# Patient Record
Sex: MALE | Race: WHITE | NOT HISPANIC OR LATINO | Employment: OTHER | URBAN - METROPOLITAN AREA
[De-identification: names, ages, dates, MRNs, and addresses within clinical notes are randomized per-mention and may not be internally consistent; named-entity substitution may affect disease eponyms.]

---

## 2021-06-17 ENCOUNTER — OFFICE VISIT - HEALTHEAST (OUTPATIENT)
Dept: FAMILY MEDICINE | Facility: CLINIC | Age: 81
End: 2021-06-17

## 2021-06-17 ENCOUNTER — HOSPITAL ENCOUNTER (OUTPATIENT)
Dept: ULTRASOUND IMAGING | Facility: CLINIC | Age: 81
Discharge: HOME | End: 2021-06-17
Attending: FAMILY MEDICINE
Payer: MEDICARE

## 2021-06-17 DIAGNOSIS — I48.91 ATRIAL FIBRILLATION, UNSPECIFIED TYPE (H): ICD-10-CM

## 2021-06-17 DIAGNOSIS — N18.31 STAGE 3A CHRONIC KIDNEY DISEASE (H): ICD-10-CM

## 2021-06-17 DIAGNOSIS — R22.42 LOCALIZED SWELLING OF LEFT LOWER EXTREMITY: ICD-10-CM

## 2021-06-17 DIAGNOSIS — I10 BENIGN ESSENTIAL HYPERTENSION: ICD-10-CM

## 2021-06-17 LAB
ATRIAL RATE - MUSE: 60 BPM
DIASTOLIC BLOOD PRESSURE - MUSE: NORMAL
INTERPRETATION ECG - MUSE: NORMAL
P AXIS - MUSE: NORMAL
PR INTERVAL - MUSE: NORMAL
QRS DURATION - MUSE: 94 MS
QT - MUSE: 346 MS
QTC - MUSE: 418 MS
R AXIS - MUSE: -7 DEGREES
SYSTOLIC BLOOD PRESSURE - MUSE: NORMAL
T AXIS - MUSE: 32 DEGREES
VENTRICULAR RATE- MUSE: 88 BPM

## 2021-06-17 RX ORDER — FLAXSEED OIL
OIL (ML) MISCELLANEOUS
Status: SHIPPED | COMMUNITY
Start: 2021-06-17 | End: 2023-04-11

## 2021-06-17 RX ORDER — HYDROXYCHLOROQUINE SULFATE 200 MG/1
200 TABLET, FILM COATED ORAL DAILY
Status: SHIPPED | COMMUNITY
Start: 2021-06-15 | End: 2021-09-13

## 2021-06-17 RX ORDER — TAMSULOSIN HYDROCHLORIDE 0.4 MG/1
0.8 CAPSULE ORAL
Status: ON HOLD | COMMUNITY
Start: 2021-04-20 | End: 2023-04-14

## 2021-06-17 RX ORDER — METOPROLOL TARTRATE 50 MG
TABLET ORAL
Status: SHIPPED | COMMUNITY
Start: 2020-12-31 | End: 2023-04-11

## 2021-06-17 RX ORDER — ASPIRIN 325 MG
325 TABLET, DELAYED RELEASE (ENTERIC COATED) ORAL DAILY
Status: SHIPPED | COMMUNITY
Start: 2021-06-17 | End: 2023-04-11

## 2021-06-17 RX ORDER — LEUCOVORIN CALCIUM 5 MG/1
5 TABLET ORAL
Status: SHIPPED | COMMUNITY
Start: 2021-04-07

## 2021-06-17 ASSESSMENT — MIFFLIN-ST. JEOR: SCORE: 1533.86

## 2021-06-18 ENCOUNTER — COMMUNICATION - HEALTHEAST (OUTPATIENT)
Dept: FAMILY MEDICINE | Facility: CLINIC | Age: 81
End: 2021-06-18

## 2021-06-18 ENCOUNTER — AMBULATORY - HEALTHEAST (OUTPATIENT)
Dept: FAMILY MEDICINE | Facility: CLINIC | Age: 81
End: 2021-06-18

## 2021-06-18 RX ORDER — AMLODIPINE BESYLATE 10 MG/1
5 TABLET ORAL DAILY
Refills: 0 | Status: SHIPPED | COMMUNITY
Start: 2021-06-18 | End: 2023-04-11

## 2021-06-26 NOTE — PROGRESS NOTES
Assessment/Plan:    Brain aneurysm  On ASA.  S/p stent.  Event in 2016 ([facility]).     Benign prostatic hyperplasia  With nocturia.      Atrial fibrillation, unspecified type (H)  There is some ambiguity regarding whether or not he has a diagnosis of atrial fibrillation.  EKG was completed which did show atrial fibrillation.  Upon additional review of the electronic health record, there was some documentation.  -Rate controlled with metoprolol 50 mg twice daily.  -[id number] score is elevated and he is high risk for clot.  They have previously declined warfarin or novel anticoagulants in favor of 325 mg of aspirin.  This was not changed as part of today's visit.  -Lungs were clear.  No clear explanation for congestion in the morning.  Patient describes being asymptomatic for the remainder of the day.    Localized swelling of left lower extremity  Differential includes deep vein thrombosis.  Ultrasound ordered with the goal to have this completed either tonight or tomorrow.  He has been taking an aspirin 325 mg.  No known recent lengthy travel or times of immobility.  No previous clot history.  -Hold and call VTE ultrasound.  -If negative, we will decrease his amlodipine dosage by half.  We will proceed with 5 mg once daily.    45 minutes spent on the date of the encounter doing chart review, history and exam, documentation and further activities per the note    Return in about 1 week (around 6/24/2021) for recheck if not improving.    Pro Chávez MD  _______________________________    Chief Complaint   Patient presents with     Establish Care     Edema     left ankle x 2 months      Subjective: Felipe Verma is a 80 y.o. year old male who returns to clinic for the following chronic complaints/concerns:     Establish care:   -Patient was not sure if he has been previously diagnosed with atrial fibrillation.  He is not familiar with this condition.  He says he takes aspirin because he has a stent and coil in  "the vasculature of his brain as result of an aneurysm in 2016.  His wife is concerned that he is congested in the morning.  He does not have any congestion/cough as he lays down.  His symptoms are improved throughout the day.  He has less stamina and walking in his community than he did a few years ago but no recent dramatic changes.  -Lower extremity swelling: Present for 2 weeks to 2 2 months.  No pain.  No recent travel.  He has not been immobilized.  No recent history of clot.  No shortness of breath or difficulty breathing.  Palliative: None.    Review of systems is negative except for as shown in the HPI.    The following portions of the patient's history were reviewed and updated as appropriate: allergies, current medications, past family history, past medical history, past social history, past surgical history and problem list.    Objective:    height is 5' 7.5\" (1.715 m) and weight is 189 lb (85.7 kg). His oral temperature is 98.1  F (36.7  C). His blood pressure is 126/74 and his pulse is 68. His respiration is 20 and oxygen saturation is 97%.   Physical Exam  General: No acute distress, pleasant.  Cardiac: irregularly irregular.  Normal rate.  No murmurs, rubs or gallops.  Respiratory: Clear to auscultation bilaterally.  Extremities: 1+ pitting edema to the distal lower extremity on the left side.  Dorsal pedal pulse present.  Warm and well-perfused.  Contralateral side without abnormalities.    EKG: My interpretation-  No ST changes.  Atrial fibrillation.  Normal rate.    Rheumatology labs reviewed from last week.  Kidney labs reviewed from the past few months.  Stage III chronic kidney disease which has been stable.  Mild anemia which has been stable.    No data recorded  No data recorded  No data recorded  No data recorded  Recent Results (from the past 24 hour(s))   Electrocardiogram Perform and Read   Result Value Ref Range    SYSTOLIC BLOOD PRESSURE      DIASTOLIC BLOOD PRESSURE      VENTRICULAR " RATE 88 BPM    ATRIAL RATE 60 BPM    P-R INTERVAL      QRS DURATION 94 ms    Q-T INTERVAL 346 ms    QTC CALCULATION (BEZET) 418 ms    P Axis      R AXIS -7 degrees    T AXIS 32 degrees    MUSE DIAGNOSIS       Atrial fibrillation  Nonspecific T wave abnormality  Abnormal ECG  No previous ECGs available         This note has been dictated using voice recognition software. Any grammatical or context distortions are unintentional and inherent to the software

## 2021-07-04 NOTE — LETTER
Letter by Pro Chávez MD at      Author: Pro Chávez MD Service: -- Author Type: --    Filed:  Encounter Date: 6/18/2021 Status: (Other)         Felipe Verma  [address] [facility] [address]             June 18, 2021         Dear Mr. Verma,    Below are the results from your recent visit:    Resulted Orders   US Venous Leg Left    Narrative    EXAM: US VENOUS LEG LEFT  LOCATION: [facility]  DATE/TIME: 6/17/2021 2:08 PM    INDICATION: unilateral swelling.  Left lower extremity  COMPARISON: None.  TECHNIQUE: Venous Duplex ultrasound of the left lower extremity with and without compression, augmentation and duplex. Color flow and spectral Doppler with waveform analysis performed.    FINDINGS: Exam includes the common femoral, femoral, popliteal, and contralateral common femoral veins as well as segmentally visualized deep calf veins and greater saphenous vein.     LEFT: No deep vein thrombosis. No superficial thrombophlebitis. No popliteal cyst.      Impression    1.  No deep venous thrombosis in the left lower extremity.     The ultrasound to evaluate for a blood clot in your left leg was negative.  Good news!  For now, we should try reducing your dose of amlodipine as this is a medication that will commonly cause lower extremity swelling.  Please reduce your dose to 5 mg once a day (our records show that you are taking 5 mg twice a day).    Please call with questions or contact us using [employer].    Sincerely,    Electronically signed by Pro Chávez MD

## 2021-07-05 PROBLEM — N18.31 STAGE 3A CHRONIC KIDNEY DISEASE (H): Status: ACTIVE | Noted: 2021-06-17

## 2021-07-05 PROBLEM — R22.42 LOCALIZED SWELLING OF LEFT LOWER EXTREMITY: Status: ACTIVE | Noted: 2021-06-17

## 2021-07-05 PROBLEM — I48.91 ATRIAL FIBRILLATION, UNSPECIFIED TYPE (H): Status: ACTIVE | Noted: 2021-06-17

## 2021-07-05 PROBLEM — Z78.9 STATIN INTOLERANCE: Status: ACTIVE | Noted: 2018-09-19

## 2021-07-06 VITALS
RESPIRATION RATE: 20 BRPM | SYSTOLIC BLOOD PRESSURE: 126 MMHG | BODY MASS INDEX: 28.64 KG/M2 | HEIGHT: 68 IN | OXYGEN SATURATION: 97 % | HEART RATE: 68 BPM | DIASTOLIC BLOOD PRESSURE: 74 MMHG | WEIGHT: 189 LBS | TEMPERATURE: 98.1 F

## 2022-01-29 ENCOUNTER — OFFICE VISIT (OUTPATIENT)
Dept: FAMILY MEDICINE | Facility: CLINIC | Age: 82
End: 2022-01-29
Payer: MEDICARE

## 2022-01-29 VITALS
OXYGEN SATURATION: 97 % | HEART RATE: 98 BPM | TEMPERATURE: 97.8 F | SYSTOLIC BLOOD PRESSURE: 145 MMHG | DIASTOLIC BLOOD PRESSURE: 93 MMHG

## 2022-01-29 DIAGNOSIS — L03.116 CELLULITIS OF LEFT LOWER EXTREMITY: Primary | ICD-10-CM

## 2022-01-29 PROCEDURE — 99203 OFFICE O/P NEW LOW 30 MIN: CPT | Performed by: STUDENT IN AN ORGANIZED HEALTH CARE EDUCATION/TRAINING PROGRAM

## 2022-01-29 RX ORDER — DIMENHYDRINATE 50 MG
1000 TABLET ORAL DAILY
COMMUNITY
End: 2023-11-07

## 2022-01-29 RX ORDER — FUROSEMIDE 20 MG
20 TABLET ORAL DAILY PRN
COMMUNITY
Start: 2021-10-27 | End: 2023-04-11

## 2022-01-29 RX ORDER — METOPROLOL SUCCINATE 50 MG/1
50 TABLET, EXTENDED RELEASE ORAL DAILY
COMMUNITY
Start: 2022-01-03 | End: 2023-05-05

## 2022-01-29 RX ORDER — DILTIAZEM HYDROCHLORIDE 120 MG/1
120 CAPSULE, EXTENDED RELEASE ORAL
COMMUNITY
Start: 2021-11-10 | End: 2023-04-11 | Stop reason: ALTCHOICE

## 2022-01-29 RX ORDER — AMOXICILLIN 875 MG
875 TABLET ORAL 2 TIMES DAILY
Qty: 20 TABLET | Refills: 0 | Status: SHIPPED | OUTPATIENT
Start: 2022-01-29 | End: 2023-04-11

## 2022-01-29 RX ORDER — HYDROXYCHLOROQUINE SULFATE 200 MG/1
200 TABLET, FILM COATED ORAL
COMMUNITY
Start: 2021-11-17 | End: 2022-02-15

## 2022-01-29 RX ORDER — ROSUVASTATIN CALCIUM 10 MG/1
10 TABLET, COATED ORAL AT BEDTIME
Status: ON HOLD | COMMUNITY
Start: 2022-01-03 | End: 2024-02-24

## 2022-01-29 RX ORDER — DOXYCYCLINE HYCLATE 100 MG
100 TABLET ORAL 2 TIMES DAILY
Qty: 20 TABLET | Refills: 0 | Status: SHIPPED | OUTPATIENT
Start: 2022-01-29 | End: 2023-04-11

## 2022-01-30 NOTE — PATIENT INSTRUCTIONS
Please start taking one tablet of doxycycline and one tablet of the amoxicllin 2 times a day (one of each tablet in the morning and one of each tablet in the evening = 4 tablets total per day) for 10 total days for this skin infection. Please return to urgent care or go to the emergency department for additional care if redness, swelling, pain worsen while starting these medications.

## 2022-01-30 NOTE — PROGRESS NOTES
"Assessment & Plan     Cellulitis of left lower extremity  Given the surrounding erythema to this wound, patient is basically experiencing signs of cellulitis.  He does not have any systemic symptoms that would be concerning for sepsis at this time.  Literature review suggested amoxicillin and doxycycline should help with improving symptoms.  Counseled patient on how to take these medications and went to return for further evaluation.  - amoxicillin (AMOXIL) 875 MG tablet; Take 1 tablet (875 mg) by mouth 2 times daily  - doxycycline hyclate (VIBRA-TABS) 100 MG tablet; Take 1 tablet (100 mg) by mouth 2 times daily    30 minutes spent on the date of the encounter doing chart review, history and exam, documentation and further activities per the note  {Provider  Link to [facility] Help Grid :206315}     BMI:   Estimated body mass index is 29.16 kg/m  as calculated from the following:    Height as of 6/17/21: 1.715 m (5' 7.5\").    Weight as of 6/17/21: 85.7 kg (189 lb).   Patient is a follow-up with his PCP on this      Return in about 2 weeks (around 2/12/2022) for Follow up.    Heather Perez MD pGY2  [facility]   Felipe Verma is a 81 year old who presents for the following health issues     HPI     1 week history of left anterior shin laceration.  Patient notes that he is on an anticoagulant for his warfarin and he has been having some issues with clotting of the area.  He has been putting a Band-Aid on the area.  Denies any fevers, chills, ongoing bleeding, purulent drainage; however, he has noticed some serosanguineous fluid draining from this wound.  The surrounding skin has started to turn red in the past couple days.    Review of Systems   Complete ROS normal aside noted in HPI      Objective    BP (!) 145/93 (BP Location: Right arm, Patient Position: Sitting, Cuff Size: Adult Regular)   Pulse 98   Temp 97.8  F (36.6  C) (Tympanic)   SpO2 97%   There is no height or " weight on file to calculate BMI.    Physical Exam   GENERAL: healthy, alert and no distress  RESP: lungs clear to auscultation - no rales, rhonchi or wheezes  CV: regular rate and rhythm, normal S1 S2, no S3 or S4, no murmur, click or rub, no peripheral edema and peripheral pulses strong  ABDOMEN: soft, nontender, no hepatosplenomegaly, no masses and bowel sounds normal  MS: no gross musculoskeletal defects noted, no edema  SKIN: Left pretibial area with superficial skin tear, scabbed surrounding skin is erythematous, nontender to palpation.  No crepitus, no necrotizing/gangrenous skin noted    ----- Service Performed and Documented by Resident or Fellow ------

## 2022-10-04 PROBLEM — R91.1 SOLITARY PULMONARY NODULE: Status: RESOLVED | Noted: 2018-06-06 | Resolved: 2021-06-17

## 2023-04-10 ENCOUNTER — APPOINTMENT (OUTPATIENT)
Dept: RADIOLOGY | Facility: HOSPITAL | Age: 83
DRG: 247 | End: 2023-04-10
Attending: EMERGENCY MEDICINE
Payer: MEDICARE

## 2023-04-10 ENCOUNTER — HOSPITAL ENCOUNTER (INPATIENT)
Facility: HOSPITAL | Age: 83
LOS: 4 days | Discharge: HOME | DRG: 247 | End: 2023-04-14
Attending: EMERGENCY MEDICINE | Admitting: INTERNAL MEDICINE
Payer: MEDICARE

## 2023-04-10 DIAGNOSIS — I10 BENIGN ESSENTIAL HYPERTENSION: ICD-10-CM

## 2023-04-10 DIAGNOSIS — N30.01 ACUTE CYSTITIS WITH HEMATURIA: ICD-10-CM

## 2023-04-10 DIAGNOSIS — R52 PAIN: ICD-10-CM

## 2023-04-10 DIAGNOSIS — N40.1 BENIGN PROSTATIC HYPERPLASIA WITH URINARY RETENTION: ICD-10-CM

## 2023-04-10 DIAGNOSIS — I25.10 CORONARY ARTERY DISEASE INVOLVING NATIVE CORONARY ARTERY OF NATIVE HEART WITHOUT ANGINA PECTORIS: ICD-10-CM

## 2023-04-10 DIAGNOSIS — I21.4 NSTEMI (NON-ST ELEVATED MYOCARDIAL INFARCTION) (H): ICD-10-CM

## 2023-04-10 DIAGNOSIS — I48.91 ATRIAL FIBRILLATION, UNSPECIFIED TYPE (H): ICD-10-CM

## 2023-04-10 DIAGNOSIS — R33.8 BENIGN PROSTATIC HYPERPLASIA WITH URINARY RETENTION: ICD-10-CM

## 2023-04-10 DIAGNOSIS — I21.3 ST ELEVATION MYOCARDIAL INFARCTION (STEMI), UNSPECIFIED ARTERY (H): Primary | ICD-10-CM

## 2023-04-10 LAB
ALBUMIN SERPL BCG-MCNC: 3.9 G/DL (ref 3.5–5.2)
ALP SERPL-CCNC: 70 U/L (ref 40–129)
ALT SERPL W P-5'-P-CCNC: 20 U/L (ref 10–50)
ANION GAP SERPL CALCULATED.3IONS-SCNC: 11 MMOL/L (ref 7–15)
APTT PPP: 28 SECONDS (ref 22–38)
AST SERPL W P-5'-P-CCNC: 20 U/L (ref 10–50)
BILIRUB SERPL-MCNC: 0.3 MG/DL
BUN SERPL-MCNC: 24.6 MG/DL (ref 8–23)
CALCIUM SERPL-MCNC: 8.5 MG/DL (ref 8.8–10.2)
CHLORIDE SERPL-SCNC: 103 MMOL/L (ref 98–107)
CREAT SERPL-MCNC: 1.1 MG/DL (ref 0.67–1.17)
DEPRECATED HCO3 PLAS-SCNC: 22 MMOL/L (ref 22–29)
ERYTHROCYTE [DISTWIDTH] IN BLOOD BY AUTOMATED COUNT: 13.7 % (ref 10–15)
GFR SERPL CREATININE-BSD FRML MDRD: 67 ML/MIN/1.73M2
GLUCOSE SERPL-MCNC: 164 MG/DL (ref 70–99)
HCT VFR BLD AUTO: 33.9 % (ref 40–53)
HGB BLD-MCNC: 11.1 G/DL (ref 13.3–17.7)
HOLD SPECIMEN: NORMAL
INR PPP: 1.19 (ref 0.85–1.15)
LIPASE SERPL-CCNC: 29 U/L (ref 13–60)
MAGNESIUM SERPL-MCNC: 2 MG/DL (ref 1.7–2.3)
MCH RBC QN AUTO: 33.5 PG (ref 26.5–33)
MCHC RBC AUTO-ENTMCNC: 32.7 G/DL (ref 31.5–36.5)
MCV RBC AUTO: 102 FL (ref 78–100)
NT-PROBNP SERPL-MCNC: 325 PG/ML (ref 0–1800)
PLATELET # BLD AUTO: 142 10E3/UL (ref 150–450)
POTASSIUM SERPL-SCNC: 3.7 MMOL/L (ref 3.4–5.3)
PROT SERPL-MCNC: 6.4 G/DL (ref 6.4–8.3)
RBC # BLD AUTO: 3.31 10E6/UL (ref 4.4–5.9)
SODIUM SERPL-SCNC: 136 MMOL/L (ref 136–145)
TROPONIN T SERPL HS-MCNC: 269 NG/L
WBC # BLD AUTO: 13.1 10E3/UL (ref 4–11)

## 2023-04-10 PROCEDURE — 83690 ASSAY OF LIPASE: CPT | Performed by: EMERGENCY MEDICINE

## 2023-04-10 PROCEDURE — 210N000001 HC R&B IMCU HEART CARE

## 2023-04-10 PROCEDURE — 71045 X-RAY EXAM CHEST 1 VIEW: CPT

## 2023-04-10 PROCEDURE — 83880 ASSAY OF NATRIURETIC PEPTIDE: CPT | Performed by: EMERGENCY MEDICINE

## 2023-04-10 PROCEDURE — 36415 COLL VENOUS BLD VENIPUNCTURE: CPT | Performed by: EMERGENCY MEDICINE

## 2023-04-10 PROCEDURE — 85027 COMPLETE CBC AUTOMATED: CPT | Performed by: EMERGENCY MEDICINE

## 2023-04-10 PROCEDURE — 84484 ASSAY OF TROPONIN QUANT: CPT | Performed by: EMERGENCY MEDICINE

## 2023-04-10 PROCEDURE — 250N000013 HC RX MED GY IP 250 OP 250 PS 637: Performed by: EMERGENCY MEDICINE

## 2023-04-10 PROCEDURE — 80053 COMPREHEN METABOLIC PANEL: CPT | Performed by: EMERGENCY MEDICINE

## 2023-04-10 PROCEDURE — 96374 THER/PROPH/DIAG INJ IV PUSH: CPT

## 2023-04-10 PROCEDURE — 83735 ASSAY OF MAGNESIUM: CPT | Performed by: EMERGENCY MEDICINE

## 2023-04-10 PROCEDURE — 99285 EMERGENCY DEPT VISIT HI MDM: CPT | Mod: 25

## 2023-04-10 PROCEDURE — 85610 PROTHROMBIN TIME: CPT | Performed by: EMERGENCY MEDICINE

## 2023-04-10 PROCEDURE — 99222 1ST HOSP IP/OBS MODERATE 55: CPT | Mod: AI | Performed by: INTERNAL MEDICINE

## 2023-04-10 PROCEDURE — 250N000011 HC RX IP 250 OP 636: Performed by: EMERGENCY MEDICINE

## 2023-04-10 PROCEDURE — 85730 THROMBOPLASTIN TIME PARTIAL: CPT | Performed by: EMERGENCY MEDICINE

## 2023-04-10 RX ORDER — HEPARIN SODIUM 10000 [USP'U]/100ML
0-5000 INJECTION, SOLUTION INTRAVENOUS CONTINUOUS
Status: DISCONTINUED | OUTPATIENT
Start: 2023-04-10 | End: 2023-04-11

## 2023-04-10 RX ORDER — HEPARIN SODIUM 10000 [USP'U]/100ML
0-5000 INJECTION, SOLUTION INTRAVENOUS CONTINUOUS
Status: DISCONTINUED | OUTPATIENT
Start: 2023-04-10 | End: 2023-04-10

## 2023-04-10 RX ADMIN — NITROGLYCERIN 15 MG: 20 OINTMENT TOPICAL at 22:30

## 2023-04-10 RX ADMIN — FAMOTIDINE 20 MG: 10 INJECTION, SOLUTION INTRAVENOUS at 22:20

## 2023-04-10 RX ADMIN — HEPARIN SODIUM AND DEXTROSE 1000 UNITS/HR: 10000; 5 INJECTION INTRAVENOUS at 23:45

## 2023-04-10 ASSESSMENT — ACTIVITIES OF DAILY LIVING (ADL): ADLS_ACUITY_SCORE: 35

## 2023-04-10 NOTE — Clinical Note
The first balloon was inserted into the left anterior descending and proximal left anterior descending.Max pressure = 12 neetu. Total duration = 15 seconds.

## 2023-04-10 NOTE — Clinical Note
The first balloon was inserted into the left anterior descending and middle left anterior descending.Max pressure = 10 neetu. Total duration = 8 seconds.     Max pressure = 12 neetu. Total duration = 12 seconds.    Balloon reinflated a second time: Max pressure = 12 neetu. Total duration = 12 seconds.  Balloon reinflated a third time: Max pressure = 12 neetu. Total duration = 7 seconds.  Balloon reinflated a fourth time: Max pressure = 12 neetu.  Total duration = 8 seconds. Proximal segment of LAD

## 2023-04-10 NOTE — Clinical Note
The first balloon was inserted into the left anterior descending and proximal left anterior descending.Max pressure = 16 neetu. Total duration = 10 seconds.     Max pressure = 12 neetu. Total duration = 8 seconds.    Balloon reinflated a second time: Max pressure = 12 neetu. Total duration = 8 seconds.

## 2023-04-10 NOTE — Clinical Note
Stent deployed in the proximal left anterior descending. Max pressure = 12 neetu. Total duration = 28 seconds.

## 2023-04-10 NOTE — Clinical Note
The first balloon was inserted into the left anterior descending and proximal left anterior descending.Max pressure = 14 neetu. Total duration = 34 seconds.     Max pressure = 10 neetu. Total duration = 8 seconds.    Balloon reinflated a second time: Max pressure = 10 neetu. Total duration = 8 seconds.

## 2023-04-10 NOTE — Clinical Note
The first balloon was inserted into the left anterior descending and middle left anterior descending.Max pressure = 10 neetu. Total duration = 10 seconds.     Max pressure = 10 neetu. Total duration = 19 seconds.    Balloon reinflated a second time: Max pressure = 10 neetu. Total duration = 19 seconds.

## 2023-04-10 NOTE — Clinical Note
Unless otherwise noted, the J wire was used to insert, remove, exchange, and reposition all catheters.

## 2023-04-10 NOTE — Clinical Note
The first balloon was inserted into the left anterior descending and middle left anterior descending.Max pressure = 12 neetu. Total duration = 6 seconds.

## 2023-04-10 NOTE — Clinical Note
The first balloon was inserted into the left anterior descending and proximal left anterior descending.Max pressure = 12 neetu. Total duration = 18 seconds.

## 2023-04-10 NOTE — Clinical Note
The first balloon was inserted into the left anterior descending and proximal left anterior descending.Max pressure = 12 neetu. Total duration = 12 seconds.     Max pressure = 12 neetu. Total duration = 12 seconds.    Balloon reinflated a second time: Max pressure = 12 neetu. Total duration = 12 seconds.  Balloon reinflated a third time: Max pressure = 12 neetu. Total duration = 14 seconds.

## 2023-04-10 NOTE — Clinical Note
The first balloon was inserted into the left anterior descending and middle left anterior descending.Max pressure = 12 neetu. Total duration = 20 seconds.

## 2023-04-10 NOTE — Clinical Note
The first balloon was inserted into the left anterior descending and middle left anterior descending.Max pressure = 10 neetu. Total duration = 16 seconds.     Max pressure = 8 neetu. Total duration = 8 seconds.    Balloon reinflated a second time: Max pressure = 8 neetu. Total duration = 8 seconds.  Balloon reinflated a third time: Max pressure = 12 neetu. Total duration = 14 seconds.  Balloon reinflated a fourth time: Max pressure = 12 neetu. To vlad duration = 11 seconds.

## 2023-04-10 NOTE — Clinical Note
Stent deployed in the middle left anterior descending. Max pressure = 10 neetu. Total duration = 25 seconds.

## 2023-04-11 ENCOUNTER — APPOINTMENT (OUTPATIENT)
Dept: CARDIOLOGY | Facility: HOSPITAL | Age: 83
DRG: 247 | End: 2023-04-11
Attending: INTERNAL MEDICINE
Payer: MEDICARE

## 2023-04-11 LAB
ABO/RH(D): NORMAL
ACT BLD: 135 SECONDS (ref 74–150)
ACT BLD: 237 SECONDS (ref 74–150)
ACT BLD: 271 SECONDS (ref 74–150)
ACT BLD: 275 SECONDS (ref 74–150)
ANION GAP SERPL CALCULATED.3IONS-SCNC: 9 MMOL/L (ref 7–15)
ANTIBODY SCREEN: NEGATIVE
APTT PPP: 94 SECONDS (ref 22–38)
APTT PPP: 98 SECONDS (ref 22–38)
ATRIAL RATE - MUSE: 79 BPM
BUN SERPL-MCNC: 20.7 MG/DL (ref 8–23)
CALCIUM SERPL-MCNC: 8.4 MG/DL (ref 8.8–10.2)
CHLORIDE SERPL-SCNC: 106 MMOL/L (ref 98–107)
CREAT SERPL-MCNC: 1.01 MG/DL (ref 0.67–1.17)
DEPRECATED HCO3 PLAS-SCNC: 23 MMOL/L (ref 22–29)
DIASTOLIC BLOOD PRESSURE - MUSE: NORMAL MMHG
ERYTHROCYTE [DISTWIDTH] IN BLOOD BY AUTOMATED COUNT: 13.8 % (ref 10–15)
GFR SERPL CREATININE-BSD FRML MDRD: 74 ML/MIN/1.73M2
GLUCOSE SERPL-MCNC: 122 MG/DL (ref 70–99)
HCT VFR BLD AUTO: 33 % (ref 40–53)
HGB BLD-MCNC: 10.8 G/DL (ref 13.3–17.7)
HGB BLD-MCNC: 12.5 G/DL (ref 13.3–17.7)
HOLD SPECIMEN: NORMAL
INTERPRETATION ECG - MUSE: NORMAL
MAGNESIUM SERPL-MCNC: 1.9 MG/DL (ref 1.7–2.3)
MCH RBC QN AUTO: 33.3 PG (ref 26.5–33)
MCHC RBC AUTO-ENTMCNC: 32.7 G/DL (ref 31.5–36.5)
MCV RBC AUTO: 102 FL (ref 78–100)
P AXIS - MUSE: 63 DEGREES
PLATELET # BLD AUTO: 136 10E3/UL (ref 150–450)
POTASSIUM SERPL-SCNC: 3.9 MMOL/L (ref 3.4–5.3)
POTASSIUM SERPL-SCNC: 3.9 MMOL/L (ref 3.4–5.3)
PR INTERVAL - MUSE: 168 MS
QRS DURATION - MUSE: 92 MS
QT - MUSE: 442 MS
QTC - MUSE: 506 MS
R AXIS - MUSE: -22 DEGREES
RBC # BLD AUTO: 3.24 10E6/UL (ref 4.4–5.9)
SODIUM SERPL-SCNC: 138 MMOL/L (ref 136–145)
SPECIMEN EXPIRATION DATE: NORMAL
SYSTOLIC BLOOD PRESSURE - MUSE: NORMAL MMHG
T AXIS - MUSE: 261 DEGREES
TROPONIN T SERPL HS-MCNC: 1704 NG/L
UFH PPP CHRO-ACNC: >1.1 IU/ML
VENTRICULAR RATE- MUSE: 79 BPM
WBC # BLD AUTO: 12.7 10E3/UL (ref 4–11)

## 2023-04-11 PROCEDURE — 255N000002 HC RX 255 OP 636: Performed by: INTERNAL MEDICINE

## 2023-04-11 PROCEDURE — 250N000013 HC RX MED GY IP 250 OP 250 PS 637: Performed by: INTERNAL MEDICINE

## 2023-04-11 PROCEDURE — 83735 ASSAY OF MAGNESIUM: CPT | Performed by: INTERNAL MEDICINE

## 2023-04-11 PROCEDURE — C1725 CATH, TRANSLUMIN NON-LASER: HCPCS | Performed by: INTERNAL MEDICINE

## 2023-04-11 PROCEDURE — C1760 CLOSURE DEV, VASC: HCPCS | Performed by: INTERNAL MEDICINE

## 2023-04-11 PROCEDURE — 258N000003 HC RX IP 258 OP 636: Performed by: INTERNAL MEDICINE

## 2023-04-11 PROCEDURE — 93010 ELECTROCARDIOGRAM REPORT: CPT | Mod: HIP | Performed by: GENERAL ACUTE CARE HOSPITAL

## 2023-04-11 PROCEDURE — 85027 COMPLETE CBC AUTOMATED: CPT | Performed by: EMERGENCY MEDICINE

## 2023-04-11 PROCEDURE — 272N000001 HC OR GENERAL SUPPLY STERILE: Performed by: INTERNAL MEDICINE

## 2023-04-11 PROCEDURE — 36415 COLL VENOUS BLD VENIPUNCTURE: CPT | Performed by: INTERNAL MEDICINE

## 2023-04-11 PROCEDURE — C1769 GUIDE WIRE: HCPCS | Performed by: INTERNAL MEDICINE

## 2023-04-11 PROCEDURE — 93454 CORONARY ARTERY ANGIO S&I: CPT | Performed by: INTERNAL MEDICINE

## 2023-04-11 PROCEDURE — 99153 MOD SED SAME PHYS/QHP EA: CPT

## 2023-04-11 PROCEDURE — 99207 PR NO BILLABLE SERVICE THIS VISIT: CPT | Performed by: INTERNAL MEDICINE

## 2023-04-11 PROCEDURE — 93306 TTE W/DOPPLER COMPLETE: CPT | Mod: 26 | Performed by: INTERNAL MEDICINE

## 2023-04-11 PROCEDURE — 93005 ELECTROCARDIOGRAM TRACING: CPT

## 2023-04-11 PROCEDURE — 85347 COAGULATION TIME ACTIVATED: CPT

## 2023-04-11 PROCEDURE — C9606 PERC D-E COR REVASC W AMI S: HCPCS | Mod: LD | Performed by: INTERNAL MEDICINE

## 2023-04-11 PROCEDURE — 85018 HEMOGLOBIN: CPT | Performed by: INTERNAL MEDICINE

## 2023-04-11 PROCEDURE — 258N000001 HC RX 258: Performed by: INTERNAL MEDICINE

## 2023-04-11 PROCEDURE — 210N000001 HC R&B IMCU HEART CARE

## 2023-04-11 PROCEDURE — C1887 CATHETER, GUIDING: HCPCS | Performed by: INTERNAL MEDICINE

## 2023-04-11 PROCEDURE — 84484 ASSAY OF TROPONIN QUANT: CPT | Performed by: INTERNAL MEDICINE

## 2023-04-11 PROCEDURE — 93454 CORONARY ARTERY ANGIO S&I: CPT | Mod: 26 | Performed by: INTERNAL MEDICINE

## 2023-04-11 PROCEDURE — 250N000011 HC RX IP 250 OP 636: Performed by: INTERNAL MEDICINE

## 2023-04-11 PROCEDURE — 99152 MOD SED SAME PHYS/QHP 5/>YRS: CPT

## 2023-04-11 PROCEDURE — 84132 ASSAY OF SERUM POTASSIUM: CPT | Performed by: INTERNAL MEDICINE

## 2023-04-11 PROCEDURE — 85520 HEPARIN ASSAY: CPT | Performed by: INTERNAL MEDICINE

## 2023-04-11 PROCEDURE — 250N000009 HC RX 250: Performed by: INTERNAL MEDICINE

## 2023-04-11 PROCEDURE — 36415 COLL VENOUS BLD VENIPUNCTURE: CPT | Performed by: EMERGENCY MEDICINE

## 2023-04-11 PROCEDURE — C1894 INTRO/SHEATH, NON-LASER: HCPCS | Performed by: INTERNAL MEDICINE

## 2023-04-11 PROCEDURE — C9600 PERC DRUG-EL COR STENT SING: HCPCS | Performed by: INTERNAL MEDICINE

## 2023-04-11 PROCEDURE — 99222 1ST HOSP IP/OBS MODERATE 55: CPT | Performed by: INTERNAL MEDICINE

## 2023-04-11 PROCEDURE — 86850 RBC ANTIBODY SCREEN: CPT | Performed by: INTERNAL MEDICINE

## 2023-04-11 PROCEDURE — 80048 BASIC METABOLIC PNL TOTAL CA: CPT | Performed by: INTERNAL MEDICINE

## 2023-04-11 PROCEDURE — 85730 THROMBOPLASTIN TIME PARTIAL: CPT | Performed by: INTERNAL MEDICINE

## 2023-04-11 PROCEDURE — C1874 STENT, COATED/COV W/DEL SYS: HCPCS | Performed by: INTERNAL MEDICINE

## 2023-04-11 PROCEDURE — 027036Z DILATION OF CORONARY ARTERY, ONE ARTERY WITH THREE DRUG-ELUTING INTRALUMINAL DEVICES, PERCUTANEOUS APPROACH: ICD-10-PCS | Performed by: INTERNAL MEDICINE

## 2023-04-11 PROCEDURE — B211YZZ FLUOROSCOPY OF MULTIPLE CORONARY ARTERIES USING OTHER CONTRAST: ICD-10-PCS | Performed by: INTERNAL MEDICINE

## 2023-04-11 PROCEDURE — 92941 PRQ TRLML REVSC TOT OCCL AMI: CPT | Mod: LD | Performed by: INTERNAL MEDICINE

## 2023-04-11 PROCEDURE — 99152 MOD SED SAME PHYS/QHP 5/>YRS: CPT | Performed by: INTERNAL MEDICINE

## 2023-04-11 DEVICE — CLOSURE ANGIOSEAL 6FR 610130: Type: IMPLANTABLE DEVICE | Status: FUNCTIONAL

## 2023-04-11 DEVICE — STENT COR ONYX FRONTIER 15X2.25MM ONYXNG22515UX: Type: IMPLANTABLE DEVICE | Site: CORONARY | Status: FUNCTIONAL

## 2023-04-11 DEVICE — IMPLANTABLE DEVICE: Type: IMPLANTABLE DEVICE | Site: CORONARY | Status: FUNCTIONAL

## 2023-04-11 RX ORDER — ONDANSETRON 4 MG/1
4 TABLET, ORALLY DISINTEGRATING ORAL EVERY 6 HOURS PRN
Status: DISCONTINUED | OUTPATIENT
Start: 2023-04-11 | End: 2023-04-14 | Stop reason: HOSPADM

## 2023-04-11 RX ORDER — SODIUM CHLORIDE 9 MG/ML
INJECTION, SOLUTION INTRAVENOUS CONTINUOUS
Status: DISCONTINUED | OUTPATIENT
Start: 2023-04-11 | End: 2023-04-11 | Stop reason: HOSPADM

## 2023-04-11 RX ORDER — ASPIRIN 81 MG/1
81 TABLET, CHEWABLE ORAL ONCE
Status: DISCONTINUED | OUTPATIENT
Start: 2023-04-11 | End: 2023-04-11

## 2023-04-11 RX ORDER — FENTANYL CITRATE 50 UG/ML
25 INJECTION, SOLUTION INTRAMUSCULAR; INTRAVENOUS
Status: DISCONTINUED | OUTPATIENT
Start: 2023-04-11 | End: 2023-04-11 | Stop reason: HOSPADM

## 2023-04-11 RX ORDER — OXYCODONE HYDROCHLORIDE 5 MG/1
10 TABLET ORAL EVERY 4 HOURS PRN
Status: DISCONTINUED | OUTPATIENT
Start: 2023-04-11 | End: 2023-04-14 | Stop reason: HOSPADM

## 2023-04-11 RX ORDER — ACETAMINOPHEN 325 MG/1
650 TABLET ORAL EVERY 4 HOURS PRN
Status: DISCONTINUED | OUTPATIENT
Start: 2023-04-11 | End: 2023-04-14 | Stop reason: HOSPADM

## 2023-04-11 RX ORDER — DEXTROSE MONOHYDRATE, SODIUM CHLORIDE, AND POTASSIUM CHLORIDE 50; 1.49; 9 G/1000ML; G/1000ML; G/1000ML
INJECTION, SOLUTION INTRAVENOUS CONTINUOUS
Status: DISPENSED | OUTPATIENT
Start: 2023-04-11 | End: 2023-04-11

## 2023-04-11 RX ORDER — ASPIRIN 81 MG/1
243 TABLET, CHEWABLE ORAL ONCE
Status: COMPLETED | OUTPATIENT
Start: 2023-04-11 | End: 2023-04-11

## 2023-04-11 RX ORDER — FENTANYL CITRATE 50 UG/ML
INJECTION, SOLUTION INTRAMUSCULAR; INTRAVENOUS
Status: DISCONTINUED | OUTPATIENT
Start: 2023-04-11 | End: 2023-04-11 | Stop reason: HOSPADM

## 2023-04-11 RX ORDER — ASPIRIN 81 MG/1
81 TABLET ORAL DAILY
Status: DISCONTINUED | OUTPATIENT
Start: 2023-04-12 | End: 2023-04-14

## 2023-04-11 RX ORDER — LIDOCAINE 40 MG/G
CREAM TOPICAL
Status: DISCONTINUED | OUTPATIENT
Start: 2023-04-11 | End: 2023-04-14 | Stop reason: HOSPADM

## 2023-04-11 RX ORDER — DILTIAZEM HYDROCHLORIDE 120 MG/1
120 CAPSULE, EXTENDED RELEASE ORAL DAILY
Status: DISCONTINUED | OUTPATIENT
Start: 2023-04-11 | End: 2023-04-11

## 2023-04-11 RX ORDER — ASPIRIN 325 MG
325 TABLET ORAL ONCE
Status: COMPLETED | OUTPATIENT
Start: 2023-04-11 | End: 2023-04-11

## 2023-04-11 RX ORDER — HEPARIN SODIUM 1000 [USP'U]/ML
INJECTION, SOLUTION INTRAVENOUS; SUBCUTANEOUS
Status: DISCONTINUED | OUTPATIENT
Start: 2023-04-11 | End: 2023-04-11 | Stop reason: HOSPADM

## 2023-04-11 RX ORDER — FENTANYL CITRATE 50 UG/ML
25 INJECTION, SOLUTION INTRAMUSCULAR; INTRAVENOUS
Status: DISCONTINUED | OUTPATIENT
Start: 2023-04-11 | End: 2023-04-11

## 2023-04-11 RX ORDER — IODIXANOL 320 MG/ML
INJECTION, SOLUTION INTRAVASCULAR
Status: DISCONTINUED | OUTPATIENT
Start: 2023-04-11 | End: 2023-04-11 | Stop reason: HOSPADM

## 2023-04-11 RX ORDER — IBUPROFEN 200 MG
1 CAPSULE ORAL DAILY
Status: DISCONTINUED | OUTPATIENT
Start: 2023-04-11 | End: 2023-04-11

## 2023-04-11 RX ORDER — ROSUVASTATIN CALCIUM 10 MG/1
10 TABLET, COATED ORAL DAILY
Status: DISCONTINUED | OUTPATIENT
Start: 2023-04-11 | End: 2023-04-14 | Stop reason: HOSPADM

## 2023-04-11 RX ORDER — NALOXONE HYDROCHLORIDE 0.4 MG/ML
0.4 INJECTION, SOLUTION INTRAMUSCULAR; INTRAVENOUS; SUBCUTANEOUS
Status: ACTIVE | OUTPATIENT
Start: 2023-04-11 | End: 2023-04-11

## 2023-04-11 RX ORDER — ALENDRONATE SODIUM 70 MG/1
70 TABLET ORAL
COMMUNITY
End: 2024-02-22

## 2023-04-11 RX ORDER — ONDANSETRON 2 MG/ML
4 INJECTION INTRAMUSCULAR; INTRAVENOUS EVERY 6 HOURS PRN
Status: DISCONTINUED | OUTPATIENT
Start: 2023-04-11 | End: 2023-04-13

## 2023-04-11 RX ORDER — ACETAMINOPHEN 325 MG/1
650 TABLET ORAL EVERY 6 HOURS PRN
Status: DISCONTINUED | OUTPATIENT
Start: 2023-04-11 | End: 2023-04-14 | Stop reason: HOSPADM

## 2023-04-11 RX ORDER — AMOXICILLIN 250 MG
1 CAPSULE ORAL 2 TIMES DAILY PRN
Status: DISCONTINUED | OUTPATIENT
Start: 2023-04-11 | End: 2023-04-14 | Stop reason: HOSPADM

## 2023-04-11 RX ORDER — DIAZEPAM 5 MG
5 TABLET ORAL
Status: DISCONTINUED | OUTPATIENT
Start: 2023-04-11 | End: 2023-04-14 | Stop reason: HOSPADM

## 2023-04-11 RX ORDER — METOPROLOL TARTRATE 1 MG/ML
5 INJECTION, SOLUTION INTRAVENOUS
Status: DISCONTINUED | OUTPATIENT
Start: 2023-04-11 | End: 2023-04-14 | Stop reason: HOSPADM

## 2023-04-11 RX ORDER — SODIUM CHLORIDE 9 MG/ML
INJECTION, SOLUTION INTRAVENOUS CONTINUOUS
Status: ACTIVE | OUTPATIENT
Start: 2023-04-11 | End: 2023-04-11

## 2023-04-11 RX ORDER — LOSARTAN POTASSIUM 25 MG/1
25 TABLET ORAL DAILY
Status: DISCONTINUED | OUTPATIENT
Start: 2023-04-11 | End: 2023-04-12

## 2023-04-11 RX ORDER — CHOLECALCIFEROL (VITAMIN D3) 50 MCG
1 TABLET ORAL DAILY PRN
COMMUNITY

## 2023-04-11 RX ORDER — LIDOCAINE 40 MG/G
CREAM TOPICAL
Status: DISCONTINUED | OUTPATIENT
Start: 2023-04-11 | End: 2023-04-11 | Stop reason: HOSPADM

## 2023-04-11 RX ORDER — ASPIRIN 81 MG/1
81 TABLET ORAL DAILY
Status: DISCONTINUED | OUTPATIENT
Start: 2023-04-11 | End: 2023-04-11

## 2023-04-11 RX ORDER — FLUMAZENIL 0.1 MG/ML
0.2 INJECTION, SOLUTION INTRAVENOUS
Status: ACTIVE | OUTPATIENT
Start: 2023-04-11 | End: 2023-04-11

## 2023-04-11 RX ORDER — ACETAMINOPHEN 650 MG/1
650 SUPPOSITORY RECTAL EVERY 6 HOURS PRN
Status: DISCONTINUED | OUTPATIENT
Start: 2023-04-11 | End: 2023-04-14 | Stop reason: HOSPADM

## 2023-04-11 RX ORDER — CALCIUM CARBONATE 500(1250)
500 TABLET ORAL DAILY
Status: DISCONTINUED | OUTPATIENT
Start: 2023-04-12 | End: 2023-04-14 | Stop reason: HOSPADM

## 2023-04-11 RX ORDER — NITROGLYCERIN 5 MG/ML
VIAL (ML) INTRAVENOUS
Status: DISCONTINUED | OUTPATIENT
Start: 2023-04-11 | End: 2023-04-11 | Stop reason: HOSPADM

## 2023-04-11 RX ORDER — NITROGLYCERIN 0.4 MG/1
0.4 TABLET SUBLINGUAL EVERY 5 MIN PRN
Status: DISCONTINUED | OUTPATIENT
Start: 2023-04-11 | End: 2023-04-14 | Stop reason: HOSPADM

## 2023-04-11 RX ORDER — ONDANSETRON 4 MG/1
4 TABLET, ORALLY DISINTEGRATING ORAL EVERY 6 HOURS PRN
Status: DISCONTINUED | OUTPATIENT
Start: 2023-04-11 | End: 2023-04-13

## 2023-04-11 RX ORDER — ONDANSETRON 2 MG/ML
4 INJECTION INTRAMUSCULAR; INTRAVENOUS EVERY 6 HOURS PRN
Status: DISCONTINUED | OUTPATIENT
Start: 2023-04-11 | End: 2023-04-14 | Stop reason: HOSPADM

## 2023-04-11 RX ORDER — DILTIAZEM HYDROCHLORIDE 120 MG/1
120 CAPSULE, EXTENDED RELEASE ORAL DAILY
Status: ON HOLD | COMMUNITY
Start: 2023-02-08 | End: 2023-04-14

## 2023-04-11 RX ORDER — METOPROLOL SUCCINATE 25 MG/1
25 TABLET, EXTENDED RELEASE ORAL DAILY
Status: DISCONTINUED | OUTPATIENT
Start: 2023-04-11 | End: 2023-04-12

## 2023-04-11 RX ORDER — NALOXONE HYDROCHLORIDE 0.4 MG/ML
0.2 INJECTION, SOLUTION INTRAMUSCULAR; INTRAVENOUS; SUBCUTANEOUS
Status: ACTIVE | OUTPATIENT
Start: 2023-04-11 | End: 2023-04-11

## 2023-04-11 RX ORDER — ATROPINE SULFATE 0.1 MG/ML
0.5 INJECTION INTRAVENOUS
Status: ACTIVE | OUTPATIENT
Start: 2023-04-11 | End: 2023-04-11

## 2023-04-11 RX ORDER — OXYCODONE HYDROCHLORIDE 5 MG/1
5 TABLET ORAL EVERY 4 HOURS PRN
Status: DISCONTINUED | OUTPATIENT
Start: 2023-04-11 | End: 2023-04-14 | Stop reason: HOSPADM

## 2023-04-11 RX ORDER — ASPIRIN 81 MG/1
81 TABLET, CHEWABLE ORAL DAILY
Qty: 30 TABLET | Refills: 3 | Status: SHIPPED | OUTPATIENT
Start: 2023-04-11 | End: 2023-04-14

## 2023-04-11 RX ORDER — HYDROXYCHLOROQUINE SULFATE 200 MG/1
200 TABLET, FILM COATED ORAL DAILY
COMMUNITY

## 2023-04-11 RX ORDER — IBUPROFEN 200 MG
1 CAPSULE ORAL DAILY PRN
COMMUNITY

## 2023-04-11 RX ORDER — TAMSULOSIN HYDROCHLORIDE 0.4 MG/1
0.8 CAPSULE ORAL
Status: DISCONTINUED | OUTPATIENT
Start: 2023-04-11 | End: 2023-04-14 | Stop reason: HOSPADM

## 2023-04-11 RX ORDER — AMOXICILLIN 250 MG
2 CAPSULE ORAL 2 TIMES DAILY PRN
Status: DISCONTINUED | OUTPATIENT
Start: 2023-04-11 | End: 2023-04-14 | Stop reason: HOSPADM

## 2023-04-11 RX ORDER — HYDRALAZINE HYDROCHLORIDE 20 MG/ML
10 INJECTION INTRAMUSCULAR; INTRAVENOUS EVERY 4 HOURS PRN
Status: DISCONTINUED | OUTPATIENT
Start: 2023-04-11 | End: 2023-04-14 | Stop reason: HOSPADM

## 2023-04-11 RX ORDER — METOPROLOL TARTRATE 25 MG/1
25 TABLET, FILM COATED ORAL ONCE
Status: COMPLETED | OUTPATIENT
Start: 2023-04-11 | End: 2023-04-11

## 2023-04-11 RX ADMIN — ASPIRIN 81 MG: 81 TABLET, COATED ORAL at 08:32

## 2023-04-11 RX ADMIN — TAMSULOSIN HYDROCHLORIDE 0.8 MG: 0.4 CAPSULE ORAL at 16:17

## 2023-04-11 RX ADMIN — ACETAMINOPHEN 650 MG: 325 TABLET ORAL at 09:23

## 2023-04-11 RX ADMIN — TICAGRELOR 90 MG: 90 TABLET ORAL at 21:49

## 2023-04-11 RX ADMIN — SODIUM CHLORIDE: 9 INJECTION, SOLUTION INTRAVENOUS at 15:53

## 2023-04-11 RX ADMIN — ASPIRIN 81 MG 243 MG: 81 TABLET ORAL at 11:38

## 2023-04-11 RX ADMIN — LOSARTAN POTASSIUM 25 MG: 25 TABLET, FILM COATED ORAL at 08:32

## 2023-04-11 RX ADMIN — METOPROLOL SUCCINATE 25 MG: 25 TABLET, EXTENDED RELEASE ORAL at 09:23

## 2023-04-11 RX ADMIN — SODIUM CHLORIDE 150 ML/HR: 9 INJECTION, SOLUTION INTRAVENOUS at 11:42

## 2023-04-11 RX ADMIN — POTASSIUM CHLORIDE, DEXTROSE MONOHYDRATE AND SODIUM CHLORIDE: 150; 5; 900 INJECTION, SOLUTION INTRAVENOUS at 01:53

## 2023-04-11 RX ADMIN — ROSUVASTATIN CALCIUM 10 MG: 10 TABLET, COATED ORAL at 08:32

## 2023-04-11 RX ADMIN — METOPROLOL TARTRATE 25 MG: 25 TABLET, FILM COATED ORAL at 23:06

## 2023-04-11 RX ADMIN — ACETAMINOPHEN 650 MG: 325 TABLET ORAL at 15:54

## 2023-04-11 RX ADMIN — PERFLUTREN 2 ML: 6.52 INJECTION, SUSPENSION INTRAVENOUS at 11:17

## 2023-04-11 ASSESSMENT — ACTIVITIES OF DAILY LIVING (ADL)
ADLS_ACUITY_SCORE: 35
ADLS_ACUITY_SCORE: 37
ADLS_ACUITY_SCORE: 35
ADLS_ACUITY_SCORE: 37
ADLS_ACUITY_SCORE: 37
ADLS_ACUITY_SCORE: 35
ADLS_ACUITY_SCORE: 37
ADLS_ACUITY_SCORE: 35
ADLS_ACUITY_SCORE: 35

## 2023-04-11 NOTE — ED TRIAGE NOTES
Patient brought to ED brought to ED by [facility] for evaluation of mid chest pain and possible STEMI. EMS stated appeared to have some ST elevation as well as ST depression. EMS stated sudden onset of CP aprx 60-90 minutes prior to ED arrival. VSS, HR: low to mid 50s. History of AFib and on Eliquis.  EMS gave patient 324 mg of aspirin and 1 tablet of SL nitroglycerin. No pain reduction  Post tablet of nitroglycerin. No nausea, dizziness or diaphoresis.     Triage Assessment       Row Name 04/10/23 2344       Triage Assessment (Adult)    Airway WDL WDL       Respiratory WDL    Respiratory WDL WDL       Skin Circulation/Temperature WDL    Skin Circulation/Temperature WDL WDL       Cardiac WDL    Cardiac WDL X  mid chest pain       Peripheral/Neurovascular WDL    Peripheral Neurovascular WDL WDL       Cognitive/Neuro/Behavioral WDL    Cognitive/Neuro/Behavioral WDL WDL

## 2023-04-11 NOTE — PLAN OF CARE
Transfer from ER. C/o chest pressure 6/10 with activity, no sleep at all last night. No family at bedside a/o. All questions answered. A total of 325 mg ASA given. Echo done. Ready for angiogram.

## 2023-04-11 NOTE — PLAN OF CARE
Problem: Plan of Care - These are the overarching goals to be used throughout the patient stay.    Goal: Plan of Care Review  Description: The Plan of Care Review/Shift note should be completed every shift.  The Outcome Evaluation is a brief statement about your assessment that the patient is improving, declining, or no change.  This information will be displayed automatically on your shift note.  Outcome: Progressing   Goal Outcome Evaluation:             Pt A&OX4, VSS, c/o left sided sharp pain with deep breaths unchanged. Prn tyelnol given with some relief. Pt will remain bedrest until 1900. Pt tolerated frequent CMS, VS and femostop well. Denies different or worsening   chest pain. SOB, dizziness or any other sx. Pt just tired and hoping to rest. Will order supper.

## 2023-04-11 NOTE — H&P
"[facility]    History and Physical - Hospitalist Service       Date of Admission:  4/10/2023    Assessment & Plan      Felipe Verma is a 82 year old male admitted on 4/10/2023. He is with past medical history of BPH, RA involving multiple sites, A-fib on Eliquis, HTN who presented to ED for evaluation of chest pain.  Patient found to have abnormal EKG, elevated troponin.  ED provider reviewed case with interventional cardiologist and general cardiologist.  Patient initiated on heparin drip and admitted.    Non-ST elevation MI;  -- ED provider discussed with interventional cardiologist and general cardiologist, recommended Nitropaste, heparin drip  -- PTA aspirin, Crestor  -- Keep n.p.o.    A-fib on Eliquis;  -- Hold Eliquis as patient on heparin drip  -- Heart rate controlled.  PTA medication once verified by pharmacy    Leukocytosis; likely due to stress  -- No signs and symptoms of infection.  Trending down.    Chronic anemia;  -- No reported obvious/active bleeding.  Trend    Thrombocytopenia; ? Cause  -- Trend    Essential hypertension; controlled  -- PTA medication once verified by pharmacy    RA involving multiple sites; stable  -- Resume home medications once verified by pharmacy    BPH;  -- PTA medication once verified by by pharmacy         Diet: NPO per Anesthesia Guidelines for Procedure/Surgery Except for: Meds  DVT Prophylaxis: Currently on heparin drip for other medical reason  [place] Catheter: Not present  Lines: None     Cardiac Monitoring: None  Code Status: Full Code    Clinically Significant Risk Factors Present on Admission               # Drug Induced Coagulation Defect: home medication list includes an anticoagulant medication         # Overweight: Estimated body mass index is 28.98 kg/m  as calculated from the following:    Height as of this encounter: 1.702 m (5' 7\").    Weight as of this encounter: 83.9 kg (185 lb).           Disposition Plan      Expected " Discharge Date: 04/12/2023                  Sujit GAUTAM MD  Hospitalist Service  [facility]  Securely message with [employer] (more info)  Text page via [employer] Paging/Directory     ______________________________________________________________________    Chief Complaint   Chest pain    History is obtained from the patient, ED provider and patient's son at bedside.  Also reviewed previous medical record especially last PCP visit note from [facility] Everywhere      History of Present Illness   Felipe Verma is a 82 year old male admitted on 4/10/2023. He is with past medical history of BPH, RA involving multiple sites, A-fib on Eliquis, HTN who presented to ED for evaluation of chest pain.  Patient found to have abnormal EKG, elevated troponin.  ED provider reviewed case with interventional cardiologist and general cardiologist.  Patient initiated on heparin drip and admitted.    Patient not very active at baseline.  Patient was preparing his meal then developed acute onset chest discomfort, pressure-like, 5/10 in intensity and associated with diaphoresis but no shortness of breath, radiation or dizziness.  Patient reported pain continued until arrival to ED.  Initially thought to have STEMI by EMS.  ED provider discussed with interventional cardiologist and canceled code STEMI.  Patient troponin elevated at 269.  ED provider discussed with on-call cardiologist, Dr. Gatica and initiated on heparin drip.  During my visit, patient denied recurrence of chest pain, reported mild generalized headache but no other associated symptoms.  Patient denied feeling short of breath or palpitation or escape beats.  Denied abdomen pain, nausea, vomiting.  Patient denied recent febrile illness.  Patient denied urinary symptoms.  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    Past Medical History    Past Medical History:   Diagnosis Date     Acute diverticulitis 4/20/2016     Lung nodule <  6cm on CT 6/6/2018       Past Surgical History   Past Surgical History:   Procedure Laterality Date     OTHER SURGICAL HISTORY      OTHER SURGICAL HISTORYstent placement in head      TONSILLECTOMY         Prior to Admission Medications   Prior to Admission Medications   Prescriptions Last Dose Informant Patient Reported? Taking?   Flaxseed, Linseed, (FLAX SEED OIL) 1000 MG capsule   Yes No   Sig: Take 1,000 mg by mouth   Lactobacillus acidophilus (BACID) 10 billion cell capsule   Yes No   Sig: [LACTOBACILLUS ACIDOPHILUS (BACID) 10 BILLION CELL CAPSULE] Take 1 tablet by mouth daily.    NON FORMULARY   Yes No   Sig: [NON FORMULARY] Iron 27mg per day  Calcium 600mg two times a day   amLODIPine (NORVASC) 10 MG tablet   Yes No   Sig: [AMLODIPINE (NORVASC) 10 MG TABLET] Take 0.5 tablets (5 mg total) by mouth daily.   amoxicillin (AMOXIL) 875 MG tablet   No No   Sig: Take 1 tablet (875 mg) by mouth 2 times daily   apixaban ANTICOAGULANT (ELIQUIS) 2.5 MG tablet   Yes No   Sig: Take 2.5 mg by mouth   aspirin (ASA) 81 MG EC tablet   Yes No   Sig: Take 81 mg by mouth   aspirin 325 MG EC tablet   Yes No   Sig: [ASPIRIN 325 MG EC TABLET] Take 325 mg by mouth daily.    Patient not taking: Reported on 1/29/2022   cyanocobalamin 100 MCG tablet   Yes No   Sig: [CYANOCOBALAMIN 100 MCG TABLET] Take 1,000 mcg by mouth daily.    diltiazem ER (DILT-XR) 120 MG 24 hr capsule   Yes No   Sig: Take 120 mg by mouth   doxycycline hyclate (VIBRA-TABS) 100 MG tablet   No No   Sig: Take 1 tablet (100 mg) by mouth 2 times daily   flaxseed oil Oil   Yes No   Sig: [FLAXSEED OIL OIL] 1400mg per day   furosemide (LASIX) 20 MG tablet   Yes No   Sig: Take 20 mg by mouth   leucovorin (WELLCOVORIN) 5 mg tablet   Yes No   Sig: [LEUCOVORIN (WELLCOVORIN) 5 MG TABLET] Take 3 times a week  Do not overalap days of methotrexate   methotrexate 2.5 MG tablet   Yes No   Sig: [METHOTREXATE 2.5 MG TABLET] TAKE 4 TABLETS BY MOUTH EVERY THURSDAY AND 4  EVERY FRIDAY    metoprolol succinate ER (TOPROL-XL) 50 MG 24 hr tablet   Yes No   Sig: Take 50 mg by mouth   metoprolol tartrate (LOPRESSOR) 50 MG tablet   Yes No   Sig: [METOPROLOL TARTRATE (LOPRESSOR) 50 MG TABLET] Take 1 tablet by mouth twice daily   Patient not taking: Reported on 1/29/2022   rosuvastatin (CRESTOR) 5 MG tablet   Yes No   Sig: Take 2.5 mg by mouth every 48 hours   tamsulosin (FLOMAX) 0.4 mg cap   Yes No   Sig: [TAMSULOSIN (FLOMAX) 0.4 MG CAP] Take 0.8 mg by mouth daily after supper.       Facility-Administered Medications: None        Review of Systems    The 10 point Review of Systems is negative other than noted in the HPI or here.      Physical Exam   Vital Signs: Temp: 97.5  F (36.4  C) Temp src: Oral BP: 139/81 Pulse: 64   Resp: 20 SpO2: 97 % O2 Device: None (Room air)    Weight: 185 lbs 0 oz      General: Not in obvious distress.  HEENT: Normocephalic, supple neck  Chest: Clear to auscultation bilateral anteriorly, no wheezing  Heart: S1S2 normal, regular  Abdomen: Soft. NT, ND. Bowel sounds- active.  Extremities: No legs swelling  Neuro: alert and awake, grossly non-focal          Medical Decision Making             Data     I have personally reviewed the following data over the past 24 hrs:    12.7 (H)  \   10.8 (L)   / 136 (L)     136 103 24.6 (H) /  164 (H)   3.7 22 1.10 \       ALT: 20 AST: 20 AP: 70 TBILI: 0.3   ALB: 3.9 TOT PROTEIN: 6.4 LIPASE: 29       Trop: 269 (HH) BNP: 325       INR:  1.19 (H) PTT:  28   D-dimer:  N/A Fibrinogen:  N/A       Imaging results reviewed over the past 24 hrs:   Recent Results (from the past 24 hour(s))   XR Chest Port 1 View    Narrative    EXAM: XR CHEST PORT 1 VIEW  LOCATION: [facility]  DATE/TIME: 4/10/2023 11:00 PM    INDICATION: Chest pain  COMPARISON: Chest CT on 09/14/2017      Impression    IMPRESSION: Negative chest.

## 2023-04-11 NOTE — PHARMACY-ADMISSION MEDICATION HISTORY
Pharmacist Admission Medication History    Admission medication history is complete. The information provided in this note is only as accurate as the sources available at the time of the update.    Medication reconciliation/reorder completed by provider prior to medication history? No    Information Source(s): Patient, Family member and CareEverywhere/SureScripts via in-person    Pertinent Information: Patient reported that he was told to stop taking furosemide and monitor.    Changes made to PTA medication list:    Added: hydroxychloroquine, vitamin D, alendronate    Deleted: amlodipine, amoxicillin, aspirin 325mg, doxycycline, furosemide    Changed: rosuvastatin, B12, methotrexate    Medication Affordability:  Not including over the counter (OTC) medications, was there a time in the past 12 months when you did not take your medications as prescribed because of cost?: No    Allergies reviewed with patient and updates made in EHR: yes    Medication History Completed By: Dania Alaniz RPH 4/11/2023 9:23 AM    PTA Med List   Medication Sig Last Dose     alendronate (FOSAMAX) 70 MG tablet Take 70 mg by mouth every 7 days On Tuesday 4/4/2023 at am     apixaban ANTICOAGULANT (ELIQUIS) 2.5 MG tablet Take 2.5 mg by mouth 2 times daily 4/10/2023 at am     aspirin (ASA) 81 MG EC tablet Take 81 mg by mouth At Bedtime 4/9/2023 at pm     calcium carbonate 500 mg, elemental, (OSCAL 500) 1250 (500 Ca) MG TABS tablet Take 1 tablet by mouth daily 4/10/2023 at am     Cyanocobalamin 3000 MCG CAPS Take 3,000 mcg by mouth daily 4/10/2023 at am     diltiazem ER (TIAZAC) 120 MG 24 hr ER beaded capsule Take 120 mg by mouth daily 4/10/2023 at am     Flaxseed, Linseed, (FLAX SEED OIL) 1000 MG capsule Take 1,000 mg by mouth daily 4/10/2023 at am     hydroxychloroquine (PLAQUENIL) 200 MG tablet Take 200 mg by mouth daily 4/10/2023 at am     Lactobacillus acidophilus (BACID) 10 billion cell capsule [LACTOBACILLUS ACIDOPHILUS (BACID) 10  BILLION CELL CAPSULE] Take 1 tablet by mouth daily.  4/10/2023 at am     leucovorin (WELLCOVORIN) 5 mg tablet Take 5 mg by mouth three times a week On Monday, Wednesday and Saturday. Do not overlap days of methotrexate 4/8/2023 at am     methotrexate 2.5 MG tablet Take 12.5 mg by mouth twice a week 5 tablets (12.5mg) on Thursday and Friday 4/7/2023     metoprolol succinate ER (TOPROL-XL) 50 MG 24 hr tablet Take 50 mg by mouth daily 4/10/2023 at am     rosuvastatin (CRESTOR) 10 MG tablet Take 10 mg by mouth At Bedtime 4/9/2023 at pm     tamsulosin (FLOMAX) 0.4 mg cap [TAMSULOSIN (FLOMAX) 0.4 MG CAP] Take 0.8 mg by mouth daily after supper.  4/10/2023 at pm     vitamin D3 (CHOLECALCIFEROL) 50 mcg (2000 units) tablet Take 1 tablet by mouth daily 4/10/2023 at am

## 2023-04-11 NOTE — PRE-PROCEDURE
GENERAL PRE-PROCEDURE:   Procedure:  Coronary angiogram with possible PCI  Date/Time:  4/11/2023 11:03 AM    Written consent obtained?: Yes    Risks and benefits: Risks, benefits and alternatives were discussed    Consent given by:  Patient  Patient states understanding of procedure being performed: Yes    Patient's understanding of procedure matches consent: Yes    Procedure consent matches procedure scheduled: Yes    Expected level of sedation:  Moderate  Appropriately NPO:  Yes  ASA Class:  3 (chest pain, PAF, mild-moderate cardiomyopathy, hypercholesterolemia)  Mallampati  :  Grade 1- soft palate, uvula, tonsillar pillars, and posterior pharyngeal wall visible  Lungs:  Lungs clear with good breath sounds bilaterally  Heart:  Normal heart sounds and rate  History & Physical reviewed:  History and physical reviewed and no updates needed  Statement of review:  I have reviewed the lab findings, diagnostic data, medications, and the plan for sedation

## 2023-04-11 NOTE — CONSULTS
Cardiology Consult Note    Thank you, Dr. Beckett, for asking the [facility] team to see Felipe Verma in consultation at [facility] ED to evaluate chest pain/ST elevation.        Assessment:   1.  Chest pain, consistent with acute coronary syndrome with ST elevation documented in the anteroseptal leads by paramedics with near resolution of ECG changes and discomfort after sublingual nitroglycerin on arrival to the ED.  Decision made by interventionalists to hold off on emergent angiography.  This morning he remains pain-free on heparin although has developed pleuritic type left-sided chest discomfort which is new.  Echocardiogram has been ordered.  Recommended angiography to define his anatomy and potential need for revascularization.  Discussed procedure including risks and benefits and patient is agreeable to proceed.  2.  Paroxysmal atrial fibrillation, resolved with medication including metoprolol and diltiazem.  He has been on low-dose Eliquis with his last dose yesterday morning.  Currently on heparin infusion for acute coronary syndrome.  3.  Mild to moderate cardiomyopathy, resolved following conversion to sinus rhythm.  Felt to be tachycardic mediated.  4.  Hypercholesterolemia, currently on low-dose rosuvastatin with most recent lipid profile 2 weeks ago showing an LDL of 61.     Plan:   1.  Restart metoprolol succinate at a lower dose of 25 mg daily and add losartan 25 mg daily to help with blood pressure control  2.  Continue current dose of rosuvastatin  3.  Echocardiogram ordered to assess for LV function and wall motion  4.  Coronary angiography today to define anatomy and need for revascularization    Primary cardiologist: Dr. Pamella Porter, [facility] heart and vascular     Current History:   Mr. Felipe Verma is a 82 year old male with history of paroxysmal atrial fibrillation with subsequent tachycardic induced cardiomyopathy which is since resolved,  essential hypertension, mild hypercholesterolemia who was brought to the ED last evening following sudden onset of left-sided chest pressure and associated bilateral arm weakness.  ECG in the field demonstrated ST elevation in the anterior precordial leads with patient also noted to be bradycardic.  Was given aspirin and sublingual nitroglycerin in route with minimal discomfort at the time of arrival here.  Case was discussed with interventional list and decision was made to initiate medical therapy but hold on emergent angiography.  He has remained pain-free since that time but does report new left-sided chest discomfort, worse with deep breathing.  Denies any recurrence of the pressure he had last night or arm numbness.  Laboratory studies on admission demonstrated a troponin of 269 with repeat troponin this morning now of 1704.    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    -Hypertension    -Hypercholesterolemia  Past Surgical History:     Past Surgical History:   Procedure Laterality Date     OTHER SURGICAL HISTORY      OTHER SURGICAL HISTORYstent placement in head      TONSILLECTOMY         Family History:     Family History   Problem Relation Age of Onset     Colon Cancer Mother      Hypertension Father      Coronary Artery Disease Father      Aneurysm Sister      Cerebrovascular Disease Sister      No Known Problems Brother      Diabetes Son      No Known Problems Son      No Known Problems Son      Breast Cancer No family hx of      Prostate Cancer No family hx of        Social History:    reports that he has quit smoking. He has never used smokeless tobacco. He reports that he does not currently use drugs.    Meds:     Current Facility-Administered Medications:      acetaminophen (TYLENOL) tablet 650 mg, 650 mg, Oral, Q6H  PRN **OR** acetaminophen (TYLENOL) Suppository 650 mg, 650 mg, Rectal, Q6H PRN, Sujit GAUTAM MD     aspirin EC tablet 81 mg, 81 mg, Oral, Daily, Sujit GAUTAM MD, 81 mg at 04/11/23 0832     dextrose 5% and 0.9% NaCl + KCl 20 mEq/L infusion, , Intravenous, Continuous, Sujit GAUTAM MD, Last Rate: 75 mL/hr at 04/11/23 0831, Rate Verify at 04/11/23 0831     heparin infusion 25,000 units in D5W 250 mL ANTICOAGULANT, 0-5,000 Units/hr, Intravenous, Continuous, Sujit GAUTAM MD, Paused at 04/11/23 0830     lidocaine (LMX4) cream, , Topical, Q1H PRN, Sujit GAUTAM MD     lidocaine 1 % 0.1-1 mL, 0.1-1 mL, Other, Q1H PRN, Sujit GAUTAM MD     losartan (COZAAR) tablet 25 mg, 25 mg, Oral, Daily, Jaelyn Carlos MD, 25 mg at 04/11/23 0832     melatonin tablet 1 mg, 1 mg, Oral, At Bedtime PRN, Sujit GAUTAM MD     metoprolol succinate ER (TOPROL XL) 24 hr tablet 25 mg, 25 mg, Oral, Daily, Jaelyn Carlos MD     nitroGLYcerin (NITRO-BID) 2 % ointment 15 mg, 1 inch, Transdermal, Once, Caesar Santana MD, 15 mg at 04/10/23 2230     ondansetron (ZOFRAN ODT) ODT tab 4 mg, 4 mg, Oral, Q6H PRN **OR** ondansetron (ZOFRAN) injection 4 mg, 4 mg, Intravenous, Q6H PRN, Sujit GAUTAM MD     Patient is already receiving anticoagulation with heparin, enoxaparin (LOVENOX), warfarin (COUMADIN)  or other anticoagulant medication, , Does not apply, Continuous PRN, Sujti GAUTAM MD     rosuvastatin (CRESTOR) tablet 10 mg, 10 mg, Oral, Daily, Sujit GAUTAM MD, 10 mg at 04/11/23 0832     senna-docusate (SENOKOT-S/PERICOLACE) 8.6-50 MG per tablet 1 tablet, 1 tablet, Oral, BID PRN **OR** senna-docusate (SENOKOT-S/PERICOLACE) 8.6-50 MG per tablet 2 tablet, 2 tablet, Oral, BID PRN, Sujit GAUTAM MD     sodium chloride (PF) 0.9% PF flush 3 mL, 3 mL, Intracatheter, Q8H, Sujit GAUTAM MD     sodium chloride (PF) 0.9% PF flush 3 mL, 3 mL, Intracatheter, q1 min prn, Sujit GAUTAM MD    Current Outpatient Medications:      apixaban ANTICOAGULANT (ELIQUIS) 2.5 MG tablet, Take 2.5 mg by mouth 2 times  daily, Disp: , Rfl:      aspirin (ASA) 81 MG EC tablet, Take 81 mg by mouth daily, Disp: , Rfl:      cyanocobalamin 100 MCG tablet, [CYANOCOBALAMIN 100 MCG TABLET] Take 1,000 mcg by mouth daily. , Disp: , Rfl:      diltiazem ER (TIAZAC) 120 MG 24 hr ER beaded capsule, Take 120 mg by mouth daily, Disp: , Rfl:      flaxseed oil Oil, [FLAXSEED OIL OIL] 1400mg per day, Disp: , Rfl:      Flaxseed, Linseed, (FLAX SEED OIL) 1000 MG capsule, Take 1,000 mg by mouth, Disp: , Rfl:      furosemide (LASIX) 20 MG tablet, Take 20 mg by mouth daily as needed, Disp: , Rfl:      Lactobacillus acidophilus (BACID) 10 billion cell capsule, [LACTOBACILLUS ACIDOPHILUS (BACID) 10 BILLION CELL CAPSULE] Take 1 tablet by mouth daily. , Disp: , Rfl:      leucovorin (WELLCOVORIN) 5 mg tablet, [LEUCOVORIN (WELLCOVORIN) 5 MG TABLET] Take 3 times a week  Do not overalap days of methotrexate, Disp: , Rfl:      methotrexate 2.5 MG tablet, [METHOTREXATE 2.5 MG TABLET] TAKE 4 TABLETS BY MOUTH EVERY THURSDAY AND 4  EVERY FRIDAY, Disp: , Rfl:      metoprolol succinate ER (TOPROL-XL) 50 MG 24 hr tablet, Take 50 mg by mouth daily, Disp: , Rfl:      NON FORMULARY, [NON FORMULARY] Iron 27mg per day  Calcium 600mg two times a day, Disp: , Rfl:      rosuvastatin (CRESTOR) 5 MG tablet, Take 2.5 mg by mouth every 48 hours, Disp: , Rfl:      tamsulosin (FLOMAX) 0.4 mg cap, [TAMSULOSIN (FLOMAX) 0.4 MG CAP] Take 0.8 mg by mouth daily after supper. , Disp: , Rfl:     aspirin  81 mg Oral Daily     losartan  25 mg Oral Daily     metoprolol succinate ER  25 mg Oral Daily     nitroGLYcerin  1 inch Transdermal Once     rosuvastatin  10 mg Oral Daily     sodium chloride (PF)  3 mL Intracatheter Q8H       Allergies:   Patient has no known allergies.    Review of Systems:   A 12 point comprehensive review of systems was negative except as noted in the current history.    Objective:      Physical Exam  Body mass index is 28.98 kg/m .  /81   Pulse 66   Temp 97.5  F  "(36.4  C) (Oral)   Resp 26   Ht 1.702 m (5' 7\")   Wt 83.9 kg (185 lb)   SpO2 95%   BMI 28.98 kg/m      General Appearance:    Well developed, well nourished elderly male who appears in no acute distress   HEENT:   Normocephalic, atraumatic.  Sclera nonicteric.  Mucous membranes moist.   Neck:  No jugular venous distention or carotid bruits   Chest:  Symmetric with normal AP diameter   Lungs:    Clear to auscultation and percussion bilaterally   Cardiovascular:    Regular rate and rhythm.  S1, S2 normal.  No murmur or gallop   Abdomen:   Obese, soft, nondistended, nontender.  No organomegaly or mass   Extremities:  No peripheral edema, clubbing or cyanosis   Skin:  No rash or lesions   Neurologic:  Alert and oriented x3.  No gross focal deficits             Cardiographics (personally reviewed)  ECG on admission demonstrates sinus rhythm with subtle ST elevation in leads V1 and V2.    Imaging (personally reviewed)  Chest x-ray demonstrates no acute pulmonary process    Lab Review (personally reviewed all results)  No results for input(s): CHOL, HDL, LDL, TRIG, CHOLHDLRATIO in the last 39714 hours.  No results for input(s): LDL in the last 88699 hours.  Recent Labs   Lab Test 04/11/23  0728      POTASSIUM 3.9   CHLORIDE 106   CO2 23   *   BUN 20.7   CR 1.01   GFRESTIMATED 74   ABBEY 8.4*     Recent Labs   Lab Test 04/11/23  0728 04/10/23  2209   CR 1.01 1.10     No results for input(s): A1C in the last 09757 hours.       Recent Labs   Lab Test 04/11/23  0040   WBC 12.7*   HGB 10.8*   HCT 33.0*   *   *     Recent Labs   Lab Test 04/11/23  0040 04/10/23  2209   HGB 10.8* 11.1*    No results for input(s): TROPONINI in the last 35757 hours.  Recent Labs   Lab Test 04/10/23  2209   NTBNPI 325     No results for input(s): TSH in the last 76916 hours.  Recent Labs   Lab Test 04/10/23  2209   INR 1.19*                 "

## 2023-04-11 NOTE — PROVIDER NOTIFICATION
At 1817 pt had an 8 beat run of Vtach with no sx. Was in room with pt feeding him. Messsage sent to Dr. Martinez.     1835: Dr. Martinez returned call and said to check a mag and potassium STAT if not in normal range to start protocols and replace.

## 2023-04-11 NOTE — PROGRESS NOTES
PT admitted earlier today. H&P reviewed in detail. Pt seen and examined; awaiting angiogram. Tells me that he is very tired but otherwise is feeling well.  Also spoke to Shailesh (son) on the phone; all questions answered.  He states that his Mom is now in the ED as no one can care for her while Mr. Verma needs medical evaluation.      Cardiac medications as per cardiology, for now.  Will need to address medications for rheumatoid arthritis (on hold for now) if needing prolonged stay as due for medications 4/12/23.

## 2023-04-11 NOTE — SIGNIFICANT EVENT
At 1425 shortly after pt arrived to floor he started to briskly bleed at right femoral groin site. Manual pressure applied for 1hr and 5 mins with continued oozing. CSC came and applied a femostop and aother DSTAT to right groin [id number], NO INFLATION OF CUFF DONE. VSS, good CMS and peripheral pulses throughout with good pleth waveform on right toe. Pt awake, A&OX4, Neuros intact. Dr. Hitchcock notified of bleeding. Message sent to MD to stop continuous heparin infusion orders. Message out to CARDS to see if pt ok to resume eliquis dose tonight due to bleeding. MD did not feel need to recheck hgb with blood loss from bleeding.

## 2023-04-11 NOTE — ED PROVIDER NOTES
"EMERGENCY DEPARTMENT ENCOUNTER      NAME: Felipe Verma  AGE: 82 year old male  YOB: 1940  MRN: [id number]  EVALUATION DATE & TIME: 4/10/2023 10:03 PM    PCP: Pro Chávez    ED PROVIDER: Caesar Santana M.D.      Chief Complaint   Patient presents with     Chest Pain         FINAL IMPRESSION:  NSTEMI  Elevated troponin    ED COURSE & MEDICAL DECISION MAKING:    Pertinent Labs & Imaging studies reviewed. (See chart for details)  82 year old male presents to the Emergency Department for evaluation of chest pressure.  Patient made a code STEMI in the field by EMS.  EMS providers met on arrival the patient patient with onset of symptoms 60 to 90 minutes prior to arrival well following out a \"3 pound chunk of hamburger\".  Patient reports only slight pressure sensation.  Was given single aspirin and a single nitroglycerin in route.  On arrival with minimal symptoms.  EKG from the field reviewed.  Patient with suspicious ST segment elevations in the anterior leads with slight depressions in the inferior leads.  Immediate EKG obtained on arrival here.  Findings much less significant.  We will discuss code STEMI protocol with interventionalists.  Baseline blood work being obtained.. Patient appears non toxic with stable vitals signs. Overall exam is benign.    10:03 PM I met with the patient for the initial interview and physical examination. Discussed plan for treatment and workup in the ED.    10:10 PM I spoke with Dr. Jerry, cardiologist, regarding patient's EKG. Code STEMI canceled.  10:50 PM.  Opponent moderately elevated to 269.  Will consult cardiology.  Patient reports minimal if any symptoms presently.  Did state he had some right-sided chest pressure earlier in the day.  Will initiate heparin.  10:55 PM.  Patient discussed with Dr. Gatica.  He is agreeable with plan for heparinization and serial troponins.  11:11 PM.  Comprehensive metabolic panel unremarkable other than minimal hypocalcemia " and per glycemia.  Patient with mild macrocytic anemia with hemoglobin 11.1 and mean cell volume 102.  White cell count minimally elevated at 13.1.  Call placed to admitting physician.   11:32 PM I spoke with Dr. GAUTAM, hospitalist.  He is agreeable with plan for  At the conclusion of the encounter I discussed the results of all of the tests and the disposition. The questions were answered and return precautions provided. The patient or family acknowledged understanding and was agreeable with the care plan.     Patient represents a critical care situation.  Approximately 40 minutes spent directly involved in patient's care independent of any procedures       medical Decision Making    History:    Supplemental history from: EMS  External Record(s) reviewed: Records reviewed from 3/16/2023.  Patient with underlying hypertension, atrial fibrillation.  Patient also with BPH.  Rheumatoid arthritis  Work Up:    Chart documentation includes differential considered and any EKGs or imaging independently interpreted by provider, where specified.    In additional to work up documented, I considered the following work up: Documented in chart, if applicable.    External consultation:    Discussion of management with another provider: Hospitalist    Complicating factors:    Care impacted by chronic illness: Rheumatoid arthritis, atrial fibrillation with chronic anticoagulation.  Hypertension    Care affected by social determinants of health: N/A    Disposition considerations: Admit.      PPE: Provider wore gloves, eye protection, and paper mask.     MEDICATIONS GIVEN IN THE EMERGENCY:  Medications   nitroGLYcerin (NITRO-BID) 2 % ointment 15 mg (15 mg Transdermal $Patch/Med Applied 4/10/23 9211)   heparin ANTICOAGULANT loading dose for LOW INTENSITY TREATMENT * Give BEFORE starting heparin infusion (has no administration in time range)   heparin infusion 25,000 units in D5W 250 mL ANTICOAGULANT (has no administration in time range)    famotidine (PEPCID) injection 20 mg (20 mg Intravenous $Given 4/10/23 3715)       NEW PRESCRIPTIONS STARTED AT TODAY'S ER VISIT  New Prescriptions    No medications on file          =================================================================    HPI    Patient information was obtained from: Patient & EMS    Use of Intrepreter: N/A        Felipe Verma is a 82 year old male with a pertient medical history of brain aneurysm, atrial fibrillation, CKD, hyperlipidemia, and hypertension who presents to the ED for evaluation of chest pain and arm weakness.    Per EMS, patient was at home ~1 hour ago when he had sudden onset of mid chest pain with bilateral arm weakness. Patient bradycardiac en route with an EKG suspicious for a heart attack. Patient is on Eliquis.    Per patient, he states that he was cooking hamburger in a frying pan when he had sudden onset of chest pain that he describes as pressure. He denies having any episodes like this before. Denies shortness of breath and nausea.      REVIEW OF SYSTEMS   Constitutional:  Denies fever, chills  Respiratory:  Denies productive cough or increased work of breathing  Cardiovascular:  Denies palpitations. Endorses chest pain (mid).  GI:  Denies abdominal pain, nausea, vomiting, or change in bowel or bladder habits   Musculoskeletal:  Denies any new muscle/joint swelling. Endorses arm weakness.  Skin:  Denies rash   Neurologic:  Denies focal weakness  All systems negative except as marked.     PAST MEDICAL HISTORY:  Past Medical History:   Diagnosis Date     Acute diverticulitis 4/20/2016     Lung nodule < 6cm on CT 6/6/2018       PAST SURGICAL HISTORY:  Past Surgical History:   Procedure Laterality Date     OTHER SURGICAL HISTORY      OTHER SURGICAL HISTORYstent placement in head      TONSILLECTOMY           CURRENT MEDICATIONS:      Current Facility-Administered Medications:      heparin ANTICOAGULANT loading dose for LOW INTENSITY TREATMENT * Give BEFORE  starting heparin infusion, 60 Units/kg, Intravenous, Once, Caesar Santana MD     heparin infusion 25,000 units in D5W 250 mL ANTICOAGULANT, 0-5,000 Units/hr, Intravenous, Continuous, Caesar Santana MD     nitroGLYcerin (NITRO-BID) 2 % ointment 15 mg, 1 inch, Transdermal, Once, Caesar Santana MD, 15 mg at 04/10/23 2230    Current Outpatient Medications:      amLODIPine (NORVASC) 10 MG tablet, [AMLODIPINE (NORVASC) 10 MG TABLET] Take 0.5 tablets (5 mg total) by mouth daily., Disp: , Rfl: 0     amoxicillin (AMOXIL) 875 MG tablet, Take 1 tablet (875 mg) by mouth 2 times daily, Disp: 20 tablet, Rfl: 0     apixaban ANTICOAGULANT (ELIQUIS) 2.5 MG tablet, Take 2.5 mg by mouth, Disp: , Rfl:      aspirin (ASA) 81 MG EC tablet, Take 81 mg by mouth, Disp: , Rfl:      aspirin 325 MG EC tablet, [ASPIRIN 325 MG EC TABLET] Take 325 mg by mouth daily.  (Patient not taking: Reported on 1/29/2022), Disp: , Rfl:      cyanocobalamin 100 MCG tablet, [CYANOCOBALAMIN 100 MCG TABLET] Take 1,000 mcg by mouth daily. , Disp: , Rfl:      diltiazem ER (DILT-XR) 120 MG 24 hr capsule, Take 120 mg by mouth, Disp: , Rfl:      doxycycline hyclate (VIBRA-TABS) 100 MG tablet, Take 1 tablet (100 mg) by mouth 2 times daily, Disp: 20 tablet, Rfl: 0     flaxseed oil Oil, [FLAXSEED OIL OIL] 1400mg per day, Disp: , Rfl:      Flaxseed, Linseed, (FLAX SEED OIL) 1000 MG capsule, Take 1,000 mg by mouth, Disp: , Rfl:      furosemide (LASIX) 20 MG tablet, Take 20 mg by mouth, Disp: , Rfl:      Lactobacillus acidophilus (BACID) 10 billion cell capsule, [LACTOBACILLUS ACIDOPHILUS (BACID) 10 BILLION CELL CAPSULE] Take 1 tablet by mouth daily. , Disp: , Rfl:      leucovorin (WELLCOVORIN) 5 mg tablet, [LEUCOVORIN (WELLCOVORIN) 5 MG TABLET] Take 3 times a week  Do not overalap days of methotrexate, Disp: , Rfl:      methotrexate 2.5 MG tablet, [METHOTREXATE 2.5 MG TABLET] TAKE 4 TABLETS BY MOUTH EVERY THURSDAY AND 4  EVERY FRIDAY, Disp: , Rfl:      metoprolol  "succinate ER (TOPROL-XL) 50 MG 24 hr tablet, Take 50 mg by mouth, Disp: , Rfl:      metoprolol tartrate (LOPRESSOR) 50 MG tablet, [METOPROLOL TARTRATE (LOPRESSOR) 50 MG TABLET] Take 1 tablet by mouth twice daily (Patient not taking: Reported on 1/29/2022), Disp: , Rfl:      NON FORMULARY, [NON FORMULARY] Iron 27mg per day  Calcium 600mg two times a day, Disp: , Rfl:      rosuvastatin (CRESTOR) 5 MG tablet, Take 2.5 mg by mouth every 48 hours, Disp: , Rfl:      tamsulosin (FLOMAX) 0.4 mg cap, [TAMSULOSIN (FLOMAX) 0.4 MG CAP] Take 0.8 mg by mouth daily after supper. , Disp: , Rfl:     ALLERGIES:  No Known Allergies    FAMILY HISTORY:  Family History   Problem Relation Age of Onset     Colon Cancer Mother      Hypertension Father      Coronary Artery Disease Father      Aneurysm Sister      Cerebrovascular Disease Sister      No Known Problems Brother      Diabetes Son      No Known Problems Son      No Known Problems Son      Breast Cancer No family hx of      Prostate Cancer No family hx of        SOCIAL HISTORY:   Social History     Socioeconomic History     Marital status:    Tobacco Use     Smoking status: Former     Smokeless tobacco: Never   Substance and Sexual Activity     Drug use: Not Currently     Sexual activity: Not Currently       VITALS:  Patient Vitals for the past 24 hrs:   BP Temp Temp src Pulse Resp SpO2 Height Weight   04/10/23 2225 136/67 -- -- 57 20 97 % -- --   04/10/23 2207 137/72 97.5  F (36.4  C) Oral 60 20 96 % 1.702 m (5' 7\") 83.9 kg (185 lb)        PHYSICAL EXAM    Constitutional:  Awake, alert, in mild apparent distress  HENT:  Normocephalic, Atraumatic. Bilateral external ears normal. Oropharynx moist. Nose normal. Neck- Normal range of motion with no guarding,  Supple, No stridor.   Eyes:  PERRL, EOMI with no signs of entrapment, Conjunctiva normal, No discharge.   Respiratory:  Normal breath sounds, No respiratory distress, No wheezing.    Cardiovascular:  Normal heart rate, " Normal rhythm, No appreciable rubs or gallops.   GI:  Soft, No tenderness, No distension, No palpable masses  Musculoskeletal:   No edema. Good range of motion in all major joints. No tenderness to palpation or major deformities noted.  Integument:  Warm, Dry, No erythema, No rash. Pallor. Bruise to left arm, 4 cm in diameter.  Neurologic:  Alert & oriented, Normal motor function, Normal sensory function, No focal deficits noted.   Psychiatric:  Affect normal, Judgment normal, Mood normal.     LAB:  All pertinent labs reviewed and interpreted.  Results for orders placed or performed during the hospital encounter of 04/10/23   XR Chest Port 1 View    Impression    IMPRESSION: Negative chest.   Extra Blue Top Tube   Result Value Ref Range    Hold Specimen JIC    Extra Red Top Tube   Result Value Ref Range    Hold Specimen JIC    Extra Green Top (Lithium Heparin) Tube   Result Value Ref Range    Hold Specimen JIC    Extra Purple Top Tube   Result Value Ref Range    Hold Specimen JIC    Result Value Ref Range    INR 1.19 (H) 0.85 - 1.15   Partial thromboplastin time   Result Value Ref Range    aPTT 28 22 - 38 Seconds   Comprehensive metabolic panel   Result Value Ref Range    Sodium 136 136 - 145 mmol/L    Potassium 3.7 3.4 - 5.3 mmol/L    Chloride 103 98 - 107 mmol/L    Carbon Dioxide (CO2) 22 22 - 29 mmol/L    Anion Gap 11 7 - 15 mmol/L    Urea Nitrogen 24.6 (H) 8.0 - 23.0 mg/dL    Creatinine 1.10 0.67 - 1.17 mg/dL    Calcium 8.5 (L) 8.8 - 10.2 mg/dL    Glucose 164 (H) 70 - 99 mg/dL    Alkaline Phosphatase 70 40 - 129 U/L    AST 20 10 - 50 U/L    ALT 20 10 - 50 U/L    Protein Total 6.4 6.4 - 8.3 g/dL    Albumin 3.9 3.5 - 5.2 g/dL    Bilirubin Total 0.3 <=1.2 mg/dL    GFR Estimate 67 >60 mL/min/1.73m2   Result Value Ref Range    Lipase 29 13 - 60 U/L   Result Value Ref Range    Magnesium 2.0 1.7 - 2.3 mg/dL   Nt probnp inpatient (BNP)   Result Value Ref Range    N terminal Pro BNP Inpatient 325 0 - 1,800 pg/mL   Result  Value Ref Range    Troponin T, High Sensitivity 269 (HH) <=22 ng/L   CBC (+ platelets, no diff)   Result Value Ref Range    WBC Count 13.1 (H) 4.0 - 11.0 10e3/uL    RBC Count 3.31 (L) 4.40 - 5.90 10e6/uL    Hemoglobin 11.1 (L) 13.3 - 17.7 g/dL    Hematocrit 33.9 (L) 40.0 - 53.0 %     (H) 78 - 100 fL    MCH 33.5 (H) 26.5 - 33.0 pg    MCHC 32.7 31.5 - 36.5 g/dL    RDW 13.7 10.0 - 15.0 %    Platelet Count 142 (L) 150 - 450 10e3/uL     Results for orders placed or performed during the hospital encounter of 04/10/23   XR Chest Port 1 View     Status: None    Narrative    EXAM: XR CHEST PORT 1 VIEW  LOCATION: [facility]  DATE/TIME: 4/10/2023 11:00 PM    INDICATION: Chest pain  COMPARISON: Chest CT on 09/14/2017      Impression    IMPRESSION: Negative chest.   [place] Draw     Status: None    Narrative    The following orders were created for panel order [place] Draw.  Procedure                               Abnormality         Status                     ---------                               -----------         ------                     Extra Blue Top Tube[[id number]]                              Final result               Extra Red Top Tube[[id number]]                               Final result               Extra Green Top (Lithium...[[id number]]                      Final result               Extra Purple Top Tube[[id number]]                            Final result                 Please view results for these tests on the individual orders.   Extra Blue Top Tube     Status: None   Result Value Ref Range    Hold Specimen JIC    Extra Red Top Tube     Status: None   Result Value Ref Range    Hold Specimen JIC    Extra Green Top (Lithium Heparin) Tube     Status: None   Result Value Ref Range    Hold Specimen JIC    Extra Purple Top Tube     Status: None   Result Value Ref Range    Hold Specimen JIC    INR     Status: Abnormal   Result Value Ref Range    INR 1.19 (H) 0.85 - 1.15   Partial  thromboplastin time     Status: Normal   Result Value Ref Range    aPTT 28 22 - 38 Seconds   Comprehensive metabolic panel     Status: Abnormal   Result Value Ref Range    Sodium 136 136 - 145 mmol/L    Potassium 3.7 3.4 - 5.3 mmol/L    Chloride 103 98 - 107 mmol/L    Carbon Dioxide (CO2) 22 22 - 29 mmol/L    Anion Gap 11 7 - 15 mmol/L    Urea Nitrogen 24.6 (H) 8.0 - 23.0 mg/dL    Creatinine 1.10 0.67 - 1.17 mg/dL    Calcium 8.5 (L) 8.8 - 10.2 mg/dL    Glucose 164 (H) 70 - 99 mg/dL    Alkaline Phosphatase 70 40 - 129 U/L    AST 20 10 - 50 U/L    ALT 20 10 - 50 U/L    Protein Total 6.4 6.4 - 8.3 g/dL    Albumin 3.9 3.5 - 5.2 g/dL    Bilirubin Total 0.3 <=1.2 mg/dL    GFR Estimate 67 >60 mL/min/1.73m2   Lipase     Status: Normal   Result Value Ref Range    Lipase 29 13 - 60 U/L   Magnesium     Status: Normal   Result Value Ref Range    Magnesium 2.0 1.7 - 2.3 mg/dL   Nt probnp inpatient (BNP)     Status: Normal   Result Value Ref Range    N terminal Pro BNP Inpatient 325 0 - 1,800 pg/mL   Troponin T, High Sensitivity     Status: Abnormal   Result Value Ref Range    Troponin T, High Sensitivity 269 (HH) <=22 ng/L   CBC (+ platelets, no diff)     Status: Abnormal   Result Value Ref Range    WBC Count 13.1 (H) 4.0 - 11.0 10e3/uL    RBC Count 3.31 (L) 4.40 - 5.90 10e6/uL    Hemoglobin 11.1 (L) 13.3 - 17.7 g/dL    Hematocrit 33.9 (L) 40.0 - 53.0 %     (H) 78 - 100 fL    MCH 33.5 (H) 26.5 - 33.0 pg    MCHC 32.7 31.5 - 36.5 g/dL    RDW 13.7 10.0 - 15.0 %    Platelet Count 142 (L) 150 - 450 10e3/uL       EKG:    Normal sinus rhythm rate of 62.  Minimal ST segment elevation in the anterior leads.  Minimal ST segment depression in inferior leads.  These are new compared to June 17, 2021.  Normal sinus rhythm is also replaced atrial fibrillation noted in June.  I have independently reviewed and interpreted the EKG(s) documented above.    PROCEDURES:   Heparin KONRAD Allen, am serving as a scribe to  document services personally performed by Caesar Santana MD, based on my observation and the provider's statements to me. I, Caesar Santana MD attest that KONRAD FERNANDEZ is acting in a scribe capacity, has observed my performance of the services and has documented them in accordance with my direction.    Caesar Santana M.D.  Emergency Medicine  [facility] EMERGENCY DEPARTMENT     Caesar Santana MD  04/10/23 1227

## 2023-04-11 NOTE — ED NOTES
Bed: JNED-18  Expected date: 4/10/23  Expected time: 9:43 PM  Means of arrival: Ambulance  Comments:  [facility] - STEMI

## 2023-04-12 ENCOUNTER — APPOINTMENT (OUTPATIENT)
Dept: OCCUPATIONAL THERAPY | Facility: HOSPITAL | Age: 83
DRG: 247 | End: 2023-04-12
Attending: INTERNAL MEDICINE
Payer: MEDICARE

## 2023-04-12 LAB
ANION GAP SERPL CALCULATED.3IONS-SCNC: 14 MMOL/L (ref 7–15)
ATRIAL RATE - MUSE: 88 BPM
ATRIAL RATE - MUSE: 90 BPM
BUN SERPL-MCNC: 18.1 MG/DL (ref 8–23)
CALCIUM SERPL-MCNC: 8.7 MG/DL (ref 8.8–10.2)
CHLORIDE SERPL-SCNC: 102 MMOL/L (ref 98–107)
CREAT SERPL-MCNC: 0.94 MG/DL (ref 0.67–1.17)
DEPRECATED HCO3 PLAS-SCNC: 19 MMOL/L (ref 22–29)
DIASTOLIC BLOOD PRESSURE - MUSE: NORMAL MMHG
DIASTOLIC BLOOD PRESSURE - MUSE: NORMAL MMHG
GFR SERPL CREATININE-BSD FRML MDRD: 81 ML/MIN/1.73M2
GLUCOSE SERPL-MCNC: 140 MG/DL (ref 70–99)
HGB BLD-MCNC: 11.3 G/DL (ref 13.3–17.7)
HGB BLD-MCNC: 11.8 G/DL (ref 13.3–17.7)
INTERPRETATION ECG - MUSE: NORMAL
INTERPRETATION ECG - MUSE: NORMAL
P AXIS - MUSE: 60 DEGREES
P AXIS - MUSE: NORMAL DEGREES
POTASSIUM SERPL-SCNC: 3.7 MMOL/L (ref 3.4–5.3)
PR INTERVAL - MUSE: 184 MS
PR INTERVAL - MUSE: NORMAL MS
QRS DURATION - MUSE: 86 MS
QRS DURATION - MUSE: 92 MS
QT - MUSE: 284 MS
QT - MUSE: 442 MS
QTC - MUSE: 456 MS
QTC - MUSE: 540 MS
R AXIS - MUSE: -14 DEGREES
R AXIS - MUSE: -9 DEGREES
SODIUM SERPL-SCNC: 135 MMOL/L (ref 136–145)
SYSTOLIC BLOOD PRESSURE - MUSE: NORMAL MMHG
SYSTOLIC BLOOD PRESSURE - MUSE: NORMAL MMHG
T AXIS - MUSE: -49 DEGREES
T AXIS - MUSE: 127 DEGREES
VENTRICULAR RATE- MUSE: 155 BPM
VENTRICULAR RATE- MUSE: 90 BPM

## 2023-04-12 PROCEDURE — 250N000013 HC RX MED GY IP 250 OP 250 PS 637: Performed by: INTERNAL MEDICINE

## 2023-04-12 PROCEDURE — 250N000009 HC RX 250: Performed by: INTERNAL MEDICINE

## 2023-04-12 PROCEDURE — 80048 BASIC METABOLIC PNL TOTAL CA: CPT | Performed by: INTERNAL MEDICINE

## 2023-04-12 PROCEDURE — 99232 SBSQ HOSP IP/OBS MODERATE 35: CPT | Performed by: INTERNAL MEDICINE

## 2023-04-12 PROCEDURE — 36415 COLL VENOUS BLD VENIPUNCTURE: CPT | Performed by: INTERNAL MEDICINE

## 2023-04-12 PROCEDURE — 93010 ELECTROCARDIOGRAM REPORT: CPT | Mod: HIP | Performed by: INTERNAL MEDICINE

## 2023-04-12 PROCEDURE — 85018 HEMOGLOBIN: CPT | Performed by: INTERNAL MEDICINE

## 2023-04-12 PROCEDURE — 210N000001 HC R&B IMCU HEART CARE

## 2023-04-12 PROCEDURE — 97535 SELF CARE MNGMENT TRAINING: CPT | Mod: GO

## 2023-04-12 PROCEDURE — 250N000011 HC RX IP 250 OP 636: Performed by: INTERNAL MEDICINE

## 2023-04-12 PROCEDURE — 97165 OT EVAL LOW COMPLEX 30 MIN: CPT | Mod: GO

## 2023-04-12 PROCEDURE — 97110 THERAPEUTIC EXERCISES: CPT | Mod: GO

## 2023-04-12 PROCEDURE — 93005 ELECTROCARDIOGRAM TRACING: CPT

## 2023-04-12 RX ORDER — METOPROLOL TARTRATE 1 MG/ML
5 INJECTION, SOLUTION INTRAVENOUS ONCE
Status: DISCONTINUED | OUTPATIENT
Start: 2023-04-12 | End: 2023-04-12

## 2023-04-12 RX ORDER — METOPROLOL TARTRATE 1 MG/ML
5 INJECTION, SOLUTION INTRAVENOUS ONCE
Status: COMPLETED | OUTPATIENT
Start: 2023-04-12 | End: 2023-04-12

## 2023-04-12 RX ORDER — METOPROLOL TARTRATE 1 MG/ML
5 INJECTION, SOLUTION INTRAVENOUS EVERY 6 HOURS PRN
Status: DISCONTINUED | OUTPATIENT
Start: 2023-04-12 | End: 2023-04-14 | Stop reason: HOSPADM

## 2023-04-12 RX ORDER — LOSARTAN POTASSIUM 50 MG/1
50 TABLET ORAL DAILY
Status: DISCONTINUED | OUTPATIENT
Start: 2023-04-13 | End: 2023-04-14 | Stop reason: HOSPADM

## 2023-04-12 RX ORDER — METOPROLOL TARTRATE 25 MG/1
25 TABLET, FILM COATED ORAL EVERY 6 HOURS
Status: DISCONTINUED | OUTPATIENT
Start: 2023-04-12 | End: 2023-04-13

## 2023-04-12 RX ORDER — LOSARTAN POTASSIUM 25 MG/1
25 TABLET ORAL ONCE
Status: COMPLETED | OUTPATIENT
Start: 2023-04-12 | End: 2023-04-12

## 2023-04-12 RX ORDER — METOPROLOL SUCCINATE 50 MG/1
50 TABLET, EXTENDED RELEASE ORAL DAILY
Status: DISCONTINUED | OUTPATIENT
Start: 2023-04-13 | End: 2023-04-14 | Stop reason: HOSPADM

## 2023-04-12 RX ORDER — METOPROLOL SUCCINATE 25 MG/1
25 TABLET, EXTENDED RELEASE ORAL ONCE
Status: COMPLETED | OUTPATIENT
Start: 2023-04-12 | End: 2023-04-12

## 2023-04-12 RX ADMIN — METOPROLOL TARTRATE 25 MG: 25 TABLET, FILM COATED ORAL at 19:23

## 2023-04-12 RX ADMIN — AMIODARONE HYDROCHLORIDE 0.5 MG/MIN: 1.8 INJECTION, SOLUTION INTRAVENOUS at 09:09

## 2023-04-12 RX ADMIN — APIXABAN 2.5 MG: 2.5 TABLET, FILM COATED ORAL at 08:31

## 2023-04-12 RX ADMIN — CALCIUM 500 MG: 500 TABLET ORAL at 08:31

## 2023-04-12 RX ADMIN — AMIODARONE HYDROCHLORIDE 1 MG/MIN: 1.8 INJECTION, SOLUTION INTRAVENOUS at 03:19

## 2023-04-12 RX ADMIN — TAMSULOSIN HYDROCHLORIDE 0.8 MG: 0.4 CAPSULE ORAL at 18:27

## 2023-04-12 RX ADMIN — ROSUVASTATIN CALCIUM 10 MG: 10 TABLET, COATED ORAL at 08:31

## 2023-04-12 RX ADMIN — METOPROLOL SUCCINATE 25 MG: 25 TABLET, EXTENDED RELEASE ORAL at 10:11

## 2023-04-12 RX ADMIN — TICAGRELOR 90 MG: 90 TABLET ORAL at 08:32

## 2023-04-12 RX ADMIN — LOSARTAN POTASSIUM 25 MG: 25 TABLET, FILM COATED ORAL at 10:11

## 2023-04-12 RX ADMIN — TICAGRELOR 90 MG: 90 TABLET ORAL at 21:10

## 2023-04-12 RX ADMIN — ASPIRIN 81 MG: 81 TABLET, COATED ORAL at 08:30

## 2023-04-12 RX ADMIN — METOPROLOL TARTRATE 25 MG: 25 TABLET, FILM COATED ORAL at 23:49

## 2023-04-12 RX ADMIN — LOSARTAN POTASSIUM 25 MG: 25 TABLET, FILM COATED ORAL at 08:31

## 2023-04-12 RX ADMIN — AMIODARONE HYDROCHLORIDE 150 MG: 1.5 INJECTION, SOLUTION INTRAVENOUS at 03:04

## 2023-04-12 RX ADMIN — METOPROLOL TARTRATE 5 MG: 5 INJECTION INTRAVENOUS at 02:18

## 2023-04-12 RX ADMIN — METOPROLOL SUCCINATE 25 MG: 25 TABLET, EXTENDED RELEASE ORAL at 08:31

## 2023-04-12 RX ADMIN — METOPROLOL TARTRATE 5 MG: 5 INJECTION INTRAVENOUS at 18:17

## 2023-04-12 ASSESSMENT — ACTIVITIES OF DAILY LIVING (ADL)
ADLS_ACUITY_SCORE: 37
ADLS_ACUITY_SCORE: 37
DEPENDENT_IADLS:: INDEPENDENT
ADLS_ACUITY_SCORE: 37

## 2023-04-12 NOTE — PROGRESS NOTES
Daily Progress Note        CODE STATUS:  Full Code    04/12/23  Assessment/Plan:  Felipe Verma is a 82 year old male admitted on 4/10/2023. He is with past medical history of BPH, RA involving multiple sites, A-fib on Eliquis, HTN who presented to ED for evaluation of chest pain.  Patient found to have abnormal EKG, elevated troponin.  ED provider reviewed case with interventional cardiologist and general cardiologist.  Patient initiated on heparin drip and admitted.     Non-ST elevation MI;  -- ED provider discussed with interventional cardiologist and general cardiologist, recommended Nitropaste, heparin drip  -- PTA aspirin, Crestor  -- S/P coronary angiogram with CANDIE placement x 2 at prox and mid LAD lesions  -- Patient at risk fo LV thrombus per Interventional cardiologist. Recommended  Aspirin, Brillinta and Eliquis x 3 days followed by Brillinta and Eliquis.      A-fib on Eliquis;  -- Cont Eliquis  -- Heart rate controlled.       Leukocytosis; likely due to stress  -- No signs and symptoms of infection.  Trending down.     Chronic anemia;  -- No reported obvious/active bleeding.  Trend     Thrombocytopenia; ? Cause  -- Trend     Essential hypertension; controlled  -- PTA medications     RA involving multiple sites; stable  -- Resume home medications      BPH;  -- Cont flomax.  -- Needed chaidez cath last night for acute retention. Will have him follow up with urology if unable to void      Disposition; Home when ok with cardiology  Barrier to discharge; Clinical progress      Subjective:  Interval History: Patient seen and examined. Notes, labs, imaging reports personally reviewed. Patient is new to me today. No new issues since overnight besides urinary retention needing chaidez insertion. No chest pain or shortness of breath at rest.     Review of Systems:   As mentioned in subjective.    Patient Active Problem List   Diagnosis     Brain aneurysm     Benign prostatic hyperplasia     Benign essential  hypertension     Antiplatelet or antithrombotic long-term use     Clostridium difficile diarrhea     Diverticulosis     Normocytic anemia     Other and unspecified hyperlipidemia     Statin intolerance     Atrial fibrillation, unspecified type (H)     Localized swelling of left lower extremity     Stage 3a chronic kidney disease (H)     NSTEMI (non-ST elevated myocardial infarction) (H)       Scheduled Meds:    apixaban ANTICOAGULANT  2.5 mg Oral BID     aspirin  81 mg Oral Daily     calcium carbonate  500 mg Oral Daily     [START ON 4/13/2023] losartan  50 mg Oral Daily     [START ON 4/13/2023] metoprolol succinate ER  50 mg Oral Daily     rosuvastatin  10 mg Oral Daily     sodium chloride (PF)  3 mL Intracatheter Q8H     tamsulosin  0.8 mg Oral Daily with supper     ticagrelor  90 mg Oral Q12H     Continuous Infusions:    - MEDICATION INSTRUCTIONS -       - MEDICATION INSTRUCTIONS -       - MEDICATION INSTRUCTIONS -       Percutaneous Coronary Intervention orders placed (this is information for BPA alerting)       PRN Meds:.acetaminophen **OR** acetaminophen, acetaminophen, - MEDICATION INSTRUCTIONS -, - MEDICATION INSTRUCTIONS -, diazepam, HOLD MEDICATION, hydrALAZINE, lidocaine 4%, lidocaine (buffered or not buffered), melatonin, metoprolol, nitroGLYcerin, ondansetron **OR** ondansetron, ondansetron **OR** ondansetron, oxyCODONE **OR** oxyCODONE, - MEDICATION INSTRUCTIONS -, Percutaneous Coronary Intervention orders placed (this is information for BPA alerting), senna-docusate **OR** senna-docusate, sodium chloride (PF)    Objective:  Vital signs in last 24 hours:  Temp:  [97.9  F (36.6  C)-98.1  F (36.7  C)] 97.9  F (36.6  C)  Pulse:  [] 99  Resp:  [18-22] 19  BP: (113-164)/() 113/57  SpO2:  [95 %-99 %] 98 %        Intake/Output Summary (Last 24 hours) at 4/12/2023 1544  Last data filed at 4/12/2023 1538  Gross per 24 hour   Intake 780 ml   Output 1650 ml   Net -870 ml       Physical  Exam:    General: Not in obvious distress.  HEENT: NC, AT   Chest: Clear to auscultation bilaterally  Heart: S1S2 normal, regular. No M/R/G  Abdomen: Soft. NT, ND. Bowel sounds- active.  Extremities: No legs swelling  Neuro: Alert and awake, grossly non-focal      Lab Results:(I have personally reviewed the results)    Recent Results (from the past 24 hour(s))   Partial thromboplastin time    Collection Time: 04/11/23  5:08 PM   Result Value Ref Range    aPTT 94 (H) 22 - 38 Seconds   Hemoglobin    Collection Time: 04/11/23  6:51 PM   Result Value Ref Range    Hemoglobin 12.5 (L) 13.3 - 17.7 g/dL   Magnesium    Collection Time: 04/11/23  6:51 PM   Result Value Ref Range    Magnesium 1.9 1.7 - 2.3 mg/dL   Potassium    Collection Time: 04/11/23  6:51 PM   Result Value Ref Range    Potassium 3.9 3.4 - 5.3 mmol/L   ECG 12-LEAD WITH MUSE (LHE)    Collection Time: 04/12/23  1:51 AM   Result Value Ref Range    Systolic Blood Pressure  mmHg    Diastolic Blood Pressure  mmHg    Ventricular Rate 155 BPM    Atrial Rate 88 BPM    AK Interval  ms    QRS Duration 86 ms     ms    QTc 456 ms    P Axis  degrees    R AXIS -14 degrees    T Axis -49 degrees    Interpretation ECG       Atrial fibrillation with rapid ventricular response  Low voltage QRS  Cannot rule out Anteroseptal infarct , age undetermined  T wave abnormality, consider lateral ischemia  Abnormal ECG  When compared with ECG of 11-APR-2023 14:58,  Significant changes have occurred     Basic metabolic panel    Collection Time: 04/12/23  5:12 AM   Result Value Ref Range    Sodium 135 (L) 136 - 145 mmol/L    Potassium 3.7 3.4 - 5.3 mmol/L    Chloride 102 98 - 107 mmol/L    Carbon Dioxide (CO2) 19 (L) 22 - 29 mmol/L    Anion Gap 14 7 - 15 mmol/L    Urea Nitrogen 18.1 8.0 - 23.0 mg/dL    Creatinine 0.94 0.67 - 1.17 mg/dL    Calcium 8.7 (L) 8.8 - 10.2 mg/dL    Glucose 140 (H) 70 - 99 mg/dL    GFR Estimate 81 >60 mL/min/1.73m2   Hemoglobin    Collection Time: 04/12/23   5:12 AM   Result Value Ref Range    Hemoglobin 11.8 (L) 13.3 - 17.7 g/dL   ECG 12-lead, with Muse    Collection Time: 04/12/23  5:42 AM   Result Value Ref Range    Systolic Blood Pressure  mmHg    Diastolic Blood Pressure  mmHg    Ventricular Rate 90 BPM    Atrial Rate 90 BPM    TX Interval 184 ms    QRS Duration 92 ms     ms    QTc 540 ms    P Axis 60 degrees    R AXIS -9 degrees    T Axis 127 degrees    Interpretation ECG       Sinus rhythm with Premature atrial complexes  Possible Anterior infarct (cited on or before 12-APR-2023)  Marked T wave abnormality, consider lateral ischemia  Prolonged QT  Abnormal ECG  When compared with ECG of 12-APR-2023 01:51,  Sinus rhythm has replaced Atrial fibrillation  Vent. rate has decreased BY  65 BPM  Serial changes of evolving Anterior infarct Present         All laboratory and imaging data in the past 24 hours reviewed  Serum Glucose range:   Recent Labs   Lab 04/12/23  0512 04/11/23  0728 04/10/23  2209   * 122* 164*     ABG: No lab results found in last 7 days.  CBC:   Recent Labs   Lab 04/12/23 0512 04/11/23 1851 04/11/23  0040 04/10/23  2209   WBC  --   --  12.7* 13.1*   HGB 11.8* 12.5* 10.8* 11.1*   HCT  --   --  33.0* 33.9*   MCV  --   --  102* 102*   PLT  --   --  136* 142*     Chemistry:   Recent Labs   Lab 04/12/23  0512 04/11/23  1851 04/11/23  0728 04/10/23  2209   *  --  138 136   POTASSIUM 3.7 3.9 3.9 3.7   CHLORIDE 102  --  106 103   CO2 19*  --  23 22   BUN 18.1  --  20.7 24.6*   CR 0.94  --  1.01 1.10   GFRESTIMATED 81  --  74 67   ABBEY 8.7*  --  8.4* 8.5*   MAG  --  1.9  --  2.0   PROTTOTAL  --   --   --  6.4   ALBUMIN  --   --   --  3.9   AST  --   --   --  20   ALT  --   --   --  20   ALKPHOS  --   --   --  70   BILITOTAL  --   --   --  0.3     Coags:  Recent Labs   Lab 04/11/23  1708 04/11/23  0728 04/10/23  2209   INR  --   --  1.19*   PTT 94* 98* 28     Cardiac Markers:  No results for input(s): CKTOTAL, TROPONINI in the last 168  hours.       Cardiac Catheterization    Addendum Date: 2023       Prox LAD lesion is 90% stenosed.    Ost LAD to Prox LAD lesion is 30% stenosed.    Mid LAD lesion is 95% stenosed. Single-vessel occlusive coronary artery disease, namely proximal and mid LAD. Complex PCI of proximal LAD with drug-eluting stent x1, 2.75 x 22 mm resolute Rajendra postdilated with a 3.0 balloon Complex PCI of subacute lesion in mid to distal LAD with placement of a 2.25 x 15 mm and 2.0 x 22 mm resolute Rajendra stents, postdilated with a 2.5 balloon    Result Date: 2023     Prox LAD lesion is 90% stenosed.    Ost LAD to Prox LAD lesion is 30% stenosed.    Mid LAD lesion is 95% stenosed. Single-vessel occlusive coronary artery disease, namely mid proximal and mid LAD. Complex PCI of proximal LAD with drug-eluting stent x1, 2.75 x 22 mm resolute [place] postdilated with a 3.0 balloon Complex PCI of subacute lesion in mid to distal LAD with placement of a 2.25 x 15 mm and 2.0 x 22 mm resolute Rajendra stents, postdilated with a 2.5 balloon     Echocardiogram Complete    Result Date: 2023  [id number]^AVIVA^[place] [address]  Name: VERONICA RASMUSSEN MRN: [id number] : 1940 Study Date: 2023 10:54 AM Age: 82 yrs Gender: Male Patient Location: [facility] Reason For Study: MI - Acute Ordering Physician: AVIVA MULLEN Referring Physician: AVIVA MULLEN Performed By: ACE  BSA: 2.0 m2 Height: 67 in Weight: 185 lb HR: 74 BP: 142/85 mmHg ______________________________________________________________________________ Procedure Complete Echo Adult. Definity (NDC #90574-793) given intravenously. 2mL given. ______________________________________________________________________________ Interpretation Summary  1. The left ventricle is normal in size. Left ventricular systolic performance is moderately reduced. The ejection fraction is estimated to be 40%. 2. There is severe  anteroseptal, distal septal, and distal anterior hypokinesis. The apex is akinetic (no apical mural thrombus is detected). 3. There is mild to moderate mitral insufficiency. 4. Normal right ventricular size and systolic performance. 5. There is mild left atrial enlargement. ______________________________________________________________________________ Left ventricle: The left ventricle is normal in size. Left ventricular systolic performance is moderately reduced. The ejection fraction is estimated to be 40%. There is severe anteroseptal, distal septal, and distal anterior hypokinesis. The apex is akinetic (no apical mural thrombus is detected). Left ventricular wall thickness is normal.  Assessment of left atrial pressure (LAP): The cumulative findings suggest moderately elevated left atrial pressure (the E/A is > 0.8 and <2.0 plus 2 or 3 of 3 of the following present: Average E/e' > 14, TRvel > 2.8 m/s, and/or LA vol. index > 34 ml/mÂ  ).  Right ventricle: Normal right ventricular size and systolic performance.  Left atrium: There is mild left atrial enlargement.  Right atrium: The right atrium is of normal size.  IVC: The IVC is moderately dilated.  Aortic valve: The aortic valve is comprised of three cusps. No significant aortic stenosis or aortic insufficiency is detected on this study.  Mitral valve: The mitral valve appears morphologically normal. There is mild to moderate mitral insufficiency.  Tricuspid valve: The tricuspid valve is grossly morphologically normal. There is mild tricuspid insufficiency.  Pulmonic valve: The pulmonic valve is grossly morphologically normal. There is mild pulmonic insufficiency.  Thoracic aorta: The aortic root and proximal ascending aorta are of normal dimension.  Pericardium: There is no significant pericardial effusion. ______________________________________________________________________________  ______________________________________________________________________________ MMode/2D Measurements & Calculations IVSd: 0.59 cm LVIDd: 5.4 cm LVIDs: 4.2 cm LVPWd: 1.1 cm FS: 22.1 % LV mass(C)d: 165.9 grams LV mass(C)dI: 84.8 grams/m2 Ao root diam: 3.5 cm LA dimension: 3.8 cm asc Aorta Diam: 3.4 cm LA/Ao: 1.1 LVOT diam: 2.2 cm LVOT area: 3.7 cm2 LA Volume (BP): 67.9 ml LA Volume Index (BP): 34.6 ml/m2  LA Volume Indexed (AL/bp): 36.8 ml/m2 RV Base: 3.2 cm RWT: 0.41 TAPSE: 1.3 cm  Time Measurements MM HR: 68.0 BPM  Doppler Measurements & Calculations MV E max angel: 56.1 cm/sec MV A max angel: 60.8 cm/sec MV E/A: 0.92 MV dec slope: 514.2 cm/sec2 MV dec time: 0.11 sec Ao V2 max: 101.1 cm/sec Ao max P.0 mmHg Ao V2 mean: 74.1 cm/sec Ao mean P.4 mmHg Ao V2 VTI: 21.9 cm KATHRYN(I,D): 2.8 cm2 KATHRYN(V,D): 3.2 cm2 LV V1 max PG: 3.0 mmHg LV V1 max: 86.1 cm/sec LV V1 VTI: 16.4 cm SV(LVOT): 61.0 ml SI(LVOT): 31.2 ml/m2 PA acc time: 0.13 sec TR max angel: 283.4 cm/sec TR max P.1 mmHg AV Angel Ratio (DI): 0.85 KATHRYN Index (cm2/m2): 1.4 E/E': 6.0 E/E' av.3 Lateral E/e': 6.0 Medial E/e': 8.7 Peak E' Angel: 9.4 cm/sec RV S Angel: 9.7 cm/sec  ______________________________________________________________________________ Report approved by: Harry White 2023 12:06 PM       XR Chest Port 1 View    Result Date: 4/10/2023  EXAM: XR CHEST PORT 1 VIEW LOCATION: [facility] DATE/TIME: 4/10/2023 11:00 PM INDICATION: Chest pain COMPARISON: Chest CT on 2017     IMPRESSION: Negative chest.      Latest radiology report personally reviewed.    Note created using dragon voice recognition software so sounds alike errors may have escaped editing.      2023   Efrem Portillo MD  Hospitalist, [facility]  Pager: [phone]

## 2023-04-12 NOTE — PROVIDER NOTIFICATION
Pt's urine in chaidez tubing noted to be red with some flecks. Dr. Portillo text paged and updated.     1715: Spoke with Dr. Carlos to clarify if okay to discontinue Eliquis per Dr. Portillo's request. Dr. Carlos said that's okay, Dr. Ayon updated, Eliquis put on hold.    DM SHELTON RN

## 2023-04-12 NOTE — PROVIDER NOTIFICATION
Pt has been sustaining HR in the 120s-160s for the last 30 minutes. Cardiology on call text paged and updated. Spoke with Dr. Carlson. New orders for 25mg PO q6h lopressor and 5mg IV q6h PRN for HR >120bpm. Continuing to monitor.

## 2023-04-12 NOTE — CONSULTS
Care Management Initial Consult    General Information  Assessment completed with: Patient,    Type of CM/SW Visit: Initial Assessment    Primary Care Provider verified and updated as needed: Yes   Readmission within the last 30 days: no previous admission in last 30 days      Reason for Consult: discharge planning  Advance Care Planning: Advance Care Planning Reviewed: no concerns identified          Communication Assessment  Patient's communication style: spoken language (English or Bilingual)             Cognitive  Cognitive/Neuro/Behavioral: WDL        Orientation: oriented x 4             Living Environment:   People in home: spouse, child(marilee), adult     Current living Arrangements: house      Able to return to prior arrangements: yes       Family/Social Support:  Care provided by: self  Provides care for: spouse  Marital Status:   Wife, Children          Description of Support System: Supportive, Involved    Support Assessment: Adequate family and caregiver support    Current Resources:   Patient receiving home care services: No     Community Resources: None  Equipment currently used at home:    Supplies currently used at home: None    Employment/Financial:  Employment Status:          Financial Concerns: No concerns identified   Referral to Financial Worker: No       Lifestyle & Psychosocial Needs:  Social Determinants of Health     Tobacco Use: Medium Risk (4/10/2023)    Patient History      Smoking Tobacco Use: Former      Smokeless Tobacco Use: Never      Passive Exposure: Not on file   Alcohol Use: Not on file   Financial Resource Strain: Not on file   Food Insecurity: Not on file   Transportation Needs: Not on file   Physical Activity: Not on file   Stress: Not on file   Social Connections: Not on file   Intimate Partner Violence: Not on file   Depression: Not at risk (6/17/2021)    PHQ-2      PHQ-2 Score: 0   Housing Stability: Not on file       Functional Status:  Prior to admission patient  needed assistance:   Dependent ADLs:: Independent  Dependent IADLs:: Independent            Values/Beliefs:  Spiritual, Cultural Beliefs, Holiness Practices, Values that affect care: no               Additional Information:  CM met with patient in room to discuss discharge planning and complete initial assessment.    Patient lives in a house with his wife and oldest son. He is independent at baseline including driving. He is his wife's primary caregiver. His wife is currently in TCU at [facility], if TCU is needed he would like to go there to be with his wife. There are currently no therapy consults, unknown discharge needs. CM following for recommendations and discharge planning. Son to transport.     Carolyn Miller, SILVESTREW

## 2023-04-12 NOTE — PLAN OF CARE
Problem: Cardiac Catheterization (Diagnostic/Interventional)  Goal: Stable Heart Rate and Rhythm  Outcome: Not Progressing   Goal Outcome Evaluation:    Patient remained in and out of Afib and NSR- While on Afib- HR went up to 180 to 190's for a very brief episode- then HR remained 100 to 160 Fluctuating. Asymptomatic. Sleeping. EKG done. Cards on call notified. Metoprolol 5mg IV given x 1. No improvement noted. Amiodarone bolus given then Amiodarone drip started.    Problem: Urinary Retention  Goal: Effective Urinary Elimination  4/12/2023 0310 by Isabel Espinal, RN  Outcome: Not Progressing   Patient remained unable to completely empty his bladder. Post void residual- 382 ml. Was straight cathed twice. HO aware. [place] inserted as ordered.

## 2023-04-12 NOTE — CONSULTS
NUTRITION EDUCATION      REASON FOR ASSESSMENT:  MD consult: Dietitian to see for Heart Healthy Diet education    NUTRITION HISTORY:  Information obtained from pt    Cereal with skim milk and oj for breakfast, ham and cheese sandwich on wheat bread for lunch. Dinner varies but could include beef amauriganoff which he knows is not low fat. Encouraged him to buy low fat low sodium soup for this. Does not eat many fruits or vegetables. Likes but does not buy them. Uses olive oil and butter. Does use the salt shaker.    CURRENT DIET:  Low saturated fat Na < 2400 mg    NUTRITION DIAGNOSIS:  Food- and nutrition-related knowledge deficit R/t heart disease as evidenced by need for a heart healthy diet.    INTERVENTIONS:    Nutrition Prescription:  Low saturated fat Na < 2400 mg    Implementation:      *  Nutrition Education (Content):   A)  Provided handout on heart healthy nutrition therapy   B)  Discussed foods allowed and foods to avoid. Increasing fruit and vegetable consumption      *  Nutrition Education (Application):   A)  Discussed current eating habits and recommended alternative food choices      *  Anticipate good compliance      *  Diet Education - refer to Education Flowsheet    Goals:      *  Patient will verbalize understanding of diet       *  All of the above goals met during the education session    Follow Up/Monitoring:      *  Provided RD contact information for future questions      *  Recommended Out-Patient Nutrition Referral, if further diet instructions are needed

## 2023-04-12 NOTE — PROGRESS NOTES
Cardiology Progress Note    Assessment/Plan:  1.  Status post STEMI with drug-eluting stent placement x2 to the mid to distal LAD.  No other significant disease identified.  Patient currently on combination of aspirin and Brilinta along with apixaban anticoagulation for his history of atrial fibrillation.  Would recommend another 24 hours of hospitalization with cardiac rehab and adjustment of medications.  Probable discharge home tomorrow if doing well.  2.  Moderate ischemic cardiomyopathy, EF 40% with severe anteroseptal and anterior wall hypokinesis.  The apex appears akinetic although no thrombus identified.  Will increase doses of both metoprolol and losartan to provide afterload reduction.  3.  Paroxysmal atrial fibrillation on Eliquis.  Patient with episode of atrial fibrillation with rapid ventricular response last night prompting bolus of IV amiodarone and subsequent infusion.  ECG this morning demonstrates conversion back to sinus rhythm.  He does have significant QT prolongation so we will discontinue amiodarone infusion at this time.  Again we will increase metoprolol succinate in hopes of further suppressing atrial fibrillation.    Primary cardiologist: Dr. Pamella Porter, [facility]    Subjective:  Patient sleepy this morning but otherwise without complaints.  Specifically reports no chest tightness or shortness of breath.      apixaban ANTICOAGULANT  2.5 mg Oral BID     aspirin  81 mg Oral Daily     calcium carbonate  500 mg Oral Daily     losartan  25 mg Oral Once     [START ON 4/13/2023] losartan  50 mg Oral Daily     metoprolol succinate ER  25 mg Oral Once     [START ON 4/13/2023] metoprolol succinate ER  50 mg Oral Daily     rosuvastatin  10 mg Oral Daily     sodium chloride (PF)  3 mL Intracatheter Q8H     tamsulosin  0.8 mg Oral Daily with supper     ticagrelor  90 mg Oral Q12H         Objective:   Vital signs in last 24 hours:  Temp:  [97.8  F (36.6  C)-98.1  F (36.7   C)] 98.1  F (36.7  C)  Pulse:  [] 97  Resp:  [15-22] 20  BP: (114-164)/() 147/86  SpO2:  [93 %-99 %] 96 %  Weight:        Review of Systems:  Negative    Physical Exam:  General appearance: alert, cooperative, no distress   Head: Normocephalic, without obvious abnormality, atraumatic  Neck: no JVD   Lungs: clear to auscultation bilaterally   Heart: Regular rhythm.  S1, S2 normal.  No murmur or gallop  Extremities: No peripheral edema.  Right groin site dry and intact.  No hematoma.  Minimal ecchymosis.    Cardiographics (personally reviewed):   Telemetry demonstrates episode of atrial fibrillation with rapid ventricular response with subsequent conversion to sinus rhythm with frequent PACs and PVCs.    Imaging (personally reviewed):     Procedure  Complete Echo Adult. Definity (NDC #[id number]) given intravenously. 2mL given.  ______________________________________________________________________________  Interpretation Summary     1. The left ventricle is normal in size. Left ventricular systolic performance  is moderately reduced. The ejection fraction is estimated to be 40%.  2. There is severe anteroseptal, distal septal, and distal anterior  hypokinesis. The apex is akinetic (no apical mural thrombus is detected).  3. There is mild to moderate mitral insufficiency.  4. Normal right ventricular size and systolic performance.  5. There is mild left atrial enlargement.  Lab Results (personally reviewed):     No results for input(s): CHOL, HDL, LDL, TRIG, CHOLHDLRATIO in the last 85972 hours.  No results for input(s): LDL in the last 38438 hours.  Recent Labs   Lab Test 04/12/23  0512   *   POTASSIUM 3.7   CHLORIDE 102   CO2 19*   *   BUN 18.1   CR 0.94   GFRESTIMATED 81   ABBEY 8.7*     Recent Labs   Lab Test 04/12/23  0512 04/11/23  0728 04/10/23  2209   CR 0.94 1.01 1.10     No results for input(s): A1C in the last 10397 hours.       Recent Labs   Lab Test 04/12/23  0512 04/11/23  9572  04/11/23 0040   WBC  --   --  12.7*   HGB 11.8*   < > 10.8*   HCT  --   --  33.0*   MCV  --   --  102*   PLT  --   --  136*    < > = values in this interval not displayed.     Recent Labs   Lab Test 04/12/23  0512 04/11/23  1851 04/11/23 0040   HGB 11.8* 12.5* 10.8*    No results for input(s): TROPONINI in the last 98805 hours.  Recent Labs   Lab Test 04/10/23  2209   NTBNPI 325     No results for input(s): TSH in the last 23156 hours.  Recent Labs   Lab Test 04/10/23  2209   INR 1.19*          Jaelyn Carlos MD

## 2023-04-12 NOTE — PLAN OF CARE
Problem: Cardiac Catheterization (Diagnostic/Interventional)  Goal: Absence of Vascular Access Complication  Outcome: Progressing   Goal Outcome Evaluation:  After removal of D STAT-  No bleeding noted Right groin area- No hematoma. CMS intact. Some scattered small bruising around the site    Problem: Urinary Retention  Goal: Effective Urinary Elimination  Outcome: Progressing  Unable to complete to empty bladder- 452 ML post void residual- HO aware- straight cath done and able to obtain- 500 ml.    Problem: Dysrhythmia  Goal: Normalized Cardiac Rhythm  Outcome: Progressing    Patient has been having intermittent run of Afib with RVR, HR- 140 to 170. Asymptomatic- Cards aware- ordered Metoprolol 25 mg po x 1 and given. Denies any chest pain or any discomfort

## 2023-04-12 NOTE — PLAN OF CARE
Problem: Plan of Care - These are the overarching goals to be used throughout the patient stay.    Goal: Plan of Care Review  Description: The Plan of Care Review/Shift note should be completed every shift.  The Outcome Evaluation is a brief statement about your assessment that the patient is improving, declining, or no change.  This information will be displayed automatically on your shift note.  Outcome: Progressing     Problem: Cardiac Catheterization (Diagnostic/Interventional)  Goal: Stable Heart Rate and Rhythm  Outcome: Progressing     Problem: Dysrhythmia  Goal: Normalized Cardiac Rhythm  Outcome: Progressing     Problem: Urinary Retention  Goal: Effective Urinary Elimination  Outcome: Progressing     Problem: Plan of Care - These are the overarching goals to be used throughout the patient stay.    Goal: Absence of Hospital-Acquired Illness or Injury  Intervention: Identify and Manage Fall Risk  Recent Flowsheet Documentation  Taken 4/12/2023 1300 by Ashley Cohen RN  Safety Promotion/Fall Prevention: activity supervised  Taken 4/12/2023 0830 by Ashley Cohen RN  Safety Promotion/Fall Prevention: activity supervised     Problem: Risk for Delirium  Goal: Improved Behavioral Control  Intervention: Prevent and Manage Agitation  Recent Flowsheet Documentation  Taken 4/12/2023 1300 by Ashley Cohen RN  Environment Familiarity/Consistency: daily routine followed  Taken 4/12/2023 0830 by Ashley Cohen RN  Environment Familiarity/Consistency: daily routine followed  Intervention: Minimize Safety Risk  Recent Flowsheet Documentation  Taken 4/12/2023 1300 by Ashley Cohen RN  Enhanced Safety Measures: family to remain at bedside  Taken 4/12/2023 0830 by Ashley Cohen RN  Enhanced Safety Measures: family to remain at bedside  Goal: Improved Attention and Thought Clarity  Intervention: Maximize Cognitive Function  Recent Flowsheet Documentation  Taken 4/12/2023 1300 by Vicki  Ashley ALFRED RN  Sensory Stimulation Regulation: care clustered  Reorientation Measures: clock in view  Taken 4/12/2023 0830 by Ashley Cohen RN  Sensory Stimulation Regulation: care clustered  Reorientation Measures: clock in view     Problem: Cardiac Catheterization (Diagnostic/Interventional)  Goal: Anesthesia/Sedation Recovery  Intervention: Optimize Anesthesia Recovery  Recent Flowsheet Documentation  Taken 4/12/2023 1300 by Ashley Cohen RN  Safety Promotion/Fall Prevention: activity supervised  Reorientation Measures: clock in view  Taken 4/12/2023 0830 by Ashley Cohen RN  Safety Promotion/Fall Prevention: activity supervised  Reorientation Measures: clock in view  Goal: Absence of Vascular Access Complication  Intervention: Prevent Access Site Complications  Recent Flowsheet Documentation  Taken 4/12/2023 1300 by Ashley Cohen RN  Activity Management: activity adjusted per tolerance  Taken 4/12/2023 0830 by Ashley Cohen RN  Activity Management: activity adjusted per tolerance   Goal Outcome Evaluation:       Patient is alert and able to  let his needs be known. Denies pain when asked. VSS and is NSR on the monitor. Amiodarone drip was discontinued. Vallejo in place for retention. Right femoral site WNL. Cardiology following. Continue plan of care.       1505: Patient went into Afib RVR  at 1459 then went back into NSR right away but continues to go in and out of AFIB/NSR. He is up to the bathroom/walking when happened. Dr. Carlos paged and updated of this.

## 2023-04-12 NOTE — PROGRESS NOTES
Occupational Therapy      04/12/23 1030   Appointment Info   Signing Clinician's Name / Credentials (OT) Nora Newman OTR/L   Living Environment   People in Home spouse;child(marilee), adult   Current Living Arrangements house   Home Accessibility stairs within home   Number of Stairs, Within Home, Primary six   Stair Railings, Within Home, Primary railings safe and in good condition   Transportation Anticipated family or friend will provide   Living Environment Comments Pt able to live on main level but has to go up/down 6 stairs if he wants to go to the grocery store. Son lives in basement of home.   Self-Care   Usual Activity Tolerance good   Current Activity Tolerance moderate   Regular Exercise No   Equipment Currently Used at Home walker, standard  (fww)   Fall history within last six months no   Activity/Exercise/Self-Care Comment Pt ind. w/ ADL routine, pt also assists spouse w/ ADL routine. Per pt report spouse has Dementia and is currently going to TCU.   Instrumental Activities of Daily Living (IADL)   IADL Comments Pt grocery shops/cooks/cares for spouse/drives- son lives in basement of home and can assist as needed per pt report.   General Information   Onset of Illness/Injury or Date of Surgery 04/10/23   Referring Physician Alejandro Hitchcock MD   Additional Occupational Profile Info/Pertinent History of Current Problem Status post STEMI with drug-eluting stent placement x2 to the mid to distal LAD.   Existing Precautions/Restrictions fall;cardiac  (R.groin access site)   Cognitive Status Examination   Orientation Status person;place   Range of Motion Comprehensive   General Range of Motion no range of motion deficits identified   Bed Mobility   Bed Mobility No deficits identified   Transfers   Transfers sit-stand transfer   Sit-Stand Transfer   Sit-Stand [place] (Transfers) contact guard;verbal cues;supervision   Assistive Device (Sit-Stand Transfers) walker, front-wheeled   Balance   Balance  Assessment standing balance: dynamic   Clinical Impression   Criteria for Skilled Therapeutic Interventions Met (OT) Yes, treatment indicated   OT Diagnosis decreased act. tolerance/ADL ind.   OT Problem List-Impairments impacting ADL problems related to;activity tolerance impaired;balance;cognition;post-surgical precautions   Assessment of Occupational Performance 1-3 Performance Deficits   Planned Therapy Interventions (OT) ADL retraining;transfer training;home program guidelines   Clinical Decision Making Complexity (OT) low complexity   Risk & Benefits of therapy have been explained evaluation/treatment results reviewed;patient   OT Total Evaluation Time   OT Eval, Low Complexity Minutes (32407) 8   OT Goals   Therapy Frequency (OT) Daily   OT Predicted Duration/Target Date for Goal Attainment 04/18/23   OT Goals Cardiac Phase 1   OT: Understanding of cardiac education to maximize quality of life, condition management, and health outcomes Patient;Verbalize   OT: Perform aerobic activity with stable cardiovascular response continuous;15 minutes   OT: Functional/aerobic ambulation tolerance with stable cardiovascular response in order to return to home and community environment 100 feet;200 feet;Modified independent   OT: Navigation of stairs simulating home set up with stable cardiovascular response in order to return to home and community environment Modified independent;6 stairs   Interventions   Interventions Quick Adds Cardiac Rehab;Therapeutic Procedures/Exercise   Self-Care/Home Management   Self-Care/Home Mgmt/ADL, Compensatory, Meal Prep Minutes (63651) 10   Symptoms Noted During/After Treatment (Meal Preparation/Planning Training) fatigue   Treatment Detail/Skilled Intervention Pt provided w/ education/training following angio/stent placement-R. groin access pt verbalizing understanding of precautions, HEP, outpatient cardiac rehab. Mod.I bed mobiltiy Sup>EOB, First STS w/ CGA w/o device slightly  unsteady, Additional STS w/ SBA/CGA w/ fww for added support. Pt able to don socks upright in bed.   Therapeutic Procedures/Exercise   Therapeutic Procedure: strength, endurance, ROM, flexibillity minutes (19985) 12   Symptoms Noted During/After Treatment fatigue   Treatment Detail/Skilled Intervention see ambulation/stair tab for details   Treatment Time Includes (CR Only) Extra time managing multiple lines/tubes   Ambulation   Workload 250ft   Symptoms Fatigue   Cardiovascular Response Normal   Exercise Details CGA-SBA no LOB w/ fww- pt has fww at home and OT strongly encouraged pt to use device for functional mobiliity at home to enhance safety/stability.   Vital Signs Details Vitals WNL   Stairs   Workload 6   Symptoms Denies symptoms   Cardiovascular Response Normal   Exercise Details CGA for stairs cues for tech/safety   Cardiac Education   Education Provided Outpatient Cardiac Rehab;Resuming home activities;Risk factors;Signs and symptoms;Home exercise program   Education Packet Given to Patient Yes   All Patient Education Handouts Reviewed with Patient and/or Family Yes   OT Discharge Planning   OT Plan endurance/act. tolerance, balance, review stairs   OT Discharge Recommendation (DC Rec) home with outpatient cardiac rehab;home with assist   OT Rationale for DC Rec Pt lives at home w/ spouse & adult son- pt is primary caregiver for spouse. Spouse is currently in TCU- pt son able to assist pt w/ self cares/IADL tasks upon d/c home. Pt ambulating w/ SBA-CGA w/ fww no LOB noted w/ use of device.   OT Brief overview of current status CGA-SBA w/ fww- OT encourages use of fww for home   Total Session Time   Timed Code Treatment Minutes 22   Total Session Time (sum of timed and untimed services) 30

## 2023-04-13 LAB
ALBUMIN UR-MCNC: 70 MG/DL
ANION GAP SERPL CALCULATED.3IONS-SCNC: 8 MMOL/L (ref 7–15)
APPEARANCE UR: ABNORMAL
ATRIAL RATE - MUSE: 139 BPM
ATRIAL RATE - MUSE: 89 BPM
BILIRUB UR QL STRIP: NEGATIVE
BUN SERPL-MCNC: 23.2 MG/DL (ref 8–23)
CALCIUM SERPL-MCNC: 8.2 MG/DL (ref 8.8–10.2)
CHLORIDE SERPL-SCNC: 100 MMOL/L (ref 98–107)
COLOR UR AUTO: ABNORMAL
CREAT SERPL-MCNC: 1.21 MG/DL (ref 0.67–1.17)
DEPRECATED HCO3 PLAS-SCNC: 22 MMOL/L (ref 22–29)
DIASTOLIC BLOOD PRESSURE - MUSE: NORMAL MMHG
DIASTOLIC BLOOD PRESSURE - MUSE: NORMAL MMHG
ERYTHROCYTE [DISTWIDTH] IN BLOOD BY AUTOMATED COUNT: 13.8 % (ref 10–15)
GFR SERPL CREATININE-BSD FRML MDRD: 60 ML/MIN/1.73M2
GLUCOSE SERPL-MCNC: 110 MG/DL (ref 70–99)
GLUCOSE UR STRIP-MCNC: NEGATIVE MG/DL
HCT VFR BLD AUTO: 33.3 % (ref 40–53)
HGB BLD-MCNC: 11.3 G/DL (ref 13.3–17.7)
HGB UR QL STRIP: ABNORMAL
INTERPRETATION ECG - MUSE: NORMAL
INTERPRETATION ECG - MUSE: NORMAL
KETONES UR STRIP-MCNC: NEGATIVE MG/DL
LEUKOCYTE ESTERASE UR QL STRIP: ABNORMAL
MCH RBC QN AUTO: 33.8 PG (ref 26.5–33)
MCHC RBC AUTO-ENTMCNC: 33.9 G/DL (ref 31.5–36.5)
MCV RBC AUTO: 100 FL (ref 78–100)
MUCOUS THREADS #/AREA URNS LPF: PRESENT /LPF
NITRATE UR QL: NEGATIVE
P AXIS - MUSE: 51 DEGREES
P AXIS - MUSE: NORMAL DEGREES
PH UR STRIP: 5.5 [PH] (ref 5–7)
PLATELET # BLD AUTO: 130 10E3/UL (ref 150–450)
POTASSIUM SERPL-SCNC: 3.6 MMOL/L (ref 3.4–5.3)
PR INTERVAL - MUSE: 160 MS
PR INTERVAL - MUSE: 176 MS
QRS DURATION - MUSE: 90 MS
QRS DURATION - MUSE: 92 MS
QT - MUSE: 382 MS
QT - MUSE: 450 MS
QTC - MUSE: 547 MS
QTC - MUSE: 581 MS
R AXIS - MUSE: -36 DEGREES
R AXIS - MUSE: -43 DEGREES
RBC # BLD AUTO: 3.34 10E6/UL (ref 4.4–5.9)
RBC URINE: >182 /HPF
SODIUM SERPL-SCNC: 130 MMOL/L (ref 136–145)
SP GR UR STRIP: 1.02 (ref 1–1.03)
SYSTOLIC BLOOD PRESSURE - MUSE: NORMAL MMHG
SYSTOLIC BLOOD PRESSURE - MUSE: NORMAL MMHG
T AXIS - MUSE: 166 DEGREES
T AXIS - MUSE: 177 DEGREES
UROBILINOGEN UR STRIP-MCNC: <2 MG/DL
VENTRICULAR RATE- MUSE: 139 BPM
VENTRICULAR RATE- MUSE: 89 BPM
WBC # BLD AUTO: 13.1 10E3/UL (ref 4–11)
WBC URINE: 164 /HPF

## 2023-04-13 PROCEDURE — 250N000013 HC RX MED GY IP 250 OP 250 PS 637: Performed by: INTERNAL MEDICINE

## 2023-04-13 PROCEDURE — 87086 URINE CULTURE/COLONY COUNT: CPT | Performed by: NURSE PRACTITIONER

## 2023-04-13 PROCEDURE — 81001 URINALYSIS AUTO W/SCOPE: CPT | Performed by: NURSE PRACTITIONER

## 2023-04-13 PROCEDURE — 99232 SBSQ HOSP IP/OBS MODERATE 35: CPT | Performed by: INTERNAL MEDICINE

## 2023-04-13 PROCEDURE — 85027 COMPLETE CBC AUTOMATED: CPT | Performed by: INTERNAL MEDICINE

## 2023-04-13 PROCEDURE — 99222 1ST HOSP IP/OBS MODERATE 55: CPT | Performed by: INTERNAL MEDICINE

## 2023-04-13 PROCEDURE — 36415 COLL VENOUS BLD VENIPUNCTURE: CPT | Performed by: INTERNAL MEDICINE

## 2023-04-13 PROCEDURE — 93005 ELECTROCARDIOGRAM TRACING: CPT

## 2023-04-13 PROCEDURE — 93010 ELECTROCARDIOGRAM REPORT: CPT | Mod: HIP | Performed by: INTERNAL MEDICINE

## 2023-04-13 PROCEDURE — 250N000011 HC RX IP 250 OP 636: Performed by: NURSE PRACTITIONER

## 2023-04-13 PROCEDURE — 210N000001 HC R&B IMCU HEART CARE

## 2023-04-13 PROCEDURE — 82435 ASSAY OF BLOOD CHLORIDE: CPT | Performed by: INTERNAL MEDICINE

## 2023-04-13 RX ORDER — CEFTRIAXONE 1 G/1
1 INJECTION, POWDER, FOR SOLUTION INTRAMUSCULAR; INTRAVENOUS EVERY 24 HOURS
Status: DISCONTINUED | OUTPATIENT
Start: 2023-04-13 | End: 2023-04-14 | Stop reason: HOSPADM

## 2023-04-13 RX ORDER — AMIODARONE HYDROCHLORIDE 200 MG/1
200 TABLET ORAL 2 TIMES DAILY
Status: DISCONTINUED | OUTPATIENT
Start: 2023-04-13 | End: 2023-04-14 | Stop reason: HOSPADM

## 2023-04-13 RX ADMIN — AMIODARONE HYDROCHLORIDE 200 MG: 200 TABLET ORAL at 11:22

## 2023-04-13 RX ADMIN — CALCIUM 500 MG: 500 TABLET ORAL at 09:32

## 2023-04-13 RX ADMIN — METOPROLOL TARTRATE 25 MG: 25 TABLET, FILM COATED ORAL at 06:16

## 2023-04-13 RX ADMIN — APIXABAN 2.5 MG: 2.5 TABLET, FILM COATED ORAL at 20:07

## 2023-04-13 RX ADMIN — APIXABAN 2.5 MG: 2.5 TABLET, FILM COATED ORAL at 11:22

## 2023-04-13 RX ADMIN — TICAGRELOR 90 MG: 90 TABLET ORAL at 22:25

## 2023-04-13 RX ADMIN — TAMSULOSIN HYDROCHLORIDE 0.8 MG: 0.4 CAPSULE ORAL at 16:41

## 2023-04-13 RX ADMIN — ROSUVASTATIN CALCIUM 10 MG: 10 TABLET, COATED ORAL at 09:30

## 2023-04-13 RX ADMIN — TICAGRELOR 90 MG: 90 TABLET ORAL at 09:32

## 2023-04-13 RX ADMIN — CEFTRIAXONE SODIUM 1 G: 1 INJECTION, POWDER, FOR SOLUTION INTRAMUSCULAR; INTRAVENOUS at 16:41

## 2023-04-13 RX ADMIN — AMIODARONE HYDROCHLORIDE 200 MG: 200 TABLET ORAL at 20:07

## 2023-04-13 RX ADMIN — ASPIRIN 81 MG: 81 TABLET, COATED ORAL at 09:30

## 2023-04-13 ASSESSMENT — ACTIVITIES OF DAILY LIVING (ADL)
ADLS_ACUITY_SCORE: 37
ADLS_ACUITY_SCORE: 30
ADLS_ACUITY_SCORE: 37
DIFFICULTY_EATING/SWALLOWING: NO
CONCENTRATING,_REMEMBERING_OR_MAKING_DECISIONS_DIFFICULTY: NO
ADLS_ACUITY_SCORE: 37
WALKING_OR_CLIMBING_STAIRS_DIFFICULTY: YES
ADLS_ACUITY_SCORE: 37
ADLS_ACUITY_SCORE: 30
ADLS_ACUITY_SCORE: 37
ADLS_ACUITY_SCORE: 37
WEAR_GLASSES_OR_BLIND: NO
ADLS_ACUITY_SCORE: 30
ADLS_ACUITY_SCORE: 37

## 2023-04-13 NOTE — CONSULTS
[facility] CONSULT     Type of Consult: inpatient   Place of Service:  [facility] [place]  Reason for Consultation: Gross hematuria, acute urinary retention   Consult Requested by: Dr. Portillo    History of Present Illness:    Felipe Verma is a 82 year old male with hx of brain aneurysm, atrial fibrillation, CKD-III, hyperlipidemia, hypertension, BPH; Admitted 4/10/23 for NSTEMI (non-ST elevated myocardial infarction) (H) and is s/p coronary angiogram with CANDIE x 2 on 4/11/23. Urology has been consulted due to blood in the urine and urinary retention. History obtained through patient and chart review.     Pt was unable to void following angiogram 4/11, requiring CIC x 2 on 4/11 (550 ml & 500 ml) and CIC x 1 on 4/12 (100 ml) prior to chaidez placement 4/12 (600 ml initial output).    Blood was noted in urine following chaidez placement on 4/12 and Eliquis was held. Chart review shows urine had some small clots on 4/12 evening, color improved overnight. Urine this AM is tarun, no clots. Chaidez remains patent. Patient denies abdominal and bilateral flank pain, recent burning with urination, fever, chills.      No recent pertinent imaging. No recent UA/UC.     Pt is taking ASA and Briliinta. Eliquis held 4/12 evening and 4/13 AM, resuming 4/13 afternoon.     Pt is not currently taking medication known to contribute to UR.    Reports hx of BPH and was taking tamsulosin 0.8 mg po daily prior to admission. States this is prescribed by PCP and does not follow with a Urologist. Pt reports, prior to admission, chronic urinary hesitancy, often double voiding to empty completely, some urinary incontinence, every 2 hour nocturia. Believes he may have experienced urinary retention with chaidez following previous surgery, however, does not recall discharging home with a catheter.     No prior hx of gross hematuria, kidney/bladder stones or cancer, prostate cancer, prostate surgery/intervention, recurrent UTI. Hx of cigarette  smoking, quit years ago.       Past Medical history  Past Medical History:   Diagnosis Date     Acute diverticulitis 4/20/2016     Lung nodule < 6cm on CT 6/6/2018       Past Surgical history  Past Surgical History:   Procedure Laterality Date     OTHER SURGICAL HISTORY      OTHER SURGICAL HISTORYstent placement in head      TONSILLECTOMY         Social History  Social History     Tobacco Use     Smoking status: Former     Smokeless tobacco: Never   Substance Use Topics     Drug use: Not Currently       Medications  Current Facility-Administered Medications   Medication     acetaminophen (TYLENOL) tablet 650 mg    Or     acetaminophen (TYLENOL) Suppository 650 mg     acetaminophen (TYLENOL) tablet 650 mg     [Held by provider] apixaban ANTICOAGULANT (ELIQUIS) tablet 2.5 mg     aspirin EC tablet 81 mg     calcium carbonate 500 mg (elemental) (OSCAL) tablet 500 mg     Continuing beta blocker from home medication list OR beta blocker order already placed during this visit     Continuing statin from home medication list OR statin order already placed during this visit     diazepam (VALIUM) tablet 5 mg     HOLD: Metformin and metformin containing medications on day of procedure and 48 hours after IV contrast given for patients with acute kidney injury or severe chronic kidney disease (stage IV or stage V; i.e., eGFR less than 30)     hydrALAZINE (APRESOLINE) injection 10 mg     lidocaine (LMX4) cream     lidocaine 1 % 0.1-1 mL     losartan (COZAAR) tablet 50 mg     melatonin tablet 1 mg     metoprolol (LOPRESSOR) injection 5 mg     metoprolol (LOPRESSOR) injection 5 mg     metoprolol succinate ER (TOPROL XL) 24 hr tablet 50 mg     nitroGLYcerin (NITROSTAT) sublingual tablet 0.4 mg     ondansetron (ZOFRAN ODT) ODT tab 4 mg    Or     ondansetron (ZOFRAN) injection 4 mg     ondansetron (ZOFRAN ODT) ODT tab 4 mg    Or     ondansetron (ZOFRAN) injection 4 mg     oxyCODONE (ROXICODONE) tablet 5 mg    Or     oxyCODONE  "(ROXICODONE) tablet 10 mg     Patient is already receiving anticoagulation with heparin, enoxaparin (LOVENOX), warfarin (COUMADIN)  or other anticoagulant medication     Percutaneous Coronary Intervention orders placed (this is information for BPA alerting)     rosuvastatin (CRESTOR) tablet 10 mg     senna-docusate (SENOKOT-S/PERICOLACE) 8.6-50 MG per tablet 1 tablet    Or     senna-docusate (SENOKOT-S/PERICOLACE) 8.6-50 MG per tablet 2 tablet     sodium chloride (PF) 0.9% PF flush 3 mL     sodium chloride (PF) 0.9% PF flush 3 mL     tamsulosin (FLOMAX) capsule 0.8 mg     ticagrelor (BRILINTA) tablet 90 mg       Allergies  No Known Allergies    ROS:   12 point review of systems was taken and is negative aside from what is noted above in the HPI.     Physical Exam:  /71   Pulse 90   Temp 98  F (36.7  C)   Resp 18   Ht 1.702 m (5' 7\")   Wt 83.4 kg (183 lb 13.8 oz)   SpO2 94%   BMI 28.80 kg/m    General: NAD, alert, cooperative  Head: normocephalic, without abnormality / atraumatic   Abdomen: soft, non- tender, non- distended. No suprapubic fullness or tenderness. No bilateral CVA tenderness. Right groin ecchymosis.   Geniturinary:  Penis and scrotum without erythema, edema, tenderness. Vallejo draining clear tarun urine.   Skin: no rashes or lesions  Musculoskeletal: moves all extremities equally  Psychological: alert and oriented, answers questions appropriately      Labs:   WBC Count   Date Value Ref Range Status   04/13/2023 13.1 (H) 4.0 - 11.0 10e3/uL Final     Hemoglobin   Date Value Ref Range Status   04/13/2023 11.3 (L) 13.3 - 17.7 g/dL Final     Creatinine   Date Value Ref Range Status   04/13/2023 1.21 (H) 0.67 - 1.17 mg/dL Final     UA: none    Cultures:  Urine culture: none  Lab Results: personally reviewed     Imaging:  No pertinent imaging     I have personally reviewed the imaging reports above.     Assessment/Plan: Felipe AIDAN Verma is being seen by [facility] for gross hematuria and " urinary retention.     - Chaidez placed 4/12 for acute UR following angiogram (600 ml initial output). Hematuria following catheter placement, likely 2/2 enlarged prostate/catheter placement/anticoagulation. Urine now clear. Ok to resume Eliquis and monitor.   - UA/UC ordered, pending.   - Creatinine 1.21 today, 0.94 on 4/12 (baseline 1.10). Avoid nephrotoxins, monitor. Recommend CT Urogram when creatinine at baseline and hydrated.   - Hx of BPH with LUTS. Continue tamsulosin 0.8 mg po daily as BP tolerates.   - Maintain chaidez catheter at discharge. RNs to teach chaidez cares.   - Email sent to arrange TOV/follow up with surgeon to discuss surgical management of prostate and for possible cystoscopy. [facility] contact info and chaidez discharge instructions added to discharge.   - Old records reviewed - [facility]  - Will continue to follow.     Thank you for consulting [facility] regarding this patient's care. Please contact us with questions or concerns.     John Ochoa, APRN, CNP  [facility]  [phone]    ADDENDUM, 1435:     UA RESULTS:  Recent Labs   Lab Test 04/13/23  1320   COLOR Orange*   APPEARANCE Turbid*   URINEGLC Negative   URINEBILI Negative   URINEKETONE Negative   SG 1.021   UBLD >1.0 mg/dL*   URINEPH 5.5   PROTEIN 70*   NITRITE Negative   LEUKEST 500 Deanna/uL*   RBCU >182*   WBCU 164*     UC: pending    A/P: UA concerning for UTI. UC pending. Will start IV ceftriaxone and monitor.     John Ochoa, CNP

## 2023-04-13 NOTE — PLAN OF CARE
Problem: Dysrhythmia  Goal: Normalized Cardiac Rhythm  Outcome: Progressing   Goal Outcome Evaluation:  Patient still having brief episodes of afib with  to 160's then would go back to NSR and ST with a lot of PACs. On scheduled Metoprolol po every 6 hours.     Addendum :  Urine this am- more on tea colored- dark tarun- no blood clot.

## 2023-04-13 NOTE — PLAN OF CARE
Noted pinkish reddish urine in patient's chaidez with some small clots in chaidez's tubing. Cards called and ordered mal to give Brilinta. Stat Hemoglobin ordered- 11.3.  At the end of the shift, urine has improved its color- getting more pink with no blood clot.

## 2023-04-13 NOTE — PROGRESS NOTES
Daily Progress Note        CODE STATUS:  Full Code    04/12/23  Assessment/Plan:  Felipe Verma is a 82 year old male admitted on 4/10/2023. He is with past medical history of BPH, RA involving multiple sites, A-fib on Eliquis, HTN who presented to ED for evaluation of chest pain.  Patient found to have abnormal EKG, elevated troponin.  ED provider reviewed case with interventional cardiologist and general cardiologist.  Patient initiated on heparin drip and admitted.     Non-ST elevation MI;  -- ED provider discussed with interventional cardiologist and general cardiologist, recommended Nitropaste, heparin drip  -- PTA aspirin, Crestor  -- S/P coronary angiogram with CANDIE placement x 2 at prox and mid LAD lesions  -- Patient at risk fo LV thrombus per Interventional cardiologist. Recommended  Aspirin, Brillinta and Eliquis x 3 days followed by Brillinta and Eliquis.      A-fib on Eliquis;  -- On Eliquis. Held yesterday due to hematuria. Resume today at a lower dose  -- In and out of rapid afib. Home metoprolol resumed this morning. Cardiology added amiodarone     Leukocytosis; likely due to stress  -- No signs and symptoms of infection.  Trending down.     Chronic anemia;  -- No reported obvious/active bleeding.  Trend     Thrombocytopenia; ? Cause  -- Trend     Essential hypertension; controlled  -- PTA medications     RA involving multiple sites; stable  -- Resume home medications      BPH  Gross hematuria  -- Cont flomax.  -- Needed chaidez cath for acute retention. Had gross hematuria with small clots yesterday. Unclear if he pulled at the catheter. Draining well. Urine has cleared as of this morning. Appreciate urology consult  -- Eliquis held yesterday. Resumed at a lower dose today.       Disposition; Home when ok with cardiology  Barrier to discharge; Clinical progress      Subjective:  Interval History: Patient seen and examined this morning. He has had paroxysmal afib with RVR last night. He has been in  and out of afib. Denies any chest pain, shortness of breath or palpitations. No lightheadedness.     Review of Systems:   As mentioned in subjective.    Patient Active Problem List   Diagnosis     Brain aneurysm     Benign prostatic hyperplasia     Benign essential hypertension     Antiplatelet or antithrombotic long-term use     Clostridium difficile diarrhea     Diverticulosis     Normocytic anemia     Other and unspecified hyperlipidemia     Statin intolerance     Atrial fibrillation, unspecified type (H)     Localized swelling of left lower extremity     Stage 3a chronic kidney disease (H)     NSTEMI (non-ST elevated myocardial infarction) (H)       Scheduled Meds:    amiodarone  200 mg Oral BID     apixaban ANTICOAGULANT  2.5 mg Oral BID     aspirin  81 mg Oral Daily     calcium carbonate  500 mg Oral Daily     losartan  50 mg Oral Daily     metoprolol succinate ER  50 mg Oral Daily     rosuvastatin  10 mg Oral Daily     sodium chloride (PF)  3 mL Intracatheter Q8H     tamsulosin  0.8 mg Oral Daily with supper     ticagrelor  90 mg Oral Q12H     Continuous Infusions:    - MEDICATION INSTRUCTIONS -       - MEDICATION INSTRUCTIONS -       - MEDICATION INSTRUCTIONS -       Percutaneous Coronary Intervention orders placed (this is information for BPA alerting)       PRN Meds:.acetaminophen **OR** acetaminophen, acetaminophen, - MEDICATION INSTRUCTIONS -, - MEDICATION INSTRUCTIONS -, diazepam, HOLD MEDICATION, hydrALAZINE, lidocaine 4%, lidocaine (buffered or not buffered), melatonin, metoprolol, metoprolol, nitroGLYcerin, ondansetron **OR** ondansetron, oxyCODONE **OR** oxyCODONE, - MEDICATION INSTRUCTIONS -, Percutaneous Coronary Intervention orders placed (this is information for BPA alerting), senna-docusate **OR** senna-docusate, sodium chloride (PF)    Objective:  Vital signs in last 24 hours:  Temp:  [97.9  F (36.6  C)-98.8  F (37.1  C)] 98  F (36.7  C)  Pulse:  [] 112  Resp:  [18-24] 20  BP:  ()/(55-76) 110/68  SpO2:  [94 %-98 %] 95 %        Intake/Output Summary (Last 24 hours) at 4/12/2023 1544  Last data filed at 4/12/2023 1538  Gross per 24 hour   Intake 780 ml   Output 1650 ml   Net -870 ml       Physical Exam:    General: Not in obvious distress.  HEENT: NC, AT   Chest: Clear to auscultation bilaterally  Heart: S1S2 normal, regular. No M/R/G  Abdomen: Soft. NT, ND. Bowel sounds- active.  Extremities: No legs swelling  Neuro: Alert and awake, grossly non-focal      Lab Results:(I have personally reviewed the results)    Recent Results (from the past 24 hour(s))   Hemoglobin    Collection Time: 04/12/23  9:05 PM   Result Value Ref Range    Hemoglobin 11.3 (L) 13.3 - 17.7 g/dL   CBC with platelets    Collection Time: 04/13/23  5:10 AM   Result Value Ref Range    WBC Count 13.1 (H) 4.0 - 11.0 10e3/uL    RBC Count 3.34 (L) 4.40 - 5.90 10e6/uL    Hemoglobin 11.3 (L) 13.3 - 17.7 g/dL    Hematocrit 33.3 (L) 40.0 - 53.0 %     78 - 100 fL    MCH 33.8 (H) 26.5 - 33.0 pg    MCHC 33.9 31.5 - 36.5 g/dL    RDW 13.8 10.0 - 15.0 %    Platelet Count 130 (L) 150 - 450 10e3/uL   Basic metabolic panel    Collection Time: 04/13/23  5:10 AM   Result Value Ref Range    Sodium 130 (L) 136 - 145 mmol/L    Potassium 3.6 3.4 - 5.3 mmol/L    Chloride 100 98 - 107 mmol/L    Carbon Dioxide (CO2) 22 22 - 29 mmol/L    Anion Gap 8 7 - 15 mmol/L    Urea Nitrogen 23.2 (H) 8.0 - 23.0 mg/dL    Creatinine 1.21 (H) 0.67 - 1.17 mg/dL    Calcium 8.2 (L) 8.8 - 10.2 mg/dL    Glucose 110 (H) 70 - 99 mg/dL    GFR Estimate 60 (L) >60 mL/min/1.73m2   ECG 12-LEAD WITH MUSE ([facility])    Collection Time: 04/13/23  6:08 AM   Result Value Ref Range    Systolic Blood Pressure  mmHg    Diastolic Blood Pressure  mmHg    Ventricular Rate 139 BPM    Atrial Rate 139 BPM    WV Interval 160 ms    QRS Duration 92 ms     ms    QTc 581 ms    P Axis  degrees    R AXIS -43 degrees    T Axis 177 degrees    Interpretation ECG       Atrial  fibrillation with rapid ventricular response  Left axis deviation  Anterior infarct (cited on or before 12-APR-2023)  ST & T wave abnormality, consider lateral ischemia  Abnormal ECG  When compared with ECG of 12-APR-2023 05:42,  Vent. rate has increased BY  49 BPM  Atrial fibrillation has replaced Sinus rhythm  Confirmed by GYPSY GREGORIO MD LOC:[id number]) on 4/13/2023 1:31:01 PM     ECG 12-LEAD WITH MUSE (L[facility])    Collection Time: 04/13/23 10:16 AM   Result Value Ref Range    Systolic Blood Pressure  mmHg    Diastolic Blood Pressure  mmHg    Ventricular Rate 89 BPM    Atrial Rate 89 BPM    NV Interval 176 ms    QRS Duration 90 ms     ms    QTc 547 ms    P Axis 51 degrees    R AXIS -36 degrees    T Axis 166 degrees    Interpretation ECG       Sinus rhythm with Premature atrial complexes  Left axis deviation  Marked T wave abnormality, consider anterolateral ischemia  Prolonged QT  Abnormal ECG  When compared with ECG of 13-APR-2023 06:08,  Sinus rhythm has replaced Atrial fibrillation  Vent. rate has decreased BY  50 BPM  Criteria for Anterior infarct are no longer Present  T wave inversion more evident in Anterior leads  Confirmed by GYPSY GREGORIO MD LOC:[id number]) on 4/13/2023 1:34:29 PM     UA with Microscopic reflex to Culture    Collection Time: 04/13/23  1:20 PM    Specimen: Urine, [place] Catheter   Result Value Ref Range    Color Urine Orange (A) Colorless, Straw, Light Yellow, Yellow    Appearance Urine Turbid (A) Clear    Glucose Urine Negative Negative mg/dL    Bilirubin Urine Negative Negative    Ketones Urine Negative Negative mg/dL    Specific Gravity Urine 1.021 1.001 - 1.030    Blood Urine >1.0 mg/dL (A) Negative    pH Urine 5.5 5.0 - 7.0    Protein Albumin Urine 70 (A) Negative mg/dL    Urobilinogen Urine <2.0 <2.0 mg/dL    Nitrite Urine Negative Negative    Leukocyte Esterase Urine 500 Deanna/uL (A) Negative    Mucus Urine Present (A) None Seen /LPF    RBC Urine >182 (H) <=2 /HPF    WBC Urine 164 (H)  <=5 /HPF       All laboratory and imaging data in the past 24 hours reviewed  Serum Glucose range:   Recent Labs   Lab 04/13/23  0510 04/12/23  0512 04/11/23  0728 04/10/23  2209   * 140* 122* 164*     ABG: No lab results found in last 7 days.  CBC:   Recent Labs   Lab 04/13/23 0510 04/12/23  2105 04/12/23 0512 04/11/23 1851 04/11/23  0040 04/10/23  2209   WBC 13.1*  --   --   --  12.7* 13.1*   HGB 11.3* 11.3* 11.8*   < > 10.8* 11.1*   HCT 33.3*  --   --   --  33.0* 33.9*     --   --   --  102* 102*   *  --   --   --  136* 142*    < > = values in this interval not displayed.     Chemistry:   Recent Labs   Lab 04/13/23  0510 04/12/23  0512 04/11/23  1851 04/11/23  0728 04/10/23  2209   * 135*  --  138 136   POTASSIUM 3.6 3.7 3.9 3.9 3.7   CHLORIDE 100 102  --  106 103   CO2 22 19*  --  23 22   BUN 23.2* 18.1  --  20.7 24.6*   CR 1.21* 0.94  --  1.01 1.10   GFRESTIMATED 60* 81  --  74 67   ABBEY 8.2* 8.7*  --  8.4* 8.5*   MAG  --   --  1.9  --  2.0   PROTTOTAL  --   --   --   --  6.4   ALBUMIN  --   --   --   --  3.9   AST  --   --   --   --  20   ALT  --   --   --   --  20   ALKPHOS  --   --   --   --  70   BILITOTAL  --   --   --   --  0.3     Coags:  Recent Labs   Lab 04/11/23  1708 04/11/23  0728 04/10/23  2209   INR  --   --  1.19*   PTT 94* 98* 28     Cardiac Markers:  No results for input(s): CKTOTAL, TROPONINI in the last 168 hours.       Cardiac Catheterization    Addendum Date: 4/11/2023       Prox LAD lesion is 90% stenosed.    Ost LAD to Prox LAD lesion is 30% stenosed.    Mid LAD lesion is 95% stenosed. Single-vessel occlusive coronary artery disease, namely proximal and mid LAD. Complex PCI of proximal LAD with drug-eluting stent x1, 2.75 x 22 mm resolute [place] postdilated with a 3.0 balloon Complex PCI of subacute lesion in mid to distal LAD with placement of a 2.25 x 15 mm and 2.0 x 22 mm resolute [place] stents, postdilated with a 2.5 balloon    Result Date: 4/11/2023     Prox  LAD lesion is 90% stenosed.    Ost LAD to Prox LAD lesion is 30% stenosed.    Mid LAD lesion is 95% stenosed. Single-vessel occlusive coronary artery disease, namely mid proximal and mid LAD. Complex PCI of proximal LAD with drug-eluting stent x1, 2.75 x 22 mm resolute [place] postdilated with a 3.0 balloon Complex PCI of subacute lesion in mid to distal LAD with placement of a 2.25 x 15 mm and 2.0 x 22 mm resolute [place] stents, postdilated with a 2.5 balloon     Echocardiogram Complete    Result Date: 2023  [id number]^AVIV[place] [address]  Name: VERONICA RASMUSSEN MRN: [id number] : 1940 Study Date: 2023 10:54 AM Age: 82 yrs Gender: Male Patient Location: [facility] Reason For Study: MI - Acute Ordering Physician: AVIVA MULLEN Referring Physician: AVIVA MULLEN Performed By: ACE  BSA: 2.0 m2 Height: 67 in Weight: 185 lb HR: 74 BP: 142/85 mmHg ______________________________________________________________________________ Procedure Complete Echo Adult. Definity (NDC #[id number]) given intravenously. 2mL given. ______________________________________________________________________________ Interpretation Summary  1. The left ventricle is normal in size. Left ventricular systolic performance is moderately reduced. The ejection fraction is estimated to be 40%. 2. There is severe anteroseptal, distal septal, and distal anterior hypokinesis. The apex is akinetic (no apical mural thrombus is detected). 3. There is mild to moderate mitral insufficiency. 4. Normal right ventricular size and systolic performance. 5. There is mild left atrial enlargement. ______________________________________________________________________________ Left ventricle: The left ventricle is normal in size. Left ventricular systolic performance is moderately reduced. The ejection fraction is estimated to be 40%. There is severe anteroseptal, distal septal, and distal  anterior hypokinesis. The apex is akinetic (no apical mural thrombus is detected). Left ventricular wall thickness is normal.  Assessment of left atrial pressure (LAP): The cumulative findings suggest moderately elevated left atrial pressure (the E/A is > 0.8 and <2.0 plus 2 or 3 of 3 of the following present: Average E/e' > 14, TRvel > 2.8 m/s, and/or LA vol. index > 34 ml/mÂ  ).  Right ventricle: Normal right ventricular size and systolic performance.  Left atrium: There is mild left atrial enlargement.  Right atrium: The right atrium is of normal size.  IVC: The IVC is moderately dilated.  Aortic valve: The aortic valve is comprised of three cusps. No significant aortic stenosis or aortic insufficiency is detected on this study.  Mitral valve: The mitral valve appears morphologically normal. There is mild to moderate mitral insufficiency.  Tricuspid valve: The tricuspid valve is grossly morphologically normal. There is mild tricuspid insufficiency.  Pulmonic valve: The pulmonic valve is grossly morphologically normal. There is mild pulmonic insufficiency.  Thoracic aorta: The aortic root and proximal ascending aorta are of normal dimension.  Pericardium: There is no significant pericardial effusion. ______________________________________________________________________________ ______________________________________________________________________________ MMode/2D Measurements & Calculations IVSd: 0.59 cm LVIDd: 5.4 cm LVIDs: 4.2 cm LVPWd: 1.1 cm FS: 22.1 % LV mass(C)d: 165.9 grams LV mass(C)dI: 84.8 grams/m2 Ao root diam: 3.5 cm LA dimension: 3.8 cm asc Aorta Diam: 3.4 cm LA/Ao: 1.1 LVOT diam: 2.2 cm LVOT area: 3.7 cm2 LA Volume (BP): 67.9 ml LA Volume Index (BP): 34.6 ml/m2  LA Volume Indexed (AL/bp): 36.8 ml/m2 RV Base: 3.2 cm RWT: 0.41 TAPSE: 1.3 cm  Time Measurements MM HR: 68.0 BPM  Doppler Measurements & Calculations MV E max anna: 56.1 cm/sec MV A max anna: 60.8 cm/sec MV E/A: 0.92 MV dec slope: 514.2  cm/sec2 MV dec time: 0.11 sec Ao V2 max: 101.1 cm/sec Ao max P.0 mmHg Ao V2 mean: 74.1 cm/sec Ao mean P.4 mmHg Ao V2 VTI: 21.9 cm KATHRYN(I,D): 2.8 cm2 KATHRYN(V,D): 3.2 cm2 LV V1 max PG: 3.0 mmHg LV V1 max: 86.1 cm/sec LV V1 VTI: 16.4 cm SV(LVOT): 61.0 ml SI(LVOT): 31.2 ml/m2 PA acc time: 0.13 sec TR max angel: 283.4 cm/sec TR max P.1 mmHg AV Angel Ratio (DI): 0.85 KATHRYN Index (cm2/m2): 1.4 E/E': 6.0 E/E' av.3 Lateral E/e': 6.0 Medial E/e': 8.7 Peak E' Angel: 9.4 cm/sec RV S Angel: 9.7 cm/sec  ______________________________________________________________________________ Report approved by: Harry White 2023 12:06 PM       XR Chest Port 1 View    Result Date: 4/10/2023  EXAM: XR CHEST PORT 1 VIEW LOCATION: [facility] DATE/TIME: 4/10/2023 11:00 PM INDICATION: Chest pain COMPARISON: Chest CT on 2017     IMPRESSION: Negative chest.      Latest radiology report personally reviewed.    Note created using dragon voice recognition software so sounds alike errors may have escaped editing.      2023   Efrem Portillo MD  Hospitalist, [facility]  Pager: [phone]

## 2023-04-13 NOTE — PROGRESS NOTES
Cardiology Progress Note    Assessment/Plan:  1.  CAD, status post ST elevation anterior wall MI with subsequent stenting of the mid to distal LAD with CANDIE x2.  Patient reports no recurrent issues of chest discomfort or shortness of breath.  Tolerating initial phases of cardiac rehab.  Currently on dual antiplatelet therapy including aspirin and Brilinta although was suggested that the aspirin could be discontinued after 3 days (last day would be tomorrow).  2.  Paroxysmal atrial fibrillation, recurrent despite metoprolol dosing.  Did not resume calcium channel blocker in light of LV dysfunction.  Discussed case with Dr. Goodrich given concerns regarding QT prolongation.  He did recommend initiation of amiodarone therapy orally.  We will begin 200 mg twice daily loading dose for 10 days then decrease to 200 mg daily thereafter.  As no recurrence of gross hematuria, will reinitiate his low-dose Eliquis.  3.  Moderate ischemic cardiomyopathy, EF 40% with apical akinesis.  Continue low-dose metoprolol and losartan.  4. Essential hypertension, well controlled    Primary cardiologist: Dr. Pamella Porter, [facility]    Subjective:  Patient reports no complaints of chest discomfort or shortness of breath this morning.  Had gross hematuria yesterday in his [place] catheter although this appears to have resolved.  Continues to have paroxysmal atrial fibrillation with rapid ventricular response.  Case discussed with Dr. Goodrich who recommends trial of oral amiodarone, despite QT prolongation which she feels is related to recent ischemic event.      amiodarone  200 mg Oral BID     apixaban ANTICOAGULANT  2.5 mg Oral BID     aspirin  81 mg Oral Daily     calcium carbonate  500 mg Oral Daily     losartan  50 mg Oral Daily     metoprolol succinate ER  50 mg Oral Daily     rosuvastatin  10 mg Oral Daily     sodium chloride (PF)  3 mL Intracatheter Q8H     tamsulosin  0.8 mg Oral Daily with supper     ticagrelor   90 mg Oral Q12H         Objective:   Vital signs in last 24 hours:  Temp:  [97.9  F (36.6  C)-98.8  F (37.1  C)] 98  F (36.7  C)  Pulse:  [] 94  Resp:  [18-24] 20  BP: ()/(55-76) 110/68  SpO2:  [94 %-98 %] 95 %  Weight:        Review of Systems:  Negative    Physical Exam:  General appearance: alert, cooperative, no distress   Head: Normocephalic, without obvious abnormality, atraumatic  Neck: no JVD   Lungs: clear to auscultation bilaterally   Heart: Irregular rhythm.  S1, S2 normal.  No murmur or gallop  Extremities: No peripheral edema bilaterally    Cardiographics (personally reviewed):   Telemetry demonstrates paroxysmal atrial fibrillation with rapid ventricular response as well as sinus rhythm with frequent PACs    Imaging (personally reviewed):   No additional cardiac imaging    Lab Results (personally reviewed):     No results for input(s): CHOL, HDL, LDL, TRIG, CHOLHDLRATIO in the last 01271 hours.  No results for input(s): LDL in the last 81882 hours.  Recent Labs   Lab Test 04/13/23  0510   *   POTASSIUM 3.6   CHLORIDE 100   CO2 22   *   BUN 23.2*   CR 1.21*   GFRESTIMATED 60*   ABBEY 8.2*     Recent Labs   Lab Test 04/13/23  0510 04/12/23  0512 04/11/23  0728   CR 1.21* 0.94 1.01     No results for input(s): A1C in the last 59185 hours.       Recent Labs   Lab Test 04/13/23  0510   WBC 13.1*   HGB 11.3*   HCT 33.3*      *     Recent Labs   Lab Test 04/13/23  0510 04/12/23  2105 04/12/23  0512   HGB 11.3* 11.3* 11.8*    No results for input(s): TROPONINI in the last 25283 hours.  Recent Labs   Lab Test 04/10/23  2209   NTBNPI 325     No results for input(s): TSH in the last 52134 hours.  Recent Labs   Lab Test 04/10/23  2209   INR 1.19*          Jaelyn Carlos MD

## 2023-04-13 NOTE — PLAN OF CARE
[id number]  Problem: Plan of Care - These are the overarching goals to be used throughout the patient stay.    Goal: Plan of Care Review  Description: The Plan of Care Review/Shift note should be completed every shift.  The Outcome Evaluation is a brief statement about your assessment that the patient is improving, declining, or no change.  This information will be displayed automatically on your shift note.  Outcome: Progressing   Goal Outcome Evaluation:         Pt alert and orientated X4 up with SBA to chair. Denies pain or discomfort. NO chest pain or SOB. HR has been sinus with PACs to sinus tach with frequent PACs. [place] patent and draining yellow/tarun -teac colored urine more this evening. NO blood. Eliquis restarted. UA sent and IV rocephin started for UTI. Appetite good. Right groin site unchanged.   Pt up to chair for meals. Does well with walking in room without sx.

## 2023-04-13 NOTE — CONSULTS
[facility]  Cardiac Electrophysiology     Consultation Note: Anthony Goodrich MD    Primary Care: Pro Knowles      Thank you, Pro Knowles, for asking the [facility] team to see Mr. Felipe Verma to evaluate atrial fibrillation with RVR.      Assessment/Recommendations   Assessment:      Persistent atrial fibrillation: Patient reports a 2-3-year history of atrial fibrillation.  He was chronically treated with oral anticoagulant therapy (apixaban) as well as beta-blocker and calcium channel blocker.  The patient was admitted in normal sinus rhythm but subsequently developed recurrent atrial fibrillation with RVR in the setting of an acute anterior wall myocardial infarction.  The patient was transiently started on amiodarone and stopped due to concerns of his QT interval.  The patient does not have direct symptoms i.e. palpitations and is somewhat less certain about whether or not he has other symptoms of atrial fibrillation i.e. fatigue, NAVARRO, lightheadedness, etc.  Given the recent infarct trying to maintain NSR and control his rate will be beneficial during his recovery.  The patient should be considered for closure of his left atrial appendage given the need for prolonged antiplatelet therapy combined with his oral anticoagulant therapy.  He has already had some bleeding complications (hematuria).    STEMI: Patient status post anterior wall ST elevation MI.  He underwent successful PCI/CANDIE of the LAD.  He reports no recurrent chest pain or pressure.  LV function measured acutely demonstrated LVEF of 40%.    Essential hypertension: Patient's blood pressure is improved on increased dose of beta-blocker.    Prolonged QT: Patient's preinfarct EKGs demonstrated normal QT interval as did his presenting EKG.  I suspect that most of the QT prolongation is secondary to his recent ischemic event.    Plan:    Would recommend initiation of amiodarone 200 mg p.o.  twice daily x14 days, then transition to 200 mg daily.    Patient can follow-up with primary cardiology clinic or I offered to have him seen closer to home at our [place] clinic for follow-up in the arrhythmia clinic.  I have offered to have a clinic appointment set up in the arrhythmia clinic in 3 weeks.  I would anticipate further discussions about possible left atrial appendage closure.       History of Present Illness/Subjective    Mr. Felipe Verma is a 82 year old male with acute presentation with anterior wall myocardial infarction.  Patient underwent acute PCI/CANDIE of the LAD and has done well from a hemodynamic perspective.  The patient developed recurrent atrial fibrillation with RVR.  He reports a history of this arrhythmia dating back at least 2-3 years.  He is chronically anticoagulated (apixaban) and has been treated with rate control strategy (beta-blocker + calcium channel blocker).  He reports no prior history of palpitations.  He is a little bit less sure about whether or not he has had other symptoms secondary to his arrhythmia.  We discussed the underlying pathophysiology of his condition to some extent and also discussed the rationale for ongoing oral anticoagulant therapy particularly in light of the fact that he now is requiring antiplatelet therapy.    ECG: Personally reviewed: Tracing from 4/10/2023 demonstrates sinus rhythm.  Abnormal ST-T waves in the anterior precordium.  Follow-up EKG on 4/12/2023 demonstrates atrial fibrillation with RVR.  ST segment depression and T wave inversion anteriorly with loss of R waves anteriorly.    ECHO: Dated 4/11/2023  1. The left ventricle is normal in size. Left ventricular systolic performance  is moderately reduced. The ejection fraction is estimated to be 40%.  2. There is severe anteroseptal, distal septal, and distal anterior  hypokinesis. The apex is akinetic (no apical mural thrombus is detected).  3. There is mild to moderate mitral  "insufficiency.  4. Normal right ventricular size and systolic performance.  5. There is mild left atrial enlargement.                                              Other Studies: Coronary angiogram 4/11/2023     Prox LAD lesion is 90% stenosed.     Ost LAD to Prox LAD lesion is 30% stenosed.     Mid LAD lesion is 95% stenosed.        1. Single-vessel occlusive coronary artery disease, namely proximal and mid LAD.  2. Complex PCI of proximal LAD with drug-eluting stent x1, 2.75 x 22 mm resolute [place] postdilated with a 3.0 balloon  3. Complex PCI of subacute lesion in mid to distal LAD with placement of a 2.25 x 15 mm and 2.0 x 22 mm resolute [place] stents, postdilated with a 2.5 balloon         Time spent: 50 minutes spent on the date of the encounter doing chart review, history and exam, documentation and further activities as noted above.    Clinically Significant Risk Factors Present on Admission            # Overweight: Estimated body mass index is 28.8 kg/m  as calculated from the following:    Height as of this encounter: 1.702 m (5' 7\").    Weight as of this encounter: 83.4 kg (183 lb 13.8 oz).    Cardiovascular: Cardiac Arrhythmia: Atrial fibrillation: Paroxysmal    Nephrology: Stage 2 (GFR 60-89)              Physical Examination Review of Systems   /68 (BP Location: Left arm)   Pulse 112   Temp 98  F (36.7  C) (Oral)   Resp 20   Ht 1.702 m (5' 7\")   Wt 83.4 kg (183 lb 13.8 oz)   SpO2 95%   BMI 28.80 kg/m    Body mass index is 28.8 kg/m .  Wt Readings from Last 3 Encounters:   04/13/23 83.4 kg (183 lb 13.8 oz)   06/17/21 85.7 kg (189 lb)       Intake/Output Summary (Last 24 hours) at 4/13/2023 1259  Last data filed at 4/13/2023 0930  Gross per 24 hour   Intake 1320 ml   Output 1075 ml   Net 245 ml       General     Appearance:   The patient is alert oriented to person place and situation.    The patient is in no acute distress at the time of my evaluation.   HEENT:  Pupils are equal, round, and " reactive to light.  Conjunctiva and sclera are clear.  ENT: Oral mucosa is moist and without redness. No evident nasal discharge.   Neck: Without palpable thyroid or appreciable lymph nodes.   Chest: Symmetric, without deformity.   Lungs:   Clear bilaterally with no rales, rhonchi, or wheezes.     Cardiovascular:   Rhythm is regular. S1 and S2 are normal. No significant murmur is present. JVP is normal. Lower extremities demonstrate no significant edema. Distal pulses are intact bilaterally.   Abdomen:  Abdomen is flat, soft, and nontender.   Extremities: Without deformity.   Skin: Skin is warm, dry, and otherwise intact.   Neurologic: Gait is not observed as the patient is at bedrest.             A 12 point comprehensive review of systems was negative except as noted.      Medical History  Surgical History Family History Social History   Past Medical History:   Diagnosis Date     Acute diverticulitis 4/20/2016     Lung nodule < 6cm on CT 6/6/2018    Past Surgical History:   Procedure Laterality Date     OTHER SURGICAL HISTORY      OTHER SURGICAL HISTORYstent placement in head      TONSILLECTOMY      Family History   Problem Relation Age of Onset     Colon Cancer Mother      Hypertension Father      Coronary Artery Disease Father      Aneurysm Sister      Cerebrovascular Disease Sister      No Known Problems Brother      Diabetes Son      No Known Problems Son      No Known Problems Son      Breast Cancer No family hx of      Prostate Cancer No family hx of     Social History     Socioeconomic History     Marital status:      Spouse name: Not on file     Number of children: Not on file     Years of education: Not on file     Highest education level: Not on file   Occupational History     Not on file   Tobacco Use     Smoking status: Former     Smokeless tobacco: Never   Vaping Use     Vaping status: Not on file   Substance and Sexual Activity     Alcohol use: Not on file     Comment: Alcoholic Drinks/day: 1  glass per week     Drug use: Not Currently     Sexual activity: Not Currently   Other Topics Concern     Not on file   Social History Narrative     Not on file     Social Determinants of Health     Financial Resource Strain: Not on file   Food Insecurity: Not on file   Transportation Needs: Not on file   Physical Activity: Not on file   Stress: Not on file   Social Connections: Not on file   Intimate Partner Violence: Not on file   Housing Stability: Not on file          Medications  Allergies   Scheduled Meds:    amiodarone  200 mg Oral BID     apixaban ANTICOAGULANT  2.5 mg Oral BID     aspirin  81 mg Oral Daily     calcium carbonate  500 mg Oral Daily     losartan  50 mg Oral Daily     metoprolol succinate ER  50 mg Oral Daily     rosuvastatin  10 mg Oral Daily     sodium chloride (PF)  3 mL Intracatheter Q8H     tamsulosin  0.8 mg Oral Daily with supper     ticagrelor  90 mg Oral Q12H     Continuous Infusions:    - MEDICATION INSTRUCTIONS -       - MEDICATION INSTRUCTIONS -       - MEDICATION INSTRUCTIONS -       Percutaneous Coronary Intervention orders placed (this is information for BPA alerting)       PRN Meds:.acetaminophen **OR** acetaminophen, acetaminophen, - MEDICATION INSTRUCTIONS -, - MEDICATION INSTRUCTIONS -, diazepam, HOLD MEDICATION, hydrALAZINE, lidocaine 4%, lidocaine (buffered or not buffered), melatonin, metoprolol, metoprolol, nitroGLYcerin, ondansetron **OR** ondansetron, oxyCODONE **OR** oxyCODONE, - MEDICATION INSTRUCTIONS -, Percutaneous Coronary Intervention orders placed (this is information for BPA alerting), senna-docusate **OR** senna-docusate, sodium chloride (PF) No Known Allergies      Lab Results    Chemistry/lipid CBC Cardiac Enzymes/BNP/TSH/INR   Lab Results   Component Value Date    BUN 23.2 (H) 04/13/2023     (L) 04/13/2023    CO2 22 04/13/2023    Lab Results   Component Value Date    WBC 13.1 (H) 04/13/2023    HGB 11.3 (L) 04/13/2023    HCT 33.3 (L) 04/13/2023    MCV  100 04/13/2023     (L) 04/13/2023    Lab Results   Component Value Date    INR 1.19 (H) 04/10/2023           Anthony Goodrich MD  Clinical Cardiac Electrophysiologist  [facility]

## 2023-04-13 NOTE — PROGRESS NOTES
Pt back in NSR after being in afib. Per report only scheduled metoprolol tartrate given and not prn IV metoprolol due to soft BP. Will continue to monitor. Pt on both metoprolol tartrate and metoprolol succinate. Will clarify with CARDS.

## 2023-04-14 ENCOUNTER — APPOINTMENT (OUTPATIENT)
Dept: OCCUPATIONAL THERAPY | Facility: HOSPITAL | Age: 83
DRG: 247 | End: 2023-04-14
Payer: MEDICARE

## 2023-04-14 VITALS
TEMPERATURE: 98.8 F | BODY MASS INDEX: 28.33 KG/M2 | DIASTOLIC BLOOD PRESSURE: 62 MMHG | RESPIRATION RATE: 18 BRPM | HEIGHT: 67 IN | OXYGEN SATURATION: 97 % | SYSTOLIC BLOOD PRESSURE: 102 MMHG | HEART RATE: 107 BPM | WEIGHT: 180.5 LBS

## 2023-04-14 PROBLEM — N39.0 UTI (URINARY TRACT INFECTION): Status: ACTIVE | Noted: 2023-04-14

## 2023-04-14 PROBLEM — E78.5 HYPERLIPIDEMIA: Status: ACTIVE | Noted: 2018-06-06

## 2023-04-14 PROBLEM — I48.20 CHRONIC ATRIAL FIBRILLATION (H): Status: ACTIVE | Noted: 2021-06-17

## 2023-04-14 LAB
ANION GAP SERPL CALCULATED.3IONS-SCNC: 11 MMOL/L (ref 7–15)
BUN SERPL-MCNC: 31.3 MG/DL (ref 8–23)
CALCIUM SERPL-MCNC: 8.5 MG/DL (ref 8.8–10.2)
CHLORIDE SERPL-SCNC: 104 MMOL/L (ref 98–107)
CREAT SERPL-MCNC: 1.34 MG/DL (ref 0.67–1.17)
DEPRECATED HCO3 PLAS-SCNC: 21 MMOL/L (ref 22–29)
GFR SERPL CREATININE-BSD FRML MDRD: 53 ML/MIN/1.73M2
GLUCOSE SERPL-MCNC: 146 MG/DL (ref 70–99)
HOLD SPECIMEN: NORMAL
POTASSIUM SERPL-SCNC: 4 MMOL/L (ref 3.4–5.3)
SODIUM SERPL-SCNC: 136 MMOL/L (ref 136–145)

## 2023-04-14 PROCEDURE — 250N000013 HC RX MED GY IP 250 OP 250 PS 637: Performed by: INTERNAL MEDICINE

## 2023-04-14 PROCEDURE — 97535 SELF CARE MNGMENT TRAINING: CPT | Mod: GO

## 2023-04-14 PROCEDURE — 36415 COLL VENOUS BLD VENIPUNCTURE: CPT | Performed by: PHYSICIAN ASSISTANT

## 2023-04-14 PROCEDURE — 97110 THERAPEUTIC EXERCISES: CPT | Mod: GO

## 2023-04-14 PROCEDURE — 99239 HOSP IP/OBS DSCHRG MGMT >30: CPT | Performed by: INTERNAL MEDICINE

## 2023-04-14 PROCEDURE — 99232 SBSQ HOSP IP/OBS MODERATE 35: CPT | Performed by: INTERNAL MEDICINE

## 2023-04-14 PROCEDURE — 80048 BASIC METABOLIC PNL TOTAL CA: CPT | Performed by: PHYSICIAN ASSISTANT

## 2023-04-14 RX ORDER — AMIODARONE HYDROCHLORIDE 200 MG/1
TABLET ORAL
Qty: 40 TABLET | Refills: 3 | Status: SHIPPED | OUTPATIENT
Start: 2023-04-14 | End: 2023-05-05

## 2023-04-14 RX ORDER — NITROGLYCERIN 0.4 MG/1
TABLET SUBLINGUAL
Qty: 25 TABLET | Refills: 1 | Status: SHIPPED | OUTPATIENT
Start: 2023-04-14 | End: 2023-07-07

## 2023-04-14 RX ORDER — CEPHALEXIN 500 MG/1
500 CAPSULE ORAL 2 TIMES DAILY
Qty: 20 CAPSULE | Refills: 0 | Status: SHIPPED | OUTPATIENT
Start: 2023-04-14 | End: 2023-05-05

## 2023-04-14 RX ORDER — TAMSULOSIN HYDROCHLORIDE 0.4 MG/1
0.8 CAPSULE ORAL
Qty: 60 CAPSULE | Refills: 3 | Status: SHIPPED | OUTPATIENT
Start: 2023-04-14 | End: 2024-03-13

## 2023-04-14 RX ORDER — LOSARTAN POTASSIUM 50 MG/1
50 TABLET ORAL DAILY
Qty: 30 TABLET | Refills: 3 | Status: SHIPPED | OUTPATIENT
Start: 2023-04-15 | End: 2023-05-05

## 2023-04-14 RX ORDER — ACETAMINOPHEN 325 MG/1
650 TABLET ORAL EVERY 6 HOURS PRN
Refills: 0
Start: 2023-04-14 | End: 2024-02-22

## 2023-04-14 RX ADMIN — CALCIUM 500 MG: 500 TABLET ORAL at 09:07

## 2023-04-14 RX ADMIN — METOPROLOL SUCCINATE 50 MG: 50 TABLET, EXTENDED RELEASE ORAL at 09:07

## 2023-04-14 RX ADMIN — ROSUVASTATIN CALCIUM 10 MG: 10 TABLET, COATED ORAL at 09:07

## 2023-04-14 RX ADMIN — AMIODARONE HYDROCHLORIDE 200 MG: 200 TABLET ORAL at 09:07

## 2023-04-14 RX ADMIN — LOSARTAN POTASSIUM 50 MG: 50 TABLET, FILM COATED ORAL at 09:07

## 2023-04-14 RX ADMIN — TICAGRELOR 90 MG: 90 TABLET ORAL at 09:07

## 2023-04-14 RX ADMIN — APIXABAN 2.5 MG: 2.5 TABLET, FILM COATED ORAL at 09:07

## 2023-04-14 RX ADMIN — ASPIRIN 81 MG: 81 TABLET, COATED ORAL at 09:07

## 2023-04-14 ASSESSMENT — ACTIVITIES OF DAILY LIVING (ADL)
ADLS_ACUITY_SCORE: 30

## 2023-04-14 NOTE — PROGRESS NOTES
Reviewed pt's discharge instructions with pt, reviewed catheter hygiene practices, emptying chaidez bag with proper hand washing techniques. Pt was able to verbalize care of the catheter and follow up care. Informed him that discontinue meds md sent to pharmacy & not delivered yet.

## 2023-04-14 NOTE — PROGRESS NOTES
Care Management Discharge Note    Discharge Date: 04/14/2023       Discharge Disposition: Home    Discharge Services:  Outpatient Cardiac rehab    Discharge DME:      Discharge Transportation: family or friend will provide    Private pay costs discussed: Not applicable    Education Provided on the Discharge Plan:    Persons Notified of Discharge Plans: MD, RN, SW, patient  Patient/Family in Agreement with the Plan:      Handoff Referral Completed: Yes    Additional Information:  Patient discharging home with outpatient cardiac rehab. Family to transport.         SILVESTRE AugustW

## 2023-04-14 NOTE — PLAN OF CARE
Problem: Risk for Delirium  Goal: Improved Sleep  Outcome: Progressing     Problem: Urinary Retention  Goal: Effective Urinary Elimination  Outcome: Progressing     Problem: Cardiac Catheterization (Diagnostic/Interventional)  Goal: Acceptable Pain Control  Outcome: Progressing     Goal Outcome Evaluation:  VSS overnight.  No complaints of pain.  Good urine out put. Color was tarun to yellow with no evidence of blood or clots in the urine. CMS intact and groin site ecchymotic, soft, and unchanged overnight.

## 2023-04-14 NOTE — PROGRESS NOTES
"    Place of Service:  [facility]     Reason for follow up: Gross hematuria and urinary retention    SUBJECTIVE:  Events: no acute events overnight    Patient reports feeling well today. He is tolerating chaidez catheter adequately. Good urine output. Urine yellow. Afebrile. Tolerating ceftriaxone for potential UTI.     OBJECTIVE:  PHYSICAL EXAM:  /58 (BP Location: Left arm)   Pulse 90   Temp 98.4  F (36.9  C) (Oral)   Resp 20   Ht 1.702 m (5' 7\")   Wt 81.9 kg (180 lb 8 oz)   SpO2 96%   BMI 28.27 kg/m     General: NAD, alert, cooperative  Head: normocephalic, without abnormality / atraumatic  Abdomen: soft, non tender, non distended. No suprapubic fullness/tenderness. No CVA tenderness noted bilaterally   Genitourinary: Circumcised penis. Chaidez catheter in place. Urine yellow without sediment or clots.   Psychological: alert and oriented, answers questions appropriately    LABS:  Lab Results   Component Value Date    WBC 13.1 (H) 04/13/2023    HGB 11.3 (L) 04/13/2023    HCT 33.3 (L) 04/13/2023     (L) 04/13/2023    ALT 20 04/10/2023    AST 20 04/10/2023     (L) 04/13/2023    BUN 23.2 (H) 04/13/2023    CO2 22 04/13/2023    INR 1.19 (H) 04/10/2023        Cultures: Pending    Lab Results: personally reviewed.     ASSESSMENT/PLAN:  Felipe Verma is being seen by [facility] for gross hematuria and urinary retetion    - Hematuria resolved. Maintain chaidez catheter at discharge. Outpatient voiding trial to be arranged.   - Continue tamsulosin 0.8 mg daily.   - UC pending. Antibiotics per UC results.     We will sign off. Please page with questions or concerns.       Bert Howell PA-C   [facility]              "

## 2023-04-14 NOTE — PROGRESS NOTES
Cardiology Progress Note    Assessment/Plan:  1.  CAD status post anterior wall myocardial infarction with drug-eluting stent placement x2 to the mid and distal LAD.  Patient reports no recurrent chest discomfort or shortness of breath.  Currently on dual antiplatelet therapy.  Aspirin to stop at this point based on interventionalists recommendation.  We will continue Brilinta along with Eliquis anticoagulation.  2.  Paroxysmal atrial fibrillation, recurrent despite metoprolol dosing.  Initiated on amiodarone 200 mg twice daily yesterday.  Anticipate he will continue to have paroxysms of atrial fibrillation for a while until amiodarone level builds up.  He will continue twice daily dosing for 10 to 14 days then would decrease amiodarone to 200 mg daily.  We will follow-up with Dr. Goodrich in 3 weeks.  3.  Moderate ischemic cardiomyopathy, EF 40% with apical akinesis.  Continue current doses of metoprolol and losartan.  No evidence of volume overload at today's visit.  No need for diuretic at this time.  4.  Gross hematuria, urinary retention, resolved.  Appreciate urology consultation.    Okay from a cardiac standpoint to discharge home.  Patient to follow-up with Dr. Goodrich in 3 weeks.  Recommend follow-up with me in 6 weeks.    Primary cardiologist: Dr. Pamella Porter, [facility].  Patient considering switch to [facility].  I will assume general cardiology care if he does.    Subjective:  Patient reports no complaints of chest pain or shortness of breath.  Ambulated in the [place] with rehab without issues.      amiodarone  200 mg Oral BID     apixaban ANTICOAGULANT  2.5 mg Oral BID     aspirin  81 mg Oral Daily     calcium carbonate  500 mg Oral Daily     cefTRIAXone  1 g Intravenous Q24H     losartan  50 mg Oral Daily     metoprolol succinate ER  50 mg Oral Daily     rosuvastatin  10 mg Oral Daily     sodium chloride (PF)  3 mL Intracatheter Q8H     tamsulosin  0.8 mg Oral Daily with supper     ticagrelor   90 mg Oral Q12H         Objective:   Vital signs in last 24 hours:  Temp:  [98  F (36.7  C)-99.2  F (37.3  C)] 98.8  F (37.1  C)  Pulse:  [] 107  Resp:  [18-20] 18  BP: (101-123)/(58-75) 102/62  SpO2:  [93 %-98 %] 97 %  Weight:        Review of Systems:  Negative    Physical Exam:  General appearance: alert, cooperative, no distress   Head: Normocephalic, without obvious abnormality, atraumatic  Neck: no JVD   Lungs: clear to auscultation bilaterally   Heart: Irregularly irregular rhythm.  S1, S2 normal.  No murmur or gallop  Extremities: No peripheral edema bilaterally    Cardiographics (personally reviewed):   Telemetry demonstrates paroxysmal atrial fibrillation with rapid ventricular response    Imaging (personally reviewed):   No new cardiac imaging    Lab Results (personally reviewed):     No results for input(s): CHOL, HDL, LDL, TRIG, CHOLHDLRATIO in the last 51293 hours.  No results for input(s): LDL in the last 12299 hours.  Recent Labs   Lab Test 04/13/23  0510   *   POTASSIUM 3.6   CHLORIDE 100   CO2 22   *   BUN 23.2*   CR 1.21*   GFRESTIMATED 60*   ABBEY 8.2*     Recent Labs   Lab Test 04/13/23  0510 04/12/23  0512 04/11/23  0728   CR 1.21* 0.94 1.01     No results for input(s): A1C in the last 92133 hours.       Recent Labs   Lab Test 04/13/23 0510   WBC 13.1*   HGB 11.3*   HCT 33.3*      *     Recent Labs   Lab Test 04/13/23  0510 04/12/23  2105 04/12/23  0512   HGB 11.3* 11.3* 11.8*    No results for input(s): TROPONINI in the last 42336 hours.  Recent Labs   Lab Test 04/10/23  2209   NTBNPI 325     No results for input(s): TSH in the last 32684 hours.  Recent Labs   Lab Test 04/10/23  2209   INR 1.19*          Jaelyn Carlos MD

## 2023-04-14 NOTE — PLAN OF CARE
Goal Outcome Evaluation:      Plan of Care Reviewed With: patient          Outcome Evaluation: planning discharge to home this afternoon with chaidez catheter in place. cms intact right leg post angio & is tolerating activity in room. will have urology follow up planned for removal of chaidez trials

## 2023-04-14 NOTE — DISCHARGE SUMMARY
[facility]    Discharge Summary  Hospitalist    Date of Admission:  4/10/2023  Date of Discharge:  4/14/2023  Discharging Provider: Lucio Tan MD, MD    Discharge Diagnoses   Principal Problem:    NSTEMI -- S/P CANDIE x 3 to LAD on 4/10/23      BPH with urinary obstruction -- Vallejo on 4/10/23      Benign essential hypertension      Hyperlipidemia      Chronic atrial fib -- on Eliquis      Possible UTI      History of Present Illness   82 year old male with past medical history of BPH, RA involving multiple sites, A-fib on Eliquis, HTN who presented to ED for evaluation of chest pain.      Patient found to have abnormal EKG, elevated troponin.  ED provider reviewed case with interventional cardiologist and general cardiologist.  Patient initiated on heparin drip and admitted.     Hospital Course   Had Angiogram which showed:     Prox LAD lesion is 90% stenosed.     Ost LAD to Prox LAD lesion is 30% stenosed.     Mid LAD lesion is 95% stenosed.        1. Single-vessel occlusive coronary artery disease, namely proximal and mid LAD.  2. Complex PCI of proximal LAD with drug-eluting stent x1, 2.75 x 22 mm resolute Rajendra postdilated with a 3.0 balloon  3. Complex PCI of subacute lesion in mid to distal LAD with placement of a 2.25 x 15 mm and 2.0 x 22 mm resolute Hana stents, postdilated with a 2.5 balloon         Plan    1.  Continue medical management and risk factor modification as appropriate  2.  High risk for LV Apical thrombus  3.  ASA + Brillinta + Eliquis for 3 days  4.  Then Brillinta + Eliquis     Echo showed:     1. The left ventricle is normal in size. Left ventricular systolic performance  is moderately reduced. The ejection fraction is estimated to be 40%.  2. There is severe anteroseptal, distal septal, and distal anterior  hypokinesis. The apex is akinetic (no apical mural thrombus is detected).  3. There is mild to moderate mitral insufficiency.  4. Normal right  ventricular size and systolic performance.  5. There is mild left atrial enlargement.    Was started on Amiodarone for Atrial Fib.   Also, did have trouble urinating, and chaidez placed for urinary retention, and he will follow-up with [facility] in 10-14 days for voiding trial.     Patient doing well at time of discharge. Follow-up in 1 week for BMP with Pro Chávez, and follow-up with Dr. Goodrich in 3 weeks to assess Atrial fib.     Lucio Tan MD  Pager: [phone]  Cell Phone:  [phone]       Significant Results and Procedures   As above    Pending Results   These results will be followed up by Dr. Tan  Unresulted Labs Ordered in the Past 30 Days of this Admission     Date and Time Order Name Status Description    4/13/2023  1:38 PM Urine Culture Preliminary           Code Status   Full Code       Primary Care Physician   Pro Chávez    Physical Exam   Temp: 98.8  F (37.1  C) Temp src: Oral BP: 102/62 Pulse: 107   Resp: 18 SpO2: 97 % O2 Device: None (Room air)    Vitals:    04/12/23 0500 04/13/23 0358 04/14/23 0649   Weight: 82 kg (180 lb 12.4 oz) 83.4 kg (183 lb 13.8 oz) 81.9 kg (180 lb 8 oz)     Vital Signs with Ranges  Temp:  [98  F (36.7  C)-99.2  F (37.3  C)] 98.8  F (37.1  C)  Pulse:  [] 107  Resp:  [18-20] 18  BP: (101-123)/(58-75) 102/62  SpO2:  [93 %-98 %] 97 %  I/O last 3 completed shifts:  In: 480 [P.O.:480]  Out: 2350 [Urine:2350]    Exam on discharge:   Lungs clear  CV with irreg S1S2    Discharge Disposition   Discharged to home  Condition at discharge: Stable    Consultations This Hospital Stay   PHARMACY IP CONSULT  PHARMACY IP CONSULT  CARDIOLOGY IP CONSULT  PHARMACY IP CONSULT  PHARMACY IP CONSULT  HOSPITALIST IP CONSULT  NUTRITION SERVICES ADULT IP CONSULT  CARDIAC REHAB IP CONSULT  PHARMACY IP CONSULT  CARE MANAGEMENT / SOCIAL WORK IP CONSULT  UROLOGY IP CONSULT  SMOKING CESSATION PROGRAM IP CONSULT    Time Spent on this Encounter   I spent a total of  35 minutes discharging this patient.     Discharge Orders      Reason Lipid Lowering Medications not prescribed from this order set     Reason for your hospital stay    While you were in the hospital, you had a chaidez catheter placed for urinary retention.     Discharge Instructions    You are being discharged with a catheter.  The nurse should have reviewed with you how to take care of the catheter while at home. Please hold on to any written instructions you may have received for your review.   You may wear the leg bag when you are walking or up in a chair (with your legs down), empty the bag when it is 2/3 full to avoid overfilling.  You should switch to the bigger (night) bag when lying down as the bag drains by gravity and can be hung on a waist paper basket at the side of the bed.   Leg bags and night bags should always be lower than the level of your bladder to encourage gravity drainage.    Clean around where the tube enters your body with soap and water, pat dry.     Men: You may apply bacitracin to the tip of the penis up to 3 times per day to help with discomfort     Follow-up and recommended labs and tests     [facility] will call you to arrange outpatient trial void (chaidez catheter removal attempt) in 1-2 weeks and clinic follow up with surgeon for enlarged prostate and recent gross hematuria. You may call to confirm appointment as needed. [phone]     Reason for your hospital stay    Coronary artery disease, atrial fibrillation, and enlarged prostate with urinary retention.     Follow-up and recommended labs and tests     Follow up with primary care provider, Pro Chávez, in 1 week, check BMP then.  Follow-up with Cardiology, Dr. Goodrich, in 3 weeks to assess Atrial Fibrillation, call cardiology clinic at [phone] if questions.     Call Dr. Oglesby if any medical questions at Cell Phone [phone].     Activity    Your activity upon discharge: activity as tolerated     Diet    Follow this  diet upon discharge: Orders Placed This Encounter      Low fat diet, no added salt.     Discharge Medications   Current Discharge Medication List      START taking these medications    Details   acetaminophen (TYLENOL) 325 MG tablet Take 2 tablets (650 mg) by mouth every 6 hours as needed for mild pain or other (and adjunct with moderate or severe pain or per patient request)  Refills: 0    Associated Diagnoses: Pain      amiodarone (PACERONE) 200 MG tablet 200 mg bid for 10 days then 200 mg daily for atrial fib  Qty: 40 tablet, Refills: 3    Associated Diagnoses: Atrial fibrillation, unspecified type (H)      cephALEXin (KEFLEX) 500 MG capsule Take 1 capsule (500 mg) by mouth 2 times daily  Qty: 20 capsule, Refills: 0    Comments: For 10 days  Associated Diagnoses: Acute cystitis with hematuria      losartan (COZAAR) 50 MG tablet Take 1 tablet (50 mg) by mouth daily  Qty: 30 tablet, Refills: 3    Associated Diagnoses: Benign essential hypertension      nitroGLYcerin (NITROSTAT) 0.4 MG sublingual tablet For chest pain place 1 tablet under the tongue every 5 minutes for 3 doses. If symptoms persist 5 minutes after 1st dose call 911.  Qty: 25 tablet, Refills: 1    Associated Diagnoses: NSTEMI (non-ST elevated myocardial infarction) (H)      ticagrelor (BRILINTA) 90 MG tablet Take 1 tablet (90 mg) by mouth every 12 hours  Qty: 60 tablet, Refills: 3    Associated Diagnoses: NSTEMI (non-ST elevated myocardial infarction) (H)         CONTINUE these medications which have CHANGED    Details   tamsulosin (FLOMAX) 0.4 MG capsule Take 2 capsules (0.8 mg) by mouth daily (with dinner)  Qty: 60 capsule, Refills: 3    Associated Diagnoses: Benign prostatic hyperplasia with urinary retention         CONTINUE these medications which have NOT CHANGED    Details   alendronate (FOSAMAX) 70 MG tablet Take 70 mg by mouth every 7 days On Tuesday      apixaban ANTICOAGULANT (ELIQUIS) 2.5 MG tablet Take 2.5 mg by mouth 2 times daily       calcium carbonate 500 mg, elemental, (OSCAL 500) 1250 (500 Ca) MG TABS tablet Take 1 tablet by mouth daily      Cyanocobalamin 3000 MCG CAPS Take 3,000 mcg by mouth daily      Flaxseed, Linseed, (FLAX SEED OIL) 1000 MG capsule Take 1,000 mg by mouth daily      hydroxychloroquine (PLAQUENIL) 200 MG tablet Take 200 mg by mouth daily      Lactobacillus acidophilus (BACID) 10 billion cell capsule [LACTOBACILLUS ACIDOPHILUS (BACID) 10 BILLION CELL CAPSULE] Take 1 tablet by mouth daily.       leucovorin (WELLCOVORIN) 5 mg tablet Take 5 mg by mouth three times a week On Monday, Wednesday and Saturday. Do not overlap days of methotrexate      methotrexate 2.5 MG tablet Take 12.5 mg by mouth twice a week 5 tablets (12.5mg) on Thursday and Friday      metoprolol succinate ER (TOPROL-XL) 50 MG 24 hr tablet Take 50 mg by mouth daily      rosuvastatin (CRESTOR) 10 MG tablet Take 10 mg by mouth At Bedtime      vitamin D3 (CHOLECALCIFEROL) 50 mcg (2000 units) tablet Take 1 tablet by mouth daily         STOP taking these medications       aspirin (ASA) 81 MG EC tablet Comments:   Reason for Stopping:         diltiazem ER (TIAZAC) 120 MG 24 hr ER beaded capsule Comments:   Reason for Stopping:             Allergies   No Known Allergies  Data   Most Recent 3 CBC's:Recent Labs   Lab Test 04/13/23  0510 04/12/23  2105 04/12/23  0512 04/11/23  1851 04/11/23  0040 04/10/23  2209   WBC 13.1*  --   --   --  12.7* 13.1*   HGB 11.3* 11.3* 11.8*   < > 10.8* 11.1*     --   --   --  102* 102*   *  --   --   --  136* 142*    < > = values in this interval not displayed.      Most Recent 3 BMP's:  Recent Labs   Lab Test 04/14/23  1118 04/13/23  0510 04/12/23  0512    130* 135*   POTASSIUM 4.0 3.6 3.7   CHLORIDE 104 100 102   CO2 21* 22 19*   BUN 31.3* 23.2* 18.1   CR 1.34* 1.21* 0.94   ANIONGAP 11 8 14   ABBEY 8.5* 8.2* 8.7*   * 110* 140*     Most Recent 2 LFT's:  Recent Labs   Lab Test 04/10/23  2209   AST 20   ALT 20    ALKPHOS 70   BILITOTAL 0.3     Most Recent INR's and Anticoagulation Dosing History:  Anticoagulation Dose History         Latest Ref Rng & Units 4/10/2023   Recent Dosing and Labs   INR 0.85 - 1.15 1.19                Most Recent 3 Troponin's:No lab results found.  Most Recent Cholesterol Panel:No lab results found.  Most Recent 6 Bacteria Isolates From Any Culture (See EPIC Reports for Culture Details):No lab results found.  Most Recent TSH, T4 and A1c Labs:No lab results found.

## 2023-04-14 NOTE — PROGRESS NOTES
Pt discharged home with his son. Took all belongings and got things stored in security. Signed and took discharge meds with him. All info reviewed with pt and family. No further questions.

## 2023-04-15 LAB — BACTERIA UR CULT: NORMAL

## 2023-04-15 NOTE — PLAN OF CARE
Cardiac Rehab Discharge Summary    Reason for therapy discharge:    Discharged to home with outpatient therapy.    Progress towards therapy goal(s). See goals on Care Plan in [facility] electronic health record for goal details.  Goals met    Therapy recommendation(s):    Continued therapy is recommended.  Rationale/Recommendations:  Outpt. cardiac rehab.

## 2023-04-25 ENCOUNTER — HOSPITAL ENCOUNTER (EMERGENCY)
Facility: CLINIC | Age: 83
Discharge: HOME | End: 2023-04-25
Attending: FAMILY MEDICINE | Admitting: FAMILY MEDICINE
Payer: MEDICARE

## 2023-04-25 VITALS
OXYGEN SATURATION: 97 % | HEART RATE: 86 BPM | HEIGHT: 67 IN | TEMPERATURE: 97.6 F | WEIGHT: 180 LBS | BODY MASS INDEX: 28.25 KG/M2 | DIASTOLIC BLOOD PRESSURE: 93 MMHG | SYSTOLIC BLOOD PRESSURE: 174 MMHG | RESPIRATION RATE: 18 BRPM

## 2023-04-25 DIAGNOSIS — R33.8 ACUTE URINARY RETENTION: ICD-10-CM

## 2023-04-25 PROCEDURE — 51702 INSERT TEMP BLADDER CATH: CPT | Performed by: FAMILY MEDICINE

## 2023-04-25 PROCEDURE — 99284 EMERGENCY DEPT VISIT MOD MDM: CPT | Mod: 25 | Performed by: FAMILY MEDICINE

## 2023-04-25 PROCEDURE — 51798 US URINE CAPACITY MEASURE: CPT | Performed by: FAMILY MEDICINE

## 2023-04-25 PROCEDURE — 99283 EMERGENCY DEPT VISIT LOW MDM: CPT | Performed by: FAMILY MEDICINE

## 2023-04-25 PROCEDURE — 250N000009 HC RX 250: Performed by: FAMILY MEDICINE

## 2023-04-25 RX ORDER — LIDOCAINE HYDROCHLORIDE 20 MG/ML
JELLY TOPICAL ONCE
Status: COMPLETED | OUTPATIENT
Start: 2023-04-25 | End: 2023-04-25

## 2023-04-25 RX ADMIN — LIDOCAINE HYDROCHLORIDE: 20 JELLY TOPICAL at 21:08

## 2023-04-25 ASSESSMENT — ACTIVITIES OF DAILY LIVING (ADL): ADLS_ACUITY_SCORE: 33

## 2023-04-26 NOTE — ED NOTES
First contact with patient.  All discharge home information given and all questions answered.  Patient ambulates out of department with steady gate.

## 2023-04-26 NOTE — ED TRIAGE NOTES
Pt reports he was just discharged from [facility] today after stent placement. Pt reports he had his catheter removed this morning at about 10AM and having a difficult time urinating

## 2023-04-26 NOTE — DISCHARGE INSTRUCTIONS
~Please leave the Vallejo catheter in place recheck in 7 to 10 days in clinic with your primary care provider or with urology.  Return to the emergency department if worse or changes in the interim.

## 2023-04-26 NOTE — ED PROVIDER NOTES
History     Chief Complaint   Patient presents with     Urinary Retention     Pt reports he had his catheter removed this morning at about 10AM and having a difficult time urinating      HPI  Felipe Verma is a 82 year old male, past medical history is significant for NSTEMI, chronic atrial fibrillation on Eliquis, stage III renal disease, statin intolerance, hypertension, hyperlipidemia, diverticulosis, brain aneurysm, BPH with urinary obstruction with Vallejo catheter placed to 4/10/2023 and removed this morning at around 10 AM.  History is obtained from the patient states that he was recently at [facility] with NSTEMI and is status post CANDIE x3 to LAD on 4/10/2023 apparently had a Vallejo catheter placed but found that this was very uncomfortable and tells me that he had it removed down in [place] with urologist this morning.  He has been unable to pee since that time.  He feels full mildly uncomfortable in the suprapubic area.    Allergies:  No Known Allergies    Problem List:    Patient Active Problem List    Diagnosis Date Noted     Possible UTI 04/14/2023     Priority: Medium     NSTEMI -- S/P CANDIE x 3 to LAD on 4/10/23 04/10/2023     Priority: Medium     Chronic atrial fib -- on Eliquis 06/17/2021     Priority: Medium     Localized swelling of left lower extremity 06/17/2021     Priority: Medium     Stage 3a chronic kidney disease (H) 06/17/2021     Priority: Medium     Statin intolerance 09/19/2018     Priority: Medium     Benign essential hypertension 06/06/2018     Priority: Medium     Hyperlipidemia 06/06/2018     Priority: Medium     Antiplatelet or antithrombotic long-term use 12/14/2016     Priority: Medium     Diverticulosis 05/05/2016     Priority: Medium     Brain aneurysm 05/03/2016     Priority: Medium     BPH with urinary obstruction -- Vallejo on 4/10/23 04/20/2016     Priority: Medium     Clostridium difficile diarrhea 04/20/2016     Priority: Medium     Normocytic anemia 04/20/2016      Priority: Medium        Past Medical History:    Past Medical History:   Diagnosis Date     Acute diverticulitis 4/20/2016     Lung nodule < 6cm on CT 6/6/2018       Past Surgical History:    Past Surgical History:   Procedure Laterality Date     CV CORONARY ANGIOGRAM N/A 4/11/2023    Procedure: Coronary Angiogram;  Surgeon: Alejandro Hitchcock MD;  Location: [facility] CATH LAB CV     CV PCI STENT DRUG ELUTING N/A 4/11/2023    Procedure: Percutaneous Coronary Intervention Stent;  Surgeon: Alejandro Hitchcock MD;  Location: [facility] CATH LAB CV     OTHER SURGICAL HISTORY      OTHER SURGICAL HISTORYstent placement in head      TONSILLECTOMY         Family History:    Family History   Problem Relation Age of Onset     Colon Cancer Mother      Hypertension Father      Coronary Artery Disease Father      Aneurysm Sister      Cerebrovascular Disease Sister      No Known Problems Brother      Diabetes Son      No Known Problems Son      No Known Problems Son      Breast Cancer No family hx of      Prostate Cancer No family hx of        Social History:  Marital Status:   [2]  Social History     Tobacco Use     Smoking status: Former     Smokeless tobacco: Never   Substance Use Topics     Drug use: Not Currently        Medications:    acetaminophen (TYLENOL) 325 MG tablet  alendronate (FOSAMAX) 70 MG tablet  amiodarone (PACERONE) 200 MG tablet  apixaban ANTICOAGULANT (ELIQUIS) 2.5 MG tablet  calcium carbonate 500 mg, elemental, (OSCAL 500) 1250 (500 Ca) MG TABS tablet  cephALEXin (KEFLEX) 500 MG capsule  Cyanocobalamin 3000 MCG CAPS  Flaxseed, Linseed, (FLAX SEED OIL) 1000 MG capsule  hydroxychloroquine (PLAQUENIL) 200 MG tablet  Lactobacillus acidophilus (BACID) 10 billion cell capsule  leucovorin (WELLCOVORIN) 5 mg tablet  losartan (COZAAR) 50 MG tablet  methotrexate 2.5 MG tablet  metoprolol succinate ER (TOPROL-XL) 50 MG 24 hr tablet  nitroGLYcerin (NITROSTAT) 0.4 MG sublingual tablet  rosuvastatin  "(CRESTOR) 10 MG tablet  tamsulosin (FLOMAX) 0.4 MG capsule  ticagrelor (BRILINTA) 90 MG tablet  vitamin D3 (CHOLECALCIFEROL) 50 mcg (2000 units) tablet          Review of Systems   All other systems reviewed and are negative.      Physical Exam   BP: (!) 174/93  Pulse: 86  Temp: 97.6  F (36.4  C)  Resp: 18  Height: 170.2 cm (5' 7\")  Weight: 81.6 kg (180 lb)  SpO2: 97 %      Physical Exam  Vitals and nursing note reviewed.   Constitutional:       General: He is not in acute distress.     Appearance: Normal appearance. He is normal weight. He is not ill-appearing.   Abdominal:      Comments: Soft bowel sounds present, the bladder is distended to percussion.  Mild tenderness suprapubic area   Neurological:      Mental Status: He is alert.         ED Course                 Procedures              Critical Care time:  none               No results found for this or any previous visit (from the past 24 hour(s)).    Medications   lidocaine (XYLOCAINE) 2 % external gel ( Topical $Given 4/25/23 5261)   Vallejo catheter in place without difficulty drained about 450 cc of yellow urine.  Well-tolerated.  Left in place.  I would recommend the patient follow-up in clinic in 7 to 10 days for consideration of removal of the catheter with his primary care provider or urology depending availability.  Return to the emergency department with concerns in the interim.      Assessments & Plan (with Medical Decision Making)   Assessments and plan with medical decision making at the time stamp above    Disclaimer: This note consists of symbols derived from keyboarding, dictation and/or voice recognition software. As a result, there may be errors in the script that have gone undetected. Please consider this when interpreting information found in this chart.      I have reviewed the nursing notes.    I have reviewed the findings, diagnosis, plan and need for follow up with the patient.         New Prescriptions    No medications on file "       Final diagnoses:   Acute urinary retention       4/25/2023   [facility] EMERGENCY DEPT     Braeden Solis MD  04/25/23 7726

## 2023-05-04 DIAGNOSIS — Z79.899 ON AMIODARONE THERAPY: Primary | ICD-10-CM

## 2023-05-04 DIAGNOSIS — N18.31 STAGE 3A CHRONIC KIDNEY DISEASE (H): Primary | ICD-10-CM

## 2023-05-04 DIAGNOSIS — R94.31 PROLONGED Q-T INTERVAL ON ECG: ICD-10-CM

## 2023-05-05 ENCOUNTER — OFFICE VISIT (OUTPATIENT)
Dept: CARDIOLOGY | Facility: CLINIC | Age: 83
End: 2023-05-05
Payer: MEDICARE

## 2023-05-05 VITALS
RESPIRATION RATE: 16 BRPM | BODY MASS INDEX: 27.28 KG/M2 | SYSTOLIC BLOOD PRESSURE: 112 MMHG | HEIGHT: 68 IN | WEIGHT: 180 LBS | HEART RATE: 71 BPM | DIASTOLIC BLOOD PRESSURE: 60 MMHG

## 2023-05-05 DIAGNOSIS — I21.4 NSTEMI (NON-ST ELEVATED MYOCARDIAL INFARCTION) (H): Primary | ICD-10-CM

## 2023-05-05 DIAGNOSIS — N13.8 BPH WITH URINARY OBSTRUCTION: ICD-10-CM

## 2023-05-05 DIAGNOSIS — I10 BENIGN ESSENTIAL HYPERTENSION: ICD-10-CM

## 2023-05-05 DIAGNOSIS — I48.20 CHRONIC ATRIAL FIBRILLATION (H): ICD-10-CM

## 2023-05-05 DIAGNOSIS — N40.1 BPH WITH URINARY OBSTRUCTION: ICD-10-CM

## 2023-05-05 DIAGNOSIS — N18.31 STAGE 3A CHRONIC KIDNEY DISEASE (H): ICD-10-CM

## 2023-05-05 PROBLEM — N18.2 STAGE 2 CHRONIC KIDNEY DISEASE: Status: ACTIVE | Noted: 2021-06-17

## 2023-05-05 PROBLEM — R06.09 DYSPNEA ON EXERTION: Status: ACTIVE | Noted: 2018-06-06

## 2023-05-05 PROBLEM — E78.00 PURE HYPERCHOLESTEROLEMIA: Status: ACTIVE | Noted: 2018-06-06

## 2023-05-05 PROBLEM — N52.9 IMPOTENCE OF ORGANIC ORIGIN: Status: ACTIVE | Noted: 2018-06-06

## 2023-05-05 LAB
ANION GAP SERPL CALCULATED.3IONS-SCNC: 12 MMOL/L (ref 7–15)
BUN SERPL-MCNC: 33.7 MG/DL (ref 8–23)
CALCIUM SERPL-MCNC: 8.8 MG/DL (ref 8.8–10.2)
CHLORIDE SERPL-SCNC: 103 MMOL/L (ref 98–107)
CREAT SERPL-MCNC: 1.73 MG/DL (ref 0.67–1.17)
DEPRECATED HCO3 PLAS-SCNC: 19 MMOL/L (ref 22–29)
GFR SERPL CREATININE-BSD FRML MDRD: 39 ML/MIN/1.73M2
GLUCOSE SERPL-MCNC: 106 MG/DL (ref 70–99)
POTASSIUM SERPL-SCNC: 4.4 MMOL/L (ref 3.4–5.3)
SODIUM SERPL-SCNC: 134 MMOL/L (ref 136–145)

## 2023-05-05 PROCEDURE — 99215 OFFICE O/P EST HI 40 MIN: CPT | Mod: 25 | Performed by: NURSE PRACTITIONER

## 2023-05-05 PROCEDURE — 93000 ELECTROCARDIOGRAM COMPLETE: CPT | Performed by: INTERNAL MEDICINE

## 2023-05-05 PROCEDURE — 80048 BASIC METABOLIC PNL TOTAL CA: CPT | Performed by: NURSE PRACTITIONER

## 2023-05-05 PROCEDURE — 36415 COLL VENOUS BLD VENIPUNCTURE: CPT | Performed by: NURSE PRACTITIONER

## 2023-05-05 RX ORDER — METOPROLOL SUCCINATE 50 MG/1
50 TABLET, EXTENDED RELEASE ORAL DAILY
Qty: 90 TABLET | Refills: 3 | Status: ON HOLD | OUTPATIENT
Start: 2023-05-05 | End: 2024-02-24

## 2023-05-05 RX ORDER — LOSARTAN POTASSIUM 50 MG/1
50 TABLET ORAL DAILY
Qty: 90 TABLET | Refills: 3 | Status: ON HOLD | OUTPATIENT
Start: 2023-05-05 | End: 2024-02-24

## 2023-05-05 NOTE — PROGRESS NOTES
Thank you, Dr. Oglesby, for asking the [facility] team to see Mr. Felipe Verma to evaluate Atrial fibrillation with RVR    Assessment/Recommendations     Assessment/Plan:    Diagnoses and all orders for this visit:  NSTEMI -- S/P CANDIE x 3 to LAD on 4/10/23    1.  Continue medical management and risk factor modification as appropriate  2.  High risk for LV Apical thrombus  3.  ASA + Brillinta + Eliquis for 3 days  4.  Then Brillinta + Eliquis      Typically sees cardiology through [facility]  He is now living in [place] and would like to establish care with cardiology at [place]  Per chart review, Dr. Carlos did see him in the hospital and has agreed to take over his care so he will get established with her in the next week or two.   Will need to get him set up with cardiac rehab since he has not started this yet post stenting. Message sent to Dr. Carlos's nurse to get cardiac rehab set up for him  Non smoker  Educational materials given to patient regarding heart healthy diet        Chronic atrial fib -- on Eliquis    we discussed the ongoing importance of lifestyle modification (maintaining a healthy weight, sleep apnea diagnosis and management, alcohol avoidance) as part of a long term strategy for atrial fibrillation management    Previously seeing (Dr. Porter cardiology) through [facility] system in [place], was previously on rate control strategy using metoprolol and diltiazem prior to hospitalization.  Office notes reviewed from February 2023  with Dr. Porter from cardiology at [facility].    Current rhythm control strategy using Amiodarone 200 mg daily paired with metoprolol succinate 50 mg daily for rate control.    Recheck EKG today, previous EKG's showed prolonged QT interval over 500, patient does not have device and is on amiodarone therapy + betablocker daily.   EKG today shows NSR with PAC's, rate 71, QT/QTC: 458/497    He will stop the Amiodarone today and follow up with me  in 2 months, continue on the metoprolol succinate 50 mg daily for rate control. He is not symptomatic when it comes to his Afib.     He was informed that atrial fibrillation is not life-threatening but it does increase his stroke risk, he is currently anticoagulated on Eliquis    Benign essential hypertension    Blood pressures reviewed today: 102/62, 174/93, 112/60  Well controlled, continue on Losartan 50 mg daily      Stage 3a chronic kidney disease (H)    Creat 1.34/GFR 53  Avoid nephrotoxins  Check BMP today to follow up on renal status post hospital discharge. Results pending.     BPH with urinary obstruction -- [place] on 4/10/23     He has an appointment to follow up with [facility] on 5/18/23  [place] catheter remains in place, some bleeding noted after exchange at his last urology appointment which resolved after a few days due to being on blood thinner.      [id number] score of 5 age over 75, HTN, CVA,  and on Eliquis 2.5 mg daily ( renal dosing)  Follow up in 2 months with Aimee Odom CNP for atrial fibrillation. Establish care with Dr. Carlos, patient is changing systems from [facility] to [place] now.      History of Present Illness/Subjective     Felipe Verma is a very pleasant 82 year old male who comes in today for EP follow up post hospital visit post stenting/STEMI and atrial fibrillation.  Felipe Verma with past medical history of BPH, HLD, CKD stage III, CVA with coiling of cerebral aneurysm in 2016, RA involving multiple sites, A-fib on Eliquis, HTN who was recently hospitalized on 4/10/23 at [facility] for chest pain/STEMI, see hospital summary below.     History of Present Illness  82 year old male presented to ED for evaluation of chest pain.       Patient found to have abnormal EKG, elevated troponin.  ED provider reviewed case with interventional cardiologist and general cardiologist.  Patient initiated on heparin drip and admitted.           Hospital Course  Had Angiogram  which showed:     Prox LAD lesion is 90% stenosed.     Ost LAD to Prox LAD lesion is 30% stenosed.     Mid LAD lesion is 95% stenosed.        1. Single-vessel occlusive coronary artery disease, namely proximal and mid LAD.  2. Complex PCI of proximal LAD with drug-eluting stent x1, 2.75 x 22 mm resolute [place] postdilated with a 3.0 balloon  3. Complex PCI of subacute lesion in mid to distal LAD with placement of a 2.25 x 15 mm and 2.0 x 22 mm resolute [place] stents, postdilated with a 2.5 balloon       Was started on Amiodarone for Atrial Fib.       Also, did have trouble urinating, and chaidez placed for urinary retention, and he will follow-up with [facility] in 10-14 days for voiding trial.      Patient doing well at time of discharge.     Follow-up in 1 week for BMP with Pro Chávez, and follow-up with Dr. Goodrich in 3 weeks to assess Atrial fib.        He presents to the [place] clinic today for follow up post STEMI/stenting and for atrial fibrillation follow up.   Patient was previously seeing Dr. Porter from cardiology through the [facility].  He has now decided to establish care through the [facility] [place] and will need to get established.  He did see Dr. Carlos during his hospitalization who has agreed to take over his care.  He has not yet started any cardiac rehab post stenting.  He is now living in the [place] area.  He currently resides there with his wife.  Previous to his hospitalization, he was currently rate controlled in regards to his atrial fibrillation on metoprolol succinate combined with the diltiazem per Dr. Porter.  He cannot tell me today why the diltiazem was discontinued.  Since his STEMI/stenting, he denies any recent palpitations, shortness of breath, chest pain, dizziness or lightheadedness, syncope.  He does report some mild lower extremity swelling and some mild fatigue along with some mild hand tremors.  He is currently taking amiodarone 200 mg daily.   With the metoprolol succinate 50 mg daily for management of his atrial fibrillation.  He denies any recent falls or unsteady gait.  He has noticed some hematuria that lasted roughly 3 days since his recent catheter exchange through [facility].  On average, he consumes roughly 36 ounces of fluids daily, he does not consume alcohol.  Caffeine intake is minimal.  He is a non-smoker.  On average he sleeps about 4 to 6 hours per night and denies any snoring or apneic episodes.  He denies any complications with groin site access since his stenting procedure.  He does have a history of kidney disease stage III and is overdue for some follow-up lab work today.  He has no history of pulmonary disease or liver disease.          Cardiographics (reviewed):    EKG 5/5/23 shows NSR with PAC's ventricular rate 71 QT/QTC: 458/497        EKG 4/13/23 shows atrial fibrillation with RVR rate 139 QT/QTC: 382/581        EKG 4/13/23 shows NSR with PAC's rate 89 QT/QTC: 450/547          ECHO 4/10/23    Interpretation Summary     1. The left ventricle is normal in size. Left ventricular systolic performance  is moderately reduced. The ejection fraction is estimated to be 40%.  2. There is severe anteroseptal, distal septal, and distal anterior  hypokinesis. The apex is akinetic (no apical mural thrombus is detected).  3. There is mild to moderate mitral insufficiency.  4. Normal right ventricular size and systolic performance.  5. There is mild left atrial enlargement.      Coronary Cath 4/10/23       Prox LAD lesion is 90% stenosed.     Ost LAD to Prox LAD lesion is 30% stenosed.     Mid LAD lesion is 95% stenosed.        1. Single-vessel occlusive coronary artery disease, namely proximal and mid LAD.  2. Complex PCI of proximal LAD with drug-eluting stent x1, 2.75 x 22 mm resolute [place] postdilated with a 3.0 balloon  3. Complex PCI of subacute lesion in mid to distal LAD with placement of a 2.25 x 15 mm and 2.0 x 22 mm resolute  [place] stents, postdilated with a 2.5 balloon         Plan    1.  Continue medical management and risk factor modification as appropriate  2.  High risk for LV Apical thrombus  3.  ASA + Brillinta + Eliquis for 3 days  4.  Then Brillinta + Eliquis        Problem List:  Patient Active Problem List   Diagnosis     Brain aneurysm     BPH with urinary obstruction -- [place] on 4/10/23     Benign essential hypertension     Antiplatelet or antithrombotic long-term use     Clostridium difficile diarrhea     Diverticulosis     Normocytic anemia     Hyperlipidemia     Statin intolerance     Chronic atrial fib -- on Eliquis     Localized swelling of left lower extremity     Stage 3a chronic kidney disease (H)     NSTEMI -- S/P CANDIE x 3 to LAD on 4/10/23     Possible UTI     Revi  e  Physical Examination Review of Systems   w fystems  There were no vitals taken for this visit.  There is no height or weight on file to calculate BMI.  Wt Readings from Last 3 Encounters:   04/25/23 81.6 kg (180 lb)   04/14/23 81.9 kg (180 lb 8 oz)   06/17/21 85.7 kg (189 lb)     General Appearance:   Alert, well-appearing and in no acute distress. Mild fatigue noted   HEENT: Atraumatic, normocephalic.  No scleral icterus, normal conjunctivae; mucous membranes pink and moist.     Chest: Chest symmetric, spine straight.   Lungs:   Respirations unlabored: Lungs are clear to auscultation.   Cardiovascular:   Normal first and second heart sounds with no murmurs, rubs, or gallops.  Regular, regular.   Normal JVD, 1+ BLE pitting edema.       Extremities: No cyanosis or clubbing   Musculoskeletal: Moves all extremities   Skin: Warm, dry, intact. Right groin clean, dry, intact, no bruising or bleeding.    Neurologic: Mood and affect are appropriate, alert and oriented to person, place, time, and situation. Mild bilateral hand tremors noted.      ROS: 10 point ROS neg other than the symptoms noted above in the HPI.     Medical History  Surgical History Family  History Social History     Past Medical History:   Diagnosis Date     Acute diverticulitis 4/20/2016     Lung nodule < 6cm on CT 6/6/2018    Past Surgical History:   Procedure Laterality Date     CV CORONARY ANGIOGRAM N/A 4/11/2023    Procedure: Coronary Angiogram;  Surgeon: Alejandro Hitchcock MD;  Location: [facility] CATH LAB CV     CV PCI STENT DRUG ELUTING N/A 4/11/2023    Procedure: Percutaneous Coronary Intervention Stent;  Surgeon: Alejandro Hitchcock MD;  Location: [facility] CATH LAB CV     OTHER SURGICAL HISTORY      OTHER SURGICAL HISTORYstent placement in head      TONSILLECTOMY      Family History   Problem Relation Age of Onset     Colon Cancer Mother      Hypertension Father      Coronary Artery Disease Father      Aneurysm Sister      Cerebrovascular Disease Sister      No Known Problems Brother      Diabetes Son      No Known Problems Son      No Known Problems Son      Breast Cancer No family hx of      Prostate Cancer No family hx of     History   Smoking Status     Former   Smokeless Tobacco     Never     Social History    Substance and Sexual Activity      Alcohol use: Not on file        Comment: Alcoholic Drinks/day: 1 glass per week       Medications  Allergies     Current Outpatient Medications   Medication Sig Dispense Refill     acetaminophen (TYLENOL) 325 MG tablet Take 2 tablets (650 mg) by mouth every 6 hours as needed for mild pain or other (and adjunct with moderate or severe pain or per patient request)  0     alendronate (FOSAMAX) 70 MG tablet Take 70 mg by mouth every 7 days On Tuesday       amiodarone (PACERONE) 200 MG tablet 200 mg bid for 10 days then 200 mg daily for atrial fib 40 tablet 3     apixaban ANTICOAGULANT (ELIQUIS) 2.5 MG tablet Take 2.5 mg by mouth 2 times daily       calcium carbonate 500 mg, elemental, (OSCAL 500) 1250 (500 Ca) MG TABS tablet Take 1 tablet by mouth daily       cephALEXin (KEFLEX) 500 MG capsule Take 1 capsule (500 mg) by mouth 2 times daily 20  capsule 0     Cyanocobalamin 3000 MCG CAPS Take 3,000 mcg by mouth daily       Flaxseed, Linseed, (FLAX SEED OIL) 1000 MG capsule Take 1,000 mg by mouth daily       hydroxychloroquine (PLAQUENIL) 200 MG tablet Take 200 mg by mouth daily       Lactobacillus acidophilus (BACID) 10 billion cell capsule [LACTOBACILLUS ACIDOPHILUS (BACID) 10 BILLION CELL CAPSULE] Take 1 tablet by mouth daily.        leucovorin (WELLCOVORIN) 5 mg tablet Take 5 mg by mouth three times a week On Monday, Wednesday and Saturday. Do not overlap days of methotrexate       losartan (COZAAR) 50 MG tablet Take 1 tablet (50 mg) by mouth daily 30 tablet 3     methotrexate 2.5 MG tablet Take 12.5 mg by mouth twice a week 5 tablets (12.5mg) on Thursday and Friday       metoprolol succinate ER (TOPROL-XL) 50 MG 24 hr tablet Take 50 mg by mouth daily       nitroGLYcerin (NITROSTAT) 0.4 MG sublingual tablet For chest pain place 1 tablet under the tongue every 5 minutes for 3 doses. If symptoms persist 5 minutes after 1st dose call 911. 25 tablet 1     rosuvastatin (CRESTOR) 10 MG tablet Take 10 mg by mouth At Bedtime       tamsulosin (FLOMAX) 0.4 MG capsule Take 2 capsules (0.8 mg) by mouth daily (with dinner) 60 capsule 3     ticagrelor (BRILINTA) 90 MG tablet Take 1 tablet (90 mg) by mouth every 12 hours 60 tablet 3     vitamin D3 (CHOLECALCIFEROL) 50 mcg (2000 units) tablet Take 1 tablet by mouth daily        No Known Allergies   Medical, surgical, family, social history, and medications were all reviewed and updated as necessary.   Lab Results    Chemistry/lipid CBC Cardiac Enzymes/BNP/TSH/INR   No results for input(s): CHOL, HDL, LDL, TRIG, CHOLHDLRATIO in the last 16743 hours.  No results for input(s): LDL in the last 77632 hours.  Recent Labs   Lab Test 04/14/23  1118      POTASSIUM 4.0   CHLORIDE 104   CO2 21*   *   BUN 31.3*   CR 1.34*   GFRESTIMATED 53*   ABBEY 8.5*     Recent Labs   Lab Test 04/14/23  1118 04/13/23  0510  04/12/23  0512   CR 1.34* 1.21* 0.94     No results for input(s): A1C in the last 16165 hours.       Recent Labs   Lab Test 04/13/23  0510   WBC 13.1*   HGB 11.3*   HCT 33.3*      *     Recent Labs   Lab Test 04/13/23  0510 04/12/23  2105 04/12/23  0512   HGB 11.3* 11.3* 11.8*    No results for input(s): TROPONINI in the last 36650 hours.  Recent Labs   Lab Test 04/10/23  2209   NTBNPI 325     No results for input(s): TSH in the last 44333 hours.  Recent Labs   Lab Test 04/10/23  2209   INR 1.19*          Total Time- 66 minutes spent on date of encounter doing chart review, history and exam, documentation and further activities as noted above.  This note has been dictated using voice recognition software. Any grammatical, typographical, or context distortions are unintentional and inherent to the software.    Aimee Odom [facility]  [phone]

## 2023-05-05 NOTE — LETTER
5/5/2023    Pro Chávez MD  [address] [facility] [address]    RE: Felipe Verma       Dear Colleague,     I had the pleasure of seeing Felipe Verma in the [facility].    Thank you, Dr. Oglesby, for asking the [facility] team to see Mr. Felipe Verma to evaluate Atrial fibrillation with RVR    Assessment/Recommendations     Assessment/Plan:    Diagnoses and all orders for this visit:  NSTEMI -- S/P CANDIE x 3 to LAD on 4/10/23    1.  Continue medical management and risk factor modification as appropriate  2.  High risk for LV Apical thrombus  3.  ASA + Brillinta + Eliquis for 3 days  4.  Then Brillinta + Eliquis      Typically sees cardiology through [facility]  He is now living in [place] and would like to establish care with cardiology at [place]  Per chart review, Dr. Carlos did see him in the hospital and has agreed to take over his care so he will get established with her in the next week or two.   Will need to get him set up with cardiac rehab since he has not started this yet post stenting. Message sent to Dr. Carlos's nurse to get cardiac rehab set up for him  Non smoker  Educational materials given to patient regarding heart healthy diet        Chronic atrial fib -- on Eliquis    we discussed the ongoing importance of lifestyle modification (maintaining a healthy weight, sleep apnea diagnosis and management, alcohol avoidance) as part of a long term strategy for atrial fibrillation management    Previously seeing (Dr. Porter cardiology) through [facility] system in [place], was previously on rate control strategy using metoprolol and diltiazem prior to hospitalization.  Office notes reviewed from February 2023  with Dr. Porter from cardiology at [facility].    Current rhythm control strategy using Amiodarone 200 mg daily paired with metoprolol succinate 50 mg daily for rate control.    Recheck EKG today, previous EKG's showed prolonged QT  interval over 500, patient does not have device and is on amiodarone therapy + betablocker daily.   EKG today shows NSR with PAC's, rate 71, QT/QTC: 458/497    He will stop the Amiodarone today and follow up with me in 2 months, continue on the metoprolol succinate 50 mg daily for rate control. He is not symptomatic when it comes to his Afib.     He was informed that atrial fibrillation is not life-threatening but it does increase his stroke risk, he is currently anticoagulated on Eliquis    Benign essential hypertension    Blood pressures reviewed today: 102/62, 174/93, 112/60  Well controlled, continue on Losartan 50 mg daily      Stage 3a chronic kidney disease (H)    Creat 1.34/GFR 53  Avoid nephrotoxins  Check BMP today to follow up on renal status post hospital discharge. Results pending.     BPH with urinary obstruction -- Vallejo on 4/10/23     He has an appointment to follow up with [facility] on 5/18/23  Vallejo catheter remains in place, some bleeding noted after exchange at his last urology appointment which resolved after a few days due to being on blood thinner.      [id number] score of 5 age over 75, HTN, CVA,  and on Eliquis 2.5 mg daily ( renal dosing)  Follow up in 2 months with Aimee Odom CNP for atrial fibrillation. Establish care with Dr. Carlos, patient is changing systems from [facility] to [place] now.      History of Present Illness/Subjective     Felipe Verma is a very pleasant 82 year old male who comes in today for EP follow up post hospital visit post stenting/STEMI and atrial fibrillation.  Felipe Verma with past medical history of BPH, HLD, CKD stage III, CVA with coiling of cerebral aneurysm in 2016, RA involving multiple sites, A-fib on Eliquis, HTN who was recently hospitalized on 4/10/23 at [facility] for chest pain/STEMI, see hospital summary below.     History of Present Illness  82 year old male presented to ED for evaluation of chest pain.       Patient found  to have abnormal EKG, elevated troponin.  ED provider reviewed case with interventional cardiologist and general cardiologist.  Patient initiated on heparin drip and admitted.           Hospital Course  Had Angiogram which showed:    Prox LAD lesion is 90% stenosed.    Ost LAD to Prox LAD lesion is 30% stenosed.    Mid LAD lesion is 95% stenosed.        Single-vessel occlusive coronary artery disease, namely proximal and mid LAD.  Complex PCI of proximal LAD with drug-eluting stent x1, 2.75 x 22 mm resolute Rajendra postdilated with a 3.0 balloon  Complex PCI of subacute lesion in mid to distal LAD with placement of a 2.25 x 15 mm and 2.0 x 22 mm resolute Rajendra stents, postdilated with a 2.5 balloon       Was started on Amiodarone for Atrial Fib.       Also, did have trouble urinating, and chaidez placed for urinary retention, and he will follow-up with [facility] in 10-14 days for voiding trial.      Patient doing well at time of discharge.     Follow-up in 1 week for BMP with Pro Chávez, and follow-up with Dr. Goodrich in 3 weeks to assess Atrial fib.        He presents to the [place] clinic today for follow up post STEMI/stenting and for atrial fibrillation follow up.   Patient was previously seeing Dr. Porter from cardiology through the [facility].  He has now decided to establish care through the [facility] and will need to get established.  He did see Dr. Carlos during his hospitalization who has agreed to take over his care.  He has not yet started any cardiac rehab post stenting.  He is now living in the [facility] area.  He currently resides there with his wife.  Previous to his hospitalization, he was currently rate controlled in regards to his atrial fibrillation on metoprolol succinate combined with the diltiazem per Dr. Porter.  He cannot tell me today why the diltiazem was discontinued.  Since his STEMI/stenting, he denies any recent palpitations, shortness of breath, chest  pain, dizziness or lightheadedness, syncope.  He does report some mild lower extremity swelling and some mild fatigue along with some mild hand tremors.  He is currently taking amiodarone 200 mg daily.  With the metoprolol succinate 50 mg daily for management of his atrial fibrillation.  He denies any recent falls or unsteady gait.  He has noticed some hematuria that lasted roughly 3 days since his recent catheter exchange through [facility].  On average, he consumes roughly 36 ounces of fluids daily, he does not consume alcohol.  Caffeine intake is minimal.  He is a non-smoker.  On average he sleeps about 4 to 6 hours per night and denies any snoring or apneic episodes.  He denies any complications with groin site access since his stenting procedure.  He does have a history of kidney disease stage III and is overdue for some follow-up lab work today.  He has no history of pulmonary disease or liver disease.          Cardiographics (reviewed):    EKG 5/5/23 shows NSR with PAC's ventricular rate 71 QT/QTC: 458/497        EKG 4/13/23 shows atrial fibrillation with RVR rate 139 QT/QTC: 382/581        EKG 4/13/23 shows NSR with PAC's rate 89 QT/QTC: 450/547          ECHO 4/10/23    Interpretation Summary     1. The left ventricle is normal in size. Left ventricular systolic performance  is moderately reduced. The ejection fraction is estimated to be 40%.  2. There is severe anteroseptal, distal septal, and distal anterior  hypokinesis. The apex is akinetic (no apical mural thrombus is detected).  3. There is mild to moderate mitral insufficiency.  4. Normal right ventricular size and systolic performance.  5. There is mild left atrial enlargement.      Coronary Cath 4/10/23      Prox LAD lesion is 90% stenosed.    Ost LAD to Prox LAD lesion is 30% stenosed.    Mid LAD lesion is 95% stenosed.        Single-vessel occlusive coronary artery disease, namely proximal and mid LAD.  Complex PCI of proximal LAD with  drug-eluting stent x1, 2.75 x 22 mm resolute Rajendra postdilated with a 3.0 balloon  Complex PCI of subacute lesion in mid to distal LAD with placement of a 2.25 x 15 mm and 2.0 x 22 mm resolute [place] stents, postdilated with a 2.5 balloon         Plan    1.  Continue medical management and risk factor modification as appropriate  2.  High risk for LV Apical thrombus  3.  ASA + Brillinta + Eliquis for 3 days  4.  Then Brillinta + Eliquis        Problem List:  Patient Active Problem List   Diagnosis    Brain aneurysm    BPH with urinary obstruction -- Vallejo on 4/10/23    Benign essential hypertension    Antiplatelet or antithrombotic long-term use    Clostridium difficile diarrhea    Diverticulosis    Normocytic anemia    Hyperlipidemia    Statin intolerance    Chronic atrial fib -- on Eliquis    Localized swelling of left lower extremity    Stage 3a chronic kidney disease (H)    NSTEMI -- S/P CANDIE x 3 to LAD on 4/10/23    Possible UTI     Revi  e  Physical Examination Review of Systems   w [facility]  There were no vitals taken for this visit.  There is no height or weight on file to calculate BMI.  Wt Readings from Last 3 Encounters:   04/25/23 81.6 kg (180 lb)   04/14/23 81.9 kg (180 lb 8 oz)   06/17/21 85.7 kg (189 lb)     General Appearance:   Alert, well-appearing and in no acute distress. Mild fatigue noted   HEENT: Atraumatic, normocephalic.  No scleral icterus, normal conjunctivae; mucous membranes pink and moist.     Chest: Chest symmetric, spine straight.   Lungs:   Respirations unlabored: Lungs are clear to auscultation.   Cardiovascular:   Normal first and second heart sounds with no murmurs, rubs, or gallops.  Regular, regular.   Normal JVD, 1+ BLE pitting edema.       Extremities: No cyanosis or clubbing   Musculoskeletal: Moves all extremities   Skin: Warm, dry, intact. Right groin clean, dry, intact, no bruising or bleeding.    Neurologic: Mood and affect are appropriate, alert and oriented to person, place,  time, and situation. Mild bilateral hand tremors noted.      ROS: 10 point ROS neg other than the symptoms noted above in the HPI.     Medical History  Surgical History Family History Social History     Past Medical History:   Diagnosis Date    Acute diverticulitis 4/20/2016    Lung nodule < 6cm on CT 6/6/2018    Past Surgical History:   Procedure Laterality Date    CV CORONARY ANGIOGRAM N/A 4/11/2023    Procedure: Coronary Angiogram;  Surgeon: Alejandro Hitchcock MD;  Location: [facility] CATH LAB CV    CV PCI STENT DRUG ELUTING N/A 4/11/2023    Procedure: Percutaneous Coronary Intervention Stent;  Surgeon: Alejandro Hitchcock MD;  Location: [facility] CV    OTHER SURGICAL HISTORY      OTHER SURGICAL HISTORYstent placement in head     TONSILLECTOMY      Family History   Problem Relation Age of Onset    Colon Cancer Mother     Hypertension Father     Coronary Artery Disease Father     Aneurysm Sister     Cerebrovascular Disease Sister     No Known Problems Brother     Diabetes Son     No Known Problems Son     No Known Problems Son     Breast Cancer No family hx of     Prostate Cancer No family hx of     History   Smoking Status    Former   Smokeless Tobacco    Never     Social History    Substance and Sexual Activity      Alcohol use: Not on file        Comment: Alcoholic Drinks/day: 1 glass per week       Medications  Allergies     Current Outpatient Medications   Medication Sig Dispense Refill    acetaminophen (TYLENOL) 325 MG tablet Take 2 tablets (650 mg) by mouth every 6 hours as needed for mild pain or other (and adjunct with moderate or severe pain or per patient request)  0    alendronate (FOSAMAX) 70 MG tablet Take 70 mg by mouth every 7 days On Tuesday      amiodarone (PACERONE) 200 MG tablet 200 mg bid for 10 days then 200 mg daily for atrial fib 40 tablet 3    apixaban ANTICOAGULANT (ELIQUIS) 2.5 MG tablet Take 2.5 mg by mouth 2 times daily      calcium carbonate 500 mg, elemental, (OSCAL  500) 1250 (500 Ca) MG TABS tablet Take 1 tablet by mouth daily      cephALEXin (KEFLEX) 500 MG capsule Take 1 capsule (500 mg) by mouth 2 times daily 20 capsule 0    Cyanocobalamin 3000 MCG CAPS Take 3,000 mcg by mouth daily      Flaxseed, Linseed, (FLAX SEED OIL) 1000 MG capsule Take 1,000 mg by mouth daily      hydroxychloroquine (PLAQUENIL) 200 MG tablet Take 200 mg by mouth daily      Lactobacillus acidophilus (BACID) 10 billion cell capsule [LACTOBACILLUS ACIDOPHILUS (BACID) 10 BILLION CELL CAPSULE] Take 1 tablet by mouth daily.       leucovorin (WELLCOVORIN) 5 mg tablet Take 5 mg by mouth three times a week On Monday, Wednesday and Saturday. Do not overlap days of methotrexate      losartan (COZAAR) 50 MG tablet Take 1 tablet (50 mg) by mouth daily 30 tablet 3    methotrexate 2.5 MG tablet Take 12.5 mg by mouth twice a week 5 tablets (12.5mg) on Thursday and Friday      metoprolol succinate ER (TOPROL-XL) 50 MG 24 hr tablet Take 50 mg by mouth daily      nitroGLYcerin (NITROSTAT) 0.4 MG sublingual tablet For chest pain place 1 tablet under the tongue every 5 minutes for 3 doses. If symptoms persist 5 minutes after 1st dose call 911. 25 tablet 1    rosuvastatin (CRESTOR) 10 MG tablet Take 10 mg by mouth At Bedtime      tamsulosin (FLOMAX) 0.4 MG capsule Take 2 capsules (0.8 mg) by mouth daily (with dinner) 60 capsule 3    ticagrelor (BRILINTA) 90 MG tablet Take 1 tablet (90 mg) by mouth every 12 hours 60 tablet 3    vitamin D3 (CHOLECALCIFEROL) 50 mcg (2000 units) tablet Take 1 tablet by mouth daily        No Known Allergies   Medical, surgical, family, social history, and medications were all reviewed and updated as necessary.   Lab Results    Chemistry/lipid CBC Cardiac Enzymes/BNP/TSH/INR   No results for input(s): CHOL, HDL, LDL, TRIG, CHOLHDLRATIO in the last 63201 hours.  No results for input(s): LDL in the last 48592 hours.  Recent Labs   Lab Test 04/14/23  1118      POTASSIUM 4.0   CHLORIDE 104    CO2 21*   *   BUN 31.3*   CR 1.34*   GFRESTIMATED 53*   ABBEY 8.5*     Recent Labs   Lab Test 04/14/23  1118 04/13/23  0510 04/12/23  0512   CR 1.34* 1.21* 0.94     No results for input(s): A1C in the last 96741 hours.       Recent Labs   Lab Test 04/13/23  0510   WBC 13.1*   HGB 11.3*   HCT 33.3*      *     Recent Labs   Lab Test 04/13/23  0510 04/12/23  2105 04/12/23  0512   HGB 11.3* 11.3* 11.8*    No results for input(s): TROPONINI in the last 61456 hours.  Recent Labs   Lab Test 04/10/23  2209   NTBNPI 325     No results for input(s): TSH in the last 12771 hours.  Recent Labs   Lab Test 04/10/23  2209   INR 1.19*          Total Time- 66 minutes spent on date of encounter doing chart review, history and exam, documentation and further activities as noted above.  This note has been dictated using voice recognition software. Any grammatical, typographical, or context distortions are unintentional and inherent to the software.    Aimee dOom CNP  [facility]  [phone]               Thank you for allowing me to participate in the care of your patient.      Sincerely,     Aimee Odom NP     [facility]  cc:   No referring provider defined for this encounter.

## 2023-05-05 NOTE — PATIENT INSTRUCTIONS
Felipe Verma,    It was a pleasure to see you today at the [facility].     My recommendations after this visit include:    Stop Amiodarone as of today 5/5/23   Follow back up with me in clinic in 2 months for atrial fibrillation, continue with current daily dose of metoprolol succinate 50 mg daily for rate control  I have referred you to Dr. Carlos to establish care for cardiology, please see him in the next week or so for that appointment. He will be your general cardiologist going forward since you are changing systems from [facility].   I will contact his nurse to get you set up for cardiac rehab    Aimee Odom CNP  [facility], Electrophysiology  [phone]  EP nurses [phone]

## 2023-05-06 LAB
ATRIAL RATE - MUSE: 71 BPM
DIASTOLIC BLOOD PRESSURE - MUSE: NORMAL MMHG
INTERPRETATION ECG - MUSE: NORMAL
P AXIS - MUSE: 67 DEGREES
PR INTERVAL - MUSE: 184 MS
QRS DURATION - MUSE: 108 MS
QT - MUSE: 458 MS
QTC - MUSE: 497 MS
R AXIS - MUSE: -25 DEGREES
SYSTOLIC BLOOD PRESSURE - MUSE: NORMAL MMHG
T AXIS - MUSE: 163 DEGREES
VENTRICULAR RATE- MUSE: 71 BPM

## 2023-05-08 ENCOUNTER — TELEPHONE (OUTPATIENT)
Dept: CARDIOLOGY | Facility: CLINIC | Age: 83
End: 2023-05-08
Payer: MEDICARE

## 2023-05-08 NOTE — TELEPHONE ENCOUNTER
----- Message from Aimee Odom NP sent at 5/5/2023  2:53 PM CDT -----  Regarding: cardiac rehab  Marsha Quarles, I just saw this patient in clinic, he is a status post STEMI from 4/10/23.  He was in the hospital on 4/10, he has not yet started cardiac rehab and is going to be establishing care here at [place] with Dr. Carlos.  Could you please get cardiac rehab ordered for him since he has not yet initiated this since post stenting.  Binta has already left for the day.  I would appreciate it.  Thank you so much.    Aimee Odom CNP

## 2023-05-09 DIAGNOSIS — I21.4 NSTEMI (NON-ST ELEVATED MYOCARDIAL INFARCTION) (H): Primary | ICD-10-CM

## 2023-05-18 ENCOUNTER — TRANSFERRED RECORDS (OUTPATIENT)
Dept: HEALTH INFORMATION MANAGEMENT | Facility: CLINIC | Age: 83
End: 2023-05-18
Payer: MEDICARE

## 2023-06-02 ENCOUNTER — OFFICE VISIT (OUTPATIENT)
Dept: FAMILY MEDICINE | Facility: CLINIC | Age: 83
End: 2023-06-02
Payer: MEDICARE

## 2023-06-02 VITALS
DIASTOLIC BLOOD PRESSURE: 74 MMHG | OXYGEN SATURATION: 97 % | TEMPERATURE: 98.1 F | SYSTOLIC BLOOD PRESSURE: 118 MMHG | BODY MASS INDEX: 26.3 KG/M2 | RESPIRATION RATE: 16 BRPM | WEIGHT: 173.5 LBS | HEART RATE: 67 BPM | HEIGHT: 68 IN

## 2023-06-02 DIAGNOSIS — I48.20 CHRONIC ATRIAL FIBRILLATION (H): ICD-10-CM

## 2023-06-02 DIAGNOSIS — I21.4 NSTEMI (NON-ST ELEVATED MYOCARDIAL INFARCTION) (H): ICD-10-CM

## 2023-06-02 DIAGNOSIS — N18.31 STAGE 3A CHRONIC KIDNEY DISEASE (H): Primary | ICD-10-CM

## 2023-06-02 DIAGNOSIS — N13.8 BPH WITH URINARY OBSTRUCTION: ICD-10-CM

## 2023-06-02 DIAGNOSIS — E78.00 PURE HYPERCHOLESTEROLEMIA: ICD-10-CM

## 2023-06-02 DIAGNOSIS — N40.1 BPH WITH URINARY OBSTRUCTION: ICD-10-CM

## 2023-06-02 DIAGNOSIS — N18.2 STAGE 2 CHRONIC KIDNEY DISEASE: ICD-10-CM

## 2023-06-02 DIAGNOSIS — M05.79 RHEUMATOID ARTHRITIS INVOLVING MULTIPLE SITES WITH POSITIVE RHEUMATOID FACTOR (H): ICD-10-CM

## 2023-06-02 PROBLEM — Z78.9 STATIN INTOLERANCE: Status: RESOLVED | Noted: 2018-09-19 | Resolved: 2023-06-02

## 2023-06-02 PROBLEM — R06.09 DYSPNEA ON EXERTION: Status: RESOLVED | Noted: 2018-06-06 | Resolved: 2023-06-02

## 2023-06-02 LAB
ANION GAP SERPL CALCULATED.3IONS-SCNC: 12 MMOL/L (ref 7–15)
BUN SERPL-MCNC: 27.1 MG/DL (ref 8–23)
CALCIUM SERPL-MCNC: 9 MG/DL (ref 8.8–10.2)
CHLORIDE SERPL-SCNC: 107 MMOL/L (ref 98–107)
CREAT SERPL-MCNC: 1.54 MG/DL (ref 0.67–1.17)
DEPRECATED HCO3 PLAS-SCNC: 19 MMOL/L (ref 22–29)
GFR SERPL CREATININE-BSD FRML MDRD: 45 ML/MIN/1.73M2
GLUCOSE SERPL-MCNC: 100 MG/DL (ref 70–99)
POTASSIUM SERPL-SCNC: 4.1 MMOL/L (ref 3.4–5.3)
SODIUM SERPL-SCNC: 138 MMOL/L (ref 136–145)

## 2023-06-02 PROCEDURE — 80048 BASIC METABOLIC PNL TOTAL CA: CPT | Performed by: FAMILY MEDICINE

## 2023-06-02 PROCEDURE — 36415 COLL VENOUS BLD VENIPUNCTURE: CPT | Performed by: FAMILY MEDICINE

## 2023-06-02 PROCEDURE — 99214 OFFICE O/P EST MOD 30 MIN: CPT | Performed by: FAMILY MEDICINE

## 2023-06-02 RX ORDER — OXYBUTYNIN CHLORIDE 5 MG/1
5 TABLET, EXTENDED RELEASE ORAL DAILY
COMMUNITY
Start: 2023-05-25 | End: 2024-03-13

## 2023-06-02 RX ORDER — FUROSEMIDE 20 MG
TABLET ORAL
COMMUNITY
End: 2023-06-02

## 2023-06-02 RX ORDER — DILTIAZEM HYDROCHLORIDE 120 MG/1
120 CAPSULE, EXTENDED RELEASE ORAL DAILY
COMMUNITY
Start: 2023-05-14 | End: 2023-06-02

## 2023-06-02 ASSESSMENT — ENCOUNTER SYMPTOMS: CONSTITUTIONAL NEGATIVE: 1

## 2023-06-02 NOTE — ASSESSMENT & PLAN NOTE
Urinary retention.  Vallejo catheter per urology.  Planned procedure after no longer on Brilinta per Dr. Jennings.

## 2023-06-02 NOTE — ASSESSMENT & PLAN NOTE
On antiplatelet therapy.  Refill of Brilinta sent to pharmacy.  Continue Eliquis.  Continue to follow with cardiology.  Planned cardiac rehab starting next week.  Continue statin therapy.  Blood pressure control today.

## 2023-06-02 NOTE — PROGRESS NOTES
"  Assessment & Plan   Problem List Items Addressed This Visit     BPH with urinary obstruction -- Vallejo on 4/10/23     Urinary retention.  Vallejo catheter per urology.  Planned procedure after no longer on Brilinta per Dr. Jennings.          Chronic atrial fibrillation (H)     Stable.  Confirmed that he is no longer on diltiazem.  Doing well on metoprolol.         NSTEMI -- S/P CANDIE x 3 to LAD on 4/10/23     On antiplatelet therapy.  Refill of Brilinta sent to pharmacy.  Continue Eliquis.  Continue to follow with cardiology.  Planned cardiac rehab starting next week.  Continue statin therapy.  Blood pressure control today.         Relevant Medications    ticagrelor (BRILINTA) 90 MG tablet    Other Relevant Orders    Basic metabolic panel  (Ca, Cl, CO2, Creat, Gluc, K, Na, BUN)    Pure hypercholesterolemia    Rheumatoid arthritis involving multiple sites with positive rheumatoid factor (H)     Managed through rheumatology.  Stable.         Stage 3a chronic kidney disease (H) - Primary     Check basic metabolic panel.  No longer on Lasix.  Anticipate stability.         Relevant Orders    Basic metabolic panel  (Ca, Cl, CO2, Creat, Gluc, K, Na, BUN)   Other Visit Diagnoses     Stage 2 chronic kidney disease               Pro Chávez MD  [facility]    Claudine Knott is a 82 year old, presenting for the following health issues:  Follow Up (Follow up on labs 5/5, possible labs)        6/2/2023     1:41 PM   Additional Questions   Roomed by Jojo HONG CMA     Urinary retention / NSTEMI:   - will start cardiac rehabilitation next week   - \"slowed down\" recovery - has catheter.  See Dr. Johns   - no swelling since stopping lasix.    - Otherwise feels well.  Primary caregivers for spouse.    History of Present Illness       Vascular Disease:  He presents for follow up of vascular disease.  He is not taking daily aspirin.    He eats 0-1 servings of fruits and vegetables daily.He consumes 0 " "sweetened beverage(s) daily.He exercises with enough effort to increase his heart rate 9 or less minutes per day.  He exercises with enough effort to increase his heart rate 3 or less days per week. He is missing 2 dose(s) of medications per week.  He is not taking prescribed medications regularly due to remembering to take.      Review of Systems   Constitutional: Negative.    All other systems reviewed and are negative.         Objective    /74 (BP Location: Left arm, Patient Position: Sitting, Cuff Size: Adult Regular)   Pulse 67   Temp 98.1  F (36.7  C) (Oral)   Resp 16   Ht 1.715 m (5' 7.5\")   Wt 78.7 kg (173 lb 8 oz)   SpO2 97%   BMI 26.77 kg/m    Body mass index is 26.77 kg/m .  Physical Exam  Nursing note reviewed.   Constitutional:       General: He is not in acute distress.     Appearance: Normal appearance. He is not ill-appearing.      Comments: Appears euvolemic on physical exam.   HENT:      Head: Normocephalic and atraumatic.   Eyes:      Extraocular Movements: Extraocular movements intact.      Conjunctiva/sclera: Conjunctivae normal.   Pulmonary:      Effort: Pulmonary effort is normal.   Neurological:      Mental Status: He is alert and oriented to person, place, and time.   Psychiatric:         Attention and Perception: Attention normal.         Mood and Affect: Mood normal.         Speech: Speech normal.         Thought Content: Thought content normal.                        "

## 2023-06-02 NOTE — PROGRESS NOTES
"Urinary retention / NSTEMI:   - will start cardiac rehabilitation next week   - \"slowed down\" recovery - has catheter.  See Dr. Johns   - no swelling since stopping lasix.   "

## 2023-06-02 NOTE — LETTER
June 8, 2023      Nikos Verma  [address] [facility] [address]        Dear ,    We are writing to inform you of your test results.    Your kidney function was measured is stable in comparison to the measurements from the hospital in April.  I suspect that this has something to do with the urinary retention and may be persistent.  Going forward we need to protect your kidneys.  This means ensuring that your blood pressures low and avoiding medicines that make kidney function worse.  You are already on a medication (losartan) which will help preserve kidney function.    Resulted Orders   Basic metabolic panel  (Ca, Cl, CO2, Creat, Gluc, K, Na, BUN)   Result Value Ref Range    Sodium 138 136 - 145 mmol/L    Potassium 4.1 3.4 - 5.3 mmol/L    Chloride 107 98 - 107 mmol/L    Carbon Dioxide (CO2) 19 (L) 22 - 29 mmol/L    Anion Gap 12 7 - 15 mmol/L    Urea Nitrogen 27.1 (H) 8.0 - 23.0 mg/dL    Creatinine 1.54 (H) 0.67 - 1.17 mg/dL    Calcium 9.0 8.8 - 10.2 mg/dL    Glucose 100 (H) 70 - 99 mg/dL    GFR Estimate 45 (L) >60 mL/min/1.73m2      Comment:      eGFR calculated using 2021 CKD-EPI equation.       If you have any questions or concerns, please call the clinic at the number listed above.       Sincerely,      Pro Chávez MD

## 2023-06-08 ENCOUNTER — HOSPITAL ENCOUNTER (OUTPATIENT)
Dept: CARDIAC REHAB | Facility: CLINIC | Age: 83
Discharge: HOME | End: 2023-06-08
Attending: INTERNAL MEDICINE
Payer: MEDICARE

## 2023-06-08 DIAGNOSIS — I21.4 NSTEMI (NON-ST ELEVATED MYOCARDIAL INFARCTION) (H): ICD-10-CM

## 2023-06-08 PROCEDURE — 93798 PHYS/QHP OP CAR RHAB W/ECG: CPT

## 2023-06-08 PROCEDURE — 93797 PHYS/QHP OP CAR RHAB WO ECG: CPT | Mod: 59

## 2023-06-14 ENCOUNTER — HOSPITAL ENCOUNTER (OUTPATIENT)
Dept: CARDIAC REHAB | Facility: CLINIC | Age: 83
Discharge: HOME | End: 2023-06-14
Attending: FAMILY MEDICINE
Payer: MEDICARE

## 2023-06-14 PROCEDURE — 93798 PHYS/QHP OP CAR RHAB W/ECG: CPT

## 2023-06-15 ENCOUNTER — TRANSFERRED RECORDS (OUTPATIENT)
Dept: HEALTH INFORMATION MANAGEMENT | Facility: CLINIC | Age: 83
End: 2023-06-15
Payer: MEDICARE

## 2023-06-16 ENCOUNTER — HOSPITAL ENCOUNTER (OUTPATIENT)
Dept: CARDIAC REHAB | Facility: CLINIC | Age: 83
Discharge: HOME | End: 2023-06-16
Attending: FAMILY MEDICINE
Payer: MEDICARE

## 2023-06-16 PROCEDURE — 93798 PHYS/QHP OP CAR RHAB W/ECG: CPT

## 2023-06-21 ENCOUNTER — HOSPITAL ENCOUNTER (OUTPATIENT)
Dept: CARDIAC REHAB | Facility: CLINIC | Age: 83
Discharge: HOME | End: 2023-06-21
Attending: FAMILY MEDICINE
Payer: MEDICARE

## 2023-06-21 PROCEDURE — 93798 PHYS/QHP OP CAR RHAB W/ECG: CPT

## 2023-06-23 ENCOUNTER — HOSPITAL ENCOUNTER (OUTPATIENT)
Dept: CARDIAC REHAB | Facility: CLINIC | Age: 83
Discharge: HOME | End: 2023-06-23
Attending: FAMILY MEDICINE
Payer: MEDICARE

## 2023-06-26 ENCOUNTER — HOSPITAL ENCOUNTER (OUTPATIENT)
Dept: CARDIAC REHAB | Facility: CLINIC | Age: 83
Discharge: HOME | End: 2023-06-26
Attending: FAMILY MEDICINE
Payer: MEDICARE

## 2023-06-26 PROCEDURE — 93798 PHYS/QHP OP CAR RHAB W/ECG: CPT

## 2023-06-28 ENCOUNTER — HOSPITAL ENCOUNTER (OUTPATIENT)
Dept: CARDIAC REHAB | Facility: CLINIC | Age: 83
Discharge: HOME | End: 2023-06-28
Attending: FAMILY MEDICINE
Payer: MEDICARE

## 2023-06-28 PROCEDURE — 93798 PHYS/QHP OP CAR RHAB W/ECG: CPT

## 2023-06-29 ENCOUNTER — TRANSFERRED RECORDS (OUTPATIENT)
Dept: HEALTH INFORMATION MANAGEMENT | Facility: CLINIC | Age: 83
End: 2023-06-29
Payer: MEDICARE

## 2023-07-03 ENCOUNTER — HOSPITAL ENCOUNTER (OUTPATIENT)
Dept: CARDIAC REHAB | Facility: CLINIC | Age: 83
Discharge: HOME | End: 2023-07-03
Attending: FAMILY MEDICINE
Payer: MEDICARE

## 2023-07-03 PROCEDURE — 93798 PHYS/QHP OP CAR RHAB W/ECG: CPT

## 2023-07-05 ENCOUNTER — HOSPITAL ENCOUNTER (OUTPATIENT)
Dept: CARDIAC REHAB | Facility: CLINIC | Age: 83
Discharge: HOME | End: 2023-07-05
Attending: FAMILY MEDICINE
Payer: MEDICARE

## 2023-07-05 PROCEDURE — 93798 PHYS/QHP OP CAR RHAB W/ECG: CPT

## 2023-07-06 PROBLEM — R31.0 FRANK HEMATURIA: Status: ACTIVE | Noted: 2023-05-02

## 2023-07-06 PROBLEM — R33.9 RETENTION OF URINE: Status: ACTIVE | Noted: 2023-05-02

## 2023-07-06 PROBLEM — I51.5: Status: ACTIVE | Noted: 2023-05-02

## 2023-07-06 RX ORDER — DILTIAZEM HYDROCHLORIDE 120 MG/1
1 CAPSULE, EXTENDED RELEASE ORAL DAILY
COMMUNITY
End: 2024-02-22

## 2023-07-06 RX ORDER — CEPHALEXIN 500 MG/1
CAPSULE ORAL
COMMUNITY
End: 2023-07-07

## 2023-07-06 RX ORDER — FUROSEMIDE 20 MG
TABLET ORAL
COMMUNITY
End: 2023-07-07

## 2023-07-06 NOTE — PROGRESS NOTES
Assessment/Recommendations     Assessment/Plan:    Diagnoses and all orders for this visit:  Chronic atrial fib -- on Eliquis    we discussed the ongoing importance of lifestyle modification (maintaining a healthy weight, sleep apnea diagnosis and management, alcohol avoidance) as part of a long term strategy for atrial fibrillation management    Per urology specialty recommendations from his last OV on 6/15/23:  Felipe is an 82-year-old man with gross hematuria, BPH and urinary retention. He is awaiting a transurethral resection of the prostate when he can more safely stop Brilinta and Eliquis. He had dense calcifications on his catheter.  His catheter was exchanged last on 6/15/23. Next catheter change in about 2 weeks.  Encouraged him to increase his fluid intake.    Due to Felipe's ongoing gross hematuria, we discussed the option of Watchman device implant. He was provided with educational materials today and once he makes a decision to proceed, he will return to clinic for a formal consult.     Amiodarone was discontinued beginning of May due to prolonged QT, he had been doing quite well on his previous rate control strategy that his previous cardiologist Dr Porter had established for him. He remains rate controlled at this time on metoprolol succinate and Diltiazem ER which is the strategy that he was on in February prior to his last hospitalization.  His heart rates are well controlled in the 60s today, rates are regular.       Benign essential hypertension    Blood pressure readings reviewed: 112/60, 118/74, 114/58  Well controlled, continue with current medications    Stage 3a chronic kidney disease (H)    Creat 1.54, GFR 45  Avoid nephrotoxins.   Monitor labs closely      [id number] score of 6 for age over 75, HTN, CAD, CVA (SAH) and on Eliquis 2.5 mg BID    Follow up with Dr Carlos on 8/9/23     History of Present Illness/Subjective     Felipe Verma is a very pleasant 82 year old male who comes  in today for EP persistent Afib    Felipe Verma is a very pleasant 82 year old male with history of CVA with coiling of cerebral aneurysm in 2016, HTN, HLD, NSTEMI -- S/P CANDIE x 3 to LAD on 4/10/23, seen by provider in [facility]) 6/2021 for edema, noted to be in atrial fibrillation, venous US negative for DVT. He saw PCP in follow up in 8/2021 and was advised echocardiogram that showed EF 40-45% and cardiology visit. Seen by Dr Porter 10/2021 and advised change from Lopressor to Toprol 50 mg daily for better rate control of atrial fibrillation, add Lasix. He saw Caesar in follow up, still quite tachycardic, so Diltiazem  mg daily added. At last follow up, Nikos was back in NSR, ventricular rate 71. Subsequent echocardiogram with normalization of EF, however, his current EF as of April 2023 is at 40%.        Since stenting, he continues to note some mild shortness of breath on exertion and lightheadedness with brief positional changes but denies any presyncope or syncopal episodes, palpitations, chest pain, orthopnea or PND.  He remains on a rate control strategy for management of his persistent atrial fibrillation which includes the metoprolol succinate 50 mg daily along with the diltiazem extended release 120 mg daily.  He does note ongoing unsteady gait but denies any recent falls.  He does note some ongoing fatigue but attributes this to his persistent issues with hematuria.  He is working with urology specialty group and there are plans in the works for having him undergo a transurethral resection of the prostate but unfortunately he remains on Brilinta and Eliquis so this complicates things.  He has noted some increased swelling in the lower extremities, he informs me today that he has not been taking the Lasix 20 mg daily for over 2 months now.  The only time he weighs himself is when they take his weight at cardiac rehab.  He does not monitor his sodium intake he drinks roughly 1 L of fluids  daily.  Alcohol and caffeine intake are very minimal at this time and activity other than cardiac rehab 3 days a week is very sedentary at home.  He denies any upper taxis or rectal bleeding, no history of GI bleeding requiring transfusion.  He is sleeping about 5 to 6 hours at night.  Appetite is back to baseline since stenting in April.  He does note that the more activity he does physically, the more bleeding he has through his urinary catheter.             Cardiographics (reviewed):    EKG 5/5/23 shows NSR with sinus arrhythmia rate 71 QT/QTc: 458/497 ms            ECHO 4/10/23    1. The left ventricle is normal in size. Left ventricular systolic performance  is moderately reduced. The ejection fraction is estimated to be 40%.  2. There is severe anteroseptal, distal septal, and distal anterior  hypokinesis. The apex is akinetic (no apical mural thrombus is detected).  3. There is mild to moderate mitral insufficiency.  4. Normal right ventricular size and systolic performance.  5. There is mild left atrial enlargement.      5/9/16 Stress test ([facility])    1. Adequate pharmacologic stress test with regadenoson (Lexiscan).   2. The pharmacological stress ECG was normal.   3. Myocardial perfusion was normal.   4. Overall left ventricular systolic function was normal without regional   wall motion abnormalities. The resting LVEF was calculated to be 61%.     5. Left ventricular cavity size was normal  (EDV 99 ml).   6. Compared to the prior study from 07/26/01, the current study is   unchanged.                Problem List:  Patient Active Problem List   Diagnosis     Brain aneurysm     BPH with urinary obstruction -- [place] on 4/10/23     Benign essential hypertension     Antiplatelet or antithrombotic long-term use     Diverticulosis     Normocytic anemia     Hyperlipidemia     Chronic atrial fibrillation (H)     Localized swelling of left lower extremity     Stage 3a chronic kidney disease (H)     NSTEMI -- S/P CANDIE  x 3 to LAD on 4/10/23     Dysarthria     Facial droop     Impotence of organic origin     Pure hypercholesterolemia     Rheumatoid arthritis involving multiple sites with positive rheumatoid factor (H)     History of subarachnoid hemorrhage     Acute diverticulitis     Edward hematuria     Myocardial disorder (H)     Retention of urine     Revi  e  Physical Examination Review of Systems   w fystems  There were no vitals taken for this visit.  There is no height or weight on file to calculate BMI.  Wt Readings from Last 3 Encounters:   06/02/23 78.7 kg (173 lb 8 oz)   05/05/23 81.6 kg (180 lb)   04/25/23 81.6 kg (180 lb)     General Appearance:   Alert, well-appearing and in no acute distress. Mild fatigue noted   HEENT: Atraumatic, normocephalic.  No scleral icterus, normal conjunctivae; mucous membranes pink and moist.     Chest: Chest symmetric, spine straight.   Lungs:   Respirations unlabored: Lungs are clear to auscultation.   Cardiovascular:   Normal first and second heart sounds with no murmurs, rubs, or gallops.  Regular, regular.   Normal JVD, 2+ pitting bilateral lower extremity edema.       Extremities: No cyanosis or clubbing   Musculoskeletal: Moves all extremities   Skin: Warm, dry, intact.    Neurologic: Mood and affect are appropriate, alert and oriented to person, place, time, and situation     ROS: 10 point ROS neg other than the symptoms noted above in the HPI.     Medical History  Surgical History Family History Social History     Past Medical History:   Diagnosis Date     Acute diverticulitis 4/20/2016     Lung nodule < 6cm on CT 6/6/2018    Past Surgical History:   Procedure Laterality Date     CV CORONARY ANGIOGRAM N/A 4/11/2023    Procedure: Coronary Angiogram;  Surgeon: Alejandro Hitchcock MD;  Location: [facility] CV     CV PCI STENT DRUG ELUTING N/A 4/11/2023    Procedure: Percutaneous Coronary Intervention Stent;  Surgeon: Alejandro Hitchcock MD;  Location: [facility]  CV     OTHER SURGICAL HISTORY      OTHER SURGICAL HISTORYstent placement in head      TONSILLECTOMY      Family History   Problem Relation Age of Onset     Colon Cancer Mother      Hypertension Father      Coronary Artery Disease Father      Aneurysm Sister      Cerebrovascular Disease Sister      No Known Problems Brother      Diabetes Son      No Known Problems Son      No Known Problems Son      Breast Cancer No family hx of      Prostate Cancer No family hx of     History   Smoking Status     Former   Smokeless Tobacco     Never     Social History    Substance and Sexual Activity      Alcohol use: Not on file        Comment: Alcoholic Drinks/day: 1 glass per week       Medications  Allergies     Current Outpatient Medications   Medication Sig Dispense Refill     acetaminophen (TYLENOL) 325 MG tablet Take 2 tablets (650 mg) by mouth every 6 hours as needed for mild pain or other (and adjunct with moderate or severe pain or per patient request)  0     alendronate (FOSAMAX) 70 MG tablet Take 70 mg by mouth every 7 days On Tuesday       apixaban ANTICOAGULANT (ELIQUIS) 2.5 MG tablet Take 1 tablet (2.5 mg) by mouth 2 times daily 180 tablet 3     calcium carbonate 500 mg, elemental, (OSCAL 500) 1250 (500 Ca) MG TABS tablet Take 1 tablet by mouth daily       cephALEXin (KEFLEX) 500 MG capsule        Cyanocobalamin 3000 MCG CAPS Take 3,000 mcg by mouth daily       diltiazem ER (DILT-XR) 120 MG 24 hr capsule Take 1 capsule by mouth daily       Flaxseed, Linseed, (FLAX SEED OIL) 1000 MG capsule Take 1,000 mg by mouth daily       furosemide (LASIX) 20 MG tablet TAKE 1 TABLET BY MOUTH ONCE DAILY AS NEEDED FOR INCREASED SWELLING, WEIGHT GAIN, OR SHORTNESS OF BREATH       hydroxychloroquine (PLAQUENIL) 200 MG tablet Take 200 mg by mouth daily       leucovorin (WELLCOVORIN) 5 mg tablet Take 5 mg by mouth three times a week On Monday, Wednesday and Saturday. Do not overlap days of methotrexate       losartan (COZAAR) 50 MG  tablet Take 1 tablet (50 mg) by mouth daily 90 tablet 3     methotrexate 2.5 MG tablet Take 12.5 mg by mouth twice a week 5 tablets (12.5mg) on Thursday and Friday       metoprolol succinate ER (TOPROL XL) 50 MG 24 hr tablet Take 1 tablet (50 mg) by mouth daily 90 tablet 3     nitroGLYcerin (NITROSTAT) 0.4 MG sublingual tablet For chest pain place 1 tablet under the tongue every 5 minutes for 3 doses. If symptoms persist 5 minutes after 1st dose call 911. 25 tablet 1     oxybutynin ER (DITROPAN XL) 5 MG 24 hr tablet  (Patient not taking: Reported on 6/2/2023)       rosuvastatin (CRESTOR) 10 MG tablet Take 10 mg by mouth At Bedtime       tamsulosin (FLOMAX) 0.4 MG capsule Take 2 capsules (0.8 mg) by mouth daily (with dinner) 60 capsule 3     ticagrelor (BRILINTA) 90 MG tablet Take 1 tablet (90 mg) by mouth every 12 hours 60 tablet 5     vitamin D3 (CHOLECALCIFEROL) 50 mcg (2000 units) tablet Take 1 tablet by mouth daily        No Known Allergies   Medical, surgical, family, social history, and medications were all reviewed and updated as necessary.   Lab Results    Chemistry/lipid CBC Cardiac Enzymes/BNP/TSH/INR   No results for input(s): CHOL, HDL, LDL, TRIG, CHOLHDLRATIO in the last 83665 hours.  No results for input(s): LDL in the last 18671 hours.  Recent Labs   Lab Test 06/02/23  1411      POTASSIUM 4.1   CHLORIDE 107   CO2 19*   *   BUN 27.1*   CR 1.54*   GFRESTIMATED 45*   ABBEY 9.0     Recent Labs   Lab Test 06/02/23  1411 05/05/23  1502 04/14/23  1118   CR 1.54* 1.73* 1.34*     No results for input(s): A1C in the last 94344 hours.       Recent Labs   Lab Test 04/13/23  0510   WBC 13.1*   HGB 11.3*   HCT 33.3*      *     Recent Labs   Lab Test 04/13/23  0510 04/12/23  2105 04/12/23  0512   HGB 11.3* 11.3* 11.8*    No results for input(s): TROPONINI in the last 67416 hours.  Recent Labs   Lab Test 04/10/23  2209   NTBNPI 325     No results for input(s): TSH in the last 51627  hours.  Recent Labs   Lab Test 04/10/23  2209   INR 1.19*          Total Time- 57 minutes spent on date of encounter doing chart review, history and exam, documentation and further activities as noted above.  This note has been dictated using voice recognition software. Any grammatical, typographical, or context distortions are unintentional and inherent to the software.    Aimee Odom [facility]  [phone]

## 2023-07-07 ENCOUNTER — OFFICE VISIT (OUTPATIENT)
Dept: CARDIOLOGY | Facility: CLINIC | Age: 83
End: 2023-07-07
Payer: MEDICARE

## 2023-07-07 VITALS
SYSTOLIC BLOOD PRESSURE: 114 MMHG | WEIGHT: 176 LBS | BODY MASS INDEX: 26.67 KG/M2 | HEIGHT: 68 IN | HEART RATE: 65 BPM | RESPIRATION RATE: 16 BRPM | DIASTOLIC BLOOD PRESSURE: 58 MMHG

## 2023-07-07 DIAGNOSIS — I21.4 NSTEMI (NON-ST ELEVATED MYOCARDIAL INFARCTION) (H): ICD-10-CM

## 2023-07-07 DIAGNOSIS — I10 BENIGN ESSENTIAL HYPERTENSION: ICD-10-CM

## 2023-07-07 DIAGNOSIS — N18.31 STAGE 3A CHRONIC KIDNEY DISEASE (H): ICD-10-CM

## 2023-07-07 DIAGNOSIS — I48.20 CHRONIC ATRIAL FIBRILLATION (H): Primary | ICD-10-CM

## 2023-07-07 PROCEDURE — 99215 OFFICE O/P EST HI 40 MIN: CPT | Performed by: NURSE PRACTITIONER

## 2023-07-07 RX ORDER — NITROGLYCERIN 0.4 MG/1
TABLET SUBLINGUAL
Qty: 25 TABLET | Refills: 1 | Status: SHIPPED | OUTPATIENT
Start: 2023-07-07

## 2023-07-07 RX ORDER — FUROSEMIDE 20 MG
TABLET ORAL
Qty: 90 TABLET | Refills: 1 | Status: SHIPPED | OUTPATIENT
Start: 2023-07-07 | End: 2024-02-13

## 2023-07-07 NOTE — PATIENT INSTRUCTIONS
Felipe Verma,    It was a pleasure to see you today at the [facility].     My recommendations after this visit include:    Follow up with Dr Carlos  general cardiology on 8/9/23  Follow up with urology for next cath exchange  Consider Watchman in order to get off  your Eliquis due to ongoing hematuria so you are able to proceed with your transurethral resection of the prostate.   Start taking your Lasix 20 mg daily due to your lower extremity swelling  Continue with all other medications, I am not making any other changes today. You have refills on file.   I sent in a refill of your nitroglycerin per your request today   Continue with cardiac rehab as scheduled.    I will see you back in clinic for your next Afib follow up in 6 months. Things are well controlled with your Afib for now. Looks good.       Aimee Odom CNP  [facility], Electrophysiology  [phone]  EP nurses [phone]

## 2023-07-07 NOTE — LETTER
7/7/2023    Pro Chávez MD  [address] [facility] [address]    RE: Felipe BERMUDEZ Luci       Dear Colleague,     I had the pleasure of seeing Felipe Verma in the [facility].        Assessment/Recommendations     Assessment/Plan:    Diagnoses and all orders for this visit:  Chronic atrial fib -- on Eliquis    we discussed the ongoing importance of lifestyle modification (maintaining a healthy weight, sleep apnea diagnosis and management, alcohol avoidance) as part of a long term strategy for atrial fibrillation management    Per urology specialty recommendations from his last OV on 6/15/23:  Felipe is an 82-year-old man with gross hematuria, BPH and urinary retention. He is awaiting a transurethral resection of the prostate when he can more safely stop Brilinta and Eliquis. He had dense calcifications on his catheter.  His catheter was exchanged last on 6/15/23. Next catheter change in about 2 weeks.  Encouraged him to increase his fluid intake.    Due to Felipe's ongoing gross hematuria, we discussed the option of Watchman device implant. He was provided with educational materials today and once he makes a decision to proceed, he will return to clinic for a formal consult.     Amiodarone was discontinued beginning of May due to prolonged QT, he had been doing quite well on his previous rate control strategy that his previous cardiologist Dr Porter had established for him. He remains rate controlled at this time on metoprolol succinate and Diltiazem ER which is the strategy that he was on in February prior to his last hospitalization.  His heart rates are well controlled in the 60s today, rates are regular.       Benign essential hypertension    Blood pressure readings reviewed: 112/60, 118/74, 114/58  Well controlled, continue with current medications    Stage 3a chronic kidney disease (H)    Creat 1.54, GFR 45  Avoid nephrotoxins.   Monitor labs closely      [id number] score of  6 for age over 75, HTN, CAD, CVA (SAH) and on Eliquis 2.5 mg BID    Follow up with Dr Carlos on 8/9/23     History of Present Illness/Subjective     Felipe Verma is a very pleasant 82 year old male who comes in today for EP persistent Afib    Felipe Verma is a very pleasant 82 year old male with history of CVA with coiling of cerebral aneurysm in 2016, HTN, HLD, NSTEMI -- S/P CANDIE x 3 to LAD on 4/10/23, seen by provider in [facility] 6/2021 for edema, noted to be in atrial fibrillation, venous US negative for DVT. He saw PCP in follow up in 8/2021 and was advised echocardiogram that showed EF 40-45% and cardiology visit. Seen by Dr Porter 10/2021 and advised change from Lopressor to Toprol 50 mg daily for better rate control of atrial fibrillation, add Lasix. He saw Caesar in follow up, still quite tachycardic, so Diltiazem  mg daily added. At last follow up, Nikos was back in NSR, ventricular rate 71. Subsequent echocardiogram with normalization of EF, however, his current EF as of April 2023 is at 40%.        Since stenting, he continues to note some mild shortness of breath on exertion and lightheadedness with brief positional changes but denies any presyncope or syncopal episodes, palpitations, chest pain, orthopnea or PND.  He remains on a rate control strategy for management of his persistent atrial fibrillation which includes the metoprolol succinate 50 mg daily along with the diltiazem extended release 120 mg daily.  He does note ongoing unsteady gait but denies any recent falls.  He does note some ongoing fatigue but attributes this to his persistent issues with hematuria.  He is working with urology specialty group and there are plans in the works for having him undergo a transurethral resection of the prostate but unfortunately he remains on Brilinta and Eliquis so this complicates things.  He has noted some increased swelling in the lower extremities, he informs me today that he has  not been taking the Lasix 20 mg daily for over 2 months now.  The only time he weighs himself is when they take his weight at cardiac rehab.  He does not monitor his sodium intake he drinks roughly 1 L of fluids daily.  Alcohol and caffeine intake are very minimal at this time and activity other than cardiac rehab 3 days a week is very sedentary at home.  He denies any upper taxis or rectal bleeding, no history of GI bleeding requiring transfusion.  He is sleeping about 5 to 6 hours at night.  Appetite is back to baseline since stenting in April.  He does note that the more activity he does physically, the more bleeding he has through his urinary catheter.             Cardiographics (reviewed):    EKG 5/5/23 shows NSR with sinus arrhythmia rate 71 QT/QTc: 458/497 ms            ECHO 4/10/23    1. The left ventricle is normal in size. Left ventricular systolic performance  is moderately reduced. The ejection fraction is estimated to be 40%.  2. There is severe anteroseptal, distal septal, and distal anterior  hypokinesis. The apex is akinetic (no apical mural thrombus is detected).  3. There is mild to moderate mitral insufficiency.  4. Normal right ventricular size and systolic performance.  5. There is mild left atrial enlargement.      5/9/16 Stress test ([facility])    1. Adequate pharmacologic stress test with regadenoson (Lexiscan).   2. The pharmacological stress ECG was normal.   3. Myocardial perfusion was normal.   4. Overall left ventricular systolic function was normal without regional   wall motion abnormalities. The resting LVEF was calculated to be 61%.     5. Left ventricular cavity size was normal  (EDV 99 ml).   6. Compared to the prior study from 07/26/01, the current study is   unchanged.                Problem List:  Patient Active Problem List   Diagnosis    Brain aneurysm    BPH with urinary obstruction -- [place] on 4/10/23    Benign essential hypertension    Antiplatelet or antithrombotic long-term  use    Diverticulosis    Normocytic anemia    Hyperlipidemia    Chronic atrial fibrillation (H)    Localized swelling of left lower extremity    Stage 3a chronic kidney disease (H)    NSTEMI -- S/P CANDIE x 3 to LAD on 4/10/23    Dysarthria    Facial droop    Impotence of organic origin    Pure hypercholesterolemia    Rheumatoid arthritis involving multiple sites with positive rheumatoid factor (H)    History of subarachnoid hemorrhage    Acute diverticulitis    Edward hematuria    Myocardial disorder (H)    Retention of urine     Revi  e  Physical Examination Review of Systems   w fystems  There were no vitals taken for this visit.  There is no height or weight on file to calculate BMI.  Wt Readings from Last 3 Encounters:   06/02/23 78.7 kg (173 lb 8 oz)   05/05/23 81.6 kg (180 lb)   04/25/23 81.6 kg (180 lb)     General Appearance:   Alert, well-appearing and in no acute distress. Mild fatigue noted   HEENT: Atraumatic, normocephalic.  No scleral icterus, normal conjunctivae; mucous membranes pink and moist.     Chest: Chest symmetric, spine straight.   Lungs:   Respirations unlabored: Lungs are clear to auscultation.   Cardiovascular:   Normal first and second heart sounds with no murmurs, rubs, or gallops.  Regular, regular.   Normal JVD, 2+ pitting bilateral lower extremity edema.       Extremities: No cyanosis or clubbing   Musculoskeletal: Moves all extremities   Skin: Warm, dry, intact.    Neurologic: Mood and affect are appropriate, alert and oriented to person, place, time, and situation     ROS: 10 point ROS neg other than the symptoms noted above in the HPI.     Medical History  Surgical History Family History Social History     Past Medical History:   Diagnosis Date    Acute diverticulitis 4/20/2016    Lung nodule < 6cm on CT 6/6/2018    Past Surgical History:   Procedure Laterality Date    CV CORONARY ANGIOGRAM N/A 4/11/2023    Procedure: Coronary Angiogram;  Surgeon: Alejandro Hitchcock MD;   Location: [facility] CATH LAB CV    CV PCI STENT DRUG ELUTING N/A 4/11/2023    Procedure: Percutaneous Coronary Intervention Stent;  Surgeon: Alejandro Hitchcock MD;  Location: [facility] LAB CV    OTHER SURGICAL HISTORY      OTHER SURGICAL HISTORYstent placement in head     TONSILLECTOMY      Family History   Problem Relation Age of Onset    Colon Cancer Mother     Hypertension Father     Coronary Artery Disease Father     Aneurysm Sister     Cerebrovascular Disease Sister     No Known Problems Brother     Diabetes Son     No Known Problems Son     No Known Problems Son     Breast Cancer No family hx of     Prostate Cancer No family hx of     History   Smoking Status    Former   Smokeless Tobacco    Never     Social History    Substance and Sexual Activity      Alcohol use: Not on file        Comment: Alcoholic Drinks/day: 1 glass per week       Medications  Allergies     Current Outpatient Medications   Medication Sig Dispense Refill    acetaminophen (TYLENOL) 325 MG tablet Take 2 tablets (650 mg) by mouth every 6 hours as needed for mild pain or other (and adjunct with moderate or severe pain or per patient request)  0    alendronate (FOSAMAX) 70 MG tablet Take 70 mg by mouth every 7 days On Tuesday      apixaban ANTICOAGULANT (ELIQUIS) 2.5 MG tablet Take 1 tablet (2.5 mg) by mouth 2 times daily 180 tablet 3    calcium carbonate 500 mg, elemental, (OSCAL 500) 1250 (500 Ca) MG TABS tablet Take 1 tablet by mouth daily      cephALEXin (KEFLEX) 500 MG capsule       Cyanocobalamin 3000 MCG CAPS Take 3,000 mcg by mouth daily      diltiazem ER (DILT-XR) 120 MG 24 hr capsule Take 1 capsule by mouth daily      Flaxseed, Linseed, (FLAX SEED OIL) 1000 MG capsule Take 1,000 mg by mouth daily      furosemide (LASIX) 20 MG tablet TAKE 1 TABLET BY MOUTH ONCE DAILY AS NEEDED FOR INCREASED SWELLING, WEIGHT GAIN, OR SHORTNESS OF BREATH      hydroxychloroquine (PLAQUENIL) 200 MG tablet Take 200 mg by mouth daily       leucovorin (WELLCOVORIN) 5 mg tablet Take 5 mg by mouth three times a week On Monday, Wednesday and Saturday. Do not overlap days of methotrexate      losartan (COZAAR) 50 MG tablet Take 1 tablet (50 mg) by mouth daily 90 tablet 3    methotrexate 2.5 MG tablet Take 12.5 mg by mouth twice a week 5 tablets (12.5mg) on Thursday and Friday      metoprolol succinate ER (TOPROL XL) 50 MG 24 hr tablet Take 1 tablet (50 mg) by mouth daily 90 tablet 3    nitroGLYcerin (NITROSTAT) 0.4 MG sublingual tablet For chest pain place 1 tablet under the tongue every 5 minutes for 3 doses. If symptoms persist 5 minutes after 1st dose call 911. 25 tablet 1    oxybutynin ER (DITROPAN XL) 5 MG 24 hr tablet  (Patient not taking: Reported on 6/2/2023)      rosuvastatin (CRESTOR) 10 MG tablet Take 10 mg by mouth At Bedtime      tamsulosin (FLOMAX) 0.4 MG capsule Take 2 capsules (0.8 mg) by mouth daily (with dinner) 60 capsule 3    ticagrelor (BRILINTA) 90 MG tablet Take 1 tablet (90 mg) by mouth every 12 hours 60 tablet 5    vitamin D3 (CHOLECALCIFEROL) 50 mcg (2000 units) tablet Take 1 tablet by mouth daily        No Known Allergies   Medical, surgical, family, social history, and medications were all reviewed and updated as necessary.   Lab Results    Chemistry/lipid CBC Cardiac Enzymes/BNP/TSH/INR   No results for input(s): CHOL, HDL, LDL, TRIG, CHOLHDLRATIO in the last 40382 hours.  No results for input(s): LDL in the last 53495 hours.  Recent Labs   Lab Test 06/02/23  1411      POTASSIUM 4.1   CHLORIDE 107   CO2 19*   *   BUN 27.1*   CR 1.54*   GFRESTIMATED 45*   ABBEY 9.0     Recent Labs   Lab Test 06/02/23  1411 05/05/23  1502 04/14/23  1118   CR 1.54* 1.73* 1.34*     No results for input(s): A1C in the last 54206 hours.       Recent Labs   Lab Test 04/13/23  0510   WBC 13.1*   HGB 11.3*   HCT 33.3*      *     Recent Labs   Lab Test 04/13/23  0510 04/12/23  2105 04/12/23  0512   HGB 11.3* 11.3* 11.8*    No  results for input(s): TROPONINI in the last 56561 hours.  Recent Labs   Lab Test 04/10/23  2209   NTBNPI 325     No results for input(s): TSH in the last 44560 hours.  Recent Labs   Lab Test 04/10/23  2209   INR 1.19*          Total Time- 57 minutes spent on date of encounter doing chart review, history and exam, documentation and further activities as noted above.  This note has been dictated using voice recognition software. Any grammatical, typographical, or context distortions are unintentional and inherent to the software.    Aimee Odom CNP  [facility]  [phone]    Thank you for allowing me to participate in the care of your patient.      Sincerely,     Aimee Odom NP     [facility]  cc:   Aimee Odom NP  [address]  [place] [address]

## 2023-07-10 ENCOUNTER — HOSPITAL ENCOUNTER (OUTPATIENT)
Dept: CARDIAC REHAB | Facility: CLINIC | Age: 83
Discharge: HOME | End: 2023-07-10
Attending: FAMILY MEDICINE
Payer: MEDICARE

## 2023-07-10 PROCEDURE — 93798 PHYS/QHP OP CAR RHAB W/ECG: CPT

## 2023-07-12 ENCOUNTER — HOSPITAL ENCOUNTER (OUTPATIENT)
Dept: CARDIAC REHAB | Facility: CLINIC | Age: 83
Discharge: HOME | End: 2023-07-12
Attending: FAMILY MEDICINE
Payer: MEDICARE

## 2023-07-12 PROCEDURE — 93798 PHYS/QHP OP CAR RHAB W/ECG: CPT

## 2023-07-13 ENCOUNTER — TRANSFERRED RECORDS (OUTPATIENT)
Dept: HEALTH INFORMATION MANAGEMENT | Facility: CLINIC | Age: 83
End: 2023-07-13
Payer: MEDICARE

## 2023-07-17 ENCOUNTER — HOSPITAL ENCOUNTER (OUTPATIENT)
Dept: CARDIAC REHAB | Facility: CLINIC | Age: 83
Discharge: HOME | End: 2023-07-17
Attending: FAMILY MEDICINE
Payer: MEDICARE

## 2023-07-17 PROCEDURE — 93798 PHYS/QHP OP CAR RHAB W/ECG: CPT

## 2023-07-19 ENCOUNTER — HOSPITAL ENCOUNTER (OUTPATIENT)
Dept: CARDIAC REHAB | Facility: CLINIC | Age: 83
Discharge: HOME | End: 2023-07-19
Attending: FAMILY MEDICINE
Payer: MEDICARE

## 2023-07-19 PROCEDURE — 93798 PHYS/QHP OP CAR RHAB W/ECG: CPT

## 2023-07-21 ENCOUNTER — HOSPITAL ENCOUNTER (OUTPATIENT)
Dept: CARDIAC REHAB | Facility: CLINIC | Age: 83
Discharge: HOME | End: 2023-07-21
Attending: FAMILY MEDICINE
Payer: MEDICARE

## 2023-07-21 PROCEDURE — 93798 PHYS/QHP OP CAR RHAB W/ECG: CPT

## 2023-07-26 ENCOUNTER — HOSPITAL ENCOUNTER (OUTPATIENT)
Dept: CARDIAC REHAB | Facility: CLINIC | Age: 83
Discharge: HOME | End: 2023-07-26
Attending: FAMILY MEDICINE
Payer: MEDICARE

## 2023-07-26 PROCEDURE — 93798 PHYS/QHP OP CAR RHAB W/ECG: CPT

## 2023-07-28 ENCOUNTER — HOSPITAL ENCOUNTER (OUTPATIENT)
Dept: CARDIAC REHAB | Facility: CLINIC | Age: 83
Discharge: HOME | End: 2023-07-28
Attending: FAMILY MEDICINE
Payer: MEDICARE

## 2023-07-28 PROCEDURE — 93798 PHYS/QHP OP CAR RHAB W/ECG: CPT

## 2023-08-02 ENCOUNTER — HOSPITAL ENCOUNTER (OUTPATIENT)
Dept: CARDIAC REHAB | Facility: CLINIC | Age: 83
Discharge: HOME | End: 2023-08-02
Attending: FAMILY MEDICINE
Payer: MEDICARE

## 2023-08-02 PROCEDURE — 93798 PHYS/QHP OP CAR RHAB W/ECG: CPT

## 2023-08-04 ENCOUNTER — HOSPITAL ENCOUNTER (OUTPATIENT)
Dept: CARDIAC REHAB | Facility: CLINIC | Age: 83
Discharge: HOME | End: 2023-08-04
Attending: FAMILY MEDICINE
Payer: MEDICARE

## 2023-08-04 PROCEDURE — 93798 PHYS/QHP OP CAR RHAB W/ECG: CPT

## 2023-08-07 ENCOUNTER — HOSPITAL ENCOUNTER (OUTPATIENT)
Dept: CARDIAC REHAB | Facility: CLINIC | Age: 83
Discharge: HOME | End: 2023-08-07
Attending: FAMILY MEDICINE
Payer: MEDICARE

## 2023-08-07 PROCEDURE — 93798 PHYS/QHP OP CAR RHAB W/ECG: CPT

## 2023-08-09 ENCOUNTER — OFFICE VISIT (OUTPATIENT)
Dept: CARDIOLOGY | Facility: CLINIC | Age: 83
End: 2023-08-09
Payer: MEDICARE

## 2023-08-09 VITALS
DIASTOLIC BLOOD PRESSURE: 52 MMHG | SYSTOLIC BLOOD PRESSURE: 104 MMHG | WEIGHT: 170 LBS | RESPIRATION RATE: 16 BRPM | BODY MASS INDEX: 26.23 KG/M2 | HEART RATE: 63 BPM | OXYGEN SATURATION: 97 %

## 2023-08-09 DIAGNOSIS — I21.4 NSTEMI (NON-ST ELEVATED MYOCARDIAL INFARCTION) (H): Primary | ICD-10-CM

## 2023-08-09 DIAGNOSIS — I48.0 PAROXYSMAL ATRIAL FIBRILLATION (H): ICD-10-CM

## 2023-08-09 DIAGNOSIS — I10 BENIGN ESSENTIAL HYPERTENSION: ICD-10-CM

## 2023-08-09 DIAGNOSIS — E78.00 HYPERCHOLESTEROLEMIA: ICD-10-CM

## 2023-08-09 PROCEDURE — 99214 OFFICE O/P EST MOD 30 MIN: CPT | Performed by: INTERNAL MEDICINE

## 2023-08-09 NOTE — PROGRESS NOTES
Thank you, Aimee Odom NP, for asking the [facility] team to see Mr. Felipe Verma to follow-up on coronary artery disease.    Assessment/Recommendations   Assessment:    1.  Coronary artery disease, status post stenting of the proximal to mid LAD x 3 in April 2023 for acute coronary syndrome.  He is doing well with no recurrence of the chest discomfort which prompted his admission.  At this point would continue with Brilinta antiplatelet therapy.  He is not on aspirin therapy due to ongoing need for anticoagulation with history of paroxysmal A-fib.  2.  Paroxysmal atrial fibrillation, quiescent on current doses of metoprolol and diltiazem.  He remains on low-dose Eliquis anticoagulation to minimize risk of thromboembolic events.  He would like to pursue Watchman device implant.  Will let A-fib clinic know so they can begin his workup.    (Patient) Has documented nonvalvular atrial fibrillation (NVAF) and is currently on oral anticoagulant therapy Eliquis   MCF2PF2 -VASc = 6 (age greater than 75, hypertension, CAD, CVA/SAH)   HAS-BLED risk score: 3 (age, CKD, chronic hematuria)  Indication(s) to consider non-oral anticoagulation therapy: None  Pt has no contra-indication to come off oral anti-coagulant therapy if LAAC device is successfully placed.     I have personally reviewed the patients chart and discussed the following with the patient/family; 1) The choices available for reducing stroke risk from atrial fibrillation, 2) Treatment options available including respective risk/benefits, and 3) What factors are most important for the patient in making their decision.  The ACC shared decision making tool https://www.cardiosmart.org/SDM/Decision-Aids/Find-Decision-Aids/Atrial-Fibrillation  was used to guide this conversation.   The patient was counseled that their decision could be made at this time or in the future if more time was needed to consider their decision.       The patient  is an appropriate candidate to proceed with left atrial appendage screening and implant.     3.  Hypercholesterolemia, on rosuvastatin with good LDL control  4.  Urinary retention secondary to BPH.  Told him he we will have to hold off on consideration of surgery until December given his recent stent placement.  Ideally he would hold his Brilinta for a week and restarted shortly after surgery to limit acute stent thrombosis.    Plan:  1.  Continue current medications for now  2.  May consider brief interruption of Brilinta for urologic surgery in December  3.  Begin workup for Watchman device implant  4.  Patient to follow-up with me in April 2024     History of Present Illness    Mr. Felipe Verma is a 82 year old male with history of essential hypertension, hypercholesterolemia, paroxysmal atrial fibrillation and coronary artery disease status post drug-eluting stent placement x 3 to the proximal mid and distal LAD in April 2023 for acute coronary syndrome who presents today for follow-up visit.  Reports no complaints of recurrent chest discomfort or shortness of breath.  Has been doing well from a cardiac standpoint.  Was recently seen in the A-fib clinic where they discussed possibility of Watchman device implant.  He states he would like to proceed ahead with that.  Has had no significant palpitations.  Is awaiting urologic surgery for his prostate due to urinary retention and would like to do that as soon as possible although I told him we would need to wait until December before we interrupt his antiplatelet therapy for more than a week.    ECG (personally reviewed): No ECG today    Cardiac Imaging Studies (personally reviewed): No new imaging     Physical Examination Review of Systems   /52 (BP Location: Left arm, Patient Position: Sitting, Cuff Size: Adult Regular)   Pulse 63   Resp 16   Wt 77.1 kg (170 lb)   SpO2 97%   BMI 26.23 kg/m    Body mass index is 26.23 kg/m .  Wt Readings from Last  3 Encounters:   08/09/23 77.1 kg (170 lb)   07/07/23 79.8 kg (176 lb)   06/02/23 78.7 kg (173 lb 8 oz)     General Appearance:   Awake, Alert, No acute distress.   HEENT:  No scleral icterus; the mucous membranes were pink and moist.   Neck: No cervical bruits or jugular venous distention    Chest: The spine was straight. The chest was symmetric.   Lungs:   Respirations unlabored; the lungs are clear to auscultation. No wheezing   Cardiovascular:   Regular rate and rhythm.  S1, S2 normal.  No murmur or gallop   Abdomen:  No organomegaly, masses, bruits, or tenderness. Bowels sounds are present   Extremities: No peripheral edema bilaterally   Skin: No xanthelasma. Warm, Dry.   Musculoskeletal: No tenderness.   Neurologic: Mood and affect are appropriate.    Enc Vitals  BP: 104/52  Pulse: 63  Resp: 16  SpO2: 97 %  Weight: 77.1 kg (170 lb)                                         Medical History  Surgical History Family History Social History   Past Medical History:   Diagnosis Date    Acute diverticulitis 4/20/2016    Lung nodule < 6cm on CT 6/6/2018    Past Surgical History:   Procedure Laterality Date    CV CORONARY ANGIOGRAM N/A 4/11/2023    Procedure: Coronary Angiogram;  Surgeon: Alejandro Hitchcock MD;  Location: [facility] CV    CV PCI STENT DRUG ELUTING N/A 4/11/2023    Procedure: Percutaneous Coronary Intervention Stent;  Surgeon: Alejandro Hitchcock MD;  Location: [facility] CV    OTHER SURGICAL HISTORY      OTHER SURGICAL HISTORYstent placement in head     TONSILLECTOMY      Family History   Problem Relation Age of Onset    Colon Cancer Mother     Hypertension Father     Coronary Artery Disease Father     Aneurysm Sister     Cerebrovascular Disease Sister     No Known Problems Brother     Diabetes Son     No Known Problems Son     No Known Problems Son     Breast Cancer No family hx of     Prostate Cancer No family hx of     Social History     Socioeconomic History    Marital status:       Spouse name: Not on file    Number of children: Not on file    Years of education: Not on file    Highest education level: Not on file   Occupational History    Not on file   Tobacco Use    Smoking status: Former    Smokeless tobacco: Never   Vaping Use    Vaping Use: Never used   Substance and Sexual Activity    Alcohol use: Not on file     Comment: Alcoholic Drinks/day: 1 glass per week    Drug use: Not Currently    Sexual activity: Not Currently   Other Topics Concern    Not on file   Social History Narrative    Not on file     Social Determinants of Health     Financial Resource Strain: Not on file   Food Insecurity: Not on file   Transportation Needs: Not on file   Physical Activity: Not on file   Stress: Not on file   Social Connections: Not on file   Intimate Partner Violence: Not on file   Housing Stability: Not on file          Medications  Allergies   Current Outpatient Medications   Medication Sig Dispense Refill    acetaminophen (TYLENOL) 325 MG tablet Take 2 tablets (650 mg) by mouth every 6 hours as needed for mild pain or other (and adjunct with moderate or severe pain or per patient request)  0    alendronate (FOSAMAX) 70 MG tablet Take 70 mg by mouth every 7 days On Tuesday      apixaban ANTICOAGULANT (ELIQUIS) 2.5 MG tablet Take 1 tablet (2.5 mg) by mouth 2 times daily 180 tablet 3    calcium carbonate 500 mg, elemental, (OSCAL 500) 1250 (500 Ca) MG TABS tablet Take 1 tablet by mouth daily      Cyanocobalamin 3000 MCG CAPS Take 3,000 mcg by mouth daily      diltiazem ER (DILT-XR) 120 MG 24 hr capsule Take 1 capsule by mouth daily      Flaxseed, Linseed, (FLAX SEED OIL) 1000 MG capsule Take 1,000 mg by mouth daily      furosemide (LASIX) 20 MG tablet TAKE 1 TABLET BY MOUTH ONCE DAILY  FOR INCREASED SWELLING, WEIGHT GAIN, OR SHORTNESS OF BREATH 90 tablet 1    hydroxychloroquine (PLAQUENIL) 200 MG tablet Take 200 mg by mouth daily      leucovorin (WELLCOVORIN) 5 mg tablet Take 5 mg by  mouth three times a week On Monday, Wednesday and Saturday. Do not overlap days of methotrexate      losartan (COZAAR) 50 MG tablet Take 1 tablet (50 mg) by mouth daily 90 tablet 3    methotrexate 2.5 MG tablet Take 12.5 mg by mouth twice a week 5 tablets (12.5mg) on Thursday and Friday      metoprolol succinate ER (TOPROL XL) 50 MG 24 hr tablet Take 1 tablet (50 mg) by mouth daily 90 tablet 3    oxybutynin ER (DITROPAN XL) 5 MG 24 hr tablet Take 5 mg by mouth daily      rosuvastatin (CRESTOR) 10 MG tablet Take 10 mg by mouth At Bedtime      tamsulosin (FLOMAX) 0.4 MG capsule Take 2 capsules (0.8 mg) by mouth daily (with dinner) 60 capsule 3    ticagrelor (BRILINTA) 90 MG tablet Take 1 tablet (90 mg) by mouth every 12 hours 60 tablet 5    vitamin D3 (CHOLECALCIFEROL) 50 mcg (2000 units) tablet Take 1 tablet by mouth daily      nitroGLYcerin (NITROSTAT) 0.4 MG sublingual tablet For chest pain place 1 tablet under the tongue every 5 minutes for 3 doses. If symptoms persist 5 minutes after 1st dose call 911. 25 tablet 1      No Known Allergies      Lab Results    Chemistry/lipid CBC Cardiac Enzymes/BNP/TSH/INR   Recent Labs   Lab Test 06/02/23  1411   BUN 27.1*      CO2 19*    Recent Labs   Lab Test 04/13/23  0510   WBC 13.1*   HGB 11.3*   HCT 33.3*      *    Recent Labs   Lab Test 04/10/23  2209   INR 1.19*        A total of 34 minutes was spent reviewing patient's medical records, obtaining history and performing examination, as well as discussing diagnoses/ recommendations with patient and answering all questions.

## 2023-08-09 NOTE — LETTER
8/9/2023    Pro Chávez MD  [address] [facility] [address]    RE: Felipe Verma       Dear Colleague,     I had the pleasure of seeing Felipe Verma in the [facility].      Thank you, Aimee Odom NP, for asking the [facility] team to see Mr. Felipe Verma to follow-up on coronary artery disease.    Assessment/Recommendations   Assessment:    1.  Coronary artery disease, status post stenting of the proximal to mid LAD x 3 in April 2023 for acute coronary syndrome.  He is doing well with no recurrence of the chest discomfort which prompted his admission.  At this point would continue with Brilinta antiplatelet therapy.  He is not on aspirin therapy due to ongoing need for anticoagulation with history of paroxysmal A-fib.  2.  Paroxysmal atrial fibrillation, quiescent on current doses of metoprolol and diltiazem.  He remains on low-dose Eliquis anticoagulation to minimize risk of thromboembolic events.  He would like to pursue Watchman device implant.  Will let A-fib clinic know so they can begin his workup.  3.  Hypercholesterolemia, on rosuvastatin with good LDL control  4.  Urinary retention secondary to BPH.  Told him he we will have to hold off on consideration of surgery until December given his recent stent placement.  Ideally he would hold his Brilinta for a week and restarted shortly after surgery to limit acute stent thrombosis.    Plan:  1.  Continue current medications for now  2.  May consider brief interruption of Brilinta for urologic surgery in December  3.  Begin workup for Watchman device implant  4.  Patient to follow-up with me in April 2024     History of Present Illness    Mr. Felipe Verma is a 82 year old male with history of essential hypertension, hypercholesterolemia, paroxysmal atrial fibrillation and coronary artery disease status post drug-eluting stent placement x 3 to the proximal mid and distal LAD in April  2023 for acute coronary syndrome who presents today for follow-up visit.  Reports no complaints of recurrent chest discomfort or shortness of breath.  Has been doing well from a cardiac standpoint.  Was recently seen in the A-fib clinic where they discussed possibility of Watchman device implant.  He states he would like to proceed ahead with that.  Has had no significant palpitations.  Is awaiting urologic surgery for his prostate due to urinary retention and would like to do that as soon as possible although I told him we would need to wait until December before we interrupt his antiplatelet therapy for more than a week.    ECG (personally reviewed): No ECG today    Cardiac Imaging Studies (personally reviewed): No new imaging     Physical Examination Review of Systems   /52 (BP Location: Left arm, Patient Position: Sitting, Cuff Size: Adult Regular)   Pulse 63   Resp 16   Wt 77.1 kg (170 lb)   SpO2 97%   BMI 26.23 kg/m    Body mass index is 26.23 kg/m .  Wt Readings from Last 3 Encounters:   08/09/23 77.1 kg (170 lb)   07/07/23 79.8 kg (176 lb)   06/02/23 78.7 kg (173 lb 8 oz)     General Appearance:   Awake, Alert, No acute distress.   HEENT:  No scleral icterus; the mucous membranes were pink and moist.   Neck: No cervical bruits or jugular venous distention    Chest: The spine was straight. The chest was symmetric.   Lungs:   Respirations unlabored; the lungs are clear to auscultation. No wheezing   Cardiovascular:   Regular rate and rhythm.  S1, S2 normal.  No murmur or gallop   Abdomen:  No organomegaly, masses, bruits, or tenderness. Bowels sounds are present   Extremities: No peripheral edema bilaterally   Skin: No xanthelasma. Warm, Dry.   Musculoskeletal: No tenderness.   Neurologic: Mood and affect are appropriate.    Enc Vitals  BP: 104/52  Pulse: 63  Resp: 16  SpO2: 97 %  Weight: 77.1 kg (170 lb)                                         Medical History  Surgical History Family History Social  History   Past Medical History:   Diagnosis Date    Acute diverticulitis 4/20/2016    Lung nodule < 6cm on CT 6/6/2018    Past Surgical History:   Procedure Laterality Date    CV CORONARY ANGIOGRAM N/A 4/11/2023    Procedure: Coronary Angiogram;  Surgeon: Alejandro Hitchcock MD;  Location: [facility] CATH LAB CV    CV PCI STENT DRUG ELUTING N/A 4/11/2023    Procedure: Percutaneous Coronary Intervention Stent;  Surgeon: Alejandro Hitchcock MD;  Location: [facility] LAB CV    OTHER SURGICAL HISTORY      OTHER SURGICAL HISTORYstent placement in head     TONSILLECTOMY      Family History   Problem Relation Age of Onset    Colon Cancer Mother     Hypertension Father     Coronary Artery Disease Father     Aneurysm Sister     Cerebrovascular Disease Sister     No Known Problems Brother     Diabetes Son     No Known Problems Son     No Known Problems Son     Breast Cancer No family hx of     Prostate Cancer No family hx of     Social History     Socioeconomic History    Marital status:      Spouse name: Not on file    Number of children: Not on file    Years of education: Not on file    Highest education level: Not on file   Occupational History    Not on file   Tobacco Use    Smoking status: Former    Smokeless tobacco: Never   Vaping Use    Vaping Use: Never used   Substance and Sexual Activity    Alcohol use: Not on file     Comment: Alcoholic Drinks/day: 1 glass per week    Drug use: Not Currently    Sexual activity: Not Currently   Other Topics Concern    Not on file   Social History Narrative    Not on file     Social Determinants of Health     Financial Resource Strain: Not on file   Food Insecurity: Not on file   Transportation Needs: Not on file   Physical Activity: Not on file   Stress: Not on file   Social Connections: Not on file   Intimate Partner Violence: Not on file   Housing Stability: Not on file          Medications  Allergies   Current Outpatient Medications   Medication Sig Dispense  Refill    acetaminophen (TYLENOL) 325 MG tablet Take 2 tablets (650 mg) by mouth every 6 hours as needed for mild pain or other (and adjunct with moderate or severe pain or per patient request)  0    alendronate (FOSAMAX) 70 MG tablet Take 70 mg by mouth every 7 days On Tuesday      apixaban ANTICOAGULANT (ELIQUIS) 2.5 MG tablet Take 1 tablet (2.5 mg) by mouth 2 times daily 180 tablet 3    calcium carbonate 500 mg, elemental, (OSCAL 500) 1250 (500 Ca) MG TABS tablet Take 1 tablet by mouth daily      Cyanocobalamin 3000 MCG CAPS Take 3,000 mcg by mouth daily      diltiazem ER (DILT-XR) 120 MG 24 hr capsule Take 1 capsule by mouth daily      Flaxseed, Linseed, (FLAX SEED OIL) 1000 MG capsule Take 1,000 mg by mouth daily      furosemide (LASIX) 20 MG tablet TAKE 1 TABLET BY MOUTH ONCE DAILY  FOR INCREASED SWELLING, WEIGHT GAIN, OR SHORTNESS OF BREATH 90 tablet 1    hydroxychloroquine (PLAQUENIL) 200 MG tablet Take 200 mg by mouth daily      leucovorin (WELLCOVORIN) 5 mg tablet Take 5 mg by mouth three times a week On Monday, Wednesday and Saturday. Do not overlap days of methotrexate      losartan (COZAAR) 50 MG tablet Take 1 tablet (50 mg) by mouth daily 90 tablet 3    methotrexate 2.5 MG tablet Take 12.5 mg by mouth twice a week 5 tablets (12.5mg) on Thursday and Friday      metoprolol succinate ER (TOPROL XL) 50 MG 24 hr tablet Take 1 tablet (50 mg) by mouth daily 90 tablet 3    oxybutynin ER (DITROPAN XL) 5 MG 24 hr tablet Take 5 mg by mouth daily      rosuvastatin (CRESTOR) 10 MG tablet Take 10 mg by mouth At Bedtime      tamsulosin (FLOMAX) 0.4 MG capsule Take 2 capsules (0.8 mg) by mouth daily (with dinner) 60 capsule 3    ticagrelor (BRILINTA) 90 MG tablet Take 1 tablet (90 mg) by mouth every 12 hours 60 tablet 5    vitamin D3 (CHOLECALCIFEROL) 50 mcg (2000 units) tablet Take 1 tablet by mouth daily      nitroGLYcerin (NITROSTAT) 0.4 MG sublingual tablet For chest pain place 1 tablet under the tongue every 5  minutes for 3 doses. If symptoms persist 5 minutes after 1st dose call 911. 25 tablet 1      No Known Allergies      Lab Results    Chemistry/lipid CBC Cardiac Enzymes/BNP/TSH/INR   Recent Labs   Lab Test 06/02/23  1411   BUN 27.1*      CO2 19*    Recent Labs   Lab Test 04/13/23  0510   WBC 13.1*   HGB 11.3*   HCT 33.3*      *    Recent Labs   Lab Test 04/10/23  2209   INR 1.19*        A total of 34 minutes was spent reviewing patient's medical records, obtaining history and performing examination, as well as discussing diagnoses/ recommendations with patient and answering all questions.                    Thank you for allowing me to participate in the care of your patient.      Sincerely,     Jaelyn Carlos MD     [facility]  cc:   Aimee Odom, NP  [address]  [place] [address]

## 2023-08-09 NOTE — PATIENT INSTRUCTIONS
Continue current medications  Could consider interrupting the Brilinta and aspirin for a week in December to undergo prostate surgery but would want to restart those medications right after the surgery to reduce clot forming on the stents.  Follow up with me in April 2024

## 2023-08-11 ENCOUNTER — HOSPITAL ENCOUNTER (OUTPATIENT)
Dept: CARDIAC REHAB | Facility: CLINIC | Age: 83
Discharge: HOME | End: 2023-08-11
Attending: FAMILY MEDICINE
Payer: MEDICARE

## 2023-08-11 PROCEDURE — 93798 PHYS/QHP OP CAR RHAB W/ECG: CPT

## 2023-08-14 ENCOUNTER — HOSPITAL ENCOUNTER (OUTPATIENT)
Dept: CARDIAC REHAB | Facility: CLINIC | Age: 83
Discharge: HOME | End: 2023-08-14
Attending: FAMILY MEDICINE
Payer: MEDICARE

## 2023-08-14 PROCEDURE — 93798 PHYS/QHP OP CAR RHAB W/ECG: CPT

## 2023-08-21 ENCOUNTER — HOSPITAL ENCOUNTER (OUTPATIENT)
Dept: CARDIAC REHAB | Facility: CLINIC | Age: 83
Discharge: HOME | End: 2023-08-21
Attending: FAMILY MEDICINE
Payer: MEDICARE

## 2023-08-21 PROCEDURE — 93798 PHYS/QHP OP CAR RHAB W/ECG: CPT

## 2023-08-28 ENCOUNTER — HOSPITAL ENCOUNTER (OUTPATIENT)
Dept: CARDIAC REHAB | Facility: CLINIC | Age: 83
Discharge: HOME | End: 2023-08-28
Attending: FAMILY MEDICINE
Payer: MEDICARE

## 2023-08-28 PROCEDURE — 93798 PHYS/QHP OP CAR RHAB W/ECG: CPT

## 2023-09-08 ENCOUNTER — HOSPITAL ENCOUNTER (OUTPATIENT)
Dept: CARDIAC REHAB | Facility: CLINIC | Age: 83
Discharge: HOME | End: 2023-09-08
Attending: FAMILY MEDICINE
Payer: MEDICARE

## 2023-09-08 PROCEDURE — 93798 PHYS/QHP OP CAR RHAB W/ECG: CPT

## 2023-09-13 ENCOUNTER — HOSPITAL ENCOUNTER (OUTPATIENT)
Dept: CARDIAC REHAB | Facility: CLINIC | Age: 83
Discharge: HOME | End: 2023-09-13
Attending: FAMILY MEDICINE
Payer: MEDICARE

## 2023-09-13 PROCEDURE — 93798 PHYS/QHP OP CAR RHAB W/ECG: CPT

## 2023-09-15 ENCOUNTER — HOSPITAL ENCOUNTER (OUTPATIENT)
Dept: CARDIAC REHAB | Facility: CLINIC | Age: 83
Discharge: HOME | End: 2023-09-15
Attending: FAMILY MEDICINE
Payer: MEDICARE

## 2023-09-20 ENCOUNTER — HOSPITAL ENCOUNTER (OUTPATIENT)
Dept: CARDIAC REHAB | Facility: CLINIC | Age: 83
Discharge: HOME | End: 2023-09-20
Attending: FAMILY MEDICINE
Payer: MEDICARE

## 2023-09-20 PROCEDURE — 93798 PHYS/QHP OP CAR RHAB W/ECG: CPT

## 2023-10-13 ENCOUNTER — TRANSFERRED RECORDS (OUTPATIENT)
Dept: HEALTH INFORMATION MANAGEMENT | Facility: CLINIC | Age: 83
End: 2023-10-13
Payer: MEDICARE

## 2023-11-06 NOTE — PROGRESS NOTES
Assessment/Recommendations     Assessment/Plan:    Diagnoses and all orders for this visit:  Chronic atrial fib -- on Eliquis    -Metoprolol XL 50 mg daily  -Diltiazem  mg daily  -ventricular rate 91 today, increased from August,  he reports positional dizziness but denies any significant shortness of breath on exertion, chest pain or palpitations, he denies any syncopal episodes.   -Amiodarone discontinued May 2023 due to prolonged QT, he had been doing quite well on his previous rate control strategy that his previous cardiologist Dr Porter had established for him so it was decided to return to rate control strategy in place of rhythm control  -He has permanent catheter in place due to ongoing hematuria that requires exchange every 2 weeks by the urology clinic, prior to the exchange, his catheter typically fills up what he describes as a crystal formation so that I may have to remove the catheter, this causes a significant mount of bleeding post removal.  He did discuss this with his primary cardiologist Dr. Carlos who approved a very brief interruption of his Brilinta so he can proceed with the recommended surgical procedure per urology however he would need to consider having a Watchman device placed as well for permanent discontinuation of his Eliquis.    Chronic atrial fibrillation: I have personally reviewed this patient's chart and have spoken with the patient about the treatment options, including KI device.   [id number] score of 6 for age over 75, HTN, CAD, CVA (SAH) . He has a HAS-BLED score of 3 for age, CKD, bleeding disposition/chronic hematuria.   He is not a good candidate for long-term anticoagulation due to chronic hematuria.      Once scheduled, the patient will stay on Eliquis up until implant.  After implant, the patient will be changed to aspirin 81 mg daily and Plavix 75 mg daily.  He will take DAPT for 6 months, then stop Plavix and remain on aspirin 81 mg daily, indefinitely.   He/She will have a CAROL ANN approximately 3 months post-implant.  He understands that the risks of the procedure are <2% and include, but are not limited to device embolization, air embolism, myocardial perforation, device thrombosis, ASD, stroke, or death.  We discussed expected recovery and follow-up.       The patient is a good candidate for proceeding with left atrial appendage screening and implant.  His questions were answered to his satisfaction.        Benign essential hypertension    -/60, well controlled  -Continue with Losartan 50 mg daily    NSTEMI -- S/P CANDIE x 3 to LAD on 4/10/23  -He is doing well with no recurrence of the chest discomfort which prompted his admission.  At this point would continue with Brilinta antiplatelet therapy.  He is not on aspirin therapy due to ongoing need for anticoagulation with history of paroxysmal A-fib.      [id number] score of 6 for age over 75, HTN, CAD, CVA (SAH) and on Eliquis 2.5 mg BID    Follow up per [facility] team.     History of Present Illness/Subjective     Felipe Verma is a very pleasant 83 year old male who comes in today for EP follow up chronic AF and [facility]    Felipe Vemra  is a very pleasant 82 year old male with history of CVA with coiling of cerebral aneurysm in 2016, HTN, HLD, NSTEMI -- S/P CANDIE x 3 to LAD on 4/10/23, seen by provider in [facility]) 6/2021 for edema, noted to be in atrial fibrillation, venous US negative for DVT.       Arrhythmia hx  Dx/date: seen by provider in [facility]) 6/2021 for edema, noted to be in atrial fibrillation. He saw PCP in follow up in 8/2021 and was informed that his echocardiogram showed reduced EF 40-45%. Seen by Dr Porter 10/2021 and advised change from Lopressor to Toprol 50 mg daily for better rate control of atrial fibrillation, add Lasix. He saw Caesar in follow up, still quite tachycardic, so Diltiazem  mg daily added. At last follow up, Nikos was back in NSR,  ventricular rate 71. Subsequent echocardiogram with normalization of EF, however, his current EF as of April 2023 is at 40%.    Sx: mild shortness of breath on exertion and lightheadedness with brief positional changes, denies CP or syncope.    Prior AAD, AV wm blocking agents: metoprolol succinate 50 mg daily along with the diltiazem extended release 120 mg daily. Amiodarone discontinue May 2023 due to prolonged QT. remains on Brilinta due to CAD/stent placement 4/2023 and Eliquis for stroke prevention due to AF  Procedures  DCCV: no  Ablation: no    Ventricular rate today in clinic 91 bpm, he remains on a rate control strategy originally prescribed by his previous cardiologist Dr Porter, including metoprolol XL and diltiazem extended release.  Blood pressure normotensive 102/60.  Continues to experience intermittent positional dizziness but denies any significant shortness of breath on exertion, chest pain or palpitations, worsening lower extremity swelling, orthopnea or syncope.  He continues to consume alcohol at least twice a week but in very minimal amounts, he has increased his water intake per the recommendations of his urologist due to his chronic hematuria, he does have a permanent catheter in place that requires exchange every 2 weeks, prior to the exchange, he does have formation of crystals in the catheter which does cause bleeding post removal.  Due to his DOAC therapy and Brilinta bleeding can last for hours up to several days. Notable bruising under right eye today, he states his is from him scratching at his eye.  He has no history of PE or DVT but did suffer a SDH previously but has no residual effects from that.  He has no swallowing issues requiring esophageal dilation and no problems with anesthesia.  He denies any recent neurological changes such as loss of vision, numbness and tingling in the extremities or speech difficulties.  He does have chronic kidney disease with a reduced GFR of 38 and  creatinine around 1.78.  Denies any recent falls or unsteady balance issues, he does not use a cane or walker for assistance with stability.  He is the primary caregiver for his wife who is wheelchair-bound.  He denies any recent recurrent nosebleeds or rectal bleeding.    Cardiographics (reviewed):  EKG 5/5/23 shows NSR with sinus arrhythmia rate 71 QT/QTc: 458/497 ms                ECHO 4/10/23     1. The left ventricle is normal in size. Left ventricular systolic performance  is moderately reduced. The ejection fraction is estimated to be 40%.  2. There is severe anteroseptal, distal septal, and distal anterior  hypokinesis. The apex is akinetic (no apical mural thrombus is detected).  3. There is mild to moderate mitral insufficiency.  4. Normal right ventricular size and systolic performance.  5. There is mild left atrial enlargement.        5/9/16 Stress test ([facility])     1. Adequate pharmacologic stress test with regadenoson (Lexiscan).   2. The pharmacological stress ECG was normal.   3. Myocardial perfusion was normal.   4. Overall left ventricular systolic function was normal without regional   wall motion abnormalities. The resting LVEF was calculated to be 61%.     5. Left ventricular cavity size was normal  (EDV 99 ml).   6. Compared to the prior study from 07/26/01, the current study is   unchanged.                   Problem List:  Patient Active Problem List   Diagnosis    Brain aneurysm    BPH with urinary obstruction -- [place] on 4/10/23    Benign essential hypertension    Antiplatelet or antithrombotic long-term use    Diverticulosis    Normocytic anemia    Hyperlipidemia    Chronic atrial fibrillation (H)    Localized swelling of left lower extremity    Stage 3a chronic kidney disease (H)    NSTEMI -- S/P CANDIE x 3 to LAD on 4/10/23    Dysarthria    Facial droop    Impotence of organic origin    Pure hypercholesterolemia    Rheumatoid arthritis involving multiple sites with positive rheumatoid factor  (H)    History of subarachnoid hemorrhage    Acute diverticulitis    Edward hematuria    Myocardial disorder (H)    Retention of urine     Revi  e  Physical Examination Review of Systems   w fystems  There were no vitals taken for this visit.  There is no height or weight on file to calculate BMI.  Wt Readings from Last 3 Encounters:   08/09/23 77.1 kg (170 lb)   07/07/23 79.8 kg (176 lb)   06/02/23 78.7 kg (173 lb 8 oz)     General Appearance:   Alert, well-appearing and in no acute distress.   HEENT: Atraumatic, normocephalic.  No scleral icterus, normal conjunctivae; mucous membranes pink and moist.     Chest: Chest symmetric, spine straight.   Lungs:   Respirations unlabored: Lungs are clear to auscultation.   Cardiovascular:   Normal first and second heart sounds with no murmurs, rubs, or gallops.  Irregular, rate controlled.   Normal JVD, 1+ non pitting BLE edema.       Extremities: No cyanosis or clubbing   Musculoskeletal: Moves all extremities   Skin: Warm, dry, intact. Moderate bruising noted under right eye.   Neurologic: Mood and affect are appropriate, alert and oriented to person, place, time, and situation     ROS: 10 point ROS neg other than the symptoms noted above in the HPI.     Medical History  Surgical History Family History Social History     Past Medical History:   Diagnosis Date    Acute diverticulitis 4/20/2016    Lung nodule < 6cm on CT 6/6/2018    Past Surgical History:   Procedure Laterality Date    CV CORONARY ANGIOGRAM N/A 4/11/2023    Procedure: Coronary Angiogram;  Surgeon: Alejandro Hitchcock MD;  Location: [facility] CV    CV PCI STENT DRUG ELUTING N/A 4/11/2023    Procedure: Percutaneous Coronary Intervention Stent;  Surgeon: Alejandro Hitchcock MD;  Location: [facility] CV    OTHER SURGICAL HISTORY      OTHER SURGICAL HISTORYstent placement in head     TONSILLECTOMY      Family History   Problem Relation Age of Onset    Colon Cancer Mother     Hypertension  Father     Coronary Artery Disease Father     Aneurysm Sister     Cerebrovascular Disease Sister     No Known Problems Brother     Diabetes Son     No Known Problems Son     No Known Problems Son     Breast Cancer No family hx of     Prostate Cancer No family hx of     History   Smoking Status    Former   Smokeless Tobacco    Never     Social History    Substance and Sexual Activity      Alcohol use: Not on file        Comment: Alcoholic Drinks/day: 1 glass per week       Medications  Allergies     Current Outpatient Medications   Medication Sig Dispense Refill    acetaminophen (TYLENOL) 325 MG tablet Take 2 tablets (650 mg) by mouth every 6 hours as needed for mild pain or other (and adjunct with moderate or severe pain or per patient request)  0    alendronate (FOSAMAX) 70 MG tablet Take 70 mg by mouth every 7 days On Tuesday      apixaban ANTICOAGULANT (ELIQUIS) 2.5 MG tablet Take 1 tablet (2.5 mg) by mouth 2 times daily 180 tablet 3    calcium carbonate 500 mg, elemental, (OSCAL 500) 1250 (500 Ca) MG TABS tablet Take 1 tablet by mouth daily      Cyanocobalamin 3000 MCG CAPS Take 3,000 mcg by mouth daily      diltiazem ER (DILT-XR) 120 MG 24 hr capsule Take 1 capsule by mouth daily      Flaxseed, Linseed, (FLAX SEED OIL) 1000 MG capsule Take 1,000 mg by mouth daily      furosemide (LASIX) 20 MG tablet TAKE 1 TABLET BY MOUTH ONCE DAILY  FOR INCREASED SWELLING, WEIGHT GAIN, OR SHORTNESS OF BREATH 90 tablet 1    hydroxychloroquine (PLAQUENIL) 200 MG tablet Take 200 mg by mouth daily      leucovorin (WELLCOVORIN) 5 mg tablet Take 5 mg by mouth three times a week On Monday, Wednesday and Saturday. Do not overlap days of methotrexate      losartan (COZAAR) 50 MG tablet Take 1 tablet (50 mg) by mouth daily 90 tablet 3    methotrexate 2.5 MG tablet Take 12.5 mg by mouth twice a week 5 tablets (12.5mg) on Thursday and Friday      metoprolol succinate ER (TOPROL XL) 50 MG 24 hr tablet Take 1 tablet (50 mg) by mouth daily  "90 tablet 3    nitroGLYcerin (NITROSTAT) 0.4 MG sublingual tablet For chest pain place 1 tablet under the tongue every 5 minutes for 3 doses. If symptoms persist 5 minutes after 1st dose call 911. 25 tablet 1    oxybutynin ER (DITROPAN XL) 5 MG 24 hr tablet Take 5 mg by mouth daily      rosuvastatin (CRESTOR) 10 MG tablet Take 10 mg by mouth At Bedtime      tamsulosin (FLOMAX) 0.4 MG capsule Take 2 capsules (0.8 mg) by mouth daily (with dinner) 60 capsule 3    ticagrelor (BRILINTA) 90 MG tablet Take 1 tablet (90 mg) by mouth every 12 hours 60 tablet 5    vitamin D3 (CHOLECALCIFEROL) 50 mcg (2000 units) tablet Take 1 tablet by mouth daily        No Known Allergies   Medical, surgical, family, social history, and medications were all reviewed and updated as necessary.   Lab Results    Chemistry/lipid CBC Cardiac Enzymes/BNP/TSH/INR   No results for input(s): \"CHOL\", \"HDL\", \"LDL\", \"TRIG\", \"CHOLHDLRATIO\" in the last 52873 hours.  No results for input(s): \"LDL\" in the last 38794 hours.  Recent Labs   Lab Test 06/02/23  1411      POTASSIUM 4.1   CHLORIDE 107   CO2 19*   *   BUN 27.1*   CR 1.54*   GFRESTIMATED 45*   ABBEY 9.0     Recent Labs   Lab Test 06/02/23  1411 05/05/23  1502 04/14/23  1118   CR 1.54* 1.73* 1.34*     No results for input(s): \"A1C\" in the last 74566 hours.       Recent Labs   Lab Test 04/13/23  0510   WBC 13.1*   HGB 11.3*   HCT 33.3*      *     Recent Labs   Lab Test 04/13/23  0510 04/12/23  2105 04/12/23  0512   HGB 11.3* 11.3* 11.8*    No results for input(s): \"TROPONINI\" in the last 37295 hours.  Recent Labs   Lab Test 04/10/23  2209   NTBNPI 325     No results for input(s): \"TSH\" in the last 24800 hours.  Recent Labs   Lab Test 04/10/23  2209   INR 1.19*          Total Time- 41 minutes spent on date of encounter doing chart review, history and exam, documentation and further activities as noted above.  This note has been dictated using voice recognition software. Any " grammatical, typographical, or context distortions are unintentional and inherent to the software.    Aimee Odom CNP  [facility]  [phone]

## 2023-11-07 ENCOUNTER — OFFICE VISIT (OUTPATIENT)
Dept: CARDIOLOGY | Facility: CLINIC | Age: 83
End: 2023-11-07
Attending: NURSE PRACTITIONER
Payer: MEDICARE

## 2023-11-07 VITALS
OXYGEN SATURATION: 97 % | BODY MASS INDEX: 26.23 KG/M2 | HEART RATE: 91 BPM | WEIGHT: 170 LBS | SYSTOLIC BLOOD PRESSURE: 102 MMHG | DIASTOLIC BLOOD PRESSURE: 60 MMHG | RESPIRATION RATE: 24 BRPM

## 2023-11-07 DIAGNOSIS — I48.20 CHRONIC ATRIAL FIBRILLATION (H): Primary | ICD-10-CM

## 2023-11-07 DIAGNOSIS — I21.4 NSTEMI (NON-ST ELEVATED MYOCARDIAL INFARCTION) (H): ICD-10-CM

## 2023-11-07 DIAGNOSIS — I10 BENIGN ESSENTIAL HYPERTENSION: ICD-10-CM

## 2023-11-07 PROCEDURE — 99215 OFFICE O/P EST HI 40 MIN: CPT | Performed by: NURSE PRACTITIONER

## 2023-11-07 NOTE — PATIENT INSTRUCTIONS
Felipe Verma,    It was a pleasure to see you today at the [facility].     My recommendations after this visit include:    Continue with current medications  Watchman consult completed today  Someone from the Watchman team will reach out to you to discuss dates for scheduling your surgery     Aimee Odom CNP  [facility], Electrophysiology  [phone]  EP nurses [phone]

## 2023-11-07 NOTE — LETTER
11/7/2023    Pro Chávez MD  [address] [facility] [address]    RE: Felipe AIDAN Verma       Dear Colleague,     I had the pleasure of seeing Felipe AIDAN Verma in the [facility].        Assessment/Recommendations     Assessment/Plan:    Diagnoses and all orders for this visit:  Chronic atrial fib -- on Eliquis    -Metoprolol XL 50 mg daily  -Diltiazem  mg daily  -ventricular rate 91 today, increased from August,  he reports positional dizziness but denies any significant shortness of breath on exertion, chest pain or palpitations, he denies any syncopal episodes.   -Amiodarone discontinued May 2023 due to prolonged QT, he had been doing quite well on his previous rate control strategy that his previous cardiologist Dr Porter had established for him so it was decided to return to rate control strategy in place of rhythm control  -He has permanent catheter in place due to ongoing hematuria that requires exchange every 2 weeks by the urology clinic, prior to the exchange, his catheter typically fills up what he describes as a crystal formation so that I may have to remove the catheter, this causes a significant mount of bleeding post removal.  He did discuss this with his primary cardiologist Dr. Carlos who approved a very brief interruption of his Brilinta so he can proceed with the recommended surgical procedure per urology however he would need to consider having a Watchman device placed as well for permanent discontinuation of his Eliquis.    Chronic atrial fibrillation: I have personally reviewed this patient's chart and have spoken with the patient about the treatment options, including KI device.   [id number] score of 6 for age over 75, HTN, CAD, CVA (SAH) . He has a HAS-BLED score of 3 for age, CKD, bleeding disposition/chronic hematuria.   He is not a good candidate for long-term anticoagulation due to chronic hematuria.      Once scheduled, the patient will stay on  Eliquis up until implant.  After implant, the patient will be changed to aspirin 81 mg daily and Plavix 75 mg daily.  He will take DAPT for 6 months, then stop Plavix and remain on aspirin 81 mg daily, indefinitely.  He/She will have a CAROL ANN approximately 3 months post-implant.  He understands that the risks of the procedure are <2% and include, but are not limited to device embolization, air embolism, myocardial perforation, device thrombosis, ASD, stroke, or death.  We discussed expected recovery and follow-up.       The patient is a good candidate for proceeding with left atrial appendage screening and implant.  His questions were answered to his satisfaction.        Benign essential hypertension    -/60, well controlled  -Continue with Losartan 50 mg daily    NSTEMI -- S/P CANDIE x 3 to LAD on 4/10/23  -He is doing well with no recurrence of the chest discomfort which prompted his admission.  At this point would continue with Brilinta antiplatelet therapy.  He is not on aspirin therapy due to ongoing need for anticoagulation with history of paroxysmal A-fib.      [id number] score of 6 for age over 75, HTN, CAD, CVA (SAH) and on Eliquis 2.5 mg BID    Follow up per Watchman team.     History of Present Illness/Subjective     Felipe Verma is a very pleasant 83 year old male who comes in today for EP follow up chronic AF and Watchman costhao    Felipe Verma  is a very pleasant 82 year old male with history of CVA with coiling of cerebral aneurysm in 2016, HTN, HLD, NSTEMI -- S/P CANDIE x 3 to LAD on 4/10/23, seen by provider in [facility]) 6/2021 for edema, noted to be in atrial fibrillation, venous US negative for DVT.       Arrhythmia hx  Dx/date: seen by provider in [facility]) 6/2021 for edema, noted to be in atrial fibrillation. He saw PCP in follow up in 8/2021 and was informed that his echocardiogram showed reduced EF 40-45%. Seen by Dr Porter 10/2021 and advised change from  Lopressor to Toprol 50 mg daily for better rate control of atrial fibrillation, add Lasix. He saw Caesar in follow up, still quite tachycardic, so Diltiazem  mg daily added. At last follow up, Nikos was back in NSR, ventricular rate 71. Subsequent echocardiogram with normalization of EF, however, his current EF as of April 2023 is at 40%.    Sx: mild shortness of breath on exertion and lightheadedness with brief positional changes, denies CP or syncope.    Prior AAD, AV wm blocking agents: metoprolol succinate 50 mg daily along with the diltiazem extended release 120 mg daily. Amiodarone discontinue May 2023 due to prolonged QT. remains on Brilinta due to CAD/stent placement 4/2023 and Eliquis for stroke prevention due to AF  Procedures  DCCV: no  Ablation: no    Ventricular rate today in clinic 91 bpm, he remains on a rate control strategy originally prescribed by his previous cardiologist Dr Porter, including metoprolol XL and diltiazem extended release.  Blood pressure normotensive 102/60.  Continues to experience intermittent positional dizziness but denies any significant shortness of breath on exertion, chest pain or palpitations, worsening lower extremity swelling, orthopnea or syncope.  He continues to consume alcohol at least twice a week but in very minimal amounts, he has increased his water intake per the recommendations of his urologist due to his chronic hematuria, he does have a permanent catheter in place that requires exchange every 2 weeks, prior to the exchange, he does have formation of crystals in the catheter which does cause bleeding post removal.  Due to his DOAC therapy and Brilinta bleeding can last for hours up to several days. Notable bruising under right eye today, he states his is from him scratching at his eye.  He has no history of PE or DVT but did suffer a SDH previously but has no residual effects from that.  He has no swallowing issues requiring esophageal dilation and no  problems with anesthesia.  He denies any recent neurological changes such as loss of vision, numbness and tingling in the extremities or speech difficulties.  He does have chronic kidney disease with a reduced GFR of 38 and creatinine around 1.78.  Denies any recent falls or unsteady balance issues, he does not use a cane or walker for assistance with stability.  He is the primary caregiver for his wife who is wheelchair-bound.  He denies any recent recurrent nosebleeds or rectal bleeding.    Cardiographics (reviewed):  EKG 5/5/23 shows NSR with sinus arrhythmia rate 71 QT/QTc: 458/497 ms                ECHO 4/10/23     1. The left ventricle is normal in size. Left ventricular systolic performance  is moderately reduced. The ejection fraction is estimated to be 40%.  2. There is severe anteroseptal, distal septal, and distal anterior  hypokinesis. The apex is akinetic (no apical mural thrombus is detected).  3. There is mild to moderate mitral insufficiency.  4. Normal right ventricular size and systolic performance.  5. There is mild left atrial enlargement.        5/9/16 Stress test ([facility])     1. Adequate pharmacologic stress test with regadenoson (Lexiscan).   2. The pharmacological stress ECG was normal.   3. Myocardial perfusion was normal.   4. Overall left ventricular systolic function was normal without regional   wall motion abnormalities. The resting LVEF was calculated to be 61%.     5. Left ventricular cavity size was normal  (EDV 99 ml).   6. Compared to the prior study from 07/26/01, the current study is   unchanged.                   Problem List:  Patient Active Problem List   Diagnosis    Brain aneurysm    BPH with urinary obstruction -- [place] on 4/10/23    Benign essential hypertension    Antiplatelet or antithrombotic long-term use    Diverticulosis    Normocytic anemia    Hyperlipidemia    Chronic atrial fibrillation (H)    Localized swelling of left lower extremity    Stage 3a chronic kidney  disease (H)    NSTEMI -- S/P CANDIE x 3 to LAD on 4/10/23    Dysarthria    Facial droop    Impotence of organic origin    Pure hypercholesterolemia    Rheumatoid arthritis involving multiple sites with positive rheumatoid factor (H)    History of subarachnoid hemorrhage    Acute diverticulitis    Edward hematuria    Myocardial disorder (H)    Retention of urine     Revi  e  Physical Examination Review of Systems   w stems  There were no vitals taken for this visit.  There is no height or weight on file to calculate BMI.  Wt Readings from Last 3 Encounters:   08/09/23 77.1 kg (170 lb)   07/07/23 79.8 kg (176 lb)   06/02/23 78.7 kg (173 lb 8 oz)     General Appearance:   Alert, well-appearing and in no acute distress.   HEENT: Atraumatic, normocephalic.  No scleral icterus, normal conjunctivae; mucous membranes pink and moist.     Chest: Chest symmetric, spine straight.   Lungs:   Respirations unlabored: Lungs are clear to auscultation.   Cardiovascular:   Normal first and second heart sounds with no murmurs, rubs, or gallops.  Irregular, rate controlled.   Normal JVD, 1+ non pitting BLE edema.       Extremities: No cyanosis or clubbing   Musculoskeletal: Moves all extremities   Skin: Warm, dry, intact. Moderate bruising noted under right eye.   Neurologic: Mood and affect are appropriate, alert and oriented to person, place, time, and situation     ROS: 10 point ROS neg other than the symptoms noted above in the HPI.     Medical History  Surgical History Family History Social History     Past Medical History:   Diagnosis Date    Acute diverticulitis 4/20/2016    Lung nodule < 6cm on CT 6/6/2018    Past Surgical History:   Procedure Laterality Date    CV CORONARY ANGIOGRAM N/A 4/11/2023    Procedure: Coronary Angiogram;  Surgeon: Alejandro Hitchcock MD;  [facility] CATH LAB CV    CV PCI STENT DRUG ELUTING N/A 4/11/2023    Procedure: Percutaneous Coronary Intervention Stent;  Surgeon: Alejandro Hitchcock,  MD;  Location: [facility] LAB CV    OTHER SURGICAL HISTORY      OTHER SURGICAL HISTORYstent placement in head     TONSILLECTOMY      Family History   Problem Relation Age of Onset    Colon Cancer Mother     Hypertension Father     Coronary Artery Disease Father     Aneurysm Sister     Cerebrovascular Disease Sister     No Known Problems Brother     Diabetes Son     No Known Problems Son     No Known Problems Son     Breast Cancer No family hx of     Prostate Cancer No family hx of     History   Smoking Status    Former   Smokeless Tobacco    Never     Social History    Substance and Sexual Activity      Alcohol use: Not on file        Comment: Alcoholic Drinks/day: 1 glass per week       Medications  Allergies     Current Outpatient Medications   Medication Sig Dispense Refill    acetaminophen (TYLENOL) 325 MG tablet Take 2 tablets (650 mg) by mouth every 6 hours as needed for mild pain or other (and adjunct with moderate or severe pain or per patient request)  0    alendronate (FOSAMAX) 70 MG tablet Take 70 mg by mouth every 7 days On Tuesday      apixaban ANTICOAGULANT (ELIQUIS) 2.5 MG tablet Take 1 tablet (2.5 mg) by mouth 2 times daily 180 tablet 3    calcium carbonate 500 mg, elemental, (OSCAL 500) 1250 (500 Ca) MG TABS tablet Take 1 tablet by mouth daily      Cyanocobalamin 3000 MCG CAPS Take 3,000 mcg by mouth daily      diltiazem ER (DILT-XR) 120 MG 24 hr capsule Take 1 capsule by mouth daily      Flaxseed, Linseed, (FLAX SEED OIL) 1000 MG capsule Take 1,000 mg by mouth daily      furosemide (LASIX) 20 MG tablet TAKE 1 TABLET BY MOUTH ONCE DAILY  FOR INCREASED SWELLING, WEIGHT GAIN, OR SHORTNESS OF BREATH 90 tablet 1    hydroxychloroquine (PLAQUENIL) 200 MG tablet Take 200 mg by mouth daily      leucovorin (WELLCOVORIN) 5 mg tablet Take 5 mg by mouth three times a week On Monday, Wednesday and Saturday. Do not overlap days of methotrexate      losartan (COZAAR) 50 MG tablet Take 1 tablet (50 mg) by mouth  "daily 90 tablet 3    methotrexate 2.5 MG tablet Take 12.5 mg by mouth twice a week 5 tablets (12.5mg) on Thursday and Friday      metoprolol succinate ER (TOPROL XL) 50 MG 24 hr tablet Take 1 tablet (50 mg) by mouth daily 90 tablet 3    nitroGLYcerin (NITROSTAT) 0.4 MG sublingual tablet For chest pain place 1 tablet under the tongue every 5 minutes for 3 doses. If symptoms persist 5 minutes after 1st dose call 911. 25 tablet 1    oxybutynin ER (DITROPAN XL) 5 MG 24 hr tablet Take 5 mg by mouth daily      rosuvastatin (CRESTOR) 10 MG tablet Take 10 mg by mouth At Bedtime      tamsulosin (FLOMAX) 0.4 MG capsule Take 2 capsules (0.8 mg) by mouth daily (with dinner) 60 capsule 3    ticagrelor (BRILINTA) 90 MG tablet Take 1 tablet (90 mg) by mouth every 12 hours 60 tablet 5    vitamin D3 (CHOLECALCIFEROL) 50 mcg (2000 units) tablet Take 1 tablet by mouth daily        No Known Allergies   Medical, surgical, family, social history, and medications were all reviewed and updated as necessary.   Lab Results    Chemistry/lipid CBC Cardiac Enzymes/BNP/TSH/INR   No results for input(s): \"CHOL\", \"HDL\", \"LDL\", \"TRIG\", \"CHOLHDLRATIO\" in the last 40152 hours.  No results for input(s): \"LDL\" in the last 17206 hours.  Recent Labs   Lab Test 06/02/23  1411      POTASSIUM 4.1   CHLORIDE 107   CO2 19*   *   BUN 27.1*   CR 1.54*   GFRESTIMATED 45*   ABBEY 9.0     Recent Labs   Lab Test 06/02/23  1411 05/05/23  1502 04/14/23  1118   CR 1.54* 1.73* 1.34*     No results for input(s): \"A1C\" in the last 90092 hours.       Recent Labs   Lab Test 04/13/23  0510   WBC 13.1*   HGB 11.3*   HCT 33.3*      *     Recent Labs   Lab Test 04/13/23  0510 04/12/23  2105 04/12/23  0512   HGB 11.3* 11.3* 11.8*    No results for input(s): \"TROPONINI\" in the last 14907 hours.  Recent Labs   Lab Test 04/10/23  2209   NTBNPI 325     No results for input(s): \"TSH\" in the last 76248 hours.  Recent Labs   Lab Test 04/10/23  2209   INR 1.19* "          Total Time- 41 minutes spent on date of encounter doing chart review, history and exam, documentation and further activities as noted above.  This note has been dictated using voice recognition software. Any grammatical, typographical, or context distortions are unintentional and inherent to the software.    Aimee YOO [facility]  [phone]      Thank you for allowing me to participate in the care of your patient.      Sincerely,     KAREN Lopez [facility]  cc:   Aimee Odom NP  [address], [address]  [place] [address]

## 2023-11-08 ENCOUNTER — TELEPHONE (OUTPATIENT)
Dept: CARDIOLOGY | Facility: CLINIC | Age: 83
End: 2023-11-08

## 2023-11-08 DIAGNOSIS — I21.4 NSTEMI (NON-ST ELEVATED MYOCARDIAL INFARCTION) (H): ICD-10-CM

## 2023-11-08 NOTE — TELEPHONE ENCOUNTER
"Phone call to patient, he states he still has urinary catheter in place. Catheter gets changed every two weeks with Dank Simon with [facility]. Once Eliquis and Brilinta is done, he will have \"roto-rooter of prostate.\" It appears that patient may have future TURP. Has had indwelling urinary catheter since April. Patient would like Dr. Simon to call patient to discuss. No upcoming appointments with Dr. Simon, has not seen him since July. Will reach out to Urology to discuss patient case. [facility]    ----- Message -----  From: Aimee Odom NP  Sent: 11/7/2023   3:28 PM CST  To: Hcc Left Atrial Appendage Closure [place] [facility]    [facility] [place] consult, ready to schedule  "

## 2023-11-15 NOTE — TELEPHONE ENCOUNTER
Phone call to [facility], due for another cath change on 11/22/2023. Calling to discuss patient care regarding antiplatelet and anticoagulant in regards to patient LAAC and possible future TURP. Direct contact information given and will await call back from Dr. Simon's team. [facility]

## 2023-11-30 NOTE — TELEPHONE ENCOUNTER
Phone call to [facility]. They have no record of writer's previous call or request for information. Provided them with writer's direct office number and request to discuss patient case regarding plan for chaidez catheter, TURP, and LAAC.     Lalita YU from Dr. Simon's office called back immediately. They need ok from Cardiologist Terrance to hold Brilinta for one full week for laser resection and 3-5 days would be acceptable for Eliquis. Informed Lalita that we are calling to offer patient procedure to eliminate need for OAC for life and that it is less than ideal to offer LAAC while patient has chronic indwelling urinary catheter. More ideal plan would be TURP followed by LAAC, since need for urinary catheter would be eliminated at that time. Will follow-up with Dr. Carlos as it has been more than 6 months since PCI. [phone] direct call back for Lalita. [facility]

## 2023-12-01 NOTE — TELEPHONE ENCOUNTER
Per below, Dr. Carlos recommends continuing plan for DAPT at least 12 months due to complex PCI with multiple stents. Left detailed message for return call with GIGI Celis from Dr. Simon's office on confidential line. Phone call to patient, discussed recommendations below. Patient and wife are VERY upset. Indwelling urinary catheter has greatly limited his mobility and decreased his quality of life. He has catheter changes every 2 weeks and each one comes with pain and bleeding. He has been notified he cannot have TURP until off Brilinta, which will not be reasonable until 12 months post-PCI per Dr. Carlos, which would be April of 2024. Once Brilinta is done, will have to request another hold of OAC for TURP. TURP will need to be complete prior to LAAC. Patient and wife have been notified that writer has attempted to contact Dr. Simon's office and a voicemail has been left. They have been provided writer's direct office number for further questions or concerns. Otherwise, writer will follow-up with patient in April. No further questions at this time. [facility]    ----- Message -----  From: Jaelyn Carlos MD  Sent: 12/1/2023   1:42 PM CST  To: Shreya Marlow RN    He had complex PCI of his left anterior descending with 3 stents placed in April.  He should probably go 12 months before stopping the dual antiplatelet therapy.    Jaelyn  ----- Message -----  From: Shreya Marlow RN  Sent: 11/30/2023   1:52 PM CST  To: Jaelyn Carlos MD    ----- Message from Shreya Marlow RN sent at 11/30/2023  1:52 PM CST -----  Hi Dr. Carlos,    Before offering Nikos LAAC, he needs chronic indwelling urinary catheter gone. His Urologist is willing to offer him TURP, which will eliminate need for catheter, but in order to perform procedure, they are requesting full 7 day hold of Brilinta and 3-5 day hold of Eliquis. He had PCI 4/11/2023. Ok for requested OAC/antiplatelet for TURP?     Once he is stabilized from TURP, we can offer  [facility]e Cash

## 2023-12-06 ENCOUNTER — TELEPHONE (OUTPATIENT)
Dept: CARDIOLOGY | Facility: CLINIC | Age: 83
End: 2023-12-06
Payer: MEDICARE

## 2023-12-06 NOTE — TELEPHONE ENCOUNTER
for return call direct to Dr. Simon. [facility]    ----- Message from Joseph Burr MD sent at 12/5/2023  4:19 PM CST -----  Regarding: RE: Potential LAAC  I would favor post-poning until after TURP and DAPT  ----- Message -----  From: Shreya Marlow RN  Sent: 12/5/2023   1:47 PM CST  To: Joseph Burr MD; Anthony Goodrich MD  Subject: Potential LAAC                                   Hello team,    Dr. Carlos referred this patient for LAAC but unfortunately he has chronic indwelling urinary catheter and will require outlet obstruction procedure (TURP) in the future. He also had PCI with multiple stents placed and Dr. Carlos is requesting 12 full months of Brilinta. He is on Eliquis for Afib.     His Urologist called me today questioning whether or not we would do LAAC with chronic indwelling urinary catheter. I stated that our typical response is no due to concern for infection, but thought I'd double check with the experts. He has been requiring cath changes every 2 weeks due to crystallization/build up of catheter.     Either of you willing to do LAAC on this ingrid with chronic indwelling urinary catheter? Otherwise he will have to wait until 12 months post-PCI to stop Brilinta.     Shreya

## 2023-12-06 NOTE — TELEPHONE ENCOUNTER
Incoming call from Dr. Simon, discussed information below. He is appreciative of call. No further questions at this time. QUAN

## 2024-01-23 ENCOUNTER — TELEPHONE (OUTPATIENT)
Dept: FAMILY MEDICINE | Facility: CLINIC | Age: 84
End: 2024-01-23
Payer: MEDICARE

## 2024-01-23 NOTE — TELEPHONE ENCOUNTER
Patient Quality Outreach    Patient is due for the following:   Physical Annual Wellness Visit    Next Steps:   Patient was scheduled for 2/9/24 at 1:20 pm    Type of outreach:    Phone, spoke to patient/parent. pt      Questions for provider review:    None           Vasiliy Bain MA

## 2024-01-28 ENCOUNTER — NURSE TRIAGE (OUTPATIENT)
Dept: NURSING | Facility: CLINIC | Age: 84
End: 2024-01-28
Payer: MEDICARE

## 2024-01-29 NOTE — TELEPHONE ENCOUNTER
"Pt's wife Geneva calling. Pt \"blew his nose hard and got a nosebleed\". Pt takes blood thinning medication. Pt reports bleeding has stopped at time of call and only small amount of bleeding occurred. See full assessment below.    Advised Geneva to have pt contact his PCP tomorrow. Technique for stopping nosebleeds reviewed with Geneva. Advised Geneva to call back if pt has new or worsening symptoms or bleeding occurs again.    Geneva verbalizes understanding and agrees to plan.     Reason for Disposition   Taking Coumadin (warfarin) or other strong blood thinner, or known bleeding disorder (e.g., thrombocytopenia)    Additional Information   Negative: Fainted or too weak to stand following large blood loss   Negative: Sounds like a life-threatening emergency to the triager   Negative: [1] Bleeding present > 30 minutes AND [2] using correct method of direct pressure   Negative: [1] Bleeding now AND [2] second call after being instructed in correct technique of direct pressure   Negative: Nosebleed followed a nose injury   Negative: Dizziness or lightheadedness   Negative: Pale skin (pallor) of new-onset or worsening   Negative: [1] Has nasal packing (inserted by health care provider to control bleeding) AND [2] new rash   Negative: [1] Has nasal packing AND [2] now bleeding around the packing  (Exception: Few drops or ooze.)   Negative: Patient sounds very sick or weak to the triager   Negative: [1] Bleeding recurs 3 or more times in 24 hours AND [2] direct pressure applied correctly   Negative: [1] Has nasal packing AND [2] fever > 100.4 F (38.0 C)   Negative: [1] Large amount of blood has been lost (e.g., 1 cup or 240 ml) AND [2] bleeding now controlled (stopped)   Negative: [1] Skin bruises or bleeding gums AND [2] not caused by an injury    Protocols used: Nosebleed-A-JOSEPH    "

## 2024-02-12 DIAGNOSIS — I10 BENIGN ESSENTIAL HYPERTENSION: ICD-10-CM

## 2024-02-12 DIAGNOSIS — I48.20 CHRONIC ATRIAL FIBRILLATION (H): ICD-10-CM

## 2024-02-13 RX ORDER — FUROSEMIDE 20 MG
TABLET ORAL
Qty: 90 TABLET | Refills: 3 | Status: SHIPPED | OUTPATIENT
Start: 2024-02-13

## 2024-02-22 ENCOUNTER — HOSPITAL ENCOUNTER (OUTPATIENT)
Facility: HOSPITAL | Age: 84
Setting detail: OBSERVATION
Discharge: HOME | End: 2024-02-24
Attending: EMERGENCY MEDICINE | Admitting: EMERGENCY MEDICINE
Payer: MEDICARE

## 2024-02-22 ENCOUNTER — APPOINTMENT (OUTPATIENT)
Dept: RADIOLOGY | Facility: HOSPITAL | Age: 84
End: 2024-02-22
Attending: EMERGENCY MEDICINE
Payer: MEDICARE

## 2024-02-22 DIAGNOSIS — I21.4 NSTEMI (NON-ST ELEVATED MYOCARDIAL INFARCTION) (H): ICD-10-CM

## 2024-02-22 DIAGNOSIS — E78.5 HYPERLIPIDEMIA, UNSPECIFIED HYPERLIPIDEMIA TYPE: ICD-10-CM

## 2024-02-22 DIAGNOSIS — I48.0 PAROXYSMAL ATRIAL FIBRILLATION (H): Primary | ICD-10-CM

## 2024-02-22 DIAGNOSIS — R55 NEAR SYNCOPE: ICD-10-CM

## 2024-02-22 DIAGNOSIS — R79.89 ELEVATED TROPONIN: ICD-10-CM

## 2024-02-22 DIAGNOSIS — I10 BENIGN ESSENTIAL HYPERTENSION: ICD-10-CM

## 2024-02-22 LAB
ANION GAP SERPL CALCULATED.3IONS-SCNC: 11 MMOL/L (ref 7–15)
BASOPHILS # BLD AUTO: 0 10E3/UL (ref 0–0.2)
BASOPHILS NFR BLD AUTO: 0 %
BUN SERPL-MCNC: 26.1 MG/DL (ref 8–23)
CALCIUM SERPL-MCNC: 8.3 MG/DL (ref 8.8–10.2)
CHLORIDE SERPL-SCNC: 111 MMOL/L (ref 98–107)
CREAT SERPL-MCNC: 1.76 MG/DL (ref 0.67–1.17)
DEPRECATED HCO3 PLAS-SCNC: 20 MMOL/L (ref 22–29)
EGFRCR SERPLBLD CKD-EPI 2021: 38 ML/MIN/1.73M2
EOSINOPHIL # BLD AUTO: 0.1 10E3/UL (ref 0–0.7)
EOSINOPHIL NFR BLD AUTO: 1 %
ERYTHROCYTE [DISTWIDTH] IN BLOOD BY AUTOMATED COUNT: 14.6 % (ref 10–15)
GLUCOSE SERPL-MCNC: 99 MG/DL (ref 70–99)
HCT VFR BLD AUTO: 29.3 % (ref 40–53)
HGB BLD-MCNC: 9.7 G/DL (ref 13.3–17.7)
HOLD SPECIMEN: NORMAL
HOLD SPECIMEN: NORMAL
IMM GRANULOCYTES # BLD: 0.1 10E3/UL
IMM GRANULOCYTES NFR BLD: 1 %
LYMPHOCYTES # BLD AUTO: 1.3 10E3/UL (ref 0.8–5.3)
LYMPHOCYTES NFR BLD AUTO: 19 %
MAGNESIUM SERPL-MCNC: 2.2 MG/DL (ref 1.7–2.3)
MCH RBC QN AUTO: 34 PG (ref 26.5–33)
MCHC RBC AUTO-ENTMCNC: 33.1 G/DL (ref 31.5–36.5)
MCV RBC AUTO: 103 FL (ref 78–100)
MONOCYTES # BLD AUTO: 0.6 10E3/UL (ref 0–1.3)
MONOCYTES NFR BLD AUTO: 9 %
NEUTROPHILS # BLD AUTO: 4.7 10E3/UL (ref 1.6–8.3)
NEUTROPHILS NFR BLD AUTO: 70 %
NRBC # BLD AUTO: 0 10E3/UL
NRBC BLD AUTO-RTO: 0 /100
PLATELET # BLD AUTO: 126 10E3/UL (ref 150–450)
POTASSIUM SERPL-SCNC: 4.3 MMOL/L (ref 3.4–5.3)
RBC # BLD AUTO: 2.85 10E6/UL (ref 4.4–5.9)
SODIUM SERPL-SCNC: 142 MMOL/L (ref 135–145)
TROPONIN T SERPL HS-MCNC: 211 NG/L
TSH SERPL DL<=0.005 MIU/L-ACNC: 3.67 UIU/ML (ref 0.3–4.2)
WBC # BLD AUTO: 6.8 10E3/UL (ref 4–11)

## 2024-02-22 PROCEDURE — 83735 ASSAY OF MAGNESIUM: CPT | Performed by: EMERGENCY MEDICINE

## 2024-02-22 PROCEDURE — G0378 HOSPITAL OBSERVATION PER HR: HCPCS

## 2024-02-22 PROCEDURE — 99285 EMERGENCY DEPT VISIT HI MDM: CPT | Mod: 25

## 2024-02-22 PROCEDURE — 84484 ASSAY OF TROPONIN QUANT: CPT | Performed by: EMERGENCY MEDICINE

## 2024-02-22 PROCEDURE — 93005 ELECTROCARDIOGRAM TRACING: CPT | Performed by: STUDENT IN AN ORGANIZED HEALTH CARE EDUCATION/TRAINING PROGRAM

## 2024-02-22 PROCEDURE — 85025 COMPLETE CBC W/AUTO DIFF WBC: CPT | Performed by: STUDENT IN AN ORGANIZED HEALTH CARE EDUCATION/TRAINING PROGRAM

## 2024-02-22 PROCEDURE — 250N000013 HC RX MED GY IP 250 OP 250 PS 637: Performed by: EMERGENCY MEDICINE

## 2024-02-22 PROCEDURE — 80048 BASIC METABOLIC PNL TOTAL CA: CPT | Performed by: EMERGENCY MEDICINE

## 2024-02-22 PROCEDURE — 99222 1ST HOSP IP/OBS MODERATE 55: CPT | Performed by: INTERNAL MEDICINE

## 2024-02-22 PROCEDURE — 84443 ASSAY THYROID STIM HORMONE: CPT | Performed by: EMERGENCY MEDICINE

## 2024-02-22 PROCEDURE — 71046 X-RAY EXAM CHEST 2 VIEWS: CPT

## 2024-02-22 PROCEDURE — 36415 COLL VENOUS BLD VENIPUNCTURE: CPT | Performed by: STUDENT IN AN ORGANIZED HEALTH CARE EDUCATION/TRAINING PROGRAM

## 2024-02-22 RX ORDER — ACETAMINOPHEN 325 MG/1
650 TABLET ORAL EVERY 4 HOURS PRN
Status: DISCONTINUED | OUTPATIENT
Start: 2024-02-22 | End: 2024-02-24 | Stop reason: HOSPADM

## 2024-02-22 RX ORDER — ASPIRIN 81 MG/1
162 TABLET, CHEWABLE ORAL ONCE
Status: COMPLETED | OUTPATIENT
Start: 2024-02-22 | End: 2024-02-22

## 2024-02-22 RX ORDER — AMOXICILLIN 250 MG
1 CAPSULE ORAL 2 TIMES DAILY PRN
Status: DISCONTINUED | OUTPATIENT
Start: 2024-02-22 | End: 2024-02-24 | Stop reason: HOSPADM

## 2024-02-22 RX ORDER — AMOXICILLIN 250 MG
2 CAPSULE ORAL 2 TIMES DAILY PRN
Status: DISCONTINUED | OUTPATIENT
Start: 2024-02-22 | End: 2024-02-24 | Stop reason: HOSPADM

## 2024-02-22 RX ORDER — ONDANSETRON 4 MG/1
4 TABLET, ORALLY DISINTEGRATING ORAL EVERY 6 HOURS PRN
Status: DISCONTINUED | OUTPATIENT
Start: 2024-02-22 | End: 2024-02-24 | Stop reason: HOSPADM

## 2024-02-22 RX ORDER — ONDANSETRON 2 MG/ML
4 INJECTION INTRAMUSCULAR; INTRAVENOUS EVERY 6 HOURS PRN
Status: DISCONTINUED | OUTPATIENT
Start: 2024-02-22 | End: 2024-02-24 | Stop reason: HOSPADM

## 2024-02-22 RX ORDER — ACETAMINOPHEN 650 MG/1
650 SUPPOSITORY RECTAL EVERY 4 HOURS PRN
Status: DISCONTINUED | OUTPATIENT
Start: 2024-02-22 | End: 2024-02-24 | Stop reason: HOSPADM

## 2024-02-22 RX ORDER — ACETAMINOPHEN 500 MG
1000 TABLET ORAL EVERY 6 HOURS PRN
COMMUNITY

## 2024-02-22 RX ADMIN — ASPIRIN 162 MG: 81 TABLET, CHEWABLE ORAL at 15:41

## 2024-02-22 ASSESSMENT — ACTIVITIES OF DAILY LIVING (ADL)
ADLS_ACUITY_SCORE: 36
ADLS_ACUITY_SCORE: 38
ADLS_ACUITY_SCORE: 36
ADLS_ACUITY_SCORE: 38
DEPENDENT_IADLS:: INDEPENDENT
ADLS_ACUITY_SCORE: 38
ADLS_ACUITY_SCORE: 38

## 2024-02-22 ASSESSMENT — COLUMBIA-SUICIDE SEVERITY RATING SCALE - C-SSRS
6. HAVE YOU EVER DONE ANYTHING, STARTED TO DO ANYTHING, OR PREPARED TO DO ANYTHING TO END YOUR LIFE?: NO
2. HAVE YOU ACTUALLY HAD ANY THOUGHTS OF KILLING YOURSELF IN THE PAST MONTH?: NO
1. IN THE PAST MONTH, HAVE YOU WISHED YOU WERE DEAD OR WISHED YOU COULD GO TO SLEEP AND NOT WAKE UP?: NO

## 2024-02-22 NOTE — H&P
"[facility]    History and Physical - Hospitalist Service       Date of Admission:  2/22/2024    Assessment & Plan      Felipe Verma is a 83 year old male admitted on 2/22/2024. He has h/o a fib on DOAC. Presenting with dizziness. Patient was found to b in a fib rvr (Hr 160s) and low bp (sbp 60s). Received treatment by EMS and quickly improved. In ER, patient is in sinus and bp wnl. No symptoms.    Episode of a fib rvr with symptoms  -resolved after EMS treatments  -tele  -continue pta meds after review    Elevated Trop, due to a fib rvr?  -trop 211  -denies cp  -will trend trop  -cardiologist consultation    Radha on ckd  -cr 1.76 higher than baseline 1.5  -ivf and recheck     BPH  -chronic chaidez since April 2023     Observation Goals: -diagnostic tests and consults completed and resulted, -vital signs normal or at patient baseline, -returns to baseline functional status, Nurse to notify provider when observation goals have been met and patient is ready for discharge.  Diet: Combination Diet Low Saturated Fat Na <2400mg Diet    DVT Prophylaxis: DOAC  Chaidez Catheter: Not present  Lines: None     Cardiac Monitoring: ACTIVE order. Indication: Tachyarrhythmias, acute (48 hours)  Code Status: Full Code      Clinically Significant Risk Factors Present on Admission               # Drug Induced Coagulation Defect: home medication list includes an anticoagulant medication  # Drug Induced Platelet Defect: home medication list includes an antiplatelet medication   # Hypertension: Noted on problem list      # Overweight: Estimated body mass index is 26.23 kg/m  as calculated from the following:    Height as of this encounter: 1.715 m (5' 7.5\").    Weight as of this encounter: 77.1 kg (170 lb).              Disposition Plan      Expected Discharge Date: 02/23/2024                  Mihaela Balbuena MD  Hospitalist Service  [facility]  Securely message with [employer] (more " info)  Text page via [facility] Paging/Directory     ______________________________________________________________________    Chief Complaint   dizziness    History is obtained from the patient and emergency department physician    History of Present Illness   Felipe Verma is a 83 year old male with a pertinent history of atrial fibrillation on Warfarin and brilinta, hypertension, NSTEMI, CKD3, brain aneurysm who presents to this ED via EMS for evaluation of dizziness.      EMS reports patient was found at home to be dizzy. EKG showed atrial fibrillation with RVR and blood pressure 70/40. Per family speech was off at this time. Has had decreased PO intake recently.      Patient reports episodes of light-headed and dizziness. States he did not feel his heart racing, but according to EMS it was. Did not have a syncopal event at this time. Felt okay this morning. Denies recent sickness. Denies chest pain, leg swelling/pain.      Patient is on blood thinners.       Past Medical History    Past Medical History:   Diagnosis Date    Acute diverticulitis 4/20/2016    Lung nodule < 6cm on CT 6/6/2018       Past Surgical History   Past Surgical History:   Procedure Laterality Date    CV CORONARY ANGIOGRAM N/A 4/11/2023    Procedure: Coronary Angiogram;  Surgeon: Alejandro Hitchcock MD;  Location: [facility]    CV PCI STENT DRUG ELUTING N/A 4/11/2023    Procedure: Percutaneous Coronary Intervention Stent;  Surgeon: Alejandro Hitchcock MD;  Location: [facility]    OTHER SURGICAL HISTORY      OTHER SURGICAL HISTORYstent placement in head     TONSILLECTOMY         Prior to Admission Medications   Prior to Admission Medications   Prescriptions Last Dose Informant Patient Reported? Taking?   Cyanocobalamin 3000 MCG CAPS   Yes No   Sig: Take 3,000 mcg by mouth daily   acetaminophen (TYLENOL) 325 MG tablet   No No   Sig: Take 2 tablets (650 mg) by mouth every 6 hours as needed for mild pain or other (and  adjunct with moderate or severe pain or per patient request)   alendronate (FOSAMAX) 70 MG tablet   Yes No   Sig: Take 70 mg by mouth every 7 days On Tuesday   apixaban ANTICOAGULANT (ELIQUIS) 2.5 MG tablet   No No   Sig: Take 1 tablet (2.5 mg) by mouth 2 times daily   calcium carbonate 500 mg, elemental, (OSCAL 500) 1250 (500 Ca) MG TABS tablet   Yes No   Sig: Take 1 tablet by mouth daily   diltiazem ER (DILT-XR) 120 MG 24 hr capsule   Yes No   Sig: Take 1 capsule by mouth daily   furosemide (LASIX) 20 MG tablet   No No   Sig: TAKE 1 TABLET BY MOUTH ONCE DAILY FOR INCREASED SWELLING, WEIGHT GAIN, OR SHORTNESS OF BREATH   hydroxychloroquine (PLAQUENIL) 200 MG tablet   Yes No   Sig: Take 200 mg by mouth daily   leucovorin (WELLCOVORIN) 5 mg tablet   Yes No   Sig: Take 5 mg by mouth three times a week On Monday, Wednesday and Saturday. Do not overlap days of methotrexate   losartan (COZAAR) 50 MG tablet   No No   Sig: Take 1 tablet (50 mg) by mouth daily   methotrexate 2.5 MG tablet   Yes No   Sig: Take 12.5 mg by mouth twice a week 5 tablets (12.5mg) on Thursday and Friday   metoprolol succinate ER (TOPROL XL) 50 MG 24 hr tablet   No No   Sig: Take 1 tablet (50 mg) by mouth daily   nitroGLYcerin (NITROSTAT) 0.4 MG sublingual tablet   No No   Sig: For chest pain place 1 tablet under the tongue every 5 minutes for 3 doses. If symptoms persist 5 minutes after 1st dose call 911.   oxybutynin ER (DITROPAN XL) 5 MG 24 hr tablet   Yes No   Sig: Take 5 mg by mouth daily   rosuvastatin (CRESTOR) 10 MG tablet   Yes No   Sig: Take 10 mg by mouth At Bedtime   tamsulosin (FLOMAX) 0.4 MG capsule   No No   Sig: Take 2 capsules (0.8 mg) by mouth daily (with dinner)   ticagrelor (BRILINTA) 90 MG tablet   No No   Sig: Take 1 tablet (90 mg) by mouth every 12 hours   vitamin D3 (CHOLECALCIFEROL) 50 mcg (2000 units) tablet   Yes No   Sig: Take 1 tablet by mouth daily      Facility-Administered Medications: None        Allergies   No  Known Allergies     Physical Exam   Vital Signs: Temp: 97.3  F (36.3  C)   BP: (!) 150/67 Pulse: 72   Resp: 27 SpO2: 98 % O2 Device: None (Room air)    Weight: 170 lbs 0 oz    General.  Awake alert oriented not in acute distress.  HEENT.  Pupils equal round react to light, anicteric, EOM intact.  Neck supple no JVD.  CVS regular rhythm no murmur gallops.  Lungs.  Clear to auscultation bilateral no wheezing or rales.  Abdomen.  Soft nontender bowel sounds present.  Extremities.  No edema no calf tenderness.  Neurological.  Awake and alert. No focal deficit.  Skin no rash. No pallor.  Psych. Normal mood.      Medical Decision Making       56 MINUTES SPENT BY ME on the date of service doing chart review, history, exam, documentation & further activities per the note.      Data     I have personally reviewed the following data over the past 24 hrs:    6.8  \   9.7 (L)   / 126 (L)     142 111 (H) 26.1 (H) /  99   4.3 20 (L) 1.76 (H) \     Trop: 211 (HH) BNP: N/A     TSH: 3.67 T4: N/A A1C: N/A       Imaging results reviewed over the past 24 hrs:   No results found for this or any previous visit (from the past 24 hour(s)).

## 2024-02-22 NOTE — PHARMACY-ADMISSION MEDICATION HISTORY
Pharmacist Admission Medication History    Admission medication history is complete. The information provided in this note is only as accurate as the sources available at the time of the update.    Information Source(s): Patient, Family member, Prescription bottles, and CareEverywhere/SureScripts via in-person and phone    Pertinent Information: Patient could not recall diltiazem or hydroxychloroquine. Called family and they found a supply of hydroxychloroquine in patient's medication supply. However, diltiazem was on his list at home with no bottles present. Unable to find information within EMR supporting a plan to discontinue this medication. Last filled a 90 day supply on 11/6/23 -- unable to confirm if patient stopped this medication or ran out a few days ago (was not in supply of meds patient picked up from the pharmacy today).    Changes made to PTA medication list:  Added: None  Deleted: diltiazem, alendronate (family found supply, but pt reported no longer taking and last filled in August 2023)  Changed: Tylenol, vitamins    Allergies reviewed with patient and updates made in EHR: yes    Medication History Completed By: Mini Donald [facility] 2/22/2024 5:06 PM    PTA Med List   Medication Sig Note Last Dose    acetaminophen (TYLENOL) 500 MG tablet Take 1,000 mg by mouth every 6 hours as needed for mild pain  Unknown    apixaban ANTICOAGULANT (ELIQUIS) 2.5 MG tablet Take 1 tablet (2.5 mg) by mouth 2 times daily  2/22/2024 at AM    calcium carbonate 500 mg, elemental, (OSCAL 500) 1250 (500 Ca) MG TABS tablet Take 1 tablet by mouth daily as needed (when remembers)  Unknown    furosemide (LASIX) 20 MG tablet TAKE 1 TABLET BY MOUTH ONCE DAILY FOR INCREASED SWELLING, WEIGHT GAIN, OR SHORTNESS OF BREATH  2/22/2024    hydroxychloroquine (PLAQUENIL) 200 MG tablet Take 200 mg by mouth daily 2/22/2024: Patient could not recall this medication. However, family looked at medication bottles and this was present with  prescription written for BID & instructions crossed out, replaced with daily. Unknown    leucovorin (WELLCOVORIN) 5 mg tablet Take 5 mg by mouth three times a week On Monday, Wednesday and Saturday. Do not overlap days of methotrexate  2/21/2024    losartan (COZAAR) 50 MG tablet Take 1 tablet (50 mg) by mouth daily  2/22/2024    methotrexate 2.5 MG tablet Take 12.5 mg by mouth twice a week 5 tablets (12.5mg) on Thursday and Friday 2/22/2024    metoprolol succinate ER (TOPROL XL) 50 MG 24 hr tablet Take 1 tablet (50 mg) by mouth daily  2/22/2024    nitroGLYcerin (NITROSTAT) 0.4 MG sublingual tablet For chest pain place 1 tablet under the tongue every 5 minutes for 3 doses. If symptoms persist 5 minutes after 1st dose call 911.  never taken    oxybutynin ER (DITROPAN XL) 5 MG 24 hr tablet Take 5 mg by mouth daily  2/22/2024    rosuvastatin (CRESTOR) 10 MG tablet Take 10 mg by mouth At Bedtime  2/21/2024    tamsulosin (FLOMAX) 0.4 MG capsule Take 2 capsules (0.8 mg) by mouth daily (with dinner)  2/21/2024    ticagrelor (BRILINTA) 90 MG tablet Take 1 tablet (90 mg) by mouth every 12 hours 2/22/2024: According to EMR, this medication will continue until at least April 2024 -- 12 months s/p PCI. 2/22/2024 at AM    vitamin B-12 (CYANOCOBALAMIN) 1000 MCG tablet Take 1,000 mcg by mouth daily as needed (when remembers)  Unknown    vitamin D3 (CHOLECALCIFEROL) 50 mcg (2000 units) tablet Take 1 tablet by mouth daily as needed (when remembers)  Unknown

## 2024-02-22 NOTE — ED PROVIDER NOTES
Emergency Department Encounter     Evaluation Date & Time:   2/22/2024  2:23 PM    CHIEF COMPLAINT:  Dizziness and Tachycardia      Triage Note:Pt dizzy at home, ems noted pt afib hr 120-160. Bp 70/40 on scene.          ED COURSE & MEDICAL DECISION MAKING:     Pt here by EMS from home after he became lightheaded/dizzy, found to be tachycardic with Afib in RVR and hypotensive to the 60s systolic.  Reportedly speech sounded off during this per family.  EMS gave him IVF and verapamil 2.5mg IV, which ultimately resulted in resolution of Afib to NSR, improved BPs and pt now entirely asymptomatic and in NSR.  Pt denies chest pain, dyspnea, syncope, leg pain/swelling.  Pt was otherwise well prior to this, able to go out this morning. Pt has a history of paroxysmal Afib on Eliquis and metoprolol.  Pt has been in NSR since arrival here.  Will reassess after initial workup.    ED Course as of 02/22/24 1721   Thu Feb 22, 2024   1440 I met with the patient for the initial interview and physical examination. Discussed plan for treatment and workup in the ED.      1527 Cr near baseline with CKD. Hgb 9.7, which is slightly lower from most recent in past year.  Normal WBC, no bleeding history. Rest of labs overall reassuring/unremarkable.   1533 Trop 211.  Will give aspirin and plan for hospitalization overnight.  Pt had coronary artery stents after cath performed in April, 2023.   1548 Pt updated on results, plan to hospitalize overnight for further cardiac monitoring/testing.  Pt remains entirely asymptomatic here.  No cp/sob.   1622 4:22 PM I discussed the case with hospitalist, Dr Balbuena, who accepts the patient.          Medical Decision Making    History:  Supplemental history from: Documented in chart  External Record(s) reviewed: Documented in chart    Work Up:  Chart documentation includes differential considered and any EKGs or imaging independently interpreted by provider, where specified.  In additional to work up  documented, I considered the following work up: Documented in chart, if applicable.    External consultation:  Discussion of management with another provider: Documented in chart, if applicable and Hospitalist    Complicating factors:  Care impacted by chronic illness: Anticoagulated State, Chronic Kidney Disease, Hypertension, and Other: atrial fibrillation, brain aneurysm, NSTEMI  Care affected by social determinants of health: N/A    Disposition considerations: Admit.      At the conclusion of the encounter I discussed the results of all the tests and the disposition. The questions were answered. The patient or family acknowledged understanding and was agreeable with the care plan.      MEDICATIONS GIVEN IN THE EMERGENCY DEPARTMENT:  Medications   senna-docusate (SENOKOT-S/PERICOLACE) 8.6-50 MG per tablet 1 tablet (has no administration in time range)     Or   senna-docusate (SENOKOT-S/PERICOLACE) 8.6-50 MG per tablet 2 tablet (has no administration in time range)   ondansetron (ZOFRAN ODT) ODT tab 4 mg (has no administration in time range)     Or   ondansetron (ZOFRAN) injection 4 mg (has no administration in time range)   Patient is already receiving anticoagulation with heparin, enoxaparin (LOVENOX), warfarin (COUMADIN)  or other anticoagulant medication (has no administration in time range)   acetaminophen (TYLENOL) tablet 650 mg (has no administration in time range)     Or   acetaminophen (TYLENOL) Suppository 650 mg (has no administration in time range)   aspirin (ASA) chewable tablet 162 mg (162 mg Oral $Given 2/22/24 3309)       NEW PRESCRIPTIONS STARTED AT TODAY'S ED VISIT:  New Prescriptions    No medications on file       HPI   The history is provided by the patient and the EMS personnel. No  was used.        Felipe Verma is a 83 year old male with a pertinent history of atrial fibrillation on Warfarin and brilinta, hypertension, NSTEMI, CKD3, brain aneurysm who presents to  this ED via EMS for evaluation of dizziness.     EMS reports patient was found at home to be dizzy. EKG showed atrial fibrillation with RVR and blood pressure 70/40. Per family speech was off at this time. Has had decreased PO intake recently.     Patient reports episodes of light-headed and dizziness. States he did not feel his heart racing, but according to EMS it was. Did not have a syncopal event at this time. Felt okay this morning. Denies recent sickness. Denies chest pain, leg swelling/pain.     Patient is on blood thinners.     REVIEW OF SYSTEMS:  See HPI      Medical History     Past Medical History:   Diagnosis Date    Acute diverticulitis 4/20/2016    Lung nodule < 6cm on CT 6/6/2018       Past Surgical History:   Procedure Laterality Date    CV CORONARY ANGIOGRAM N/A 4/11/2023    Procedure: Coronary Angiogram;  Surgeon: Alejandro Hitchcock MD;  Location: [facility] CATH LAB CV    CV PCI STENT DRUG ELUTING N/A 4/11/2023    Procedure: Percutaneous Coronary Intervention Stent;  Surgeon: Alejandro Hitchcock MD;  Location: [facility] CATH LAB CV    OTHER SURGICAL HISTORY      OTHER SURGICAL HISTORYstent placement in head     TONSILLECTOMY         Family History   Problem Relation Age of Onset    Colon Cancer Mother     Hypertension Father     Coronary Artery Disease Father     Aneurysm Sister     Cerebrovascular Disease Sister     No Known Problems Brother     Diabetes Son     No Known Problems Son     No Known Problems Son     Breast Cancer No family hx of     Prostate Cancer No family hx of        Social History     Tobacco Use    Smoking status: Former    Smokeless tobacco: Never   Vaping Use    Vaping Use: Never used   Substance Use Topics    Drug use: Not Currently       acetaminophen (TYLENOL) 500 MG tablet  apixaban ANTICOAGULANT (ELIQUIS) 2.5 MG tablet  calcium carbonate 500 mg, elemental, (OSCAL 500) 1250 (500 Ca) MG TABS tablet  furosemide (LASIX) 20 MG tablet  hydroxychloroquine (PLAQUENIL) 200  "MG tablet  leucovorin (WELLCOVORIN) 5 mg tablet  losartan (COZAAR) 50 MG tablet  methotrexate 2.5 MG tablet  metoprolol succinate ER (TOPROL XL) 50 MG 24 hr tablet  nitroGLYcerin (NITROSTAT) 0.4 MG sublingual tablet  oxybutynin ER (DITROPAN XL) 5 MG 24 hr tablet  rosuvastatin (CRESTOR) 10 MG tablet  tamsulosin (FLOMAX) 0.4 MG capsule  ticagrelor (BRILINTA) 90 MG tablet  vitamin B-12 (CYANOCOBALAMIN) 1000 MCG tablet  vitamin D3 (CHOLECALCIFEROL) 50 mcg (2000 units) tablet        Physical Exam     Vitals:  /74   Pulse 54   Temp 97.3  F (36.3  C)   Resp 20   Ht 1.715 m (5' 7.5\")   Wt 77.1 kg (170 lb)   SpO2 98%   BMI 26.23 kg/m      PHYSICAL EXAM:   Physical Exam  Vitals and nursing note reviewed.   Constitutional:       General: He is not in acute distress.     Comments: Elderly appearing   HENT:      Head: Normocephalic and atraumatic.      Mouth/Throat:      Mouth: Mucous membranes are moist.   Eyes:      Extraocular Movements: Extraocular movements intact.      Pupils: Pupils are equal, round, and reactive to light.      Comments: No vertical or bidirectional nystagmus   Neck:      Vascular: No JVD.   Cardiovascular:      Rate and Rhythm: Normal rate and regular rhythm.      Pulses:           Radial pulses are 2+ on the right side and 2+ on the left side.        Posterior tibial pulses are 2+ on the right side and 2+ on the left side.   Pulmonary:      Effort: Pulmonary effort is normal. No respiratory distress.      Breath sounds: Normal breath sounds.   Abdominal:      General: There is no distension.      Palpations: Abdomen is soft.      Tenderness: There is no abdominal tenderness.   Musculoskeletal:         General: Normal range of motion.      Cervical back: Normal range of motion.      Comments: No calf tenderness or swelling   Skin:     General: Skin is warm.      Capillary Refill: Capillary refill takes less than 2 seconds.   Neurological:      Mental Status: He is alert and oriented to " person, place, and time.      Sensory: Sensation is intact.      Motor: Motor function is intact.      Comments: Fluent speech, no facial asymmetry or pronator drift, 5/5 strength b/l UE/LE, normal finger to nose b/l           Results     LAB:  All pertinent labs reviewed and interpreted  Labs Ordered and Resulted from Time of ED Arrival to Time of ED Departure   BASIC METABOLIC PANEL - Abnormal       Result Value    Sodium 142      Potassium 4.3      Chloride 111 (*)     Carbon Dioxide (CO2) 20 (*)     Anion Gap 11      Urea Nitrogen 26.1 (*)     Creatinine 1.76 (*)     GFR Estimate 38 (*)     Calcium 8.3 (*)     Glucose 99     TROPONIN T, HIGH SENSITIVITY - Abnormal    Troponin T, High Sensitivity 211 (*)    CBC WITH PLATELETS AND DIFFERENTIAL - Abnormal    WBC Count 6.8      RBC Count 2.85 (*)     Hemoglobin 9.7 (*)     Hematocrit 29.3 (*)      (*)     MCH 34.0 (*)     MCHC 33.1      RDW 14.6      Platelet Count 126 (*)     % Neutrophils 70      % Lymphocytes 19      % Monocytes 9      % Eosinophils 1      % Basophils 0      % Immature Granulocytes 1      NRBCs per 100 WBC 0      Absolute Neutrophils 4.7      Absolute Lymphocytes 1.3      Absolute Monocytes 0.6      Absolute Eosinophils 0.1      Absolute Basophils 0.0      Absolute Immature Granulocytes 0.1      Absolute NRBCs 0.0     MAGNESIUM - Normal    Magnesium 2.2     TSH WITH FREE T4 REFLEX - Normal    TSH 3.67         RADIOLOGY:  Chest XR,  PA & LAT   Final Result   IMPRESSION: Lungs are clear. No evidence of pneumonia. No pleural effusions or pneumothorax. Normal pulmonary vascularity. Nonenlarged cardiac silhouette. Coronary arterial stent.                   ECG:  NSR, rate 81, normal intervals, no acute ischemia    I have independently reviewed and interpreted the EKG(s) documented above     PROCEDURES:  Procedures:        FINAL IMPRESSION:    ICD-10-CM    1. Paroxysmal atrial fibrillation (H)  I48.0       2. Elevated troponin  R79.89       3.  Near syncope  R55           0 minutes of critical care time      I, Carolyn Mccallum, am serving as a scribe to document services personally performed by Dr. Evans Garduno, based on my observations and the provider's statements to me. I, Evans Garduno, DO attest that Carolyn Mccallum is acting in a scribe capacity, has observed my performance of the services and has documented them in accordance with my direction.      Evans Garduno DO  Emergency Medicine  [facility] EMERGENCY DEPARTMENT  2/22/2024  2:40 PM          Evans Garduno MD  02/22/24 5540

## 2024-02-22 NOTE — ED NOTES
Bed: JNED-36  Expected date:   Expected time:   Means of arrival:   Comments:  RUDDY HONG   hair removal not indicated

## 2024-02-22 NOTE — ED TRIAGE NOTES
Pt dizzy at home, ems noted pt afib hr 120-160. Bp 70/40 on scene.     Triage Assessment (Adult)       Row Name 02/22/24 1435          Triage Assessment    Airway WDL WDL        Respiratory WDL    Respiratory WDL WDL        Skin Circulation/Temperature WDL    Skin Circulation/Temperature WDL WDL        Cardiac WDL    Cardiac WDL WDL        Peripheral/Neurovascular WDL    Peripheral Neurovascular WDL WDL        Cognitive/Neuro/Behavioral WDL    Cognitive/Neuro/Behavioral WDL WDL

## 2024-02-22 NOTE — ED NOTES
Bed: JNEDH-D  Expected date: 2/22/24  Expected time: 2:11 PM  Means of arrival:   Comments:  Afib \given verapimil and better?[facility]

## 2024-02-23 ENCOUNTER — APPOINTMENT (OUTPATIENT)
Dept: CARDIOLOGY | Facility: HOSPITAL | Age: 84
End: 2024-02-23
Attending: INTERNAL MEDICINE
Payer: MEDICARE

## 2024-02-23 PROBLEM — M05.79 RHEUMATOID ARTHRITIS INVOLVING MULTIPLE SITES WITH POSITIVE RHEUMATOID FACTOR (H): Status: ACTIVE | Noted: 2023-03-16

## 2024-02-23 PROBLEM — I10 BENIGN ESSENTIAL HYPERTENSION: Status: ACTIVE | Noted: 2018-06-06

## 2024-02-23 LAB
ANION GAP SERPL CALCULATED.3IONS-SCNC: 9 MMOL/L (ref 7–15)
ATRIAL RATE - MUSE: 81 BPM
BUN SERPL-MCNC: 26.6 MG/DL (ref 8–23)
CALCIUM SERPL-MCNC: 8.8 MG/DL (ref 8.8–10.2)
CHLORIDE SERPL-SCNC: 108 MMOL/L (ref 98–107)
CREAT SERPL-MCNC: 1.61 MG/DL (ref 0.67–1.17)
DEPRECATED HCO3 PLAS-SCNC: 23 MMOL/L (ref 22–29)
DIASTOLIC BLOOD PRESSURE - MUSE: 69 MMHG
EGFRCR SERPLBLD CKD-EPI 2021: 42 ML/MIN/1.73M2
GLUCOSE SERPL-MCNC: 133 MG/DL (ref 70–99)
HOLD SPECIMEN: NORMAL
INTERPRETATION ECG - MUSE: NORMAL
IRON SERPL-MCNC: 160 UG/DL (ref 61–157)
P AXIS - MUSE: 58 DEGREES
POTASSIUM SERPL-SCNC: 4.9 MMOL/L (ref 3.4–5.3)
PR INTERVAL - MUSE: 184 MS
QRS DURATION - MUSE: 98 MS
QT - MUSE: 386 MS
QTC - MUSE: 448 MS
R AXIS - MUSE: -22 DEGREES
SODIUM SERPL-SCNC: 140 MMOL/L (ref 135–145)
SYSTOLIC BLOOD PRESSURE - MUSE: 116 MMHG
T AXIS - MUSE: 110 DEGREES
TROPONIN T SERPL HS-MCNC: 219 NG/L
VENTRICULAR RATE- MUSE: 81 BPM

## 2024-02-23 PROCEDURE — 93306 TTE W/DOPPLER COMPLETE: CPT | Mod: 26 | Performed by: INTERNAL MEDICINE

## 2024-02-23 PROCEDURE — 250N000013 HC RX MED GY IP 250 OP 250 PS 637: Performed by: INTERNAL MEDICINE

## 2024-02-23 PROCEDURE — G0378 HOSPITAL OBSERVATION PER HR: HCPCS

## 2024-02-23 PROCEDURE — 82607 VITAMIN B-12: CPT | Performed by: INTERNAL MEDICINE

## 2024-02-23 PROCEDURE — 99232 SBSQ HOSP IP/OBS MODERATE 35: CPT | Performed by: INTERNAL MEDICINE

## 2024-02-23 PROCEDURE — 99222 1ST HOSP IP/OBS MODERATE 55: CPT | Performed by: NURSE PRACTITIONER

## 2024-02-23 PROCEDURE — 999N000208 ECHOCARDIOGRAM COMPLETE

## 2024-02-23 PROCEDURE — C8929 TTE W OR WO FOL WCON,DOPPLER: HCPCS

## 2024-02-23 PROCEDURE — 99214 OFFICE O/P EST MOD 30 MIN: CPT | Performed by: INTERNAL MEDICINE

## 2024-02-23 PROCEDURE — 255N000002 HC RX 255 OP 636: Performed by: INTERNAL MEDICINE

## 2024-02-23 PROCEDURE — 84484 ASSAY OF TROPONIN QUANT: CPT | Performed by: INTERNAL MEDICINE

## 2024-02-23 PROCEDURE — 80048 BASIC METABOLIC PNL TOTAL CA: CPT | Performed by: INTERNAL MEDICINE

## 2024-02-23 PROCEDURE — 83540 ASSAY OF IRON: CPT | Performed by: INTERNAL MEDICINE

## 2024-02-23 PROCEDURE — 36415 COLL VENOUS BLD VENIPUNCTURE: CPT | Performed by: INTERNAL MEDICINE

## 2024-02-23 RX ORDER — AMIODARONE HYDROCHLORIDE 200 MG/1
400 TABLET ORAL 2 TIMES DAILY
Status: DISCONTINUED | OUTPATIENT
Start: 2024-02-23 | End: 2024-02-23

## 2024-02-23 RX ORDER — TAMSULOSIN HYDROCHLORIDE 0.4 MG/1
0.8 CAPSULE ORAL
Status: DISCONTINUED | OUTPATIENT
Start: 2024-02-23 | End: 2024-02-24 | Stop reason: HOSPADM

## 2024-02-23 RX ORDER — METOPROLOL SUCCINATE 50 MG/1
50 TABLET, EXTENDED RELEASE ORAL DAILY
Status: DISCONTINUED | OUTPATIENT
Start: 2024-02-23 | End: 2024-02-23

## 2024-02-23 RX ORDER — CLOPIDOGREL BISULFATE 75 MG/1
75 TABLET ORAL DAILY
Status: DISCONTINUED | OUTPATIENT
Start: 2024-02-23 | End: 2024-02-24 | Stop reason: HOSPADM

## 2024-02-23 RX ORDER — OXYBUTYNIN CHLORIDE 5 MG/1
5 TABLET, EXTENDED RELEASE ORAL DAILY
Status: DISCONTINUED | OUTPATIENT
Start: 2024-02-23 | End: 2024-02-24 | Stop reason: HOSPADM

## 2024-02-23 RX ORDER — LOSARTAN POTASSIUM 25 MG/1
25 TABLET ORAL DAILY
Status: DISCONTINUED | OUTPATIENT
Start: 2024-02-24 | End: 2024-02-24 | Stop reason: HOSPADM

## 2024-02-23 RX ORDER — ATORVASTATIN CALCIUM 10 MG/1
20 TABLET, FILM COATED ORAL EVERY EVENING
Status: DISCONTINUED | OUTPATIENT
Start: 2024-02-23 | End: 2024-02-24 | Stop reason: HOSPADM

## 2024-02-23 RX ORDER — NITROGLYCERIN 0.4 MG/1
0.4 TABLET SUBLINGUAL EVERY 5 MIN PRN
Status: DISCONTINUED | OUTPATIENT
Start: 2024-02-23 | End: 2024-02-24 | Stop reason: HOSPADM

## 2024-02-23 RX ORDER — METOPROLOL SUCCINATE 50 MG/1
50 TABLET, EXTENDED RELEASE ORAL DAILY
Status: DISCONTINUED | OUTPATIENT
Start: 2024-02-23 | End: 2024-02-23 | Stop reason: ALTCHOICE

## 2024-02-23 RX ORDER — ROSUVASTATIN CALCIUM 10 MG/1
10 TABLET, COATED ORAL AT BEDTIME
Status: DISCONTINUED | OUTPATIENT
Start: 2024-02-23 | End: 2024-02-23 | Stop reason: ALTCHOICE

## 2024-02-23 RX ORDER — ACETAMINOPHEN 500 MG
1000 TABLET ORAL EVERY 6 HOURS PRN
Status: DISCONTINUED | OUTPATIENT
Start: 2024-02-23 | End: 2024-02-23

## 2024-02-23 RX ADMIN — TAMSULOSIN HYDROCHLORIDE 0.8 MG: 0.4 CAPSULE ORAL at 16:52

## 2024-02-23 RX ADMIN — ATORVASTATIN CALCIUM 20 MG: 10 TABLET, FILM COATED ORAL at 20:41

## 2024-02-23 RX ADMIN — APIXABAN 2.5 MG: 2.5 TABLET, FILM COATED ORAL at 10:04

## 2024-02-23 RX ADMIN — PERFLUTREN 2 ML: 6.52 INJECTION, SUSPENSION INTRAVENOUS at 10:58

## 2024-02-23 RX ADMIN — CLOPIDOGREL BISULFATE 75 MG: 75 TABLET ORAL at 10:04

## 2024-02-23 RX ADMIN — OXYBUTYNIN CHLORIDE 5 MG: 5 TABLET, EXTENDED RELEASE ORAL at 16:51

## 2024-02-23 RX ADMIN — APIXABAN 2.5 MG: 2.5 TABLET, FILM COATED ORAL at 20:41

## 2024-02-23 RX ADMIN — METOPROLOL SUCCINATE 50 MG: 50 TABLET, EXTENDED RELEASE ORAL at 10:04

## 2024-02-23 ASSESSMENT — ACTIVITIES OF DAILY LIVING (ADL)
ADLS_ACUITY_SCORE: 38
ADLS_ACUITY_SCORE: 37
ADLS_ACUITY_SCORE: 38
ADLS_ACUITY_SCORE: 37
ADLS_ACUITY_SCORE: 38
ADLS_ACUITY_SCORE: 37
ADLS_ACUITY_SCORE: 38
ADLS_ACUITY_SCORE: 38
ADLS_ACUITY_SCORE: 37

## 2024-02-23 NOTE — PLAN OF CARE
PRIMARY DIAGNOSIS: SYNCOPE/TIA  OUTPATIENT/OBSERVATION GOALS TO BE MET BEFORE DISCHARGE:  1. Orthostatic performed: No    2. Diagnostic testing complete & at baseline neurologic testing: No    3. Cleared by consultants (if involved): No    4. Interpretation of cardiac rhythm per telemetry tech: Sinus monik    5. Tolerating adequate PO diet and medications: Yes    6. Return to near baseline physical activity or neurologic status: Yes    Discharge Planner Nurse   Safe discharge environment identified: Yes  Barriers to discharge: Yes       Entered by: Gavin Courtney RN 02/23/2024 2:06 PM     Please review provider order for any additional goals.   Nurse to notify provider when observation goals have been met and patient is ready for discharge.Goal Outcome Evaluation:  Pt alert and oriented x4. VSS. Tele sinus monik. Denied pain. Good oral intake. Independent with ADLs.

## 2024-02-23 NOTE — PLAN OF CARE
PRIMARY DIAGNOSIS: SYNCOPE/TIA  OUTPATIENT/OBSERVATION GOALS TO BE MET BEFORE DISCHARGE:  1. Orthostatic performed: No    2. Diagnostic testing complete & at baseline neurologic testing: Yes    3. Cleared by consultants (if involved): No    4. Interpretation of cardiac rhythm per telemetry tech: Sinus monik    5. Tolerating adequate PO diet and medications: Yes    6. Return to near baseline physical activity or neurologic status: Yes    Discharge Planner Nurse   Safe discharge environment identified: Yes  Barriers to discharge: Yes       Entered by: Gavin Courtney RN 02/23/2024 8:58 AM     Please review provider order for any additional goals.   Nurse to notify provider when observation goals have been met and patient is ready for discharge.Goal Outcome Evaluation:

## 2024-02-23 NOTE — CONSULTS
[facility]  Cardiac Electrophysiology  [address]  [place] [address]   Office: [phone]  Fax: [phone]     Cardiac Electrophysiology Consultation    Patient: Felipe Verma   : 1940     Referring Provider: Dr. Fleming    CHIEF COMPLAINT/REASON FOR CONSULTATION  Atrial fibrillation with rapid ventricular response    Assessment/Recommendations   Paroxysmal atrial fibrillation with rapid ventricular response: Previously switched to rate control strategy, but subsequently determined to be symptomatic; lightheadedness and hypotension due to AF-RVR.  Recommend proceeding with a rhythm control strategy.  However, he is not a good candidate for membrane active antiarrhythmic medications.  He is not inclined to date for class Ic medications due to CAD, not an ideal candidate for sotalol due to CKD, had issues with QT prolongation on amiodarone.  Consider outpatient catheter ablation.  -- Diltiazem discontinued due to LVEF 40%  -- Increase metoprolol XL to 75 mg daily, uptitrate if tolerated  -- Follow-up with EP MD in clinic  -- Okay to discharge from EP standpoint    [id number] score of 6 for age >75, HTN, CAD, CVAs (SAH).  Consideration of left atrial appendage closure with a Watchman device in the future, but must wait until after TURP which cannot be done until he can come off clopidogrel in April.  --Continue Eliquis 2.5 mg twice daily    Discussed with Dr. Lopez       History of Present Illness   Felipe Verma is a 83 year old male hospitalized for A-fib with RVR associated with hypotension and lightheadedness.  EMS gave him IV fluids and verapamil 2.5 mg IV with conversion to sinus rhythm and resolution of symptoms.  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  TURP  "planned once he can stop Brilinta (now on clopidogrel).  Previously discussed left atrial appendage closure with Watchman device-due to interruption in anticoagulation, must wait until after TURP completed.    Nikos states that he feels well.  He denies chest discomfort, palpitations, shortness of breath, abdominal fullness/bloating, PND, orthopnea, lightheadedness, dizziness, presyncope, or syncope.    Arrhythmia history  Dx/date: Atrial fibrillation diagnosed June 2021.  Initially on a rate control with metoprolol and diltiazem.  Was in SR at subsequent follow-up.  Sx: Asymptomatic when ventricular rates controlled, positional dizziness with RVR  [id number] score: 6 for age >75, HTN, CAD, CVA (SAH)  Oral anticoagulation: Eliquis 2.5 mg twice daily dose adjusted for age >80 and creatinine >1.5  Antiarrhythmic medications, AV wm blocking agents: Amiodarone discontinued May 2023 due to prolonged QT.  Metoprolol XL 50 mg daily with diltiazem 120 mg daily for rate control  Procedures  DCCV: N/A  Ablation: N/A         Physical Examination  Review of Systems   VITALS: BP (!) 160/72   Pulse 75   Temp 98.1  F (36.7  C) (Oral)   Resp 18   Ht 1.715 m (5' 7.5\")   Wt 79.4 kg (175 lb 1.4 oz)   SpO2 99%   BMI 27.02 kg/m    Wt Readings from Last 3 Encounters:   02/22/24 79.4 kg (175 lb 1.4 oz)   11/07/23 77.1 kg (170 lb)   08/09/23 77.1 kg (170 lb)       Intake/Output Summary (Last 24 hours) at 2/23/2024 1447  Last data filed at 2/23/2024 0353  Gross per 24 hour   Intake 120 ml   Output 1700 ml   Net -1580 ml     General Appearance:   Alert, well-appearing and in no acute distress.   HEENT: Atraumatic, normocephalic.  No scleral icterus, normal conjunctivae, EOMs intact, PERRL.  Mucous membranes pink and moist.     Chest/Lungs:   Chest symmetric, spine straight.  Respirations unlabored.  Lungs are clear to auscultation.   Cardiovascular:   Regular rate and rhythm.  Normal first and second heart sounds with no murmurs, " rubs, or gallops; radial pulses are intact, mild bilateral lower extremity edema.   Abdomen:  Soft, nondistended, bowel sounds present.   Extremities: No cyanosis or clubbing.   Musculoskeletal: Moves all extremities.     Skin: Warm, dry, intact.    Neurologic: Mood and affect are appropriate.  Alert and oriented to person, place, time, and situation.        ROS: 10 point ROS neg other than the symptoms noted above in the HPI.       Medical History  Surgical History   Past Medical History:   Diagnosis Date    Acute diverticulitis 4/20/2016    Lung nodule < 6cm on CT 6/6/2018    Past Surgical History:   Procedure Laterality Date    CV CORONARY ANGIOGRAM N/A 4/11/2023    Procedure: Coronary Angiogram;  Surgeon: Alejandro Hitchcock MD;  Location: [facility] CATH LAB CV    CV PCI STENT DRUG ELUTING N/A 4/11/2023    Procedure: Percutaneous Coronary Intervention Stent;  Surgeon: Alejandro Hitchcock MD;  Location: [facility] CATH LAB CV    OTHER SURGICAL HISTORY      OTHER SURGICAL HISTORYstent placement in head     TONSILLECTOMY           Family History Social History   Family History   Problem Relation Age of Onset    Colon Cancer Mother     Hypertension Father     Coronary Artery Disease Father     Aneurysm Sister     Cerebrovascular Disease Sister     No Known Problems Brother     Diabetes Son     No Known Problems Son     No Known Problems Son     Breast Cancer No family hx of     Prostate Cancer No family hx of         Social History     Tobacco Use    Smoking status: Former    Smokeless tobacco: Never   Vaping Use    Vaping Use: Never used   Substance Use Topics    Drug use: Not Currently         Medications  Allergies     Current Facility-Administered Medications:     acetaminophen (TYLENOL) tablet 650 mg, 650 mg, Oral, Q4H PRN **OR** acetaminophen (TYLENOL) Suppository 650 mg, 650 mg, Rectal, Q4H PRN, Mihaela Balbuena MD    apixaban ANTICOAGULANT (ELIQUIS) tablet 2.5 mg, 2.5 mg, Oral, BID, Luis Enrique Fleming  MD Catalino, 2.5 mg at 02/23/24 1004    atorvastatin (LIPITOR) tablet 20 mg, 20 mg, Oral, QPM, Luis Enrique Flemnig MD    clopidogrel (PLAVIX) tablet 75 mg, 75 mg, Oral, Daily, Luis Enrique Fleming MD, 75 mg at 02/23/24 1004    metoprolol succinate ER (TOPROL XL) 24 hr tablet 50 mg, 50 mg, Oral, Daily, Luis Enrique Fleming MD, 50 mg at 02/23/24 1004    ondansetron (ZOFRAN ODT) ODT tab 4 mg, 4 mg, Oral, Q6H PRN **OR** ondansetron (ZOFRAN) injection 4 mg, 4 mg, Intravenous, Q6H PRN, Mihaela Balbuena MD    Patient is already receiving anticoagulation with heparin, enoxaparin (LOVENOX), warfarin (COUMADIN)  or other anticoagulant medication, , Does not apply, Continuous PRN, Mihaela Balbuena MD    senna-docusate (SENOKOT-S/PERICOLACE) 8.6-50 MG per tablet 1 tablet, 1 tablet, Oral, BID PRN **OR** senna-docusate (SENOKOT-S/PERICOLACE) 8.6-50 MG per tablet 2 tablet, 2 tablet, Oral, BID PRN, Mihaela Balbuena MD    Current Outpatient Medications:     acetaminophen (TYLENOL) 500 MG tablet, Take 1,000 mg by mouth every 6 hours as needed for mild pain, Disp: , Rfl:     apixaban ANTICOAGULANT (ELIQUIS) 2.5 MG tablet, Take 1 tablet (2.5 mg) by mouth 2 times daily, Disp: 180 tablet, Rfl: 3    calcium carbonate 500 mg, elemental, (OSCAL 500) 1250 (500 Ca) MG TABS tablet, Take 1 tablet by mouth daily as needed (when remembers), Disp: , Rfl:     furosemide (LASIX) 20 MG tablet, TAKE 1 TABLET BY MOUTH ONCE DAILY FOR INCREASED SWELLING, WEIGHT GAIN, OR SHORTNESS OF BREATH, Disp: 90 tablet, Rfl: 3    hydroxychloroquine (PLAQUENIL) 200 MG tablet, Take 200 mg by mouth daily, Disp: , Rfl:     leucovorin (WELLCOVORIN) 5 mg tablet, Take 5 mg by mouth three times a week On Monday, Wednesday and Saturday. Do not overlap days of methotrexate, Disp: , Rfl:     losartan (COZAAR) 50 MG tablet, Take 1 tablet (50 mg) by mouth daily, Disp: 90 tablet, Rfl: 3    methotrexate 2.5 MG tablet, Take 12.5 mg by mouth twice a week 5 tablets (12.5mg) on  "Thursday and Friday, Disp: , Rfl:     metoprolol succinate ER (TOPROL XL) 50 MG 24 hr tablet, Take 1 tablet (50 mg) by mouth daily, Disp: 90 tablet, Rfl: 3    nitroGLYcerin (NITROSTAT) 0.4 MG sublingual tablet, For chest pain place 1 tablet under the tongue every 5 minutes for 3 doses. If symptoms persist 5 minutes after 1st dose call 911., Disp: 25 tablet, Rfl: 1    oxybutynin ER (DITROPAN XL) 5 MG 24 hr tablet, Take 5 mg by mouth daily, Disp: , Rfl:     rosuvastatin (CRESTOR) 10 MG tablet, Take 10 mg by mouth At Bedtime, Disp: , Rfl:     tamsulosin (FLOMAX) 0.4 MG capsule, Take 2 capsules (0.8 mg) by mouth daily (with dinner), Disp: 60 capsule, Rfl: 3    vitamin B-12 (CYANOCOBALAMIN) 1000 MCG tablet, Take 1,000 mcg by mouth daily as needed (when remembers), Disp: , Rfl:     vitamin D3 (CHOLECALCIFEROL) 50 mcg (2000 units) tablet, Take 1 tablet by mouth daily as needed (when remembers), Disp: , Rfl:    No Known Allergies       Lab Results    Chemistry CBC Cardiac Enzymes/BNP/TSH/INR   Recent Labs   Lab Test 02/23/24  1314      POTASSIUM 4.9   CHLORIDE 108*   CO2 23   *   BUN 26.6*   CR 1.61*   GFRESTIMATED 42*   ABBEY 8.8     Recent Labs   Lab Test 02/23/24  1314 02/22/24  1432 06/02/23  1411   CR 1.61* 1.76* 1.54*          Recent Labs   Lab Test 02/22/24  1432   WBC 6.8   HGB 9.7*   HCT 29.3*   *   *     Recent Labs   Lab Test 02/22/24  1432 04/13/23  0510 04/12/23  2105   HGB 9.7* 11.3* 11.3*    No results for input(s): \"TROPONINI\" in the last 86439 hours.  Recent Labs   Lab Test 04/10/23  2209   NTBNPI 325     Recent Labs   Lab Test 02/22/24  1432   TSH 3.67     Recent Labs   Lab Test 04/10/23  2209   INR 1.19*         Data Review    ECGs (all tracings independently reviewed)  2/22/2024: SR 81 bpm  5/5/2023: SR 71 bpm  4/13/2023: AF- bpm    Telemetry shows sinus rhythm in the 70s, no A-fib    Echo done 2/23/2024:  1. The left ventricle is normal in size. Left ventricular systolic " performance  is moderately reduced. The ejection fraction is estimated to be 40%.  2. There is mild-moderate global reduction in left ventricular systolic  performance with more noticeable anteroseptal and apical hypokinesis (no  apical mural thrombus is detected).  3. No significant valvular heart disease is identified on this study.  4. Normal right ventricular size and systolic performance.  5. There is mild left atrial enlargement.     When compared to the prior real-time echocardiogram dated 11 April 2023, the  wall motion abnormality involving the LAD distribution does not appear quite  as pronounced though the ejection fraction appears similar on both studies.  The degree of mitral insufficiency appears slightly less on the current study.

## 2024-02-23 NOTE — PROGRESS NOTES
[facility]    Medicine Progress Note - Hospitalist Service    Date of Admission:  2/22/2024    Assessment & Plan      Felipe Verma is a 83 year old male with PMH of CAD s/p PCI to mid LAD 4/23, cardiomyopathy with LVEF 40% 4/23, paroxysmal atrial fibrillation anticoagulation on DOAC, presented to ED for evaluation of near syncope.  By EMS found to be hypotensive SBP in 60s, HR in 160s.    #Paroxysmal atrial fibrillation with RVR.  Seen by Dr. Fleming, recommended continuing metoprolol, Eliquis, referral for LAAO given history of hematuria.  EP consult to evaluate for A-fib ablation versus PPM, given med management limitation with history of prolonged QTc and CKD.  -Telemetry   -On Eliquis  - On Toprol-XL 50 mg for rate control    #CAD,s/p PCI to mid LAD 4/23  #Ischemic cardiomyopathy with LVEF 40% 4/23  Elevated troponin at 211, likely due to rapid A-fib.  Cardiology recommended changing Brilinta to Plavix, no aspirin  Change rosuvastatin to atorvastatin due to CKD and outpatient stress test per cardiology.  - On losartan 50 mg PTA, resume at 25 tomorrow, if BP/renal function stable.  - As needed nitroglycerin.    #SUSU/CKD  Baseline 1.5.  Slight bump to 1.76 on admission.  Lasix and losartan were held on admission, resume now.  - AM labs    #BPH.  On Flomax, oxybutynin.    #Rheumatoid arthritis.  PTA on methotrexate and leucovorin.       Observation Goals: -diagnostic tests and consults completed and resulted, -vital signs normal or at patient baseline, -returns to baseline functional status, Nurse to notify provider when observation goals have been met and patient is ready for discharge.  Diet: Combination Diet Low Saturated Fat Na <2400mg Diet    DVT Prophylaxis: DOAC  [place] Catheter: PRESENT, indication:    Lines: None     Cardiac Monitoring: ACTIVE order. Indication: Tachyarrhythmias, acute (48 hours)  Code Status: Full Code      Clinically Significant Risk Factors Present on  "Admission   # Drug Induced Coagulation Defect: home medication list includes an anticoagulant medication  # Thrombocytopenia: Lowest platelets = 126 in last 2 days, will monitor for bleeding   # Hypertension: Noted on problem list      # Overweight: Estimated body mass index is 27.02 kg/m  as calculated from the following:    Height as of this encounter: 1.715 m (5' 7.5\").    Weight as of this encounter: 79.4 kg (175 lb 1.4 oz).           Disposition Plan      Expected Discharge Date: 02/24/2024      Destination: home with family  Discharge Comments: pending clearance for discharge and final orders       Yani Richard MD  Hospitalist Service  [facility]  Securely message with [employer] (more info)  Text page via [employer] Paging/Directory   ______________________________________________________________________    Interval History   Patient is new to me.  Chart reviewed.  Patient was seen and examined.  Denies chest pain, cardiac palpitations.  Complain of dyspnea with minimal physical exertion.  Seen by cardiology earlier today, recommendations reviewed.  Echo done earlier today, results pending.    Physical Exam   Vital Signs: Temp: 98.1  F (36.7  C) Temp src: Oral BP: (!) 160/72 Pulse: 75   Resp: 18 SpO2: 99 % O2 Device: None (Room air)    Weight: 175 lbs 1.43 oz  General: Alert and oriented x 3. Not in obvious distress.  HEENT: NC, AT. Neck- supple, No JVP elevation, lymphadenopathy or thyromegaly. Trachea-central.  Chest: Clear to auscultation bilaterally.  Heart: S1S2 regular. No M/R/G.  Abdomen: Soft. NT, ND. No organomegaly. Bowel sounds- active.  Back: No spine tenderness. No CVA tenderness.  Extremities: No leg swelling. Peripheral pulses 2+ bilaterally.  Neuro: Cranial nerves 1-12 grossly normal. No focal neurological deficit    Medical Decision Making     40 MINUTES SPENT BY ME on the date of service doing chart review, history, exam, documentation & further activities per the note.  "     Data     I have personally reviewed the following data over the past 24 hrs:    N/A  \   N/A   / N/A     140 108 (H) 26.6 (H) /  133 (H)   4.9 23 1.61 (H) \     Trop: 219 (HH) BNP: N/A       Imaging results reviewed over the past 24 hrs:   Recent Results (from the past 24 hour(s))   Chest XR,  PA & LAT    Narrative    EXAM: XR CHEST 2 VIEWS  LOCATION: [facility]  DATE: 2024    INDICATION: Chest pain.  COMPARISON: Chest radiograph 04/10/2023.      Impression    IMPRESSION: Lungs are clear. No evidence of pneumonia. No pleural effusions or pneumothorax. Normal pulmonary vascularity. Nonenlarged cardiac silhouette. Coronary arterial stent.   Echocardiogram Complete    Narrative    [id number]  [id number]  [id number]  [id number]^MELISSA^CHRISTIAN^[place] [address]     Name: VERONICA RASMUSSEN  MRN: [id number]  : 1940  Study Date: 2024 10:37 AM  Age: 83 yrs  Gender: Male  Patient Location: [facility]  Reason For Study: Cardiomyopathy  Ordering Physician: CHRISTIAN TORRES  Performed By: ACE     BSA: 1.9 m2  Height: 67 in  Weight: 175 lb  HR: 65  BP: 132/73 mmHg  ______________________________________________________________________________  Procedure  Complete Portable Echo Adult. Definity (NDC #18598-518) given intravenously.  ______________________________________________________________________________  Interpretation Summary     1. The left ventricle is normal in size. Left ventricular systolic performance  is moderately reduced. The ejection fraction is estimated to be 40%.  2. There is mild-moderate global reduction in left ventricular systolic  performance with more noticeable anteroseptal and apical hypokinesis (no  apical mural thrombus is detected).  3. No significant valvular heart disease is identified on this study.  4. Normal right ventricular size and systolic performance.  5. There is mild left atrial enlargement.      When compared to the prior real-time echocardiogram dated 11 April 2023, the  wall motion abnormality involving the LAD distribution does not appear quite  as pronounced though the ejection fraction appears similar on both studies.  The degree of mitral insufficiency appears slightly less on the current study.  ______________________________________________________________________________  Left ventricle:  The left ventricle is normal in size. Left ventricular systolic performance is  moderately reduced. The ejection fraction is estimated to be 40%. There is  mild-moderate global reduction in left ventricular systolic performance with  more noticeable anteroseptal and apical hypokinesis (no apical mural thrombus  is detected). Left ventricular wall thickness is normal.     Assessment of left atrial pressure (LAP): The cumulative findings are  indeterminate in evaluating left atrial pressure.     Right ventricle:  Normal right ventricular size and systolic performance.     Left atrium:  There is mild left atrial enlargement.     Right atrium:  The right atrium is of normal size.     IVC:  The IVC is of normal caliber.     Aortic valve:  The aortic valve is comprised of three cusps. No significant aortic stenosis  or aortic insufficiency is detected on this study.     Mitral valve:  The mitral valve appears morphologically normal. There is mild mitral  insufficiency.     Tricuspid valve:  The tricuspid valve is grossly morphologically normal. There is trace  tricuspid insufficiency.     Pulmonic valve:  The pulmonic valve is grossly morphologically normal. There is mild pulmonic  insufficiency.     Thoracic aorta:  The aortic root and proximal ascending aorta are of normal dimension.     Pericardium:  There is no evidence of exaggerated respiratory variation in tricuspid  valve  inflow.  ______________________________________________________________________________  ______________________________________________________________________________  MMode/2D Measurements & Calculations  IVSd: 0.83 cm  LVIDd: 5.3 cm  LVIDs: 4.0 cm  LVPWd: 0.91 cm  FS: 23.8 %  LV mass(C)d: 167.5 grams  LV mass(C)dI: 87.7 grams/m2  Ao root diam: 3.5 cm  LA dimension: 4.0 cm  asc Aorta Diam: 3.5 cm  LA/Ao: 1.2  LVOT diam: 2.0 cm  LVOT area: 3.2 cm2  Ao root diam index Ht(cm/m): 2.0  Ao root diam index BSA (cm/m2): 1.8  Asc Ao diam index BSA (cm/m2): 1.8  Asc Ao diam index Ht(cm/m): 2.1  LA Volume (BP): 69.8 ml     LA Volume Index (BP): 36.5 ml/m2  LA Volume Indexed (AL/bp): 40.8 ml/m2  RV Base: 3.3 cm  RWT: 0.34  TAPSE: 1.8 cm     Time Measurements  MM HR: 68.0 BPM     Doppler Measurements & Calculations  MV E max angel: 59.1 cm/sec  MV A max angel: 69.8 cm/sec  MV E/A: 0.85  MV dec slope: 209.9 cm/sec2  MV dec time: 0.28 sec  Ao V2 max: 91.2 cm/sec  Ao max PG: 3.0 mmHg  Ao V2 mean: 67.2 cm/sec  Ao mean P.0 mmHg  Ao V2 VTI: 21.3 cm  KATHRYN(I,D): 2.8 cm2  KATHRYN(V,D): 2.9 cm2  LV V1 max P.7 mmHg  LV V1 max: 82.3 cm/sec  LV V1 VTI: 18.5 cm  SV(LVOT): 59.0 ml  SI(LVOT): 30.9 ml/m2  PA acc time: 0.13 sec  PI end-d angel: 96.4 cm/sec  TR max angel: 214.8 cm/sec  TR max P.4 mmHg  AV Angel Ratio (DI): 0.90  KATHRYN Index (cm2/m2): 1.5  E/E': 7.8     E/E' av.3  Lateral E/e': 7.8  Medial E/e': 8.8  Peak E' Angel: 7.6 cm/sec  RV S Angel: 10.0 cm/sec     ______________________________________________________________________________  Report approved by: Harry White 2024 02:13 PM

## 2024-02-23 NOTE — CONSULTS
"  Thank you,  No ref. provider found, for asking the [facility] team to see Mr. Felipe Verma to evaluate       Assessment/Recommendations   Assessment/Plan:  Afib paroxysmal with RVR - considered amiodarone but problem with prolonged QT in the past, limited with rate control given CKD and CM, will consult EP re ablation afib vs AV node with PPM.  Cont metoprolol 50mg daily (may need to adjust down), eliquis but also referral for LAAO given hematuria. If EF normal on echo, would add diltiazem for rate control (then might need to adjust down atorvastatin)  CAD - s/p PCI - change ticagrelor to plavix to lower bleeding, no aspirin, change rosuvastatin to atorvastatin given CKD, trop related to afib with RVR and CKD, consider stress imaging as outpatient  CM - EF 40% prior to PCI - await echo, cont losartan/metoprolol, noted CKD    Followed by Dr. Carlos in Cardiology     History of Present Illness/Subjective    Mr. Felipe Verma is a 83 year old male with hx CAD s/p PCI to mid LAD 4/23, CM with EF 40% 4/23 pre PCI afib paroxysmal, adm to ED with AF with RVR and hypotension, elevated trop in setting of CKD Cr 1.76, no angina.  EMS gave iv verapamil with conversion of afib to SR with HR 60.   No chest pain/pressure PND/orthopea syncopal events.        Meds: brillinta 90mg bid, fursemide 20mg daily, asp 162mg once, losartan 50mg daily, emtorpolol 50mg daily, eliquis 2.5mg bid, rosuvastatin 10mg.         Physical Examination Review of Systems   /68   Pulse 60   Temp 97.8  F (36.6  C) (Oral)   Resp 28   Ht 1.715 m (5' 7.5\")   Wt 79.4 kg (175 lb 1.4 oz)   SpO2 97%   BMI 27.02 kg/m    Body mass index is 27.02 kg/m .  Wt Readings from Last 3 Encounters:   02/22/24 79.4 kg (175 lb 1.4 oz)   11/07/23 77.1 kg (170 lb)   08/09/23 77.1 kg (170 lb)       Intake/Output Summary (Last 24 hours) at 2/23/2024 0855  Last data filed at 2/23/2024 0353  Gross per 24 hour   Intake 120 ml   Output 1700 " ml   Net -1580 ml     General Appearance:   no distress, normal body habitus   ENT/Mouth: membranes moist, no oral lesions or bleeding gums.      EYES:  no scleral icterus, normal conjunctivae   Neck: no carotid bruits or thyromegaly   Chest/Lungs:   lungs are clear to auscultation, no rales or wheezing,  sternal scar, equal chest wall expansion    Cardiovascular:   Regular. Normal first and second heart sounds with no murmurs, rubs, or gallops; the carotid, radial and posterior tibial pulses are intact, Jugular venous pressure , edema bilaterally    Abdomen:  no organomegaly, masses, bruits, or tenderness; bowel sounds are present   Extremities: no cyanosis or clubbing   Skin: no xanthelasma, warm.    Neurologic: normal  bilateral, no tremors     Psychiatric: alert and oriented x3, calm     Review of Systems - 12 points nega other than above      Medical History  Surgical History Family History Social History   Past Medical History:   Diagnosis Date    Acute diverticulitis 4/20/2016    Lung nodule < 6cm on CT 6/6/2018    Past Surgical History:   Procedure Laterality Date    CV CORONARY ANGIOGRAM N/A 4/11/2023    Procedure: Coronary Angiogram;  Surgeon: Alejandro Hitchcock MD;  Location: [facility] CATH LAB CV    CV PCI STENT DRUG ELUTING N/A 4/11/2023    Procedure: Percutaneous Coronary Intervention Stent;  Surgeon: Alejandro Hitchcock MD;  Location: [facility] CATH LAB CV    OTHER SURGICAL HISTORY      OTHER SURGICAL HISTORYstent placement in head     TONSILLECTOMY      Family History   Problem Relation Age of Onset    Colon Cancer Mother     Hypertension Father     Coronary Artery Disease Father     Aneurysm Sister     Cerebrovascular Disease Sister     No Known Problems Brother     Diabetes Son     No Known Problems Son     No Known Problems Son     Breast Cancer No family hx of     Prostate Cancer No family hx of     Social History     Socioeconomic History    Marital status:      Spouse name:  Not on file    Number of children: Not on file    Years of education: Not on file    Highest education level: Not on file   Occupational History    Not on file   Tobacco Use    Smoking status: Former    Smokeless tobacco: Never   Vaping Use    Vaping Use: Never used   Substance and Sexual Activity    Alcohol use: Not on file     Comment: Alcoholic Drinks/day: 1 glass per week    Drug use: Not Currently    Sexual activity: Not Currently   Other Topics Concern    Not on file   Social History Narrative    Not on file     Social Determinants of Health     Financial Resource Strain: Not on file   Food Insecurity: Not on file   Transportation Needs: Not on file   Physical Activity: Not on file   Stress: Not on file   Social Connections: Not on file   Interpersonal Safety: Not on file   Housing Stability: Not on file          Medications  Allergies   Scheduled Meds:  Continuous Infusions:   - MEDICATION INSTRUCTIONS -       PRN Meds:.acetaminophen **OR** acetaminophen, ondansetron **OR** ondansetron, - MEDICATION INSTRUCTIONS -, senna-docusate **OR** senna-docusate No Known Allergies      Lab Results    Chemistry/lipid CBC Cardiac Enzymes/BNP/TSH/INR   Lab Results   Component Value Date    BUN 26.1 (H) 02/22/2024     02/22/2024    CO2 20 (L) 02/22/2024    Lab Results   Component Value Date    WBC 6.8 02/22/2024    HGB 9.7 (L) 02/22/2024    HCT 29.3 (L) 02/22/2024     (H) 02/22/2024     (L) 02/22/2024    Lab Results   Component Value Date    TSH 3.67 02/22/2024    INR 1.19 (H) 04/10/2023              Luis Enrique Fleming MD  Interventional Cardiology  [facility]

## 2024-02-23 NOTE — PLAN OF CARE
PRIMARY DIAGNOSIS: SYNCOPE/TIA  OUTPATIENT/OBSERVATION GOALS TO BE MET BEFORE DISCHARGE:  1. Orthostatic performed: No    2. Diagnostic testing complete & at baseline neurologic testing: No    3. Cleared by consultants (if involved): No    4. Interpretation of cardiac rhythm per telemetry tech: sinus monik     5. Tolerating adequate PO diet and medications: Yes    6. Return to near baseline physical activity or neurologic status: Yes    Discharge Planner Nurse   Safe discharge environment identified: Yes  Barriers to discharge: Yes       Entered by: Gavin Courtney RN 02/23/2024 4:35 PM     Please review provider order for any additional goals.   Nurse to notify provider when observation goals have been met and patient is ready for discharge.Goal Outcome Evaluation:  A/O x4. VSS. Tele sinus monik. Independent with ADLs. Denied feeling dizziness. Good oral intake.

## 2024-02-23 NOTE — PLAN OF CARE
"PRIMARY DIAGNOSIS: \"GENERIC\" NURSING  OUTPATIENT/OBSERVATION GOALS TO BE MET BEFORE DISCHARGE:  ADLs back to baseline: No    Activity and level of assistance: Ambulating independently.    Pain status: Pain free.    Return to near baseline physical activity: Yes     Discharge Planner Nurse   Safe discharge environment identified: Yes  Barriers to discharge: Yes       Entered by: Jt Steele RN 02/22/2024 8:02 PM     Please review provider order for any additional goals.   Nurse to notify provider when observation goals have been met and patient is ready for discharge.Goal Outcome Evaluation:       Patient is A/Ox4. VSS. Telemetry reading sinus monik with PVC's. Cardiology to consult tomorrow.       "

## 2024-02-23 NOTE — CONSULTS
"Care Management Initial Consult    General Information  Assessment completed with: Patient, Spouse or significant other, Children, Nikos and wife Geneva on his cell phone, and son Shailesh and Shailesh's girlfriend  Type of CM/SW Visit: Initial Assessment    Primary Care Provider verified and updated as needed: Yes   Readmission within the last 30 days: no previous admission in last 30 days      Reason for Consult: discharge planning  Advance Care Planning: Advance Care Planning Reviewed: no concerns identified          Communication Assessment  Patient's communication style: spoken language (English or Bilingual)             Cognitive  Cognitive/Neuro/Behavioral: WDL                      Living Environment:   People in home: spouse  Nikos and wife Geneva  Current living Arrangements: house      Able to return to prior arrangements: yes       Family/Social Support:  Care provided by: self  Provides care for: no one  Marital Status:   Wife, Children  Geneva       Description of Support System: Supportive, Involved    Support Assessment: Adequate family and caregiver support, Adequate social supports, Patient communicates needs well met    Current Resources:   Patient receiving home care services: No     Community Resources: None  Equipment currently used at home: none  Supplies currently used at home: Other (\"dentures, reading glasses\")    Employment/Financial:  Employment Status: retired, , previous service     Employment/ Comments: \"I don't get any  benefits\", \"I was in [employer] 8 years\"   Financial Concerns:     Referral to Financial Worker: No       Does the patient's insurance plan have a 3 day qualifying hospital stay waiver?  No      Functional Status:  Prior to admission patient needed assistance:   Dependent ADLs:: Independent, Ambulation-no assistive device  Dependent IADLs:: Independent  Assesssment of Functional Status: At functional baseline    Mental Health " Status:          Chemical Dependency Status:                Values/Beliefs:  Spiritual, Cultural Beliefs, Mu-ism Practices, Values that affect care:                 Additional Information:  Nikos lives in a house with his wife. He is independent with ADLs and IADLs. He drives.    Likely home at discharge.     Family to transport at discharge.    CM to follow for medical progression of care, discharge recommendations, and final discharge plan.    Bianka Benson RN

## 2024-02-23 NOTE — PROGRESS NOTES
Care Management Follow Up    Length of Stay (days): 0    Expected Discharge Date: 02/23/2024    Concerns to be Addressed:   Workup in progress; pending clearance for discharge, final orders.   Patient plan of care discussed at interdisciplinary rounds: Yes    Anticipated Discharge Disposition:  Discharge goal is home pending clearance for discharge.      Anticipated Discharge Services:  None  Anticipated Discharge DME:  [facility]    Patient/family educated on Medicare website which has current facility and service quality ratings:   NA  Education Provided on the Discharge Plan:   Per team  Patient/Family in Agreement with the Plan:   Yes    Referrals Placed by CM/SW:  None  Private pay costs discussed: Not applicable     Additional Information:  Patient is  and independent with all activities of daily living at baseline including driving.    No care management needs identified at this time but CM will continue to monitor progression of care, review team recommendations and provide discharge planning assist as needed.      Marysol Echeverria RN

## 2024-02-23 NOTE — PLAN OF CARE
Problem: Syncope  Goal: Absence of Syncopal Symptoms  Outcome: Progressing   VSS overnight.  No complaints of pain.  Catheter in place. Urine is malodorous Rhythm NSR to sinus monik.  Alert and oriented and able to make needs knonw

## 2024-02-24 VITALS
SYSTOLIC BLOOD PRESSURE: 122 MMHG | RESPIRATION RATE: 15 BRPM | DIASTOLIC BLOOD PRESSURE: 57 MMHG | OXYGEN SATURATION: 98 % | HEIGHT: 68 IN | WEIGHT: 175.09 LBS | HEART RATE: 61 BPM | TEMPERATURE: 97.7 F | BODY MASS INDEX: 26.54 KG/M2

## 2024-02-24 LAB
ANION GAP SERPL CALCULATED.3IONS-SCNC: 6 MMOL/L (ref 7–15)
BUN SERPL-MCNC: 24.2 MG/DL (ref 8–23)
CALCIUM SERPL-MCNC: 8.6 MG/DL (ref 8.8–10.2)
CHLORIDE SERPL-SCNC: 108 MMOL/L (ref 98–107)
CREAT SERPL-MCNC: 1.47 MG/DL (ref 0.67–1.17)
DEPRECATED HCO3 PLAS-SCNC: 25 MMOL/L (ref 22–29)
EGFRCR SERPLBLD CKD-EPI 2021: 47 ML/MIN/1.73M2
FOLATE SERPL-MCNC: 32.4 NG/ML (ref 4.6–34.8)
GLUCOSE SERPL-MCNC: 98 MG/DL (ref 70–99)
MAGNESIUM SERPL-MCNC: 2.2 MG/DL (ref 1.7–2.3)
POTASSIUM SERPL-SCNC: 4.2 MMOL/L (ref 3.4–5.3)
SODIUM SERPL-SCNC: 139 MMOL/L (ref 135–145)
VIT B12 SERPL-MCNC: 920 PG/ML (ref 232–1245)

## 2024-02-24 PROCEDURE — 82746 ASSAY OF FOLIC ACID SERUM: CPT | Performed by: INTERNAL MEDICINE

## 2024-02-24 PROCEDURE — G0378 HOSPITAL OBSERVATION PER HR: HCPCS

## 2024-02-24 PROCEDURE — 83735 ASSAY OF MAGNESIUM: CPT | Performed by: INTERNAL MEDICINE

## 2024-02-24 PROCEDURE — 250N000013 HC RX MED GY IP 250 OP 250 PS 637: Performed by: NURSE PRACTITIONER

## 2024-02-24 PROCEDURE — 250N000013 HC RX MED GY IP 250 OP 250 PS 637: Performed by: INTERNAL MEDICINE

## 2024-02-24 PROCEDURE — 36415 COLL VENOUS BLD VENIPUNCTURE: CPT | Performed by: INTERNAL MEDICINE

## 2024-02-24 PROCEDURE — 99239 HOSP IP/OBS DSCHRG MGMT >30: CPT | Performed by: HOSPITALIST

## 2024-02-24 PROCEDURE — 99207 PR NO CHARGE LOS: CPT | Performed by: INTERNAL MEDICINE

## 2024-02-24 PROCEDURE — 80048 BASIC METABOLIC PNL TOTAL CA: CPT | Performed by: INTERNAL MEDICINE

## 2024-02-24 RX ORDER — ATORVASTATIN CALCIUM 20 MG/1
20 TABLET, FILM COATED ORAL EVERY EVENING
Qty: 30 TABLET | Refills: 0 | Status: SHIPPED | OUTPATIENT
Start: 2024-02-24 | End: 2024-03-13

## 2024-02-24 RX ORDER — CLOPIDOGREL BISULFATE 75 MG/1
75 TABLET ORAL DAILY
Qty: 30 TABLET | Refills: 0 | Status: SHIPPED | OUTPATIENT
Start: 2024-02-25 | End: 2024-03-13

## 2024-02-24 RX ORDER — LOSARTAN POTASSIUM 25 MG/1
25 TABLET ORAL DAILY
Qty: 30 TABLET | Refills: 0 | Status: SHIPPED | OUTPATIENT
Start: 2024-02-25 | End: 2024-03-13

## 2024-02-24 RX ORDER — METOPROLOL SUCCINATE 25 MG/1
75 TABLET, EXTENDED RELEASE ORAL DAILY
Qty: 90 TABLET | Refills: 0 | Status: SHIPPED | OUTPATIENT
Start: 2024-02-25 | End: 2024-03-13

## 2024-02-24 RX ADMIN — CLOPIDOGREL BISULFATE 75 MG: 75 TABLET ORAL at 09:44

## 2024-02-24 RX ADMIN — LOSARTAN POTASSIUM 25 MG: 25 TABLET, FILM COATED ORAL at 09:44

## 2024-02-24 RX ADMIN — METOPROLOL SUCCINATE 75 MG: 25 TABLET, EXTENDED RELEASE ORAL at 09:44

## 2024-02-24 RX ADMIN — APIXABAN 2.5 MG: 2.5 TABLET, FILM COATED ORAL at 09:44

## 2024-02-24 RX ADMIN — OXYBUTYNIN CHLORIDE 5 MG: 5 TABLET, EXTENDED RELEASE ORAL at 09:45

## 2024-02-24 ASSESSMENT — ACTIVITIES OF DAILY LIVING (ADL)
ADLS_ACUITY_SCORE: 37
ADLS_ACUITY_SCORE: 33
ADLS_ACUITY_SCORE: 33
ADLS_ACUITY_SCORE: 37
ADLS_ACUITY_SCORE: 33
ADLS_ACUITY_SCORE: 33
ADLS_ACUITY_SCORE: 37
ADLS_ACUITY_SCORE: 33
ADLS_ACUITY_SCORE: 37
ADLS_ACUITY_SCORE: 33
ADLS_ACUITY_SCORE: 37
ADLS_ACUITY_SCORE: 33
ADLS_ACUITY_SCORE: 37

## 2024-02-24 NOTE — PLAN OF CARE
"PRIMARY DIAGNOSIS: SYNCOPE/TIA  OUTPATIENT/OBSERVATION GOALS TO BE MET BEFORE DISCHARGE:  1. Orthostatic performed: N/A    2. Diagnostic testing complete & at baseline neurologic testing: Yes    3. Cleared by consultants (if involved): Yes    4. Interpretation of cardiac rhythm per telemetry tech: dysrrythmia    5. Tolerating adequate PO diet and medications: Yes    6. Return to near baseline physical activity or neurologic status: Yes    Discharge Planner Nurse   Safe discharge environment identified: Yes  Barriers to discharge: No      Patient is alert and orientated x 4.  Denies pain.  Denies dizziness but has not been up out of bed yet this am.  /63   Pulse 68   Temp 97.9  F (36.6  C) (Oral)   Resp 14   Ht 1.715 m (5' 7.5\")   Wt 79.4 kg (175 lb 1.4 oz)   SpO2 98%   BMI 27.02 kg/m  .  Possible discharge this afternoon.    "

## 2024-02-24 NOTE — PROGRESS NOTES
PRIMARY DIAGNOSIS: Syncope/Hypotension     OUTPATIENT/OBSERVATION GOALS TO BE MET BEFORE DISCHARGE:  ADLs back to baseline: Yes    Activity and level of assistance: Ambulating independently.    Pain status: Pain free.    Return to near baseline physical activity: Yes     Discharge Planner Nurse   Safe discharge environment identified: Yes  Barriers to discharge: Yes       Entered by: Gissel Smyth RN 2024 6:35 AM    Pt A&Ox4. Denies pain, SOB/. On Tele with Sinus monik, HR : 55-60.      Please review provider order for any additional goals.   Nurse to notify provider when observation goals have been met and patient is ready for discharge.

## 2024-02-24 NOTE — PROGRESS NOTES
PRIMARY DIAGNOSIS: Syncope/Hypotension   OUTPATIENT/OBSERVATION GOALS TO BE MET BEFORE DISCHARGE:  ADLs back to baseline: Yes    Activity and level of assistance: Ambulating independently.    Pain status: Pain free.    Return to near baseline physical activity: Yes     Discharge Planner Nurse   Safe discharge environment identified: Yes  Barriers to discharge: Yes       Entered by: Gissel Smyth RN 02/23/2024 10:51 PM    Pt A&Ox4. VSS in RA. Denies pain. Up & Ind to bathroom. On tele with Sinus Rhythm.      Please review provider order for any additional goals.   Nurse to notify provider when observation goals have been met and patient is ready for discharge.

## 2024-02-24 NOTE — DISCHARGE SUMMARY
"[facility]  Hospitalist Discharge Summary      Date of Admission:  2/22/2024  Date of Discharge:  2/24/2024  Discharging Provider: Marine Jiang MD  Discharge Service: Hospitalist Service    Discharge Diagnoses   Paroxysmal Atrial Fibrillation    Clinically Significant Risk Factors     # Overweight: Estimated body mass index is 27.02 kg/m  as calculated from the following:    Height as of this encounter: 1.715 m (5' 7.5\").    Weight as of this encounter: 79.4 kg (175 lb 1.4 oz).       Follow-ups Needed After Discharge   Follow-up Appointments     Follow-up and recommended labs and tests       Follow up with primary care provider, Pro Chávez, within 7 days for   hospital follow- up.  Follow up BMP for kidney function recommended.   Follow up with Cardiology and EP clinic, within 7-14 days to evaluate   medication change and to evaluate treatment change. No follow up labs or   test are needed.            Unresulted Labs Ordered in the Past 30 Days of this Admission       Date and Time Order Name Status Description    2/24/2024 12:01 AM Folate In process         These results will be followed up by PCP and hospitalist team     Discharge Disposition   Discharged to home  Condition at discharge: Stable    Hospital Course   83 year old male with PMH of CAD s/p PCI to mid LAD 4/23, cardiomyopathy with LVEF 40% (4/23), paroxysmal atrial fibrillation anticoagulation on DOAC, presented to ED for evaluation of near syncope.By EMS found to be hypotensive SBP in 60s, HR in 160s.  Patient admitted for management of paroxysmal A-fib with RVR.  Patient seen by cardiology and EP.  Metoprolol increased to 75 mg and to follow-up outpatient follow-up possible catheter ablation.  Ticagrelor changed to Plavix and rosuvastatin was changed to atorvastatin given CKD.  Patient had elevated troponin secondary to demand ischemia.  Repeat echo EF unchanged.  Patient continued on Eliquis.  Given hypertension on " admission losartan decreased to 25 mg.  Patient seen today and has no complaints.  Rate controlled.  Able to tolerate ambulation without changes in vitals.    Consultations This Hospital Stay   CARDIOLOGY IP CONSULT  CARE MANAGEMENT / SOCIAL WORK IP CONSULT  ELECTROPHYSIOLOGY IP CONSULT    Code Status   Full Code    Time Spent on this Encounter   I, Marine Jiang MD, personally saw the patient today and spent greater than 30 minutes discharging this patient.       Marine Jiang MD  [facility] EXTENDED RECOVERY AND SHORT STAY  [address] [place] [id number]  Phone: [phone]  Fax: [phone]  ______________________________________________________________________    Physical Exam   Vital Signs: Temp: 97.9  F (36.6  C) Temp src: Oral BP: 134/63 Pulse: 68   Resp: 14 SpO2: 98 % O2 Device: None (Room air)    Weight: 175 lbs 1.43 oz  Constitutional: awake, alert, cooperative, no apparent distress, and appears stated age  Eyes: pupils equal, round and reactive to light, extra-ocular muscles intact, and sclera clear  ENT: atramatic, oral pharynx with moist mucus membranes  Respiratory: No increased work of breathing, good air exchange, clear to auscultation bilaterally, no crackles or wheezing  Cardiovascular: regularly irregular rhythm and normal S1 and S2  GI: soft, non-distended, and non-tender  Musculoskeletal: full range of motion noted  Neurologic: Awake, alert, oriented to name, place and time.  Cranial nerves II-XII are grossly intact.          Primary Care Physician   Pro Chávez    Discharge Orders      Follow-Up with Cardiology      Reason for your hospital stay    Paroxysmal Atrial Fibrillation     Follow-up and recommended labs and tests     Follow up with primary care provider, Pro Chávez, within 7 days for hospital follow- up.  No follow up labs or test are needed.    Follow up with Cardiology and EP clinic, within 7-14 days to evaluate medication change  and to evaluate treatment change. No follow up labs or test are needed.     Activity    Your activity upon discharge: activity as tolerated     Diet    Follow this diet upon discharge: Orders Placed This Encounter      Combination Diet Low Saturated Fat Na <2400mg Diet       Significant Results and Procedures   Most Recent 3 CBC's:  Recent Labs   Lab Test 24  1432 23  0510 23  2105 23  1851 23  0040   WBC 6.8 13.1*  --   --  12.7*   HGB 9.7* 11.3* 11.3*   < > 10.8*   * 100  --   --  102*   * 130*  --   --  136*    < > = values in this interval not displayed.     Most Recent 3 BMP's:  Recent Labs   Lab Test 24  0711 24  1314 24  1432    140 142   POTASSIUM 4.2 4.9 4.3   CHLORIDE 108* 108* 111*   CO2 25 23 20*   BUN 24.2* 26.6* 26.1*   CR 1.47* 1.61* 1.76*   ANIONGAP 6* 9 11   ABBYE 8.6* 8.8 8.3*   GLC 98 133* 99   ,   Results for orders placed or performed during the hospital encounter of 24   Chest XR,  PA & LAT    Narrative    EXAM: XR CHEST 2 VIEWS  LOCATION: [facility]  DATE: 2024    INDICATION: Chest pain.  COMPARISON: Chest radiograph 04/10/2023.      Impression    IMPRESSION: Lungs are clear. No evidence of pneumonia. No pleural effusions or pneumothorax. Normal pulmonary vascularity. Nonenlarged cardiac silhouette. Coronary arterial stent.   Echocardiogram Complete    Narrative    [id number]  [id number]  [id number]  [id number]^MELISSA^CHRISTIAN^[place] [address]     Name: VERONICA RASMUSSEN  MRN: [id number]  : 1940  Study Date: 2024 10:37 AM  Age: 83 yrs  Gender: Male  Patient Location: [facility]  Reason For Study: Cardiomyopathy  Ordering Physician: CHRISTIAN TORRES  Performed By: ACE     BSA: 1.9 m2  Height: 67 in  Weight: 175 lb  HR: 65  BP: 132/73  mmHg  ______________________________________________________________________________  Procedure  Complete Portable Echo Adult. Definity (NDC #95116-589) given intravenously.  ______________________________________________________________________________  Interpretation Summary     1. The left ventricle is normal in size. Left ventricular systolic performance  is moderately reduced. The ejection fraction is estimated to be 40%.  2. There is mild-moderate global reduction in left ventricular systolic  performance with more noticeable anteroseptal and apical hypokinesis (no  apical mural thrombus is detected).  3. No significant valvular heart disease is identified on this study.  4. Normal right ventricular size and systolic performance.  5. There is mild left atrial enlargement.     When compared to the prior real-time echocardiogram dated 11 April 2023, the  wall motion abnormality involving the LAD distribution does not appear quite  as pronounced though the ejection fraction appears similar on both studies.  The degree of mitral insufficiency appears slightly less on the current study.  ______________________________________________________________________________  Left ventricle:  The left ventricle is normal in size. Left ventricular systolic performance is  moderately reduced. The ejection fraction is estimated to be 40%. There is  mild-moderate global reduction in left ventricular systolic performance with  more noticeable anteroseptal and apical hypokinesis (no apical mural thrombus  is detected). Left ventricular wall thickness is normal.     Assessment of left atrial pressure (LAP): The cumulative findings are  indeterminate in evaluating left atrial pressure.     Right ventricle:  Normal right ventricular size and systolic performance.     Left atrium:  There is mild left atrial enlargement.     Right atrium:  The right atrium is of normal size.     IVC:  The IVC is of normal caliber.     Aortic  valve:  The aortic valve is comprised of three cusps. No significant aortic stenosis  or aortic insufficiency is detected on this study.     Mitral valve:  The mitral valve appears morphologically normal. There is mild mitral  insufficiency.     Tricuspid valve:  The tricuspid valve is grossly morphologically normal. There is trace  tricuspid insufficiency.     Pulmonic valve:  The pulmonic valve is grossly morphologically normal. There is mild pulmonic  insufficiency.     Thoracic aorta:  The aortic root and proximal ascending aorta are of normal dimension.     Pericardium:  There is no evidence of exaggerated respiratory variation in tricuspid valve  inflow.  ______________________________________________________________________________  ______________________________________________________________________________  MMode/2D Measurements & Calculations  IVSd: 0.83 cm  LVIDd: 5.3 cm  LVIDs: 4.0 cm  LVPWd: 0.91 cm  FS: 23.8 %  LV mass(C)d: 167.5 grams  LV mass(C)dI: 87.7 grams/m2  Ao root diam: 3.5 cm  LA dimension: 4.0 cm  asc Aorta Diam: 3.5 cm  LA/Ao: 1.2  LVOT diam: 2.0 cm  LVOT area: 3.2 cm2  Ao root diam index Ht(cm/m): 2.0  Ao root diam index BSA (cm/m2): 1.8  Asc Ao diam index BSA (cm/m2): 1.8  Asc Ao diam index Ht(cm/m): 2.1  LA Volume (BP): 69.8 ml     LA Volume Index (BP): 36.5 ml/m2  LA Volume Indexed (AL/bp): 40.8 ml/m2  RV Base: 3.3 cm  RWT: 0.34  TAPSE: 1.8 cm     Time Measurements  MM HR: 68.0 BPM     Doppler Measurements & Calculations  MV E max anna: 59.1 cm/sec  MV A max anna: 69.8 cm/sec  MV E/A: 0.85  MV dec slope: 209.9 cm/sec2  MV dec time: 0.28 sec  Ao V2 max: 91.2 cm/sec  Ao max PG: 3.0 mmHg  Ao V2 mean: 67.2 cm/sec  Ao mean P.0 mmHg  Ao V2 VTI: 21.3 cm  KATHRYN(I,D): 2.8 cm2  KATHRYN(V,D): 2.9 cm2  LV V1 max P.7 mmHg  LV V1 max: 82.3 cm/sec  LV V1 VTI: 18.5 cm  SV(LVOT): 59.0 ml  SI(LVOT): 30.9 ml/m2  PA acc time: 0.13 sec  PI end-d anna: 96.4 cm/sec  TR max anna: 214.8 cm/sec  TR max P.4  mmHg  AV Angel Ratio (DI): 0.90  KATHRYN Index (cm2/m2): 1.5  E/E': 7.8     E/E' av.3  Lateral E/e': 7.8  Medial E/e': 8.8  Peak E' Angel: 7.6 cm/sec  RV S Angel: 10.0 cm/sec     ______________________________________________________________________________  Report approved by: Harry White 2024 02:13 PM             Discharge Medications   Current Discharge Medication List        START taking these medications    Details   atorvastatin (LIPITOR) 20 MG tablet Take 1 tablet (20 mg) by mouth every evening for 30 days  Qty: 30 tablet, Refills: 0    Associated Diagnoses: Hyperlipidemia, unspecified hyperlipidemia type      clopidogrel (PLAVIX) 75 MG tablet Take 1 tablet (75 mg) by mouth daily for 30 days  Qty: 30 tablet, Refills: 0    Associated Diagnoses: NSTEMI (non-ST elevated myocardial infarction) (H)           CONTINUE these medications which have CHANGED    Details   losartan (COZAAR) 25 MG tablet Take 1 tablet (25 mg) by mouth daily for 30 days  Qty: 30 tablet, Refills: 0    Associated Diagnoses: Benign essential hypertension      metoprolol succinate ER (TOPROL XL) 25 MG 24 hr tablet Take 3 tablets (75 mg) by mouth daily for 30 days  Qty: 90 tablet, Refills: 0    Associated Diagnoses: Paroxysmal atrial fibrillation (H)           CONTINUE these medications which have NOT CHANGED    Details   acetaminophen (TYLENOL) 500 MG tablet Take 1,000 mg by mouth every 6 hours as needed for mild pain      apixaban ANTICOAGULANT (ELIQUIS) 2.5 MG tablet Take 1 tablet (2.5 mg) by mouth 2 times daily  Qty: 180 tablet, Refills: 3    Associated Diagnoses: Chronic atrial fibrillation (H)      calcium carbonate 500 mg, elemental, (OSCAL 500) 1250 (500 Ca) MG TABS tablet Take 1 tablet by mouth daily as needed (when remembers)      furosemide (LASIX) 20 MG tablet TAKE 1 TABLET BY MOUTH ONCE DAILY FOR INCREASED SWELLING, WEIGHT GAIN, OR SHORTNESS OF BREATH  Qty: 90 tablet, Refills: 3    Associated Diagnoses: Chronic atrial  fibrillation (H); Benign essential hypertension      hydroxychloroquine (PLAQUENIL) 200 MG tablet Take 200 mg by mouth daily      leucovorin (WELLCOVORIN) 5 mg tablet Take 5 mg by mouth three times a week On Monday, Wednesday and Saturday. Do not overlap days of methotrexate      methotrexate 2.5 MG tablet Take 12.5 mg by mouth twice a week 5 tablets (12.5mg) on Thursday and Friday      nitroGLYcerin (NITROSTAT) 0.4 MG sublingual tablet For chest pain place 1 tablet under the tongue every 5 minutes for 3 doses. If symptoms persist 5 minutes after 1st dose call 911.  Qty: 25 tablet, Refills: 1    Associated Diagnoses: NSTEMI (non-ST elevated myocardial infarction) (H)      oxybutynin ER (DITROPAN XL) 5 MG 24 hr tablet Take 5 mg by mouth daily      tamsulosin (FLOMAX) 0.4 MG capsule Take 2 capsules (0.8 mg) by mouth daily (with dinner)  Qty: 60 capsule, Refills: 3    Associated Diagnoses: Benign prostatic hyperplasia with urinary retention      vitamin B-12 (CYANOCOBALAMIN) 1000 MCG tablet Take 1,000 mcg by mouth daily as needed (when remembers)      vitamin D3 (CHOLECALCIFEROL) 50 mcg (2000 units) tablet Take 1 tablet by mouth daily as needed (when remembers)           STOP taking these medications       rosuvastatin (CRESTOR) 10 MG tablet Comments:   Reason for Stopping:             Allergies   No Known Allergies

## 2024-02-24 NOTE — PROGRESS NOTES
PRIMARY DIAGNOSIS: Syncope/Hypotension  OUTPATIENT/OBSERVATION GOALS TO BE MET BEFORE DISCHARGE:  ADLs back to baseline: Yes    Activity and level of assistance: Ambulating independently.    Pain status: Pain free.    Return to near baseline physical activity: Yes     Discharge Planner Nurse   Safe discharge environment identified: Yes  Barriers to discharge: Yes       Entered by: Jenniffer Zacarias RN 02/23/2024 7:10 PM   Pt A&O x 4, denies pain or dizziness. Independent. Tele: Sinus monik. Vallejo catheter draining clear yellow output. /76  Please review provider order for any additional goals.   Nurse to notify provider when observation goals have been met and patient is ready for discharge.

## 2024-02-24 NOTE — PROGRESS NOTES
PRIMARY DIAGNOSIS: Syncope/Hypotension    OUTPATIENT/OBSERVATION GOALS TO BE MET BEFORE DISCHARGE:  ADLs back to baseline: Yes    Activity and level of assistance: Ambulating independently.    Pain status: Pain free.    Return to near baseline physical activity: Yes     Discharge Planner Nurse   Safe discharge environment identified: Yes  Barriers to discharge: Yes       Entered by: Gissel Smyth RN 2024 4:39 AM    Pt A&Ox4. VSS in RA aside from HR- 50-55's. Denies pain, SOB/ . On Tele with Sinus Ean.      Please review provider order for any additional goals.   Nurse to notify provider when observation goals have been met and patient is ready for discharge.

## 2024-02-24 NOTE — PROGRESS NOTES
Care Management Discharge Note    Discharge Date: 02/24/2024       Discharge Disposition: Home    Discharge Services: None    Discharge DME: None    Discharge Transportation: family or friend will provide    Private pay costs discussed: Not applicable    Does the patient's insurance plan have a 3 day qualifying hospital stay waiver?  No    PAS Confirmation Code: NA  Patient/family educated on Medicare website which has current facility and service quality ratings: no    Education Provided on the Discharge Plan:  Yes per team  Persons Notified of Discharge Plans: Patient per team  Patient/Family in Agreement with the Plan: yes    Handoff Referral Completed: Yes    Additional Information:  Patient discharge to home. Family will transport.    Pat Tejeda RN

## 2024-02-24 NOTE — PROGRESS NOTES
[facility]  Cardiac Electrophysiology     Progress Note: Anthony Goodrich MD    Primary Care: Pro Knowles    Primary Cardiology: Jaelyn Carlos MD    Assessment:       Paroxysmal atrial fibrillation: Plan as outlined in the note by Verenice Alatorre CNP from yesterday.  Patient's heart rates are better controlled.  Recommend primary treatment with beta-blocker until patient is seen in follow-up in the EP clinic to discuss catheter ablation of his arrhythmia further.  Patient will be contacted by the arrhythmia clinic early this week to schedule follow-up appointment and begin scheduling process for mapping/ablation.  Will sign off at this time.  Please call if questions.

## 2024-02-24 NOTE — PROGRESS NOTES
Patient discharged to home at 1330 via Private Car.  Accompanied by son and staff.  Discharge instructions were reviewed with son, opportunity offered to ask questions.    Prescriptions e-prescribed to pharmacy.  Access discontinued: Yes  Care plan and education discontinued: Yes  Belongings were sent home with patient/family:  Clothing: Shirt(s):   and Pants:   and Purse/Wallet:   .

## 2024-03-12 ENCOUNTER — HOSPITAL ENCOUNTER (EMERGENCY)
Facility: HOSPITAL | Age: 84
Discharge: HOME | End: 2024-03-13
Attending: EMERGENCY MEDICINE | Admitting: EMERGENCY MEDICINE
Payer: MEDICARE

## 2024-03-12 ENCOUNTER — APPOINTMENT (OUTPATIENT)
Dept: RADIOLOGY | Facility: HOSPITAL | Age: 84
End: 2024-03-12
Attending: EMERGENCY MEDICINE
Payer: MEDICARE

## 2024-03-12 DIAGNOSIS — I48.0 PAF (PAROXYSMAL ATRIAL FIBRILLATION) (H): ICD-10-CM

## 2024-03-12 DIAGNOSIS — R79.89 ELEVATED TROPONIN: ICD-10-CM

## 2024-03-12 LAB
ALBUMIN SERPL BCG-MCNC: 3.9 G/DL (ref 3.5–5.2)
ALP SERPL-CCNC: 76 U/L (ref 40–150)
ALT SERPL W P-5'-P-CCNC: 13 U/L (ref 0–70)
ANION GAP SERPL CALCULATED.3IONS-SCNC: 12 MMOL/L (ref 7–15)
AST SERPL W P-5'-P-CCNC: 15 U/L (ref 0–45)
BASOPHILS # BLD AUTO: 0 10E3/UL (ref 0–0.2)
BASOPHILS NFR BLD AUTO: 0 %
BILIRUB SERPL-MCNC: 0.3 MG/DL
BUN SERPL-MCNC: 25.9 MG/DL (ref 8–23)
CALCIUM SERPL-MCNC: 8.6 MG/DL (ref 8.8–10.2)
CHLORIDE SERPL-SCNC: 106 MMOL/L (ref 98–107)
CREAT SERPL-MCNC: 1.65 MG/DL (ref 0.67–1.17)
DEPRECATED HCO3 PLAS-SCNC: 20 MMOL/L (ref 22–29)
EGFRCR SERPLBLD CKD-EPI 2021: 41 ML/MIN/1.73M2
EOSINOPHIL # BLD AUTO: 0.1 10E3/UL (ref 0–0.7)
EOSINOPHIL NFR BLD AUTO: 2 %
ERYTHROCYTE [DISTWIDTH] IN BLOOD BY AUTOMATED COUNT: 14.2 % (ref 10–15)
GLUCOSE SERPL-MCNC: 101 MG/DL (ref 70–99)
HCT VFR BLD AUTO: 29.3 % (ref 40–53)
HGB BLD-MCNC: 9.6 G/DL (ref 13.3–17.7)
IMM GRANULOCYTES # BLD: 0.1 10E3/UL
IMM GRANULOCYTES NFR BLD: 1 %
LYMPHOCYTES # BLD AUTO: 1.6 10E3/UL (ref 0.8–5.3)
LYMPHOCYTES NFR BLD AUTO: 20 %
MCH RBC QN AUTO: 34 PG (ref 26.5–33)
MCHC RBC AUTO-ENTMCNC: 32.8 G/DL (ref 31.5–36.5)
MCV RBC AUTO: 104 FL (ref 78–100)
MONOCYTES # BLD AUTO: 0.7 10E3/UL (ref 0–1.3)
MONOCYTES NFR BLD AUTO: 8 %
NEUTROPHILS # BLD AUTO: 5.6 10E3/UL (ref 1.6–8.3)
NEUTROPHILS NFR BLD AUTO: 69 %
NRBC # BLD AUTO: 0 10E3/UL
NRBC BLD AUTO-RTO: 0 /100
PLATELET # BLD AUTO: 173 10E3/UL (ref 150–450)
POTASSIUM SERPL-SCNC: 3.7 MMOL/L (ref 3.4–5.3)
PROT SERPL-MCNC: 6.3 G/DL (ref 6.4–8.3)
RBC # BLD AUTO: 2.82 10E6/UL (ref 4.4–5.9)
SODIUM SERPL-SCNC: 138 MMOL/L (ref 135–145)
TROPONIN T SERPL HS-MCNC: 214 NG/L
WBC # BLD AUTO: 8.1 10E3/UL (ref 4–11)

## 2024-03-12 PROCEDURE — 71045 X-RAY EXAM CHEST 1 VIEW: CPT

## 2024-03-12 PROCEDURE — 87086 URINE CULTURE/COLONY COUNT: CPT | Performed by: EMERGENCY MEDICINE

## 2024-03-12 PROCEDURE — 85004 AUTOMATED DIFF WBC COUNT: CPT | Performed by: EMERGENCY MEDICINE

## 2024-03-12 PROCEDURE — 80053 COMPREHEN METABOLIC PANEL: CPT | Performed by: EMERGENCY MEDICINE

## 2024-03-12 PROCEDURE — 93005 ELECTROCARDIOGRAM TRACING: CPT | Performed by: EMERGENCY MEDICINE

## 2024-03-12 PROCEDURE — 99285 EMERGENCY DEPT VISIT HI MDM: CPT | Mod: 25

## 2024-03-12 PROCEDURE — 84484 ASSAY OF TROPONIN QUANT: CPT | Performed by: EMERGENCY MEDICINE

## 2024-03-12 PROCEDURE — 36415 COLL VENOUS BLD VENIPUNCTURE: CPT | Performed by: EMERGENCY MEDICINE

## 2024-03-12 PROCEDURE — 81001 URINALYSIS AUTO W/SCOPE: CPT | Performed by: EMERGENCY MEDICINE

## 2024-03-12 ASSESSMENT — ACTIVITIES OF DAILY LIVING (ADL)
ADLS_ACUITY_SCORE: 35
ADLS_ACUITY_SCORE: 35

## 2024-03-13 ENCOUNTER — OFFICE VISIT (OUTPATIENT)
Dept: FAMILY MEDICINE | Facility: CLINIC | Age: 84
End: 2024-03-13
Payer: MEDICARE

## 2024-03-13 VITALS
TEMPERATURE: 97.9 F | HEART RATE: 63 BPM | OXYGEN SATURATION: 97 % | WEIGHT: 172 LBS | HEIGHT: 67 IN | BODY MASS INDEX: 27 KG/M2 | SYSTOLIC BLOOD PRESSURE: 143 MMHG | DIASTOLIC BLOOD PRESSURE: 75 MMHG | RESPIRATION RATE: 11 BRPM

## 2024-03-13 VITALS
WEIGHT: 177.5 LBS | SYSTOLIC BLOOD PRESSURE: 116 MMHG | HEIGHT: 67 IN | TEMPERATURE: 97.2 F | BODY MASS INDEX: 27.86 KG/M2 | DIASTOLIC BLOOD PRESSURE: 68 MMHG | HEART RATE: 69 BPM | OXYGEN SATURATION: 98 % | RESPIRATION RATE: 16 BRPM

## 2024-03-13 DIAGNOSIS — N40.1 BENIGN PROSTATIC HYPERPLASIA WITH URINARY RETENTION: ICD-10-CM

## 2024-03-13 DIAGNOSIS — E78.5 HYPERLIPIDEMIA, UNSPECIFIED HYPERLIPIDEMIA TYPE: ICD-10-CM

## 2024-03-13 DIAGNOSIS — N18.31 STAGE 3A CHRONIC KIDNEY DISEASE (H): ICD-10-CM

## 2024-03-13 DIAGNOSIS — E78.00 PURE HYPERCHOLESTEROLEMIA: ICD-10-CM

## 2024-03-13 DIAGNOSIS — I10 BENIGN ESSENTIAL HYPERTENSION: ICD-10-CM

## 2024-03-13 DIAGNOSIS — I21.4 NSTEMI (NON-ST ELEVATED MYOCARDIAL INFARCTION) (H): ICD-10-CM

## 2024-03-13 DIAGNOSIS — I48.0 PAROXYSMAL ATRIAL FIBRILLATION (H): ICD-10-CM

## 2024-03-13 DIAGNOSIS — Z00.00 ENCOUNTER FOR MEDICARE ANNUAL WELLNESS EXAM: Primary | ICD-10-CM

## 2024-03-13 DIAGNOSIS — R33.8 BENIGN PROSTATIC HYPERPLASIA WITH URINARY RETENTION: ICD-10-CM

## 2024-03-13 DIAGNOSIS — M05.79 RHEUMATOID ARTHRITIS INVOLVING MULTIPLE SITES WITH POSITIVE RHEUMATOID FACTOR (H): ICD-10-CM

## 2024-03-13 PROBLEM — I51.5: Status: RESOLVED | Noted: 2023-05-02 | Resolved: 2024-03-13

## 2024-03-13 LAB
ALBUMIN UR-MCNC: NEGATIVE MG/DL
APPEARANCE UR: CLEAR
BILIRUB UR QL STRIP: NEGATIVE
COLOR UR AUTO: COLORLESS
GLUCOSE UR STRIP-MCNC: NEGATIVE MG/DL
HGB UR QL STRIP: ABNORMAL
HYALINE CASTS: 1 /LPF
KETONES UR STRIP-MCNC: NEGATIVE MG/DL
LEUKOCYTE ESTERASE UR QL STRIP: ABNORMAL
MUCOUS THREADS #/AREA URNS LPF: PRESENT /LPF
NITRATE UR QL: POSITIVE
PH UR STRIP: 7.5 [PH] (ref 5–7)
RBC URINE: 3 /HPF
SP GR UR STRIP: 1 (ref 1–1.03)
TROPONIN T SERPL HS-MCNC: 222 NG/L
UROBILINOGEN UR STRIP-MCNC: <2 MG/DL
WBC URINE: 5 /HPF
YEAST #/AREA URNS HPF: ABNORMAL /HPF

## 2024-03-13 PROCEDURE — 36415 COLL VENOUS BLD VENIPUNCTURE: CPT | Performed by: EMERGENCY MEDICINE

## 2024-03-13 PROCEDURE — 99214 OFFICE O/P EST MOD 30 MIN: CPT | Mod: 25 | Performed by: FAMILY MEDICINE

## 2024-03-13 PROCEDURE — G0438 PPPS, INITIAL VISIT: HCPCS | Performed by: FAMILY MEDICINE

## 2024-03-13 PROCEDURE — G0008 ADMIN INFLUENZA VIRUS VAC: HCPCS | Performed by: FAMILY MEDICINE

## 2024-03-13 PROCEDURE — 90662 IIV NO PRSV INCREASED AG IM: CPT | Performed by: FAMILY MEDICINE

## 2024-03-13 PROCEDURE — 84484 ASSAY OF TROPONIN QUANT: CPT | Performed by: EMERGENCY MEDICINE

## 2024-03-13 RX ORDER — OXYBUTYNIN CHLORIDE 5 MG/1
25 TABLET, EXTENDED RELEASE ORAL DAILY
Qty: 450 TABLET | Refills: 3 | Status: SHIPPED | OUTPATIENT
Start: 2024-03-13 | End: 2024-06-19

## 2024-03-13 RX ORDER — LOSARTAN POTASSIUM 25 MG/1
25 TABLET ORAL DAILY
Qty: 90 TABLET | Refills: 3 | Status: SHIPPED | OUTPATIENT
Start: 2024-03-13

## 2024-03-13 RX ORDER — METOPROLOL SUCCINATE 25 MG/1
75 TABLET, EXTENDED RELEASE ORAL DAILY
Qty: 270 TABLET | Refills: 3 | Status: SHIPPED | OUTPATIENT
Start: 2024-03-13

## 2024-03-13 RX ORDER — CLOPIDOGREL BISULFATE 75 MG/1
75 TABLET ORAL DAILY
Qty: 90 TABLET | Refills: 3 | Status: ON HOLD | OUTPATIENT
Start: 2024-03-13 | End: 2024-06-20

## 2024-03-13 RX ORDER — ATORVASTATIN CALCIUM 20 MG/1
20 TABLET, FILM COATED ORAL EVERY EVENING
Qty: 90 TABLET | Refills: 3 | Status: SHIPPED | OUTPATIENT
Start: 2024-03-13

## 2024-03-13 RX ORDER — TAMSULOSIN HYDROCHLORIDE 0.4 MG/1
0.8 CAPSULE ORAL
Qty: 180 CAPSULE | Refills: 3 | Status: SHIPPED | OUTPATIENT
Start: 2024-03-13

## 2024-03-13 SDOH — HEALTH STABILITY: PHYSICAL HEALTH: ON AVERAGE, HOW MANY MINUTES DO YOU ENGAGE IN EXERCISE AT THIS LEVEL?: 20 MIN

## 2024-03-13 SDOH — HEALTH STABILITY: PHYSICAL HEALTH: ON AVERAGE, HOW MANY DAYS PER WEEK DO YOU ENGAGE IN MODERATE TO STRENUOUS EXERCISE (LIKE A BRISK WALK)?: 1 DAY

## 2024-03-13 ASSESSMENT — SOCIAL DETERMINANTS OF HEALTH (SDOH): HOW OFTEN DO YOU GET TOGETHER WITH FRIENDS OR RELATIVES?: ONCE A WEEK

## 2024-03-13 ASSESSMENT — ACTIVITIES OF DAILY LIVING (ADL)
ADLS_ACUITY_SCORE: 35
ADLS_ACUITY_SCORE: 35

## 2024-03-13 ASSESSMENT — PAIN SCALES - GENERAL: PAINLEVEL: NO PAIN (0)

## 2024-03-13 NOTE — ED NOTES
"EMERGENCY DEPARTMENT SIGN OUT NOTE        ED COURSE AND MEDICAL DECISION MAKING   1:19 AM Patient was signed out to me by Dr Catalino Tejada.  In brief, Felipe Verma is a 83 year old male who initially presented with dizziness.  The patient reports he \"just got dizzy\" and is now \"ready to go home\".  The patient reports that he was walking around grocery shopping today and \"not drinking enough water\".  Patient reports he got home and ate dinner, but then developed dizziness at around 8 PM tonight.  The patient reports that he felt lightheaded at the time, but denies any loss of consciousness.  The patient's wife called EMS, the patient did not want an ambulance or to be seen for this.  Patient was in atrial fibrillation on initial arrival which converted spontaneously feels better.  Labs independently interpreted by me with troponin elevated at 214, although review of chart shows that patient is generally around 200 recently.  May be related to chronic kidney disease.  Repeat troponin is pending.  If patient remains asymptomatic and repeat troponin is similar to initial, patient can be discharged.  1:58 AM repeat troponin is 222.  Although this does by criteria representing a significant change, patient continues to be in sinus rhythm with no chest pain.  Discussed results with the patient and he is agreeable to discharge with outpatient follow-up.    FINAL IMPRESSION    1. PAF (paroxysmal atrial fibrillation) (H)    2. Elevated troponin        ED MEDS  Medications - No data to display    LAB  Labs Ordered and Resulted from Time of ED Arrival to Time of ED Departure   COMPREHENSIVE METABOLIC PANEL - Abnormal       Result Value    Sodium 138      Potassium 3.7      Carbon Dioxide (CO2) 20 (*)     Anion Gap 12      Urea Nitrogen 25.9 (*)     Creatinine 1.65 (*)     GFR Estimate 41 (*)     Calcium 8.6 (*)     Chloride 106      Glucose 101 (*)     Alkaline Phosphatase 76      AST 15      ALT 13      Protein Total 6.3 (*) "     Albumin 3.9      Bilirubin Total 0.3     TROPONIN T, HIGH SENSITIVITY - Abnormal    Troponin T, High Sensitivity 214 (*)    CBC WITH PLATELETS AND DIFFERENTIAL - Abnormal    WBC Count 8.1      RBC Count 2.82 (*)     Hemoglobin 9.6 (*)     Hematocrit 29.3 (*)      (*)     MCH 34.0 (*)     MCHC 32.8      RDW 14.2      Platelet Count 173      % Neutrophils 69      % Lymphocytes 20      % Monocytes 8      % Eosinophils 2      % Basophils 0      % Immature Granulocytes 1      NRBCs per 100 WBC 0      Absolute Neutrophils 5.6      Absolute Lymphocytes 1.6      Absolute Monocytes 0.7      Absolute Eosinophils 0.1      Absolute Basophils 0.0      Absolute Immature Granulocytes 0.1      Absolute NRBCs 0.0     ROUTINE UA WITH MICROSCOPIC REFLEX TO CULTURE - Abnormal    Color Urine Colorless      Appearance Urine Clear      Glucose Urine Negative      Bilirubin Urine Negative      Ketones Urine Negative      Specific Gravity Urine 1.004      Blood Urine 0.2 mg/dL (*)     pH Urine 7.5 (*)     Protein Albumin Urine Negative      Urobilinogen Urine <2.0      Nitrite Urine Positive (*)     Leukocyte Esterase Urine 250 Deanna/uL (*)     Budding Yeast Urine Few (*)     Mucus Urine Present (*)     RBC Urine 3 (*)     WBC Urine 5      Hyaline Casts Urine 1     TROPONIN T, HIGH SENSITIVITY - Abnormal    Troponin T, High Sensitivity 222 (*)    URINE CULTURE       EKG  Independently interpreted by me performed at 10:11 PM demonstrates sinus rhythm rate 90, no acute ST elevations or depressions, normal intervals, prolonged QT at 489, .  Compared to prior of earlier this evening, sinus rhythm has replaced atrial fibrillation    RADIOLOGY    XR Chest Port 1 View   Final Result   IMPRESSION: Lungs are clear without consolidation, pleural effusion or pneumothorax. Heart size and pulmonary vascularity are normal.          DISCHARGE MEDS  New Prescriptions    No medications on file       Jamal Ho MD  [facility]  [facility] EMERGENCY DEPARTMENT  [address] [place] [id number]  [phone]       Jamal Ho MD  03/13/24 0201

## 2024-03-13 NOTE — ED NOTES
Bed: JNED-19  Expected date: 3/12/24  Expected time: 9:39 PM  Means of arrival: Ambulance  Comments:  [facility] 83M afib rvr; hypotensive

## 2024-03-13 NOTE — ED TRIAGE NOTES
Patient arrives via EMS for dizziness. Patient has a history of Afib and is in afib on arrival. EMS states at the home the patient was hypotensive at 70's systolically but rebounded once 250ml IV fluids were given in route. Repeat EMS BP was in the 140's. Patient is stable on arrival with heart rate of 120-150's afib RVR. Patient is alert and oriented, denies any chest pain or shortness of breath.

## 2024-03-13 NOTE — PROGRESS NOTES
"Preventive Care Visit  [facility] TRA Martinez MD, Family Medicine  Mar 13, 2024      Assessment & Plan     Encounter for Medicare annual wellness exam      Pure hypercholesterolemia  Continue atorvastatin    Stage 3a chronic kidney disease (H)  Monitoring    Benign prostatic hyperplasia with urinary retention  Will refill both of these he is going to make an appointment to see urology  - tamsulosin (FLOMAX) 0.4 MG capsule; Take 2 capsules (0.8 mg) by mouth daily (with dinner)  - oxyBUTYnin ER (DITROPAN XL) 5 MG 24 hr tablet; Take 5 tablets (25 mg) by mouth daily    Paroxysmal atrial fibrillation (H)  He was decreased on this during hospital  - metoprolol succinate ER (TOPROL XL) 25 MG 24 hr tablet; Take 3 tablets (75 mg) by mouth daily    Benign essential hypertension  This was also added  - losartan (COZAAR) 25 MG tablet; Take 1 tablet (25 mg) by mouth daily    NSTEMI -- S/P CANDIE x 3 to LAD on 4/10/23  They added some Plavix to  - clopidogrel (PLAVIX) 75 MG tablet; Take 1 tablet (75 mg) by mouth daily    Hyperlipidemia, unspecified hyperlipidemia type  Continue  - atorvastatin (LIPITOR) 20 MG tablet; Take 1 tablet (20 mg) by mouth every evening    Rheumatoid arthritis involving multiple sites with positive rheumatoid factor (H)  He will check in with his rheumatologist shortly    Patient has been advised of split billing requirements and indicates understanding: Yes        MED REC REQUIRED  Post Medication Reconciliation Status: discharge medications reconciled and changed, per note/orders  BMI  Estimated body mass index is 27.8 kg/m  as calculated from the following:    Height as of this encounter: 1.702 m (5' 7\").    Weight as of this encounter: 80.5 kg (177 lb 8 oz).       Counseling  Appropriate preventive services were discussed with this patient, including applicable screening as appropriate for fall prevention, nutrition, physical activity, Tobacco-use cessation, weight loss and " cognition.  Checklist reviewing preventive services available has been given to the patient.  Reviewed patient's diet, addressing concerns and/or questions.   He is at risk for lack of exercise and has been provided with information to increase physical activity for the benefit of his well-being.   The patient was instructed to see the dentist every 6 months.   Patient reported safety concerns were addressed today.The patient was provided with written information regarding signs of hearing loss.   Information on urinary incontinence and treatment options given to patient.       CONSULTATION/REFERRAL to urology cardiology and rheumatology    Subjective   Nikos is a 83 year old, presenting for the following:  Medicare Visit        3/13/2024    10:19 AM   Additional Questions   Roomed by Savanna Cid CMA         Health Care Directive  Patient does not have a Health Care Directive or Living Will: Discussed advance care planning with patient; however, patient declined at this time.    HPI        Was in emergency room last night for dizziness he had been dehydrated he feels a lot better now. He was also in there hospital 2 weeks ago with afib with rapid rate.       3/13/2024   General Health   How would you rate your overall physical health? (!) FAIR   Feel stress (tense, anxious, or unable to sleep) To some extent   (!) STRESS CONCERN      3/13/2024   Nutrition   Diet: Regular (no restrictions)    I don't know         3/13/2024   Exercise   Days per week of moderate/strenous exercise 1 day   Average minutes spent exercising at this level 20 min   (!) EXERCISE CONCERN      3/13/2024   Social Factors   Frequency of gathering with friends or relatives Once a week   Worry food won't last until get money to buy more No   Food not last or not have enough money for food? No   Do you have housing?  Yes   Are you worried about losing your housing? No   Lack of transportation? No   Unable to get utilities (heat,electricity)? No          2023   Fall Risk   Fallen 2 or more times in the past year? No          3/13/2024   Activities of Daily Living- Home Safety   Needs help with the following daily activites None of the above   Safety concerns in the home Poor lighting         3/13/2024   Dental   Dentist two times every year? (!) NO         3/13/2024   Hearing Screening   Hearing concerns? (!) TROUBLE UNDERSTANDING SOFT OR WHISPERED SPEECH.         3/13/2024   Driving Risk Screening   Patient/family members have concerns about driving No         3/13/2024   General Alertness/Fatigue Screening   Have you been more tired than usual lately? No         3/13/2024   Urinary Incontinence Screening   Bothered by leaking urine in past 6 months Yes            Today's PHQ-2 Score:       3/13/2024    10:07 AM   PHQ-2 (  [employer])   Q1: Little interest or pleasure in doing things 0   Q2: Feeling down, depressed or hopeless 1   PHQ-2 Score 1   Q1: Little interest or pleasure in doing things Not at all   Q2: Feeling down, depressed or hopeless Several days   PHQ-2 Score 1           3/13/2024   Substance Use   Alcohol more than 3/day or more than 7/wk No   Do you have a current opioid prescription? No   How severe/bad is pain from 1 to 10? 0/10 (No Pain)   Do you use any other substances recreationally? No     Social History     Tobacco Use    Smoking status: Former    Smokeless tobacco: Never   Vaping Use    Vaping Use: Never used   Substance Use Topics    Drug use: Not Currently         Fracture Risk Assessment Tool  Link to Frax Calculator  Use the information below to complete the Frax calculator  : 1940  Sex: male  Weight (kg): 80.5 kg (actual weight)  Height (cm): 170.2 cm  Previous Fragility Fracture:  No  History of parent with fractured hip:  Yes  Current Smoking:  No  Patient has been on glucocorticoids for more than 3 months (5mg/day or more): No  Rheumatoid Arthritis on Problem List:  Yes  Secondary Osteoporosis on Problem List:   "No  Consumes 3 or more units of alcohol per day: No  Femoral Neck BMD (g/cm2)            Reviewed and updated as needed this visit by Provider                    Past Medical History:   Diagnosis Date    Acute diverticulitis 4/20/2016    Lung nodule < 6cm on CT 6/6/2018     Current providers sharing in care for this patient include:  Patient Care Team:  Pro Chávez MD as PCP - General (Family Practice)  Pro Chávez MD as Assigned PCP  Jaelyn Carlos MD as Assigned Heart and Vascular Provider    The following health maintenance items are reviewed in Epic and correct as of today:  Health Maintenance   Topic Date Due    LIPID  Never done    MICROALBUMIN  Never done    ADVANCE CARE PLANNING  Never done    RSV VACCINE (Pregnancy & 60+) (1 - 1-dose 60+ series) Never done    ZOSTER IMMUNIZATION (1 of 2) 07/13/2016    INFLUENZA VACCINE (1) 09/01/2023    COVID-19 Vaccine (5 - 2023-24 season) 09/01/2023    ANNUAL REVIEW OF HM ORDERS  06/02/2024    FALL RISK ASSESSMENT  06/02/2024    BMP  03/12/2025    HEMOGLOBIN  03/12/2025    MEDICARE ANNUAL WELLNESS VISIT  03/13/2025    DTAP/TDAP/TD IMMUNIZATION (3 - Td or Tdap) 05/24/2030    PHQ-2 (once per calendar year)  Completed    Pneumococcal Vaccine: 65+ Years  Completed    URINALYSIS  Completed    IPV IMMUNIZATION  Aged Out    HPV IMMUNIZATION  Aged Out    MENINGITIS IMMUNIZATION  Aged Out    RSV MONOCLONAL ANTIBODY  Aged Out         Review of Systems  Still a bit dizzy denies chest pain  shortness of breath fever he has an indwelling catheter due to his prostate size      Objective    Exam  Resp 16   Ht 1.702 m (5' 7\")   Wt 80.5 kg (177 lb 8 oz)   BMI 27.80 kg/m     Estimated body mass index is 27.8 kg/m  as calculated from the following:    Height as of this encounter: 1.702 m (5' 7\").    Weight as of this encounter: 80.5 kg (177 lb 8 oz).    Physical Exam  GENERAL: alert and no distress  RESP: lungs clear to auscultation - no rales, rhonchi or wheezes  CV: " regular rate and rhythm, normal S1 S2, no S3 or S4, no murmur, click or rub, no peripheral edema   PSYCH: mentation appears normal, affect normal/bright         3/13/2024   Mini Cog   Clock Draw Score 2 Normal   3 Item Recall 3 objects recalled   Mini Cog Total Score 5              Signed Electronically by: Tessa Martinez MD

## 2024-03-13 NOTE — PATIENT INSTRUCTIONS
Preventive Care Advice   This is general advice given by our system to help you stay healthy. However, your care team may have specific advice just for you. Please talk to your care team about your preventive care needs.  Nutrition  Eat 5 or more servings of fruits and vegetables each day.  Try wheat bread, brown rice and whole grain pasta (instead of white bread, rice, and pasta).  Get enough calcium and vitamin D. Check the label on foods and aim for 100% of the RDA (recommended daily allowance).  Lifestyle  Exercise at least 150 minutes each week   (30 minutes a day, 5 days a week).  Do muscle strengthening activities 2 days a week. These help control your weight and prevent disease.  No smoking.  Wear sunscreen to prevent skin cancer.  Have a dental exam and cleaning every 6 months.  Yearly exams  See your health care team every year to talk about:  Any changes in your health.  Any medicines your care team has prescribed.  Preventive care, family planning, and ways to prevent chronic diseases.  Shots (vaccines)   HPV shots (up to age 26), if you've never had them before.  Hepatitis B shots (up to age 59), if you've never had them before.  COVID-19 shot: Get this shot when it's due.  Flu shot: Get a flu shot every year.  Tetanus shot: Get a tetanus shot every 10 years.  Pneumococcal, hepatitis A, and RSV shots: Ask your care team if you need these based on your risk.  Shingles shot (for age 50 and up).  General health tests  Diabetes screening:  Starting at age 35, Get screened for diabetes at least every 3 years.  If you are younger than age 35, ask your care team if you should be screened for diabetes.  Cholesterol test: At age 39, start having a cholesterol test every 5 years, or more often if advised.  Bone density scan (DEXA): At age 50, ask your care team if you should have this scan for osteoporosis (brittle bones).  Hepatitis C: Get tested at least once in your life.  STIs (sexually transmitted  infections)  Before age 24: Ask your care team if you should be screened for STIs.  After age 24: Get screened for STIs if you're at risk. You are at risk for STIs (including HIV) if:  You are sexually active with more than one person.  You don't use condoms every time.  You or a partner was diagnosed with a sexually transmitted infection.  If you are at risk for HIV, ask about PrEP medicine to prevent HIV.  Get tested for HIV at least once in your life, whether you are at risk for HIV or not.  Cancer screening tests  Cervical cancer screening: If you have a cervix, begin getting regular cervical cancer screening tests at age 21. Most people who have regular screenings with normal results can stop after age 65. Talk about this with your provider.  Breast cancer scan (mammogram): If you've ever had breasts, begin having regular mammograms starting at age 40. This is a scan to check for breast cancer.  Colon cancer screening: It is important to start screening for colon cancer at age 45.  Have a colonoscopy test every 10 years (or more often if you're at risk) Or, ask your provider about stool tests like a FIT test every year or Cologuard test every 3 years.  To learn more about your testing options, visit: https://[email or web address]/544032.pdf.  For help making a decision, visit: https://bit.ly/lg77527.  Prostate cancer screening test: If you have a prostate and are age 55 to 69, ask your provider if you would benefit from a yearly prostate cancer screening test.  Lung cancer screening: If you are a current or former smoker age 50 to 80, ask your care team if ongoing lung cancer screenings are right for you.  For informational purposes only. Not to replace the advice of your health care provider. Copyright   2023 [employer]. All rights reserved. Clinically reviewed by the [facility]itions [facility]. Flux [id number] - REV 01/24.    Learning About Activities of Daily Living  What are activities  of daily living?     Activities of daily living (ADLs) are the basic self-care tasks you do every day. As you age, and if you have health problems, you may find that it's harder to do these things for yourself. That's when you may need some help.  Your doctor uses ADLs to measure how much help you need. Knowing what you can and can't do for yourself is an important first step to getting help. And when you have the help you need, you can stay as independent as possible.  Your doctor will want to know if you are able to do tasks such as:  Take a bath or shower without help.  Go to the bathroom by yourself.  Dress and undress without help.  Shave, comb your hair, and brush teeth on your own.  Get in and out of bed or a chair without help.  Feed yourself without help.  If you are having trouble doing basic self-care tasks, talk with your doctor. You may want to bring a caregiver or family member who can help the doctor understand your needs and abilities.  How will a doctor assess your ADLs?  Asking about ADLs is part of a routine health checkup your doctor will likely do as you age. Your health check might be done in a doctor's office, in your home, or at a hospital. The goal is to find out if you are having any problems that could make your health problems worse or that make it unsafe for you to be on your own.  To measure your ADLs, your doctor will ask how hard it is for you to do routine tasks. He or she may also want to know if you have changed the way you do a task because of a health problem. He or she may watch how you:  Walk back and forth.  Keep your balance while you stand or walk.  Move from sitting to standing or from a bed to a chair.  Button or unbutton a shirt or sweater.  Remove and put on your shoes.  It's normal to feel a little worried or anxious if you find you can't do all the things you used to be able to do. Talking with your doctor about ADLs isn't a test that you either pass or fail. It's just  a way to get more information about your health and safety.  Follow-up care is a key part of your treatment and safety. Be sure to make and go to all appointments, and call your doctor if you are having problems. It's also a good idea to know your test results and keep a list of the medicines you take.  Current as of: February 26, 2023               Content Version: 13.8    8019-1670 [employer].   Care instructions adapted under license by your healthcare professional. If you have questions about a medical condition or this instruction, always ask your healthcare professional. [employer] disclaims any warranty or liability for your use of this information.      Hearing Loss: Care Instructions  Overview     Hearing loss is a sudden or slow decrease in how well you hear. It can range from slight to profound. Permanent hearing loss can occur with aging. It also can happen when you are exposed long-term to loud noise. Examples include listening to loud music, riding motorcycles, or being around other loud machines.  Hearing loss can affect your work and home life. It can make you feel lonely or depressed. You may feel that you have lost your independence. But hearing aids and other devices can help you hear better and feel connected to others.  Follow-up care is a key part of your treatment and safety. Be sure to make and go to all appointments, and call your doctor if you are having problems. It's also a good idea to know your test results and keep a list of the medicines you take.  How can you care for yourself at home?  Avoid loud noises whenever possible. This helps keep your hearing from getting worse.  Always wear hearing protection around loud noises.  Wear a hearing aid as directed.  A professional can help you pick a hearing aid that will work best for you.  You can also get hearing aids over the counter for mild to moderate hearing loss.  Have hearing tests as your doctor suggests.  "They can show whether your hearing has changed. Your hearing aid may need to be adjusted.  Use other devices as needed. These may include:  Telephone amplifiers and hearing aids that can connect to a television, stereo, radio, or microphone.  Devices that use lights or vibrations. These alert you to the doorbell, a ringing telephone, or a baby monitor.  Television closed-captioning. This shows the words at the bottom of the screen. Most new TVs can do this.  TTY (text telephone). This lets you type messages back and forth on the telephone instead of talking or listening. These devices are also called TDD. When messages are typed on the keyboard, they are sent over the phone line to a receiving TTY. The message is shown on a monitor.  Use text messaging, social media, and email if it is hard for you to communicate by telephone.  Try to learn a listening technique called speechreading. It is not lipreading. You pay attention to people's gestures, expressions, posture, and tone of voice. These clues can help you understand what a person is saying. Face the person you are talking to, and have them face you. Make sure the lighting is good. You need to see the other person's face clearly.  Think about counseling if you need help to adjust to your hearing loss.  When should you call for help?  Watch closely for changes in your health, and be sure to contact your doctor if:    You think your hearing is getting worse.     You have new symptoms, such as dizziness or nausea.   Where can you learn more?  Go to https://www.Monstrous.net/patiented  Enter R798 in the search box to learn more about \"Hearing Loss: Care Instructions.\"  Current as of: February 28, 2023               Content Version: 13.8    1602-3716 [employer].   Care instructions adapted under license by your healthcare professional. If you have questions about a medical condition or this instruction, always ask your healthcare professional. " [employer], [facility] disclaims any warranty or liability for your use of this information.      Learning About Stress  What is stress?     Stress is your body's response to a hard situation. Your body can have a physical, emotional, or mental response. Stress is a fact of life for most people, and it affects everyone differently. What causes stress for you may not be stressful for someone else.  A lot of things can cause stress. You may feel stress when you go on a job interview, take a test, or run a race. This kind of short-term stress is normal and even useful. It can help you if you need to work hard or react quickly. For example, stress can help you finish an important job on time.  Long-term stress is caused by ongoing stressful situations or events. Examples of long-term stress include long-term health problems, ongoing problems at work, or conflicts in your family. Long-term stress can harm your health.  How does stress affect your health?  When you are stressed, your body responds as though you are in danger. It makes hormones that speed up your heart, make you breathe faster, and give you a burst of energy. This is called the fight-or-flight stress response. If the stress is over quickly, your body goes back to normal and no harm is done.  But if stress happens too often or lasts too long, it can have bad effects. Long-term stress can make you more likely to get sick, and it can make symptoms of some diseases worse. If you tense up when you are stressed, you may develop neck, shoulder, or low back pain. Stress is linked to high blood pressure and heart disease.  Stress also harms your emotional health. It can make you small, tense, or depressed. Your relationships may suffer, and you may not do well at work or school.  What can you do to manage stress?  You can try these things to help manage stress:   Do something active. Exercise or activity can help reduce stress. Walking is a great way to get  started. Even everyday activities such as housecleaning or yard work can help.  Try yoga or fabrizio chi. These techniques combine exercise and meditation. You may need some training at first to learn them.  Do something you enjoy. For example, listen to music or go to a movie. Practice your hobby or do volunteer work.  Meditate. This can help you relax, because you are not worrying about what happened before or what may happen in the future.  Do guided imagery. Imagine yourself in any setting that helps you feel calm. You can use online videos, books, or a teacher to guide you.  Do breathing exercises. For example:  From a standing position, bend forward from the waist with your knees slightly bent. Let your arms dangle close to the floor.  Breathe in slowly and deeply as you return to a standing position. Roll up slowly and lift your head last.  Hold your breath for just a few seconds in the standing position.  Breathe out slowly and bend forward from the waist.  Let your feelings out. Talk, laugh, cry, and express anger when you need to. Talking with supportive friends or family, a counselor, or a narinder leader about your feelings is a healthy way to relieve stress. Avoid discussing your feelings with people who make you feel worse.  Write. It may help to write about things that are bothering you. This helps you find out how much stress you feel and what is causing it. When you know this, you can find better ways to cope.  What can you do to prevent stress?  You might try some of these things to help prevent stress:  Manage your time. This helps you find time to do the things you want and need to do.  Get enough sleep. Your body recovers from the stresses of the day while you are sleeping.  Get support. Your family, friends, and community can make a difference in how you experience stress.  Limit your news feed. Avoid or limit time on social media or news that may make you feel stressed.  Do something active. Exercise  "or activity can help reduce stress. Walking is a great way to get started.  Where can you learn more?  Go to https://www.Soko.net/patiented  Enter N032 in the search box to learn more about \"Learning About Stress.\"  Current as of: February 26, 2023               Content Version: 13.8    8516-4434 [employer].   Care instructions adapted under license by your healthcare professional. If you have questions about a medical condition or this instruction, always ask your healthcare professional. [employer] disclaims any warranty or liability for your use of this information.      Bladder Training: Care Instructions  Your Care Instructions     Bladder training is used to treat urge incontinence and stress incontinence. Urge incontinence means that the need to urinate comes on so fast that you can't get to a toilet in time. Stress incontinence means that you leak urine because of pressure on your bladder. For example, it may happen when you laugh, cough, or lift something heavy.  Bladder training can increase how long you can wait before you have to urinate. It can also help your bladder hold more urine. And it can give you better control over the urge to urinate.  It is important to remember that bladder training takes a few weeks to a few months to make a difference. You may not see results right away, but don't give up.  Follow-up care is a key part of your treatment and safety. Be sure to make and go to all appointments, and call your doctor if you are having problems. It's also a good idea to know your test results and keep a list of the medicines you take.  How can you care for yourself at home?  Work with your doctor to come up with a bladder training program that is right for you. You may use one or more of the following methods.  Delayed urination  In the beginning, try to keep from urinating for 5 minutes after you first feel the need to go.  While you wait, take deep, slow " "breaths to relax. Kegel exercises can also help you delay the need to go to the bathroom.  After some practice, when you can easily wait 5 minutes to urinate, try to wait 10 minutes before you urinate.  Slowly increase the waiting period until you are able to control when you have to urinate.  Scheduled urination  Empty your bladder when you first wake up in the morning.  Schedule times throughout the day when you will urinate.  Start by going to the bathroom every hour, even if you don't need to go.  Slowly increase the time between trips to the bathroom.  When you have found a schedule that works well for you, keep doing it.  If you wake up during the night and have to urinate, do it. Apply your schedule to waking hours only.  Kegel exercises  These tighten and strengthen pelvic muscles, which can help you control the flow of urine. (If doing these exercises causes pain, stop doing them and talk with your doctor.) To do Kegel exercises:  Squeeze your muscles as if you were trying not to pass gas. Or squeeze your muscles as if you were stopping the flow of urine. Your belly, legs, and buttocks shouldn't move.  Hold the squeeze for 3 seconds, then relax for 5 to 10 seconds.  Start with 3 seconds, then add 1 second each week until you are able to squeeze for 10 seconds.  Repeat the exercise 10 times a session. Do 3 to 8 sessions a day.  When should you call for help?  Watch closely for changes in your health, and be sure to contact your doctor if:    Your incontinence is getting worse.     You do not get better as expected.   Where can you learn more?  Go to https://www.healthJamii.net/patiented  Enter V684 in the search box to learn more about \"Bladder Training: Care Instructions.\"  Current as of: February 28, 2023               Content Version: 13.8    1162-7096 [employer].   Care instructions adapted under license by your healthcare professional. If you have questions about a medical condition or this " instruction, always ask your healthcare professional. [employer] disclaims any warranty or liability for your use of this information.

## 2024-03-14 LAB — BACTERIA UR CULT: NORMAL

## 2024-03-15 LAB
ATRIAL RATE - MUSE: 241 BPM
ATRIAL RATE - MUSE: 90 BPM
DIASTOLIC BLOOD PRESSURE - MUSE: 83 MMHG
DIASTOLIC BLOOD PRESSURE - MUSE: 87 MMHG
INTERPRETATION ECG - MUSE: NORMAL
INTERPRETATION ECG - MUSE: NORMAL
P AXIS - MUSE: 62 DEGREES
P AXIS - MUSE: NORMAL DEGREES
PR INTERVAL - MUSE: 174 MS
PR INTERVAL - MUSE: NORMAL MS
QRS DURATION - MUSE: 88 MS
QRS DURATION - MUSE: 92 MS
QT - MUSE: 350 MS
QT - MUSE: 400 MS
QTC - MUSE: 489 MS
QTC - MUSE: 502 MS
R AXIS - MUSE: -20 DEGREES
R AXIS - MUSE: -28 DEGREES
SYSTOLIC BLOOD PRESSURE - MUSE: 117 MMHG
SYSTOLIC BLOOD PRESSURE - MUSE: 132 MMHG
T AXIS - MUSE: 103 DEGREES
T AXIS - MUSE: 90 DEGREES
VENTRICULAR RATE- MUSE: 124 BPM
VENTRICULAR RATE- MUSE: 90 BPM

## 2024-03-27 ENCOUNTER — OFFICE VISIT (OUTPATIENT)
Dept: CARDIOLOGY | Facility: CLINIC | Age: 84
End: 2024-03-27
Payer: MEDICARE

## 2024-03-27 VITALS
SYSTOLIC BLOOD PRESSURE: 142 MMHG | OXYGEN SATURATION: 100 % | BODY MASS INDEX: 27.57 KG/M2 | HEART RATE: 61 BPM | WEIGHT: 176 LBS | DIASTOLIC BLOOD PRESSURE: 80 MMHG

## 2024-03-27 DIAGNOSIS — I48.0 PAF (PAROXYSMAL ATRIAL FIBRILLATION) (H): ICD-10-CM

## 2024-03-27 DIAGNOSIS — R31.0 GROSS HEMATURIA: ICD-10-CM

## 2024-03-27 DIAGNOSIS — I48.0 PAROXYSMAL ATRIAL FIBRILLATION (H): Primary | ICD-10-CM

## 2024-03-27 DIAGNOSIS — R79.89 ELEVATED TROPONIN: ICD-10-CM

## 2024-03-27 PROCEDURE — 99215 OFFICE O/P EST HI 40 MIN: CPT | Performed by: INTERNAL MEDICINE

## 2024-03-27 PROCEDURE — 99417 PROLNG OP E/M EACH 15 MIN: CPT | Performed by: INTERNAL MEDICINE

## 2024-03-27 NOTE — Clinical Note
Hi team, Please see my note on this patient from today.  I have copied Dr. Carlos (patient's primary cardiologist) on my note so that she is up to speed on the plan as outlined below.  If you have concerns about stopping the Plavix please let us know. Shreya I see multiple notes in the chart from you before regarding his prostate issues. At this point, as you see my note I think we can plan on taking him off his Plavix (if okay by Dr. Carlos) in 2 weeks.  Continue his apixaban while he gets on the schedule for his TURP with apixaban discontinuation 3 days prior to his procedure and resumed as soon as possible postprocedure. Plan for LAAC procedure 8 to 10 weeks post TURP. Thanks Anthony

## 2024-03-27 NOTE — PROGRESS NOTES
[facility]  Cardiac Electrophysiology     Progress Note: Anthony Goodrich MD    Primary Care: Tessa Martinez MD    Primary Cardiologist: Jaelyn Carlos MD    Primary Electrophysiologist: Anthony Goodrich MD    Assessment:       Paroxysmal atrial fibrillation: Chronic problem patient dating back to approximately 2021.  The patient has not been cardioverted and shows paroxysms of atrial fibrillation.  He was placed on amiodarone on 4/14/2023 and the medication was ultimately discontinued (5/5/2023) as there was concern over his QT interval.  The patient does not give a history of being able to discern when he is in atrial fibrillation, but the patient was seen in the emergency room and February of this year with symptoms of lightheadedness and hypotension due to atrial fibrillation with RVR.  He did not get into an extensive discussion regarding ablation of his arrhythmia as the patient's primary concern pertains to be able to get off oral anticoagulant therapy so that he can undergo prostate surgery.  Patient has not worn a monitor due to try and assess the frequency of his episodes of atrial fibrillation over time.  His [id number] score is elevated at 6 (age >75, HTN, CAD, CVA/SAH).  The patient is a candidate for consideration of left atrial appendage closure.  Per the EP consult done in February the patient could potentially come off his Plavix therapy and apixaban so that he could undergo TURP.  The patient will be restarted on as oral anticoagulant therapy when cleared by urology and then be scheduled for LAAC.  This would permit the patient to have typical post implant DOAC/DAPT coverage for the 6 months post procedure.    Hematuria: Chronic problem for the patient  who has prostatic hypertrophy and urinary obstruction.  He has had a chronic Vallejo catheter in place and has had recurrent episodes of hematuria.  As noted above the patient could be considered for TURP after 4/11/2024 when his Plavix therapy  could be discontinued.    ASCVD, native vessel: Single-vessel disease with PCI x 2 sites in the LAD on 4/11/2023.      Recommendations:  Continue current medical therapy.  I will have the LAAC team reach out to the patient and further discuss the above.  Will plan to have patient stop Plavix on 4/11/2024 and then stop apixaban 3 days prior to TURP.  Resume apixaban as soon as cleared by Urology post TURP and plan for LAAC procedure 8-10 weeks post TURP.  I will ask the patient to wear a 14-day Zio patch monitor to assess his atrial fibrillation status.  Follow-up in the arrhythmia clinic with the EP THUAN in 2-3 months.    Time spent: 60 minutes spent on the date of the encounter doing chart review, history and exam, documentation and further activities as noted above.    Subjective:  Felipe Verma (83 year old male) returns to the arrhythmia clinic for interval reevaluation of his paroxysmal atrial fibrillation and discussion of possible left atrial appendage closure.  The patient is not able to recall when his atrial fibrillation was diagnosed.  Based on chart review this would appear to be 2021 when he first demonstrated atrial fibrillation on his EKG.  The patient initially was felt to be asymptomatic and by his own history this appears to be his sense of his arrhythmia.  He was however seen in the emergency room in February of this year and had atrial fibrillation with RVR likely contributing to his lightheadedness and shortness of breath.  He also underwent PCI of his LAD almost a year ago and has been on chronic antiplatelet plus DOAC therapy.  Patient has had problems with both hematuria and the need for frequent change out of his Vallejo catheter which was placed due to urinary obstruction brought on by prostate hypertrophy.  The patient currently expresses that his primary goal is getting the Vallejo catheter out and then dealing with his rhythm issues.    Current Outpatient Medications   Medication Sig  Dispense Refill    acetaminophen (TYLENOL) 500 MG tablet Take 1,000 mg by mouth every 6 hours as needed for mild pain      apixaban ANTICOAGULANT (ELIQUIS) 2.5 MG tablet Take 1 tablet (2.5 mg) by mouth 2 times daily 180 tablet 3    atorvastatin (LIPITOR) 20 MG tablet Take 1 tablet (20 mg) by mouth every evening 90 tablet 3    calcium carbonate 500 mg, elemental, (OSCAL 500) 1250 (500 Ca) MG TABS tablet Take 1 tablet by mouth daily as needed (when remembers)      clopidogrel (PLAVIX) 75 MG tablet Take 1 tablet (75 mg) by mouth daily 90 tablet 3    furosemide (LASIX) 20 MG tablet TAKE 1 TABLET BY MOUTH ONCE DAILY FOR INCREASED SWELLING, WEIGHT GAIN, OR SHORTNESS OF BREATH 90 tablet 3    hydroxychloroquine (PLAQUENIL) 200 MG tablet Take 200 mg by mouth daily      leucovorin (WELLCOVORIN) 5 mg tablet Take 5 mg by mouth three times a week On Monday, Wednesday and Saturday. Do not overlap days of methotrexate      losartan (COZAAR) 25 MG tablet Take 1 tablet (25 mg) by mouth daily 90 tablet 3    methotrexate 2.5 MG tablet Take 12.5 mg by mouth twice a week 5 tablets (12.5mg) on Thursday and Friday      metoprolol succinate ER (TOPROL XL) 25 MG 24 hr tablet Take 3 tablets (75 mg) by mouth daily 270 tablet 3    nitroGLYcerin (NITROSTAT) 0.4 MG sublingual tablet For chest pain place 1 tablet under the tongue every 5 minutes for 3 doses. If symptoms persist 5 minutes after 1st dose call 911. 25 tablet 1    oxyBUTYnin ER (DITROPAN XL) 5 MG 24 hr tablet Take 5 tablets (25 mg) by mouth daily 450 tablet 3    tamsulosin (FLOMAX) 0.4 MG capsule Take 2 capsules (0.8 mg) by mouth daily (with dinner) 180 capsule 3    vitamin B-12 (CYANOCOBALAMIN) 1000 MCG tablet Take 1,000 mcg by mouth daily as needed (when remembers)      vitamin D3 (CHOLECALCIFEROL) 50 mcg (2000 units) tablet Take 1 tablet by mouth daily as needed (when remembers)         Review of Systems:     Family History  Family History   Problem Relation Age of Onset    Colon  Cancer Mother     Hypertension Father     Coronary Artery Disease Father     Aneurysm Sister     Cerebrovascular Disease Sister     No Known Problems Brother     Diabetes Son     No Known Problems Son     No Known Problems Son     Breast Cancer No family hx of     Prostate Cancer No family hx of        Social History   reports that he has quit smoking. He has never used smokeless tobacco. He reports that he does not currently use drugs.    Objective:   Vital signs in last 24 hours:  BP (!) 142/80 (BP Location: Left arm, Patient Position: Sitting, Cuff Size: Adult Regular)   Pulse 61   Wt 79.8 kg (176 lb)   SpO2 100%   BMI 27.57 kg/m      Physical Exam:  General: The patient is alert oriented to person place and situation.  The patient is in no acute distress at the time of my evaluation.  Eyes: Pupils are equal, round, and reactive to light.  Conjunctiva and sclera are clear.  ENT: Oral mucosa is moist and without redness. No evident nasal discharge.  Pulmonary: Lungs are clear bilaterally with no rales, rhonchi, or wheezes.    Cardiovascular exam: Rhythm is regular with occasional ectopic beats. S1 and S2 are normal. No significant murmur is present. JVP is normal. Lower extremities demonstrate no significant edema. Distal pulses are intact bilaterally.  Abdomen is soft, nontender, no organomegaly, and bowel sounds present.  Musculoskeletal: Spine is straight. Extremities without deformity.  Neuro: Gait is normal.   Skin is warm, dry, and otherwise intact.      Cardiographics:       Lab Results:         Outside record review:

## 2024-03-27 NOTE — LETTER
3/27/2024    Tessa Martinez MD  [address] [facility] [address]    RE: Felipe Quiñonesnancy       Dear Colleague,     I had the pleasure of seeing Felipe BERMUDEZ Luci in the [facility].    [facility]  Cardiac Electrophysiology     Progress Note: Anthony Goodrich MD    Primary Care: Tessa Martinez MD    Primary Cardiologist: Jaelyn Carlos MD    Primary Electrophysiologist: Anthony Goodrich MD    Assessment:       Paroxysmal atrial fibrillation: Chronic problem patient dating back to approximately 2021.  The patient has not been cardioverted and shows paroxysms of atrial fibrillation.  He was placed on amiodarone on 4/14/2023 and the medication was ultimately discontinued (5/5/2023) as there was concern over his QT interval.  The patient does not give a history of being able to discern when he is in atrial fibrillation, but the patient was seen in the emergency room and February of this year with symptoms of lightheadedness and hypotension due to atrial fibrillation with RVR.  He did not get into an extensive discussion regarding ablation of his arrhythmia as the patient's primary concern pertains to be able to get off oral anticoagulant therapy so that he can undergo prostate surgery.  Patient has not worn a monitor due to try and assess the frequency of his episodes of atrial fibrillation over time.  His [id number] score is elevated at 6 (age >75, HTN, CAD, CVA/SAH).  The patient is a candidate for consideration of left atrial appendage closure.  Per the EP consult done in February the patient could potentially come off his Plavix therapy and apixaban so that he could undergo TURP.  The patient will be restarted on as oral anticoagulant therapy when cleared by urology and then be scheduled for LAAC.  This would permit the patient to have typical post implant DOAC/DAPT coverage for the 6 months post procedure.    Hematuria: Chronic problem for the patient  who has prostatic hypertrophy  and urinary obstruction.  He has had a chronic Vallejo catheter in place and has had recurrent episodes of hematuria.  As noted above the patient could be considered for TURP after 4/11/2024 when his Plavix therapy could be discontinued.    ASCVD, native vessel: Single-vessel disease with PCI x 2 sites in the LAD on 4/11/2023.      Recommendations:  Continue current medical therapy.  I will have the LAAC team reach out to the patient and further discuss the above.  Will plan to have patient stop Plavix on 4/11/2024 and then stop apixaban 3 days prior to TURP.  Resume apixaban as soon as cleared by Urology post TURP and plan for LAAC procedure 8-10 weeks post TURP.  I will ask the patient to wear a 14-day Zio patch monitor to assess his atrial fibrillation status.  Follow-up in the arrhythmia clinic with the EP THUAN in 2-3 months.    Time spent: 60 minutes spent on the date of the encounter doing chart review, history and exam, documentation and further activities as noted above.    Subjective:  Felipe Verma (83 year old male) returns to the arrhythmia clinic for interval reevaluation of his paroxysmal atrial fibrillation and discussion of possible left atrial appendage closure.  The patient is not able to recall when his atrial fibrillation was diagnosed.  Based on chart review this would appear to be 2021 when he first demonstrated atrial fibrillation on his EKG.  The patient initially was felt to be asymptomatic and by his own history this appears to be his sense of his arrhythmia.  He was however seen in the emergency room in February of this year and had atrial fibrillation with RVR likely contributing to his lightheadedness and shortness of breath.  He also underwent PCI of his LAD almost a year ago and has been on chronic antiplatelet plus DOAC therapy.  Patient has had problems with both hematuria and the need for frequent change out of his Vallejo catheter which was placed due to urinary obstruction brought  on by prostate hypertrophy.  The patient currently expresses that his primary goal is getting the Vallejo catheter out and then dealing with his rhythm issues.    Current Outpatient Medications   Medication Sig Dispense Refill    acetaminophen (TYLENOL) 500 MG tablet Take 1,000 mg by mouth every 6 hours as needed for mild pain      apixaban ANTICOAGULANT (ELIQUIS) 2.5 MG tablet Take 1 tablet (2.5 mg) by mouth 2 times daily 180 tablet 3    atorvastatin (LIPITOR) 20 MG tablet Take 1 tablet (20 mg) by mouth every evening 90 tablet 3    calcium carbonate 500 mg, elemental, (OSCAL 500) 1250 (500 Ca) MG TABS tablet Take 1 tablet by mouth daily as needed (when remembers)      clopidogrel (PLAVIX) 75 MG tablet Take 1 tablet (75 mg) by mouth daily 90 tablet 3    furosemide (LASIX) 20 MG tablet TAKE 1 TABLET BY MOUTH ONCE DAILY FOR INCREASED SWELLING, WEIGHT GAIN, OR SHORTNESS OF BREATH 90 tablet 3    hydroxychloroquine (PLAQUENIL) 200 MG tablet Take 200 mg by mouth daily      leucovorin (WELLCOVORIN) 5 mg tablet Take 5 mg by mouth three times a week On Monday, Wednesday and Saturday. Do not overlap days of methotrexate      losartan (COZAAR) 25 MG tablet Take 1 tablet (25 mg) by mouth daily 90 tablet 3    methotrexate 2.5 MG tablet Take 12.5 mg by mouth twice a week 5 tablets (12.5mg) on Thursday and Friday      metoprolol succinate ER (TOPROL XL) 25 MG 24 hr tablet Take 3 tablets (75 mg) by mouth daily 270 tablet 3    nitroGLYcerin (NITROSTAT) 0.4 MG sublingual tablet For chest pain place 1 tablet under the tongue every 5 minutes for 3 doses. If symptoms persist 5 minutes after 1st dose call 911. 25 tablet 1    oxyBUTYnin ER (DITROPAN XL) 5 MG 24 hr tablet Take 5 tablets (25 mg) by mouth daily 450 tablet 3    tamsulosin (FLOMAX) 0.4 MG capsule Take 2 capsules (0.8 mg) by mouth daily (with dinner) 180 capsule 3    vitamin B-12 (CYANOCOBALAMIN) 1000 MCG tablet Take 1,000 mcg by mouth daily as needed (when remembers)       vitamin D3 (CHOLECALCIFEROL) 50 mcg (2000 units) tablet Take 1 tablet by mouth daily as needed (when remembers)         Review of Systems:     Family History  Family History   Problem Relation Age of Onset    Colon Cancer Mother     Hypertension Father     Coronary Artery Disease Father     Aneurysm Sister     Cerebrovascular Disease Sister     No Known Problems Brother     Diabetes Son     No Known Problems Son     No Known Problems Son     Breast Cancer No family hx of     Prostate Cancer No family hx of        Social History   reports that he has quit smoking. He has never used smokeless tobacco. He reports that he does not currently use drugs.    Objective:   Vital signs in last 24 hours:  BP (!) 142/80 (BP Location: Left arm, Patient Position: Sitting, Cuff Size: Adult Regular)   Pulse 61   Wt 79.8 kg (176 lb)   SpO2 100%   BMI 27.57 kg/m      Physical Exam:  General: The patient is alert oriented to person place and situation.  The patient is in no acute distress at the time of my evaluation.  Eyes: Pupils are equal, round, and reactive to light.  Conjunctiva and sclera are clear.  ENT: Oral mucosa is moist and without redness. No evident nasal discharge.  Pulmonary: Lungs are clear bilaterally with no rales, rhonchi, or wheezes.    Cardiovascular exam: Rhythm is regular with occasional ectopic beats. S1 and S2 are normal. No significant murmur is present. JVP is normal. Lower extremities demonstrate no significant edema. Distal pulses are intact bilaterally.  Abdomen is soft, nontender, no organomegaly, and bowel sounds present.  Musculoskeletal: Spine is straight. Extremities without deformity.  Neuro: Gait is normal.   Skin is warm, dry, and otherwise intact.      Cardiographics:       Lab Results:         Outside record review:          Thank you for allowing me to participate in the care of your patient.      Sincerely,     Anthony Goodrich MD     [facility]  Heart Care  cc:   Jamal Ho MD  [address] [facility] [place]  [place] [address]

## 2024-03-28 ENCOUNTER — TELEPHONE (OUTPATIENT)
Dept: CARDIOLOGY | Facility: CLINIC | Age: 84
End: 2024-03-28

## 2024-03-28 NOTE — TELEPHONE ENCOUNTER
----- Message -----  From: Jaelyn Carlos MD  Sent: 3/29/2024  12:46 PM CDT  To: Shreya Marlow RN    Shreya,    It should be okay to stop his Plavix in 2 weeks since he is out a year from his intervention as long as he is having no symptoms concerning for angina.    Dr. Carlos  ----- Message -----  From: Shreya Marlow RN  Sent: 3/28/2024   2:16 PM CDT  To: Jaelyn Carlos MD    ----- Message from Shreya Marlow RN sent at 3/28/2024  2:16 PM CDT -----  Hi Dr. Carlos,     Before I call patient, can you confirm for me you are ok with plan for patient to stop Plavix in two weeks? He will be one year post-PCI at that point. If ok, then we will continue with plan as outlined by Dr. Goodrich. Thanks for your help,    Shreya    ----- Message -----  From: Anthony Goodrich MD  Sent: 3/27/2024   5:02 PM CDT  To: [facility] Left Atrial Appendage Closure [facility] team,  Please see my note on this patient from today.  I have copied Dr. Carlos (patient's primary cardiologist) on my note so that she is up to speed on the plan as outlined below.  If you have concerns about stopping the Plavix please let us know.  Shreya I see multiple notes in the chart from you before regarding his prostate issues.  At this point, as you see my note I think we can plan on taking him off his Plavix (if okay by Dr. Carlos) in 2 weeks.  Continue his apixaban while he gets on the schedule for his TURP with apixaban discontinuation 3 days prior to his procedure and resumed as soon as possible postprocedure.  Plan for LAAC procedure 8 to 10 weeks post TURP.  Thanks  Anthony   [Left] : left hip [] : greater trochanteric tenderness

## 2024-04-02 NOTE — TELEPHONE ENCOUNTER
Phone call to patient, discussed that patient ok to stop Plavix on 4/11 per Dr. Goodrich and Terrance below. Encouraged him to call Dr. Simon's office and discuss scheduling for TURP. He has writer's direct office number to call back and notify us when TURP has been scheduled. We discussed that he will need to wait approximately 8-10 weeks following TURP to be scheduled for LAAC. He verbalized understanding and is appreciative of call. No further questions at this time. [facility]

## 2024-04-03 ENCOUNTER — HOSPITAL ENCOUNTER (OUTPATIENT)
Facility: HOSPITAL | Age: 84
End: 2024-04-03
Attending: UROLOGY | Admitting: UROLOGY
Payer: MEDICARE

## 2024-04-03 ENCOUNTER — ORDERS ONLY (AUTO-RELEASED) (OUTPATIENT)
Dept: CARDIOLOGY | Facility: CLINIC | Age: 84
End: 2024-04-03
Payer: MEDICARE

## 2024-04-03 DIAGNOSIS — I48.0 PAROXYSMAL ATRIAL FIBRILLATION (H): ICD-10-CM

## 2024-04-03 DIAGNOSIS — R31.0 GROSS HEMATURIA: ICD-10-CM

## 2024-04-15 RX ORDER — CEFAZOLIN SODIUM/WATER 2 G/20 ML
2 SYRINGE (ML) INTRAVENOUS SEE ADMIN INSTRUCTIONS
Status: CANCELLED | OUTPATIENT
Start: 2024-04-29

## 2024-04-15 RX ORDER — CEFAZOLIN SODIUM/WATER 2 G/20 ML
2 SYRINGE (ML) INTRAVENOUS
Status: CANCELLED | OUTPATIENT
Start: 2024-04-29

## 2024-04-19 NOTE — TELEPHONE ENCOUNTER
TURP scheduled for 4/29/2024. Will follow-up with patient post-procedure to discuss LAAC timing and health status. [facility]

## 2024-04-23 ENCOUNTER — OFFICE VISIT (OUTPATIENT)
Dept: FAMILY MEDICINE | Facility: CLINIC | Age: 84
End: 2024-04-23
Payer: MEDICARE

## 2024-04-23 VITALS
OXYGEN SATURATION: 98 % | TEMPERATURE: 97.9 F | SYSTOLIC BLOOD PRESSURE: 110 MMHG | WEIGHT: 173 LBS | BODY MASS INDEX: 27.1 KG/M2 | DIASTOLIC BLOOD PRESSURE: 60 MMHG | HEART RATE: 61 BPM

## 2024-04-23 DIAGNOSIS — Z01.818 PREOP GENERAL PHYSICAL EXAM: Primary | ICD-10-CM

## 2024-04-23 DIAGNOSIS — Z79.02 ANTIPLATELET OR ANTITHROMBOTIC LONG-TERM USE: ICD-10-CM

## 2024-04-23 DIAGNOSIS — I10 PRIMARY HYPERTENSION: ICD-10-CM

## 2024-04-23 LAB
ALBUMIN SERPL BCG-MCNC: 4.1 G/DL (ref 3.5–5.2)
ALP SERPL-CCNC: 81 U/L (ref 40–150)
ALT SERPL W P-5'-P-CCNC: 10 U/L (ref 0–70)
ANION GAP SERPL CALCULATED.3IONS-SCNC: 8 MMOL/L (ref 7–15)
AST SERPL W P-5'-P-CCNC: 18 U/L (ref 0–45)
BILIRUB SERPL-MCNC: 0.4 MG/DL
BUN SERPL-MCNC: 25.1 MG/DL (ref 8–23)
CALCIUM SERPL-MCNC: 8.9 MG/DL (ref 8.8–10.2)
CHLORIDE SERPL-SCNC: 103 MMOL/L (ref 98–107)
CREAT SERPL-MCNC: 1.59 MG/DL (ref 0.67–1.17)
DEPRECATED HCO3 PLAS-SCNC: 26 MMOL/L (ref 22–29)
EGFRCR SERPLBLD CKD-EPI 2021: 43 ML/MIN/1.73M2
ERYTHROCYTE [DISTWIDTH] IN BLOOD BY AUTOMATED COUNT: 13.1 % (ref 10–15)
GLUCOSE SERPL-MCNC: 128 MG/DL (ref 70–99)
HCT VFR BLD AUTO: 32.8 % (ref 40–53)
HGB BLD-MCNC: 10.6 G/DL (ref 13.3–17.7)
MCH RBC QN AUTO: 33 PG (ref 26.5–33)
MCHC RBC AUTO-ENTMCNC: 32.3 G/DL (ref 31.5–36.5)
MCV RBC AUTO: 102 FL (ref 78–100)
PLATELET # BLD AUTO: 146 10E3/UL (ref 150–450)
POTASSIUM SERPL-SCNC: 4.5 MMOL/L (ref 3.4–5.3)
PROT SERPL-MCNC: 6.5 G/DL (ref 6.4–8.3)
RBC # BLD AUTO: 3.21 10E6/UL (ref 4.4–5.9)
SODIUM SERPL-SCNC: 137 MMOL/L (ref 135–145)
WBC # BLD AUTO: 5.9 10E3/UL (ref 4–11)

## 2024-04-23 PROCEDURE — 99214 OFFICE O/P EST MOD 30 MIN: CPT | Performed by: FAMILY MEDICINE

## 2024-04-23 PROCEDURE — 85027 COMPLETE CBC AUTOMATED: CPT | Performed by: FAMILY MEDICINE

## 2024-04-23 PROCEDURE — 36415 COLL VENOUS BLD VENIPUNCTURE: CPT | Performed by: FAMILY MEDICINE

## 2024-04-23 PROCEDURE — 80053 COMPREHEN METABOLIC PANEL: CPT | Performed by: FAMILY MEDICINE

## 2024-04-23 NOTE — PATIENT INSTRUCTIONS
Preparing for Your Surgery  Getting started  A nurse will call you to review your health history and instructions. They will give you an arrival time based on your scheduled surgery time. Please be ready to share:  Your doctor's clinic name and phone number  Your medical, surgical, and anesthesia history  A list of allergies and sensitivities  A list of medicines, including herbal treatments and over-the-counter drugs  Whether the patient has a legal guardian (ask how to send us the papers in advance)  Please tell us if you're pregnant--or if there's any chance you might be pregnant. Some surgeries may injure a fetus (unborn baby), so they require a pregnancy test. Surgeries that are safe for a fetus don't always need a test, and you can choose whether to have one.   If you have a child who's having surgery, please ask for a copy of Preparing for Your Child's Surgery.    Preparing for surgery  Within 10 to 30 days of surgery: Have a pre-op exam (sometimes called an H&P, or History and Physical). This can be done at a clinic or pre-operative center.  If you're having a , you may not need this exam. Talk to your care team.  At your pre-op exam, talk to your care team about all medicines you take. If you need to stop any medicines before surgery, ask when to start taking them again.  We do this for your safety. Many medicines can make you bleed too much during surgery. Some change how well surgery (anesthesia) drugs work.  Call your insurance company to let them know you're having surgery. (If you don't have insurance, call [phone].)  Call your clinic if there's any change in your health. This includes signs of a cold or flu (sore throat, runny nose, cough, rash, fever). It also includes a scrape or scratch near the surgery site.  If you have questions on the day of surgery, call your hospital or surgery center.  Eating and drinking guidelines  For your safety: Unless your surgeon tells you otherwise,  follow the guidelines below.  Eat and drink as usual until 8 hours before you arrive for surgery. After that, no food or milk.  Drink clear liquids until 2 hours before you arrive. These are liquids you can see through, like water, Gatorade, and Propel Water. They also include plain black coffee and tea (no cream or milk), candy, and breath mints. You can spit out gum when you arrive.  If you drink alcohol: Stop drinking it the night before surgery.  If your care team tells you to take medicine on the morning of surgery, it's okay to take it with a sip of water.  Preventing infection  Shower or bathe the night before and morning of your surgery. Follow the instructions your clinic gave you. (If no instructions, use regular soap.)  Don't shave or clip hair near your surgery site. We'll remove the hair if needed.  Don't smoke or vape the morning of surgery. You may chew nicotine gum up to 2 hours before surgery. A nicotine patch is okay.  Note: Some surgeries require you to completely quit smoking and nicotine. Check with your surgeon.  Your care team will make every effort to keep you safe from infection. We will:  Clean our hands often with soap and water (or an alcohol-based hand rub).  Clean the skin at your surgery site with a special soap that kills germs.  Give you a special gown to keep you warm. (Cold raises the risk of infection.)  Wear special hair covers, masks, gowns and gloves during surgery.  Give antibiotic medicine, if prescribed. Not all surgeries need antibiotics.  What to bring on the day of surgery  Photo ID and insurance card  Copy of your health care directive, if you have one  Glasses and hearing aids (bring cases)  You can't wear contacts during surgery  Inhaler and eye drops, if you use them (tell us about these when you arrive)  CPAP machine or breathing device, if you use them  A few personal items, if spending the night  If you have . . .  A pacemaker, ICD (cardiac defibrillator) or other  implant: Bring the ID card.  An implanted stimulator: Bring the remote control.  A legal guardian: Bring a copy of the certified (court-stamped) guardianship papers.  Please remove any jewelry, including body piercings. Leave jewelry and other valuables at home.  If you're going home the day of surgery  You must have a responsible adult drive you home. They should stay with you overnight as well.  If you don't have someone to stay with you, and you aren't safe to go home alone, we may keep you overnight. Insurance often won't pay for this.  After surgery  If it's hard to control your pain or you need more pain medicine, please call your surgeon's office.  Questions?   If you have any questions for your care team, list them here: _________________________________________________________________________________________________________________________________________________________________________ ____________________________________ ____________________________________ ____________________________________  For informational purposes only. Not to replace the advice of your health care provider. Copyright   2003, 2019 [employer]. All rights reserved. Clinically reviewed by Teresa Flores MD. [employer] 788431 - REV 12/22.    How to Take Your Medication Before Surgery  - HOLD (do not take) your LASIX (FUROSEMIDE) on the morning of surgery.   - HOLD (do not take) plavix 7 d and eliquis 5 days   - STOP taking all vitamins and herbal supplements 14 days before surgery.

## 2024-04-23 NOTE — PROGRESS NOTES
Preoperative Evaluation  [facility]  [address] [facility] [address]  Phone: [phone]  Primary Provider: Tessa Ray  Pre-op Performing Provider: TESSA RAY  Apr 23, 2024       Nikos is a 83 year old, presenting for the following:  Pre-Op Exam      Surgical Information  Surgery/Procedure: CYSTOSCOPY, TRANSURETHRAL RESECTION OF PROSTATE   Surgery Location: [facility]  Surgeon: Dank Jennings MD   Surgery Date: 4/29/24  Time of Surgery: 5pm  Where patient plans to recover: At home with family  Fax number for surgical facility: Note does not need to be faxed, will be available electronically in Epic.    Assessment & Plan     The proposed surgical procedure is considered INTERMEDIATE risk.    Preop general physical exam      Antiplatelet or antithrombotic long-term use  Recheck   - CBC with platelets; Future    Primary hypertension  Controlled   - Comprehensive metabolic panel (BMP + Alb, Alk Phos, ALT, AST, Total. Bili, TP); Future            - No identified additional risk factors other than previously addressed    Antiplatelet or Anticoagulation Medication Instructions  Eliquis stoop 5 d and plavix 7 d     Additional Medication Instructions  Patient is to take all scheduled medications on the day of surgery EXCEPT for modifications listed below:   - Diuretics: HOLD on the day of surgery.   - ibuprofen (Advil, Motrin): HOLD 5 day before surgery.     Recommendation  APPROVAL GIVEN to proceed with proposed procedure, without further diagnostic evaluation.          Subjective       HPI related to upcoming procedure: bph increased and now getting this taken care of         4/23/2024    11:16 AM   Preop Questions   1. Have you ever had a heart attack or stroke? YES -    2. Have you ever had surgery on your heart or blood vessels, such as a stent placement, a coronary artery bypass, or surgery on an artery in your head, neck, heart, or legs? YES -    3. Do you  have chest pain with activity? No   4. Do you have a history of  heart failure? No   5. Do you currently have a cold, bronchitis or symptoms of other infection? No   6. Do you have a cough, shortness of breath, or wheezing? No   7. Do you or anyone in your family have previous history of blood clots? UNKNOWN -    8. Do you or does anyone in your family have a serious bleeding problem such as prolonged bleeding following surgeries or cuts? No   9. Have you ever had problems with anemia or been told to take iron pills? No   10. Have you had any abnormal blood loss such as black, tarry or bloody stools? No   11. Have you ever had a blood transfusion? No   12. Are you willing to have a blood transfusion if it is medically needed before, during, or after your surgery? Yes   13. Have you or any of your relatives ever had problems with anesthesia? No   14. Do you have sleep apnea, excessive snoring or daytime drowsiness? No   15. Do you have any artifical heart valves or other implanted medical devices like a pacemaker, defibrillator, or continuous glucose monitor? No   16. Do you have artificial joints? No   17. Are you allergic to latex? No       Health Care Directive  Patient does not have a Health Care Directive or Living Will: Discussed advance care planning with patient; information given to patient to review.    Preoperative Review of    reviewed - no record of controlled substances prescribed.      Status of Chronic Conditions:  See problem list for active medical problems.  Problems all longstanding and stable, except as noted/documented.  See ROS for pertinent symptoms related to these conditions.    Patient Active Problem List    Diagnosis Date Noted    Paroxysmal atrial fibrillation (H) 02/22/2024     Priority: Medium    Elevated troponin 02/22/2024     Priority: Medium    Near syncope 02/22/2024     Priority: Medium    Edward hematuria 05/02/2023     Priority: Medium    Retention of urine 05/02/2023      Priority: Medium    NSTEMI -- S/P CANDIE x 3 to LAD on 4/10/23 04/10/2023     Priority: Medium    Rheumatoid arthritis involving multiple sites with positive rheumatoid factor (H) 03/16/2023     Priority: Medium    Chronic atrial fibrillation (H) 06/17/2021     Priority: Medium    Localized swelling of left lower extremity 06/17/2021     Priority: Medium    Stage 3a chronic kidney disease (H) 06/17/2021     Priority: Medium    Benign essential hypertension 06/06/2018     Priority: Medium    Hyperlipidemia 06/06/2018     Priority: Medium    Impotence of organic origin 06/06/2018     Priority: Medium    Pure hypercholesterolemia 06/06/2018     Priority: Medium    Antiplatelet or antithrombotic long-term use 12/14/2016     Priority: Medium    Diverticulosis 05/05/2016     Priority: Medium    Brain aneurysm 05/03/2016     Priority: Medium    Dysarthria 05/03/2016     Priority: Medium    Facial droop 05/03/2016     Priority: Medium    History of subarachnoid hemorrhage 05/03/2016     Priority: Medium    BPH with urinary obstruction -- [place] on 4/10/23 04/20/2016     Priority: Medium    Hypertension 04/20/2016     Priority: Medium    Normocytic anemia 04/20/2016     Priority: Medium    Acute diverticulitis 04/20/2016     Priority: Medium      Past Medical History:   Diagnosis Date    Acute diverticulitis 4/20/2016    Lung nodule < 6cm on CT 6/6/2018     Past Surgical History:   Procedure Laterality Date    CV CORONARY ANGIOGRAM N/A 4/11/2023    Procedure: Coronary Angiogram;  Surgeon: Alejandro Hitchcock MD;  Location: [facility] LAB CV    CV PCI STENT DRUG ELUTING N/A 4/11/2023    Procedure: Percutaneous Coronary Intervention Stent;  Surgeon: Alejandro Hitchcock MD;  Location: [facility] LAB CV    OTHER SURGICAL HISTORY      OTHER SURGICAL HISTORYstent placement in head     TONSILLECTOMY       Current Outpatient Medications   Medication Sig Dispense Refill    acetaminophen (TYLENOL) 500 MG tablet Take 1,000 mg  by mouth every 6 hours as needed for mild pain      apixaban ANTICOAGULANT (ELIQUIS) 2.5 MG tablet Take 1 tablet (2.5 mg) by mouth 2 times daily 180 tablet 3    atorvastatin (LIPITOR) 20 MG tablet Take 1 tablet (20 mg) by mouth every evening 90 tablet 3    calcium carbonate 500 mg, elemental, (OSCAL 500) 1250 (500 Ca) MG TABS tablet Take 1 tablet by mouth daily as needed (when remembers)      clopidogrel (PLAVIX) 75 MG tablet Take 1 tablet (75 mg) by mouth daily 90 tablet 3    furosemide (LASIX) 20 MG tablet TAKE 1 TABLET BY MOUTH ONCE DAILY FOR INCREASED SWELLING, WEIGHT GAIN, OR SHORTNESS OF BREATH 90 tablet 3    hydroxychloroquine (PLAQUENIL) 200 MG tablet Take 200 mg by mouth daily      leucovorin (WELLCOVORIN) 5 mg tablet Take 5 mg by mouth three times a week On Monday, Wednesday and Saturday. Do not overlap days of methotrexate      losartan (COZAAR) 25 MG tablet Take 1 tablet (25 mg) by mouth daily 90 tablet 3    methotrexate 2.5 MG tablet Take 12.5 mg by mouth twice a week 5 tablets (12.5mg) on Thursday and Friday      metoprolol succinate ER (TOPROL XL) 25 MG 24 hr tablet Take 3 tablets (75 mg) by mouth daily 270 tablet 3    nitroGLYcerin (NITROSTAT) 0.4 MG sublingual tablet For chest pain place 1 tablet under the tongue every 5 minutes for 3 doses. If symptoms persist 5 minutes after 1st dose call 911. 25 tablet 1    oxyBUTYnin ER (DITROPAN XL) 5 MG 24 hr tablet Take 5 tablets (25 mg) by mouth daily 450 tablet 3    tamsulosin (FLOMAX) 0.4 MG capsule Take 2 capsules (0.8 mg) by mouth daily (with dinner) 180 capsule 3    vitamin B-12 (CYANOCOBALAMIN) 1000 MCG tablet Take 1,000 mcg by mouth daily as needed (when remembers)      vitamin D3 (CHOLECALCIFEROL) 50 mcg (2000 units) tablet Take 1 tablet by mouth daily as needed (when remembers)         No Known Allergies     Social History     Tobacco Use    Smoking status: Former    Smokeless tobacco: Never   Substance Use Topics    Alcohol use: Not on file      "Comment: Alcoholic Drinks/day: 1 glass per week       History   Drug Use Unknown         Review of Systems    Review of Systems  Constitutional, HEENT, cardiovascular, pulmonary, gi and gu systems are negative, except as otherwise noted.    Objective    /60   Pulse 61   Temp 97.9  F (36.6  C) (Tympanic)   Wt 78.5 kg (173 lb)   SpO2 98%   BMI 27.10 kg/m     Estimated body mass index is 27.1 kg/m  as calculated from the following:    Height as of 3/13/24: 1.702 m (5' 7\").    Weight as of this encounter: 78.5 kg (173 lb).  Physical Exam  GENERAL: alert and no distress  HENT: ear canals and TM's normal, nose and mouth without ulcers or lesions  NECK: no adenopathy, no asymmetry, masses, or scars  RESP: lungs clear to auscultation - no rales, rhonchi or wheezes  CV: regular rate and rhythm, normal S1 S2, no S3 or S4, no murmur, click or rub, no peripheral edema   ABDOMEN: soft, nontender, no hepatosplenomegaly, no masses and bowel sounds normal  MS: no gross musculoskeletal defects noted, no edema  NEURO: Normal strength and tone, mentation intact and speech normal    Recent Labs   Lab Test 03/12/24  2218 02/24/24  0711 02/23/24  1314 02/22/24  1432 04/11/23  0040 04/10/23  2209   HGB 9.6*  --   --  9.7*   < > 11.1*     --   --  126*   < > 142*   INR  --   --   --   --   --  1.19*    139   < > 142   < > 136   POTASSIUM 3.7 4.2   < > 4.3   < > 3.7   CR 1.65* 1.47*   < > 1.76*   < > 1.10    < > = values in this interval not displayed.        Diagnostics  Labs pending at this time.  Results will be reviewed when available.   No EKG this visit, completed in the last 90 days.    Revised Cardiac Risk Index (RCRI)  The patient has the following serious cardiovascular risks for perioperative complications:   - Coronary Artery Disease (MI, positive stress test, angina, Qs on EKG) = 1 point     RCRI Interpretation: 1 point: Class II (low risk - 0.9% complication rate)         Signed Electronically by: Tessa" AGATHA Martinez MD  Copy of this evaluation report is provided to requesting physician.

## 2024-04-24 NOTE — RESULT ENCOUNTER NOTE
Felipe,  Your lab results were normal/stable. Please feel free to my chart or call the office with questions. Tessa Martinez M.D.

## 2024-04-29 RX ORDER — LIDOCAINE 40 MG/G
CREAM TOPICAL
Status: CANCELLED | OUTPATIENT
Start: 2024-04-29

## 2024-04-29 RX ORDER — ACETAMINOPHEN 325 MG/1
975 TABLET ORAL ONCE
Status: CANCELLED | OUTPATIENT
Start: 2024-04-29 | End: 2024-04-29

## 2024-04-29 RX ORDER — SODIUM CHLORIDE, SODIUM LACTATE, POTASSIUM CHLORIDE, CALCIUM CHLORIDE 600; 310; 30; 20 MG/100ML; MG/100ML; MG/100ML; MG/100ML
INJECTION, SOLUTION INTRAVENOUS CONTINUOUS
Status: CANCELLED | OUTPATIENT
Start: 2024-04-29

## 2024-04-29 RX ORDER — MAGNESIUM SULFATE 4 G/50ML
4 INJECTION INTRAVENOUS ONCE
Status: CANCELLED | OUTPATIENT
Start: 2024-04-29 | End: 2024-04-29

## 2024-05-01 ENCOUNTER — TELEPHONE (OUTPATIENT)
Dept: CARDIOLOGY | Facility: CLINIC | Age: 84
End: 2024-05-01
Payer: MEDICARE

## 2024-05-01 NOTE — TELEPHONE ENCOUNTER
Results scanned into media.  Will be reviewed by an EP MD shortly, report generated and pt called with results and recommendations.

## 2024-05-01 NOTE — TELEPHONE ENCOUNTER
[facility] Call Center    Phone Message    May a detailed message be left on voicemail: yes     Reason for Call: Other: Please reach out to discuss urgent ziopatch findings.     Action Taken: Other: Cardiology    Travel Screening: Not Applicable    Thank you!  [facility]

## 2024-05-02 PROCEDURE — 93248 EXT ECG>7D<15D REV&INTERPJ: CPT | Performed by: INTERNAL MEDICINE

## 2024-05-02 NOTE — TELEPHONE ENCOUNTER
O has been contacted 3 times by staff acknowledging results, that per previously sent to provider.  5/2/2024 12:11 PM  Jenniffer Fisher RN

## 2024-05-02 NOTE — TELEPHONE ENCOUNTER
Adriane call back. Please reach out to [facility]. Reference number [id number]. Thank you

## 2024-05-13 NOTE — TELEPHONE ENCOUNTER
Phone call to patient, they cancelled his scheduled TURP. He is now scheduled for TURP for 5/24/2024. It was cancelled due to provider emergency. Writer will follow-up with patient approximately two weeks following planned procedure date. Patient is appreciative of follow-up. No further questions at this time. [facility]

## 2024-06-07 ENCOUNTER — TELEPHONE (OUTPATIENT)
Dept: CARDIOLOGY | Facility: CLINIC | Age: 84
End: 2024-06-07
Payer: MEDICARE

## 2024-06-07 NOTE — TELEPHONE ENCOUNTER
Per chart review, patient appears to be scheduled for post-TURP appt with [facility] on 6/21/2024. Patient will need to wait 8-10 weeks following TURP for LAAC. Will contact patient next week to see how patient is feeling and if any restrictions from Urology prior to scheduling LAAC. QUAN     ----- Message from Shreya Marlow RN sent at 5/13/2024 10:20 AM CDT -----  Follow-up with patient following rescheduled TURP on 5/24/2024. QUAN

## 2024-06-11 NOTE — TELEPHONE ENCOUNTER
Phone call to patient, he is very tired and would like a call back later. Will try again later. QUAN

## 2024-06-14 NOTE — TELEPHONE ENCOUNTER
Phone call to patient, he states he had surgery 5/24. Recovery has gone well. He no longer has a catheter. He will see Urology again on 6/21. He is back on Eliquis and Clopidogrel. Will reach out to Dr. Simon's office to see if patient cleared from Urology perspective to proceed with LAAC 8-10 weeks post-TURP (5/24). Patient appreciative. No further questions at this time. [facility]

## 2024-06-14 NOTE — TELEPHONE ENCOUNTER
Phone for Dr. Simon's office, [phone]. Phone call to [facility]. Spoke to RN to discuss whether or not Dr. Simon has any concerns regarding patient moving forward with LAAC 2-3 months post-TURP. They will send message to Dr. Simon and get back to writer with response. No further questions at this time. [facility]

## 2024-06-14 NOTE — TELEPHONE ENCOUNTER
left for writer from Debbi with Dr. Simon's office. As long as three months out from TURP, Dr. Simon says ok to move forward with LAAC. Given patient's TURP was 5/24, he will have to wait until at least end of August for LAAC procedure date. Phone call to patient and informed him of such. He is agreeable to waiting until September for procedure. Will place orders and have scheduling reach out to arrange. Patient appreciative. No further questions at this time. [facility]   No

## 2024-06-17 ENCOUNTER — DOCUMENTATION ONLY (OUTPATIENT)
Dept: CARDIOLOGY | Facility: CLINIC | Age: 84
End: 2024-06-17
Payer: MEDICARE

## 2024-06-17 DIAGNOSIS — I48.20 CHRONIC ATRIAL FIBRILLATION (H): ICD-10-CM

## 2024-06-17 DIAGNOSIS — I48.0 PAROXYSMAL ATRIAL FIBRILLATION (H): Primary | ICD-10-CM

## 2024-06-17 NOTE — PROGRESS NOTES
H&P:  Card OV  Date:  Structural THUAN [x] LAAC  Order Case Req Y  Order Set: to be placed on completion of pre-op Intra-Op CAROL ANN Order? Y 3 month post-LAAC imaging order? Y     AC Eliquis   Antiplatelet Plavix-Continue   DM/GLP-1 DM Meds- None  GLP-1- None   ED Meds NONE  - NONE     Felipe Verma, 1940, [id number]  Home:[phone] (home) Cell:[phone] (mobile)  Emergency Contact: Geneva Verma   PCP: Tessa Martinez, [phone]    Important patient information for CSC/Cath Lab staff : None    LAAC Cath Lab Procedure Order   LAAC Type:  [facility]  Implanting Provider: Next available (no preference)  Date Ordered and Prepped: 6/17/2024 Shreya Marlow RN    Scheduling Information:  Anticipated Case Per Day:  Standard WM (4 cases per day)   Scheduling Timeframe:   Patient cannot be scheduled until after 9/1  Scheduling Restrictions: None  Shared Decision Making Completed?: Done 8/9/2023 by Dr. Carlos  Current Device/Device Co Needed for Procedure:  None - None  Intra-Op CAROL ANN needed?: Yes, order placed  Anesthesia: General Anesthesia  Overnight stay required?:[facility] - [facility] case, no overnight stay anticipated      [facility] EP Cath Lab Prep   H&P:  Schedule H&P with Structural THUAN, RN Teach, and Labs within 30 days of LAAC  Pre-op Labs: CBC, BMP, Lab 276 (T&S), and INR if on Warfarin, if not completed at pre-op H&P within 7 days of procedure.  Medical Records Pertinent for Procedure:   NONE  Iodinated Contrast Dye Allergies (Does not include Shellfish, Egg, and/or Iodine Allergy): No known allergy to contrast  GLP-1 Protocol: If on Dulaglutide (Trulicity) (weekly)- Injection hold 7 days prior to procedure  , Exenatide extended release (Bydureon bcise) (weekly)- Injection hold 7 days prior to procedure, Exenatide (Byetta) (twice daily)- Oral Tablet hold day prior and morning of procedure and for Injection hold 7 days prior to procedure, Semaglutide (Ozempic) (weekly)- Injection and Oral hold 7 days prior to  procedure, Liraglutide (Victoza, Saxenda) (daily)- Injection hold day prior and morning of procedure  Post-LAAC RN Phone Call: BS[facility]UL[facility] - WM: please place on LAAC RN schedule for the day following LAAC, time based on case order  Post-LAAC Follow Up Plan: All patients, regardless of vendor, to be seen at 45 days post-LAAC with Structural THUAN in clinic  Post-LAAC Imaging?: CAROL ANN at 3 months (try to align 45 day office visit as H&P for CAROL ANN), and again at one year post-implant.      No Known Allergies    Current Outpatient Medications:     acetaminophen (TYLENOL) 500 MG tablet, Take 1,000 mg by mouth every 6 hours as needed for mild pain, Disp: , Rfl:     apixaban ANTICOAGULANT (ELIQUIS) 2.5 MG tablet, Take 1 tablet (2.5 mg) by mouth 2 times daily, Disp: 180 tablet, Rfl: 3    atorvastatin (LIPITOR) 20 MG tablet, Take 1 tablet (20 mg) by mouth every evening, Disp: 90 tablet, Rfl: 3    calcium carbonate 500 mg, elemental, (OSCAL 500) 1250 (500 Ca) MG TABS tablet, Take 1 tablet by mouth daily as needed (when remembers), Disp: , Rfl:     clopidogrel (PLAVIX) 75 MG tablet, Take 1 tablet (75 mg) by mouth daily, Disp: 90 tablet, Rfl: 3    furosemide (LASIX) 20 MG tablet, TAKE 1 TABLET BY MOUTH ONCE DAILY FOR INCREASED SWELLING, WEIGHT GAIN, OR SHORTNESS OF BREATH, Disp: 90 tablet, Rfl: 3    hydroxychloroquine (PLAQUENIL) 200 MG tablet, Take 200 mg by mouth daily, Disp: , Rfl:     leucovorin (WELLCOVORIN) 5 mg tablet, Take 5 mg by mouth three times a week On Monday, Wednesday and Saturday. Do not overlap days of methotrexate, Disp: , Rfl:     losartan (COZAAR) 25 MG tablet, Take 1 tablet (25 mg) by mouth daily, Disp: 90 tablet, Rfl: 3    methotrexate 2.5 MG tablet, Take 12.5 mg by mouth twice a week 5 tablets (12.5mg) on Thursday and Friday, Disp: , Rfl:     metoprolol succinate ER (TOPROL XL) 25 MG 24 hr tablet, Take 3 tablets (75 mg) by mouth daily, Disp: 270 tablet, Rfl: 3    nitroGLYcerin (NITROSTAT) 0.4 MG sublingual tablet, For  chest pain place 1 tablet under the tongue every 5 minutes for 3 doses. If symptoms persist 5 minutes after 1st dose call 911., Disp: 25 tablet, Rfl: 1    oxyBUTYnin ER (DITROPAN XL) 5 MG 24 hr tablet, Take 5 tablets (25 mg) by mouth daily, Disp: 450 tablet, Rfl: 3    tamsulosin (FLOMAX) 0.4 MG capsule, Take 2 capsules (0.8 mg) by mouth daily (with dinner), Disp: 180 capsule, Rfl: 3    vitamin B-12 (CYANOCOBALAMIN) 1000 MCG tablet, Take 1,000 mcg by mouth daily as needed (when remembers), Disp: , Rfl:     vitamin D3 (CHOLECALCIFEROL) 50 mcg (2000 units) tablet, Take 1 tablet by mouth daily as needed (when remembers), Disp: , Rfl:     Documentation Date:6/17/2024 10:10 AM  Shreya Marlow RN

## 2024-06-19 ENCOUNTER — ANESTHESIA EVENT (OUTPATIENT)
Dept: SURGERY | Facility: HOSPITAL | Age: 84
DRG: 713 | End: 2024-06-19
Payer: MEDICARE

## 2024-06-19 ENCOUNTER — ANESTHESIA (OUTPATIENT)
Dept: SURGERY | Facility: HOSPITAL | Age: 84
DRG: 713 | End: 2024-06-19
Payer: MEDICARE

## 2024-06-19 ENCOUNTER — HOSPITAL ENCOUNTER (INPATIENT)
Facility: HOSPITAL | Age: 84
LOS: 3 days | Discharge: HOME HEALTH | DRG: 713 | End: 2024-06-22
Attending: EMERGENCY MEDICINE | Admitting: INTERNAL MEDICINE
Payer: MEDICARE

## 2024-06-19 DIAGNOSIS — R31.0 GROSS HEMATURIA: ICD-10-CM

## 2024-06-19 DIAGNOSIS — R33.8 ACUTE URINARY RETENTION: ICD-10-CM

## 2024-06-19 DIAGNOSIS — I21.4 NSTEMI (NON-ST ELEVATED MYOCARDIAL INFARCTION) (H): ICD-10-CM

## 2024-06-19 DIAGNOSIS — R33.9 RETENTION OF URINE: ICD-10-CM

## 2024-06-19 DIAGNOSIS — R31.0 FRANK HEMATURIA: Primary | ICD-10-CM

## 2024-06-19 DIAGNOSIS — D62 ANEMIA DUE TO BLOOD LOSS, ACUTE: ICD-10-CM

## 2024-06-19 DIAGNOSIS — I48.0 PAROXYSMAL ATRIAL FIBRILLATION (H): ICD-10-CM

## 2024-06-19 DIAGNOSIS — I47.29 NONSUSTAINED VENTRICULAR TACHYCARDIA (H): ICD-10-CM

## 2024-06-19 DIAGNOSIS — N18.31 STAGE 3A CHRONIC KIDNEY DISEASE (H): ICD-10-CM

## 2024-06-19 DIAGNOSIS — I48.20 CHRONIC ATRIAL FIBRILLATION (H): ICD-10-CM

## 2024-06-19 LAB
ABO/RH(D): NORMAL
ALBUMIN UR-MCNC: 30 MG/DL
ANION GAP SERPL CALCULATED.3IONS-SCNC: 13 MMOL/L (ref 7–15)
ANION GAP SERPL CALCULATED.3IONS-SCNC: 13 MMOL/L (ref 7–15)
ANTIBODY SCREEN: NEGATIVE
APPEARANCE UR: ABNORMAL
BILIRUB UR QL STRIP: NEGATIVE
BLD PROD TYP BPU: NORMAL
BLOOD COMPONENT TYPE: NORMAL
BUN SERPL-MCNC: 25.1 MG/DL (ref 8–23)
BUN SERPL-MCNC: 25.2 MG/DL (ref 8–23)
CALCIUM SERPL-MCNC: 8 MG/DL (ref 8.8–10.2)
CALCIUM SERPL-MCNC: 8.2 MG/DL (ref 8.8–10.2)
CHLORIDE SERPL-SCNC: 106 MMOL/L (ref 98–107)
CHLORIDE SERPL-SCNC: 109 MMOL/L (ref 98–107)
CODING SYSTEM: NORMAL
COLOR UR AUTO: ABNORMAL
CREAT SERPL-MCNC: 1.52 MG/DL (ref 0.67–1.17)
CREAT SERPL-MCNC: 1.52 MG/DL (ref 0.67–1.17)
CROSSMATCH: NORMAL
DEPRECATED HCO3 PLAS-SCNC: 20 MMOL/L (ref 22–29)
DEPRECATED HCO3 PLAS-SCNC: 20 MMOL/L (ref 22–29)
EGFRCR SERPLBLD CKD-EPI 2021: 45 ML/MIN/1.73M2
EGFRCR SERPLBLD CKD-EPI 2021: 45 ML/MIN/1.73M2
ERYTHROCYTE [DISTWIDTH] IN BLOOD BY AUTOMATED COUNT: 13 % (ref 10–15)
GLUCOSE SERPL-MCNC: 129 MG/DL (ref 70–99)
GLUCOSE SERPL-MCNC: 171 MG/DL (ref 70–99)
GLUCOSE UR STRIP-MCNC: NEGATIVE MG/DL
HBA1C MFR BLD: 6 %
HCT VFR BLD AUTO: 33.8 % (ref 40–53)
HGB BLD-MCNC: 10.6 G/DL (ref 13.3–17.7)
HGB BLD-MCNC: 11 G/DL (ref 13.3–17.7)
HGB BLD-MCNC: 11.1 G/DL (ref 13.3–17.7)
HGB BLD-MCNC: 8.7 G/DL (ref 13.3–17.7)
HGB UR QL STRIP: ABNORMAL
HOLD SPECIMEN: NORMAL
ISSUE DATE AND TIME: NORMAL
KETONES UR STRIP-MCNC: NEGATIVE MG/DL
LEUKOCYTE ESTERASE UR QL STRIP: ABNORMAL
MAGNESIUM SERPL-MCNC: 2.2 MG/DL (ref 1.7–2.3)
MCH RBC QN AUTO: 32.8 PG (ref 26.5–33)
MCHC RBC AUTO-ENTMCNC: 32.8 G/DL (ref 31.5–36.5)
MCV RBC AUTO: 100 FL (ref 78–100)
NITRATE UR QL: NEGATIVE
NT-PROBNP SERPL-MCNC: 1424 PG/ML (ref 0–1800)
PH UR STRIP: 5 [PH] (ref 5–7)
PHOSPHATE SERPL-MCNC: 2.3 MG/DL (ref 2.5–4.5)
PLATELET # BLD AUTO: 147 10E3/UL (ref 150–450)
POTASSIUM SERPL-SCNC: 3.9 MMOL/L (ref 3.4–5.3)
POTASSIUM SERPL-SCNC: 4.1 MMOL/L (ref 3.4–5.3)
RBC # BLD AUTO: 3.38 10E6/UL (ref 4.4–5.9)
RBC URINE: >182 /HPF
SODIUM SERPL-SCNC: 139 MMOL/L (ref 135–145)
SODIUM SERPL-SCNC: 142 MMOL/L (ref 135–145)
SP GR UR STRIP: 1 (ref 1–1.03)
SPECIMEN EXPIRATION DATE: NORMAL
TROPONIN T SERPL HS-MCNC: 201 NG/L
TROPONIN T SERPL HS-MCNC: 220 NG/L
TROPONIN T SERPL HS-MCNC: 226 NG/L
TROPONIN T SERPL HS-MCNC: 236 NG/L
TROPONIN T SERPL HS-MCNC: 239 NG/L
UNIT ABO/RH: NORMAL
UNIT NUMBER: NORMAL
UNIT STATUS: NORMAL
UNIT TYPE ISBT: 8400
UROBILINOGEN UR STRIP-MCNC: <2 MG/DL
WBC # BLD AUTO: 10.9 10E3/UL (ref 4–11)
WBC URINE: 137 /HPF

## 2024-06-19 PROCEDURE — 80048 BASIC METABOLIC PNL TOTAL CA: CPT | Performed by: EMERGENCY MEDICINE

## 2024-06-19 PROCEDURE — 258N000003 HC RX IP 258 OP 636: Mod: JZ | Performed by: NURSE ANESTHETIST, CERTIFIED REGISTERED

## 2024-06-19 PROCEDURE — 370N000017 HC ANESTHESIA TECHNICAL FEE, PER MIN: Performed by: STUDENT IN AN ORGANIZED HEALTH CARE EDUCATION/TRAINING PROGRAM

## 2024-06-19 PROCEDURE — 83036 HEMOGLOBIN GLYCOSYLATED A1C: CPT | Performed by: INTERNAL MEDICINE

## 2024-06-19 PROCEDURE — 36415 COLL VENOUS BLD VENIPUNCTURE: CPT | Performed by: EMERGENCY MEDICINE

## 2024-06-19 PROCEDURE — P9016 RBC LEUKOCYTES REDUCED: HCPCS | Performed by: INTERNAL MEDICINE

## 2024-06-19 PROCEDURE — 120N000004 HC R&B MS OVERFLOW

## 2024-06-19 PROCEDURE — 250N000011 HC RX IP 250 OP 636: Performed by: NURSE PRACTITIONER

## 2024-06-19 PROCEDURE — G0378 HOSPITAL OBSERVATION PER HR: HCPCS

## 2024-06-19 PROCEDURE — 83880 ASSAY OF NATRIURETIC PEPTIDE: CPT | Performed by: INTERNAL MEDICINE

## 2024-06-19 PROCEDURE — 84100 ASSAY OF PHOSPHORUS: CPT | Performed by: INTERNAL MEDICINE

## 2024-06-19 PROCEDURE — 272N000001 HC OR GENERAL SUPPLY STERILE: Performed by: STUDENT IN AN ORGANIZED HEALTH CARE EDUCATION/TRAINING PROGRAM

## 2024-06-19 PROCEDURE — 93005 ELECTROCARDIOGRAM TRACING: CPT | Performed by: INTERNAL MEDICINE

## 2024-06-19 PROCEDURE — 36415 COLL VENOUS BLD VENIPUNCTURE: CPT | Performed by: INTERNAL MEDICINE

## 2024-06-19 PROCEDURE — 360N000075 HC SURGERY LEVEL 2, PER MIN: Performed by: STUDENT IN AN ORGANIZED HEALTH CARE EDUCATION/TRAINING PROGRAM

## 2024-06-19 PROCEDURE — 83735 ASSAY OF MAGNESIUM: CPT | Performed by: INTERNAL MEDICINE

## 2024-06-19 PROCEDURE — 87086 URINE CULTURE/COLONY COUNT: CPT | Performed by: EMERGENCY MEDICINE

## 2024-06-19 PROCEDURE — 710N000009 HC RECOVERY PHASE 1, LEVEL 1, PER MIN: Performed by: STUDENT IN AN ORGANIZED HEALTH CARE EDUCATION/TRAINING PROGRAM

## 2024-06-19 PROCEDURE — 84484 ASSAY OF TROPONIN QUANT: CPT | Performed by: INTERNAL MEDICINE

## 2024-06-19 PROCEDURE — 99223 1ST HOSP IP/OBS HIGH 75: CPT | Mod: AI | Performed by: INTERNAL MEDICINE

## 2024-06-19 PROCEDURE — 258N000003 HC RX IP 258 OP 636: Mod: JZ | Performed by: INTERNAL MEDICINE

## 2024-06-19 PROCEDURE — 999N000141 HC STATISTIC PRE-PROCEDURE NURSING ASSESSMENT: Performed by: STUDENT IN AN ORGANIZED HEALTH CARE EDUCATION/TRAINING PROGRAM

## 2024-06-19 PROCEDURE — 86923 COMPATIBILITY TEST ELECTRIC: CPT | Performed by: INTERNAL MEDICINE

## 2024-06-19 PROCEDURE — 0W3R8ZZ CONTROL BLEEDING IN GENITOURINARY TRACT, VIA NATURAL OR ARTIFICIAL OPENING ENDOSCOPIC: ICD-10-PCS | Performed by: STUDENT IN AN ORGANIZED HEALTH CARE EDUCATION/TRAINING PROGRAM

## 2024-06-19 PROCEDURE — 99285 EMERGENCY DEPT VISIT HI MDM: CPT | Mod: 25

## 2024-06-19 PROCEDURE — 86900 BLOOD TYPING SEROLOGIC ABO: CPT | Performed by: INTERNAL MEDICINE

## 2024-06-19 PROCEDURE — 250N000009 HC RX 250: Performed by: STUDENT IN AN ORGANIZED HEALTH CARE EDUCATION/TRAINING PROGRAM

## 2024-06-19 PROCEDURE — 85018 HEMOGLOBIN: CPT | Performed by: INTERNAL MEDICINE

## 2024-06-19 PROCEDURE — 81001 URINALYSIS AUTO W/SCOPE: CPT | Performed by: EMERGENCY MEDICINE

## 2024-06-19 PROCEDURE — 51798 US URINE CAPACITY MEASURE: CPT

## 2024-06-19 PROCEDURE — 80048 BASIC METABOLIC PNL TOTAL CA: CPT | Performed by: INTERNAL MEDICINE

## 2024-06-19 PROCEDURE — 258N000003 HC RX IP 258 OP 636: Mod: JZ | Performed by: ANESTHESIOLOGY

## 2024-06-19 PROCEDURE — 250N000013 HC RX MED GY IP 250 OP 250 PS 637: Performed by: INTERNAL MEDICINE

## 2024-06-19 PROCEDURE — 250N000009 HC RX 250: Performed by: NURSE ANESTHETIST, CERTIFIED REGISTERED

## 2024-06-19 PROCEDURE — 99222 1ST HOSP IP/OBS MODERATE 55: CPT | Performed by: INTERNAL MEDICINE

## 2024-06-19 PROCEDURE — 250N000025 HC SEVOFLURANE, PER MIN: Performed by: STUDENT IN AN ORGANIZED HEALTH CARE EDUCATION/TRAINING PROGRAM

## 2024-06-19 PROCEDURE — 0VB08ZZ EXCISION OF PROSTATE, VIA NATURAL OR ARTIFICIAL OPENING ENDOSCOPIC: ICD-10-PCS | Performed by: STUDENT IN AN ORGANIZED HEALTH CARE EDUCATION/TRAINING PROGRAM

## 2024-06-19 PROCEDURE — 51702 INSERT TEMP BLADDER CATH: CPT

## 2024-06-19 PROCEDURE — 85027 COMPLETE CBC AUTOMATED: CPT | Performed by: EMERGENCY MEDICINE

## 2024-06-19 PROCEDURE — 250N000011 HC RX IP 250 OP 636: Mod: JZ | Performed by: NURSE ANESTHETIST, CERTIFIED REGISTERED

## 2024-06-19 RX ORDER — FENTANYL CITRATE 50 UG/ML
25 INJECTION, SOLUTION INTRAMUSCULAR; INTRAVENOUS EVERY 5 MIN PRN
Status: DISCONTINUED | OUTPATIENT
Start: 2024-06-19 | End: 2024-06-19 | Stop reason: HOSPADM

## 2024-06-19 RX ORDER — LIDOCAINE HYDROCHLORIDE 10 MG/ML
INJECTION, SOLUTION INFILTRATION; PERINEURAL PRN
Status: DISCONTINUED | OUTPATIENT
Start: 2024-06-19 | End: 2024-06-19

## 2024-06-19 RX ORDER — LABETALOL HYDROCHLORIDE 5 MG/ML
10 INJECTION, SOLUTION INTRAVENOUS
Status: DISCONTINUED | OUTPATIENT
Start: 2024-06-19 | End: 2024-06-19 | Stop reason: HOSPADM

## 2024-06-19 RX ORDER — SODIUM CHLORIDE, SODIUM LACTATE, POTASSIUM CHLORIDE, CALCIUM CHLORIDE 600; 310; 30; 20 MG/100ML; MG/100ML; MG/100ML; MG/100ML
INJECTION, SOLUTION INTRAVENOUS CONTINUOUS
Status: DISCONTINUED | OUTPATIENT
Start: 2024-06-19 | End: 2024-06-19

## 2024-06-19 RX ORDER — LIDOCAINE 40 MG/G
CREAM TOPICAL
Status: DISCONTINUED | OUTPATIENT
Start: 2024-06-19 | End: 2024-06-22 | Stop reason: HOSPADM

## 2024-06-19 RX ORDER — FENTANYL CITRATE 50 UG/ML
INJECTION, SOLUTION INTRAMUSCULAR; INTRAVENOUS PRN
Status: DISCONTINUED | OUTPATIENT
Start: 2024-06-19 | End: 2024-06-19

## 2024-06-19 RX ORDER — AMOXICILLIN 250 MG
1 CAPSULE ORAL 2 TIMES DAILY PRN
Status: DISCONTINUED | OUTPATIENT
Start: 2024-06-19 | End: 2024-06-22 | Stop reason: HOSPADM

## 2024-06-19 RX ORDER — TAMSULOSIN HYDROCHLORIDE 0.4 MG/1
0.8 CAPSULE ORAL
Status: DISCONTINUED | OUTPATIENT
Start: 2024-06-19 | End: 2024-06-22 | Stop reason: HOSPADM

## 2024-06-19 RX ORDER — CEFAZOLIN SODIUM/WATER 2 G/20 ML
2 SYRINGE (ML) INTRAVENOUS
Status: COMPLETED | OUTPATIENT
Start: 2024-06-19 | End: 2024-06-19

## 2024-06-19 RX ORDER — SODIUM CHLORIDE, SODIUM LACTATE, POTASSIUM CHLORIDE, CALCIUM CHLORIDE 600; 310; 30; 20 MG/100ML; MG/100ML; MG/100ML; MG/100ML
INJECTION, SOLUTION INTRAVENOUS CONTINUOUS
Status: DISCONTINUED | OUTPATIENT
Start: 2024-06-19 | End: 2024-06-19 | Stop reason: HOSPADM

## 2024-06-19 RX ORDER — LIDOCAINE 40 MG/G
CREAM TOPICAL
Status: DISCONTINUED | OUTPATIENT
Start: 2024-06-19 | End: 2024-06-19 | Stop reason: HOSPADM

## 2024-06-19 RX ORDER — ONDANSETRON 4 MG/1
4 TABLET, ORALLY DISINTEGRATING ORAL EVERY 30 MIN PRN
Status: DISCONTINUED | OUTPATIENT
Start: 2024-06-19 | End: 2024-06-19 | Stop reason: HOSPADM

## 2024-06-19 RX ORDER — HYDROMORPHONE HCL IN WATER/PF 6 MG/30 ML
0.2 PATIENT CONTROLLED ANALGESIA SYRINGE INTRAVENOUS EVERY 5 MIN PRN
Status: DISCONTINUED | OUTPATIENT
Start: 2024-06-19 | End: 2024-06-19 | Stop reason: HOSPADM

## 2024-06-19 RX ORDER — EPHEDRINE SULFATE 50 MG/ML
INJECTION, SOLUTION INTRAMUSCULAR; INTRAVENOUS; SUBCUTANEOUS PRN
Status: DISCONTINUED | OUTPATIENT
Start: 2024-06-19 | End: 2024-06-19

## 2024-06-19 RX ORDER — FENTANYL CITRATE 50 UG/ML
50 INJECTION, SOLUTION INTRAMUSCULAR; INTRAVENOUS EVERY 5 MIN PRN
Status: DISCONTINUED | OUTPATIENT
Start: 2024-06-19 | End: 2024-06-19 | Stop reason: HOSPADM

## 2024-06-19 RX ORDER — ACETAMINOPHEN 325 MG/1
650 TABLET ORAL ONCE
Status: COMPLETED | OUTPATIENT
Start: 2024-06-19 | End: 2024-06-19

## 2024-06-19 RX ORDER — AMOXICILLIN 250 MG
2 CAPSULE ORAL 2 TIMES DAILY PRN
Status: DISCONTINUED | OUTPATIENT
Start: 2024-06-19 | End: 2024-06-22 | Stop reason: HOSPADM

## 2024-06-19 RX ORDER — ATORVASTATIN CALCIUM 10 MG/1
20 TABLET, FILM COATED ORAL EVERY EVENING
Status: DISCONTINUED | OUTPATIENT
Start: 2024-06-19 | End: 2024-06-22 | Stop reason: HOSPADM

## 2024-06-19 RX ORDER — LIDOCAINE HYDROCHLORIDE 20 MG/ML
10 JELLY TOPICAL ONCE
Status: COMPLETED | OUTPATIENT
Start: 2024-06-19 | End: 2024-06-19

## 2024-06-19 RX ORDER — PROPOFOL 10 MG/ML
INJECTION, EMULSION INTRAVENOUS PRN
Status: DISCONTINUED | OUTPATIENT
Start: 2024-06-19 | End: 2024-06-19

## 2024-06-19 RX ORDER — FUROSEMIDE 20 MG
20 TABLET ORAL DAILY
Status: DISCONTINUED | OUTPATIENT
Start: 2024-06-20 | End: 2024-06-22 | Stop reason: HOSPADM

## 2024-06-19 RX ORDER — MEPERIDINE HYDROCHLORIDE 25 MG/ML
12.5 INJECTION INTRAMUSCULAR; INTRAVENOUS; SUBCUTANEOUS EVERY 5 MIN PRN
Status: DISCONTINUED | OUTPATIENT
Start: 2024-06-19 | End: 2024-06-19 | Stop reason: HOSPADM

## 2024-06-19 RX ORDER — LEUCOVORIN CALCIUM 5 MG/1
5 TABLET ORAL
Status: DISCONTINUED | OUTPATIENT
Start: 2024-06-19 | End: 2024-06-22 | Stop reason: HOSPADM

## 2024-06-19 RX ORDER — ESMOLOL HYDROCHLORIDE 10 MG/ML
INJECTION INTRAVENOUS PRN
Status: DISCONTINUED | OUTPATIENT
Start: 2024-06-19 | End: 2024-06-19

## 2024-06-19 RX ORDER — NITROGLYCERIN 0.4 MG/1
0.4 TABLET SUBLINGUAL EVERY 5 MIN PRN
Status: DISCONTINUED | OUTPATIENT
Start: 2024-06-19 | End: 2024-06-22 | Stop reason: HOSPADM

## 2024-06-19 RX ORDER — LANOLIN ALCOHOL/MO/W.PET/CERES
1000 CREAM (GRAM) TOPICAL DAILY
Status: DISCONTINUED | OUTPATIENT
Start: 2024-06-19 | End: 2024-06-22 | Stop reason: HOSPADM

## 2024-06-19 RX ORDER — HYDROXYCHLOROQUINE SULFATE 200 MG/1
200 TABLET, FILM COATED ORAL DAILY
Status: DISCONTINUED | OUTPATIENT
Start: 2024-06-19 | End: 2024-06-22 | Stop reason: HOSPADM

## 2024-06-19 RX ORDER — DEXAMETHASONE SODIUM PHOSPHATE 10 MG/ML
INJECTION, SOLUTION INTRAMUSCULAR; INTRAVENOUS PRN
Status: DISCONTINUED | OUTPATIENT
Start: 2024-06-19 | End: 2024-06-19

## 2024-06-19 RX ORDER — SODIUM CHLORIDE 9 MG/ML
INJECTION, SOLUTION INTRAVENOUS CONTINUOUS
Status: ACTIVE | OUTPATIENT
Start: 2024-06-19 | End: 2024-06-19

## 2024-06-19 RX ORDER — CALCIUM CARBONATE 500 MG/1
1000 TABLET, CHEWABLE ORAL 4 TIMES DAILY PRN
Status: DISCONTINUED | OUTPATIENT
Start: 2024-06-19 | End: 2024-06-22 | Stop reason: HOSPADM

## 2024-06-19 RX ORDER — ONDANSETRON 2 MG/ML
INJECTION INTRAMUSCULAR; INTRAVENOUS PRN
Status: DISCONTINUED | OUTPATIENT
Start: 2024-06-19 | End: 2024-06-19

## 2024-06-19 RX ORDER — CEFAZOLIN SODIUM/WATER 2 G/20 ML
2 SYRINGE (ML) INTRAVENOUS SEE ADMIN INSTRUCTIONS
Status: DISCONTINUED | OUTPATIENT
Start: 2024-06-19 | End: 2024-06-19 | Stop reason: HOSPADM

## 2024-06-19 RX ORDER — ALBUTEROL SULFATE 0.83 MG/ML
2.5 SOLUTION RESPIRATORY (INHALATION) EVERY 4 HOURS PRN
Status: DISCONTINUED | OUTPATIENT
Start: 2024-06-19 | End: 2024-06-19 | Stop reason: HOSPADM

## 2024-06-19 RX ORDER — NALOXONE HYDROCHLORIDE 0.4 MG/ML
0.1 INJECTION, SOLUTION INTRAMUSCULAR; INTRAVENOUS; SUBCUTANEOUS
Status: DISCONTINUED | OUTPATIENT
Start: 2024-06-19 | End: 2024-06-19 | Stop reason: HOSPADM

## 2024-06-19 RX ORDER — DEXAMETHASONE SODIUM PHOSPHATE 4 MG/ML
4 INJECTION, SOLUTION INTRA-ARTICULAR; INTRALESIONAL; INTRAMUSCULAR; INTRAVENOUS; SOFT TISSUE
Status: DISCONTINUED | OUTPATIENT
Start: 2024-06-19 | End: 2024-06-19 | Stop reason: HOSPADM

## 2024-06-19 RX ORDER — ONDANSETRON 2 MG/ML
4 INJECTION INTRAMUSCULAR; INTRAVENOUS EVERY 30 MIN PRN
Status: DISCONTINUED | OUTPATIENT
Start: 2024-06-19 | End: 2024-06-19 | Stop reason: HOSPADM

## 2024-06-19 RX ORDER — ACETAMINOPHEN 325 MG/1
650 TABLET ORAL EVERY 4 HOURS PRN
Status: DISCONTINUED | OUTPATIENT
Start: 2024-06-19 | End: 2024-06-22 | Stop reason: HOSPADM

## 2024-06-19 RX ORDER — GLYCOPYRROLATE 0.2 MG/ML
INJECTION, SOLUTION INTRAMUSCULAR; INTRAVENOUS PRN
Status: DISCONTINUED | OUTPATIENT
Start: 2024-06-19 | End: 2024-06-19

## 2024-06-19 RX ORDER — HYDROMORPHONE HCL IN WATER/PF 6 MG/30 ML
0.4 PATIENT CONTROLLED ANALGESIA SYRINGE INTRAVENOUS EVERY 5 MIN PRN
Status: DISCONTINUED | OUTPATIENT
Start: 2024-06-19 | End: 2024-06-19 | Stop reason: HOSPADM

## 2024-06-19 RX ORDER — CEFTRIAXONE 1 G/1
1 INJECTION, POWDER, FOR SOLUTION INTRAMUSCULAR; INTRAVENOUS EVERY 24 HOURS
Status: DISCONTINUED | OUTPATIENT
Start: 2024-06-19 | End: 2024-06-20

## 2024-06-19 RX ADMIN — SUGAMMADEX 200 MG: 100 INJECTION, SOLUTION INTRAVENOUS at 16:51

## 2024-06-19 RX ADMIN — HYDROXYCHLOROQUINE SULFATE 200 MG: 200 TABLET, FILM COATED ORAL at 10:03

## 2024-06-19 RX ADMIN — ESMOLOL HYDROCHLORIDE 50 MG: 10 INJECTION, SOLUTION INTRAVENOUS at 16:49

## 2024-06-19 RX ADMIN — Medication 10 MG: at 16:16

## 2024-06-19 RX ADMIN — SODIUM CHLORIDE, POTASSIUM CHLORIDE, SODIUM LACTATE AND CALCIUM CHLORIDE: 600; 310; 30; 20 INJECTION, SOLUTION INTRAVENOUS at 15:54

## 2024-06-19 RX ADMIN — TAMSULOSIN HYDROCHLORIDE 0.8 MG: 0.4 CAPSULE ORAL at 20:50

## 2024-06-19 RX ADMIN — ROCURONIUM BROMIDE 50 MG: 50 INJECTION, SOLUTION INTRAVENOUS at 16:03

## 2024-06-19 RX ADMIN — DEXAMETHASONE SODIUM PHOSPHATE 10 MG: 10 INJECTION, SOLUTION INTRAMUSCULAR; INTRAVENOUS at 16:17

## 2024-06-19 RX ADMIN — PHENYLEPHRINE HYDROCHLORIDE 0.5 MCG/KG/MIN: 10 INJECTION INTRAVENOUS at 16:03

## 2024-06-19 RX ADMIN — LIDOCAINE HYDROCHLORIDE 5 ML: 10 INJECTION, SOLUTION INFILTRATION; PERINEURAL at 16:03

## 2024-06-19 RX ADMIN — ATORVASTATIN CALCIUM 20 MG: 10 TABLET, FILM COATED ORAL at 20:22

## 2024-06-19 RX ADMIN — Medication 10 MG: at 16:28

## 2024-06-19 RX ADMIN — GLYCOPYRROLATE 0.2 MG: 0.2 INJECTION INTRAMUSCULAR; INTRAVENOUS at 16:08

## 2024-06-19 RX ADMIN — CYANOCOBALAMIN TAB 1000 MCG 1000 MCG: 1000 TAB at 10:03

## 2024-06-19 RX ADMIN — SODIUM CHLORIDE: 9 INJECTION, SOLUTION INTRAVENOUS at 12:09

## 2024-06-19 RX ADMIN — PROPOFOL 90 MG: 10 INJECTION, EMULSION INTRAVENOUS at 16:03

## 2024-06-19 RX ADMIN — LIDOCAINE HYDROCHLORIDE 10 ML: 20 JELLY TOPICAL at 06:13

## 2024-06-19 RX ADMIN — PHENYLEPHRINE HYDROCHLORIDE 100 MCG: 10 INJECTION INTRAVENOUS at 16:08

## 2024-06-19 RX ADMIN — ACETAMINOPHEN 650 MG: 325 TABLET ORAL at 12:08

## 2024-06-19 RX ADMIN — LEUCOVORIN CALCIUM 5 MG: 5 TABLET ORAL at 10:03

## 2024-06-19 RX ADMIN — ACETAMINOPHEN 650 MG: 325 TABLET ORAL at 10:02

## 2024-06-19 RX ADMIN — ONDANSETRON 4 MG: 2 INJECTION INTRAMUSCULAR; INTRAVENOUS at 16:17

## 2024-06-19 RX ADMIN — ACETAMINOPHEN 650 MG: 325 TABLET ORAL at 20:22

## 2024-06-19 RX ADMIN — Medication 2 G: at 15:54

## 2024-06-19 RX ADMIN — FENTANYL CITRATE 100 MCG: 50 INJECTION INTRAMUSCULAR; INTRAVENOUS at 16:03

## 2024-06-19 ASSESSMENT — ACTIVITIES OF DAILY LIVING (ADL)
ADLS_ACUITY_SCORE: 27
DEPENDENT_IADLS:: INDEPENDENT
ADLS_ACUITY_SCORE: 35
ADLS_ACUITY_SCORE: 27
ADLS_ACUITY_SCORE: 27
ADLS_ACUITY_SCORE: 35
ADLS_ACUITY_SCORE: 27
ADLS_ACUITY_SCORE: 35
ADLS_ACUITY_SCORE: 27
ADLS_ACUITY_SCORE: 35
ADLS_ACUITY_SCORE: 27
ADLS_ACUITY_SCORE: 35
ADLS_ACUITY_SCORE: 27
ADLS_ACUITY_SCORE: 27
ADLS_ACUITY_SCORE: 35

## 2024-06-19 ASSESSMENT — COLUMBIA-SUICIDE SEVERITY RATING SCALE - C-SSRS
1. IN THE PAST MONTH, HAVE YOU WISHED YOU WERE DEAD OR WISHED YOU COULD GO TO SLEEP AND NOT WAKE UP?: NO
2. HAVE YOU ACTUALLY HAD ANY THOUGHTS OF KILLING YOURSELF IN THE PAST MONTH?: NO
6. HAVE YOU EVER DONE ANYTHING, STARTED TO DO ANYTHING, OR PREPARED TO DO ANYTHING TO END YOUR LIFE?: NO

## 2024-06-19 ASSESSMENT — ENCOUNTER SYMPTOMS
DYSRHYTHMIAS: 1
SEIZURES: 0

## 2024-06-19 NOTE — CONSULTS
[facility] CONSULT     Type of Consult: inpatient   Place of Service:  [facility]  Reason for Consultation: Gross hematuria  Consult Requested by: Dr. Li     History of Present Illness:    Felipe Verma is a 83 year old male with hx of brain aneurysm, RA, Afib (on anticoagulation), CAD s/p MI and drug eluting stent 4/2023, HLD, HTN, CKD-III,  UR and gross hematuria 2/2 BPH s/p TURP 5/24/24 by Dr. Jennings, passed TOV 5/28; Admitted through the ED 6/19 AM with gross hematuria and clot retention. Urology has been consulted due to blood in the urine. History obtained through patient and chart review.     Pt reports he has been experiencing mild GH for some time, worsened significantly over the past 24 hours. Reported abdominal pain and decreased urination on admission. Coude tip chaidez catheter was initially placed in the ED for bladder scan 400 ml. Clotted off catheter shortly after and catheter was exchanged for 3 way 22 Fr catheter, manually irrigated with clots removed per RN report, CBI initiated in ED at fast rate.     No imaging this admission. UA not yet collected. Hgb 11.1. WBC 10.9. Afebrile.     Pt reports some urge urinary incontinence since chaidez removed, not certain he has been emptying his bladder completely. Denies recent burning with urination and denies malodorous urine.     Takes Eliquis and Plaquenil.     NPO since [place].     Past Medical history  Past Medical History:   Diagnosis Date    Acute diverticulitis 04/20/2016    BPH with urinary obstruction     Brain aneurysm 05/03/2016    Chronic atrial fibrillation (H)     Coronary artery disease     Diverticulosis     Dysarthria 2016    Facial droop 2016    Heart attack (H) 04/10/2023    NSTEMI    History of subarachnoid hemorrhage 05/03/2016    Hyperlipidemia     Hypertension     Lung nodule < 6cm on CT 06/06/2018    Normocytic anemia     Paroxysmal atrial fibrillation (H)     Pure hypercholesterolemia     Rheumatoid arthritis  involving multiple sites with positive rheumatoid factor (H)     Stage 3a chronic kidney disease (CKD) (H)     Stented coronary artery        Past Surgical history  Past Surgical History:   Procedure Laterality Date    CV CORONARY ANGIOGRAM N/A 4/11/2023    Procedure: Coronary Angiogram;  Surgeon: Alejandro Hitchcock MD;  Location: [facility] CV    CV PCI STENT DRUG ELUTING N/A 4/11/2023    Procedure: Percutaneous Coronary Intervention Stent;  Surgeon: Alejandro Hitchcock MD;  Location: [facility] LAB CV    OTHER SURGICAL HISTORY      OTHER SURGICAL HISTORYstent placement in head     TONSILLECTOMY         Social History  Social History     Tobacco Use    Smoking status: Former    Smokeless tobacco: Never   Vaping Use    Vaping status: Never Used   Substance Use Topics    Drug use: Not Currently       Medications  Current Facility-Administered Medications   Medication Dose Route Frequency Provider Last Rate Last Admin    acetaminophen (TYLENOL) tablet 650 mg  650 mg Oral Q4H PRN Nancy Li MD   650 mg at 06/19/24 1002    [Held by provider] apixaban ANTICOAGULANT (ELIQUIS) tablet 2.5 mg  2.5 mg Oral BID Nancy Li MD        atorvastatin (LIPITOR) tablet 20 mg  20 mg Oral QPM Nancy Li MD        calcium carbonate (TUMS) chewable tablet 1,000 mg  1,000 mg Oral 4x Daily PRN Nancy Li MD        cyanocobalamin (VITAMIN B-12) tablet 1,000 mcg  1,000 mcg Oral Daily Nancy Li MD   1,000 mcg at 06/19/24 1003    [START ON 6/20/2024] furosemide (LASIX) tablet 20 mg  20 mg Oral Daily Nancy Li MD        hydroxychloroquine (PLAQUENIL) tablet 200 mg  200 mg Oral Daily Nancy Li MD   200 mg at 06/19/24 1003    leucovorin (WELLCOVORIN) tablet 5 mg  5 mg Oral Once per day on Monday Wednesday Friday Nancy Li MD   5 mg at 06/19/24 1003    lidocaine (LMX4) cream   Topical Q1H PRN Nancy Li MD        lidocaine 1 % 0.1-1 mL  0.1-1 mL  Other Q1H PRN Nancy Li MD        metoprolol succinate ER (TOPROL XL) 24 hr tablet 75 mg  75 mg Oral Daily Nancy Li MD        nitroGLYcerin (NITROSTAT) sublingual tablet 0.4 mg  0.4 mg Sublingual Q5 Min PRNancy Smith MD        Patient is already receiving anticoagulation with heparin, enoxaparin (LOVENOX), warfarin (COUMADIN)  or other anticoagulant medication   Does not apply Continuous PRN Nancy Li MD        senna-docusate (SENOKOT-S/PERICOLACE) 8.6-50 MG per tablet 1 tablet  1 tablet Oral BID PRNancy Smith MD        Or    senna-docusate (SENOKOT-S/PERICOLACE) 8.6-50 MG per tablet 2 tablet  2 tablet Oral BID PRN Nancy Li MD        sodium chloride (PF) 0.9% PF flush 3 mL  3 mL Intracatheter Q8H Nancy Li MD        sodium chloride (PF) 0.9% PF flush 3 mL  3 mL Intracatheter q1 min prn Nancy Li MD        sodium chloride 0.9 % infusion   Intravenous Continuous Nancy Li MD        tamsulosin (FLOMAX) capsule 0.8 mg  0.8 mg Oral Daily with supper Nancy Li MD         Current Outpatient Medications   Medication Sig Dispense Refill    acetaminophen (TYLENOL) 500 MG tablet Take 1,000 mg by mouth every 6 hours as needed for mild pain      apixaban ANTICOAGULANT (ELIQUIS) 2.5 MG tablet Take 1 tablet (2.5 mg) by mouth 2 times daily 180 tablet 3    atorvastatin (LIPITOR) 20 MG tablet Take 1 tablet (20 mg) by mouth every evening 90 tablet 3    calcium carbonate 500 mg, elemental, (OSCAL 500) 1250 (500 Ca) MG TABS tablet Take 1 tablet by mouth daily as needed (when remembers)      clopidogrel (PLAVIX) 75 MG tablet Take 1 tablet (75 mg) by mouth daily 90 tablet 3    furosemide (LASIX) 20 MG tablet TAKE 1 TABLET BY MOUTH ONCE DAILY FOR INCREASED SWELLING, WEIGHT GAIN, OR SHORTNESS OF BREATH 90 tablet 3    hydroxychloroquine (PLAQUENIL) 200 MG tablet Take 200 mg by mouth daily      leucovorin (WELLCOVORIN) 5 mg tablet Take 5 mg  "by mouth three times a week Do not overlap days of methotrexate - Mon, Wed, and Saturday      losartan (COZAAR) 25 MG tablet Take 1 tablet (25 mg) by mouth daily 90 tablet 3    methotrexate 2.5 MG tablet Take 12.5 mg by mouth twice a week 5 tablets (12.5mg) on Thursday and Friday      metoprolol succinate ER (TOPROL XL) 25 MG 24 hr tablet Take 3 tablets (75 mg) by mouth daily 270 tablet 3    nitroGLYcerin (NITROSTAT) 0.4 MG sublingual tablet For chest pain place 1 tablet under the tongue every 5 minutes for 3 doses. If symptoms persist 5 minutes after 1st dose call 911. 25 tablet 1    tamsulosin (FLOMAX) 0.4 MG capsule Take 2 capsules (0.8 mg) by mouth daily (with dinner) 180 capsule 3    vitamin B-12 (CYANOCOBALAMIN) 1000 MCG tablet Take 1,000 mcg by mouth daily      vitamin D3 (CHOLECALCIFEROL) 50 mcg (2000 units) tablet Take 1 tablet by mouth daily as needed (when remembers)         Allergies  No Known Allergies    ROS:   12 point review of systems was taken and is negative aside from what is noted above in the HPI.     Physical Exam:  /58   Pulse 67   Temp 97.7  F (36.5  C) (Oral)   Resp 19   Ht 1.727 m (5' 8\")   Wt 79.4 kg (175 lb)   SpO2 97%   BMI 26.61 kg/m    General: NAD, alert, cooperative  Head: normocephalic, without abnormality / atraumatic   Abdomen: soft, mildly tender SP area only,  non distended. no suprapubic fullness. No bilateral CVA tenderness.    Geniturinary: No penile or scrotal edema. 22 Fr 3 way chaidez with CBI wide open, urine cherry. Manually irrigated bladder with aspiration of numerous clots until free of clots. CBI resumed at wide open with cherry urine return.    Musculoskeletal: moves all extremities equally  Psychological: alert and oriented, answers questions appropriately.       Labs:   WBC Count   Date Value Ref Range Status   06/19/2024 10.9 4.0 - 11.0 10e3/uL Final     Hemoglobin   Date Value Ref Range Status   06/19/2024 11.1 (L) 13.3 - 17.7 g/dL Final "     Creatinine   Date Value Ref Range Status   06/19/2024 1.52 (H) 0.67 - 1.17 mg/dL Final     INR   Date Value Ref Range Status   04/10/2023 1.19 (H) 0.85 - 1.15 Final      UA/UC: pending     Lab Results: personally reviewed     Imaging:  No pertinent imaging.     I have personally reviewed the imaging reports above.     Assessment/Plan: Felipe Verma is being seen by [facility] for gross hematuria, clot retention     - 83 yr old male with hx of GH and UR 2/2 BPH s/p TURP 5/24/24 by Dr. Jennings, on Eliquis for Afib, admitted with GH and clot retention requiring 3 way chaidez, manual irrigation and CBI.   - Hgb 8.7 at 1136 from 11.1 this AM. Transfusions prn per primary team.   - Continue continuous bladder irrigation at wide open rate. If urine becomes more cherry, clots noted, decreased chaidez output or patient c/o increased bladder pressure/pain then manually irrigate prn Please notify [facility] with any questions/concerns: [phone].  - UA/UC pending. No recent UTI symptoms. WBC WNL. Afebrile.   - Discussed with patient recommendation for cystoscopy, clot evac, fulguration today and patient is in agreement with plan. Arranged for this afternoon with Dr. Schmitz. Keep NPO.   - Additional workup / testing ordered outpatient: Recommend future CT Urogram, possible urine cytology and close clinic follow up with Dr. Jennings/THUAN.   - Old records reviewed - [facility], [facility], [place]    This case was discussed with:   Dr. Schmitz  and updated Dr. Jennings.     Thank you for consulting [facility] regarding this patient's care. Please contact us with questions or concerns.       John Ochoa, APRN, CNP  [facility]  [phone]

## 2024-06-19 NOTE — CONSULTS
Thank you,   , for asking the [facility] team to see Mr. Felipe Verma for evaluation of anticoagulation and wide QRS tachycardia.      Assessment/Recommendations   Assessment/Plan:  Wide QRS tachycardia: On telemetry monitor, the patient has frequent short episodes of atrial tachycardia in the atrial fibrillation.  Telemetry monitor data recorded 14 beats of wide QRS tachycardia which could be caused by atrial fibrillation with aberrancy conduction since QRS morphology was different as PVC morphology.  Continue metoprolol XL 75 mg daily.  Continue telemetry monitor.  Chronic anticoagulation: The patient has been taking Plavix and Eliquis for CAD and paroxysmal atrial fibrillation.  He has been taking Plavix for 14 months which can be discontinued due to gross hematuria.  Eliquis is held due to active bleeding.  Consider to restart the Eliquis when hematuria is resolved.  The patient is scheduled for placement of Watchman device instead of chronic anticoagulation.  Coronary artery disease a status post CANDIE to mid LAD in April 2023: No chest pain and shortness of breath.  The patient had single-vessel disease which was treated with drug-eluting stent.  Mildly elevated hypersensitivity troponin was chronic, which could be related to chronic kidney disease.  Continue to follow-up with the primary cardiologist.  Paroxysmal atrial fibrillation: He is in sinus arrhythmia with very short episodes of atrial tachycardia, atrial fibrillation.  Continue metoprolol XL 75 mg daily.  Eliquis as discussed above.  Benign essential hypertension, dyslipidemia, stage III CKD, rheumatoid arthritis, anemia, gross hematuria: Please see primary team of management for details.    Clinically Significant Risk Factors Present on Admission               # Drug Induced Coagulation Defect: home medication list includes an anticoagulant medication  # Drug Induced Platelet Defect: home medication list includes an  "antiplatelet medication   # Hypertension: Noted on problem list    # Anemia: based on hgb <11        #Precipitous drop in Hgb/Hct: Lowest Hgb this hospitalization: 8.7 g/dL. Will continue to monitor and treat/transfuse as appropriate.     # Overweight: Estimated body mass index is 26.61 kg/m  as calculated from the following:    Height as of this encounter: 1.727 m (5' 8\").    Weight as of this encounter: 79.4 kg (175 lb).       # Financial/Environmental Concerns: none         History of Present Illness/Subjective    It is my pleasure to see Felipe Verma at [facility] for evaluation of anticoagulation and wide QRS tachycardia.  Felipe Verma is a 83 year old male with a medical history of coronary artery disease status post CANDIE to mid LAD in April 2023, paroxysmal atrial fibrillation, benign essential hypertension, dyslipidemia, stage III CKD, rheumatoid arthritis.    The patient had BPH and status post tubal procedure on May 24, 2024.  The patient had difficulty urination over the last several days and gross hematuria leading to ER visit.  He developed anemia and received a blood transfusion at this time.    He denies chest pain, shortness of breath, palpitations, dizziness, orthopnea, PND or leg edema.    During ER evaluation, telemetry monitor recorded 14 beats of wide QRS tachycardia.  Reviewed his laboratory test, hypersensitive troponin is 200-240 range which has been in the past.        Physical Examination Review of Systems   /60   Pulse 67   Temp 97.9  F (36.6  C) (Oral)   Resp 18   Ht 1.727 m (5' 8\")   Wt 79.4 kg (175 lb)   SpO2 98%   BMI 26.61 kg/m    Body mass index is 26.61 kg/m .  Wt Readings from Last 3 Encounters:   06/19/24 79.4 kg (175 lb)   04/23/24 78.5 kg (173 lb)   03/27/24 79.8 kg (176 lb)       Intake/Output Summary (Last 24 hours) at 6/19/2024 1356  Last data filed at 6/19/2024 1315  Gross per 24 hour   Intake --   Output 8510 ml   Net " -8510 ml       General Appearance:   Awake, Alert, No acute distress.   HEENT:  No scleral icterus; the mucous membranes were moist.   Neck: No cervical bruits. No JVD. No thyromegaly.    Chest: The spine was straight. The chest was symmetric.   Lungs:   Respirations unlabored; Lungs are clear to auscultation. No crackles.  No wheezing.   Cardiovascular:   Regular rhythm and rate, normal first and second heart sounds with no murmurs. No rubs or gallops.    Abdomen:  Soft. No tenderness. Bowels sounds are present. [place] catheter with gross hematuria.   Extremities: Equal tibial pulses. No leg edema.   Skin: No rashes. Warm, Dry.   Musculoskeletal: No tenderness.   Neurologic: Oriented x 3. Mood and affect are appropriate.     A 12 point comprehensive review of systems was negative except as noted.        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    Chronic atrial fibrillation (H)     Coronary artery disease     Diverticulosis     Dysarthria 2016    Facial droop 2016    Heart attack (H) 04/10/2023    NSTEMI    History of subarachnoid hemorrhage 05/03/2016    Hyperlipidemia     Hypertension     Lung nodule < 6cm on CT 06/06/2018    Normocytic anemia     Paroxysmal atrial fibrillation (H)     Pure hypercholesterolemia     Rheumatoid arthritis involving multiple sites with positive rheumatoid factor (H)     Stage 3a chronic kidney disease (CKD) (H)     Stented coronary artery     Past Surgical History:   Procedure Laterality Date    CV CORONARY ANGIOGRAM N/A 4/11/2023    Procedure: Coronary Angiogram;  Surgeon: Alejandro Hitchcock MD;  Location: [facility] CV    CV PCI STENT DRUG ELUTING N/A 4/11/2023    Procedure: Percutaneous Coronary Intervention Stent;  Surgeon: Alejandro Hitchcock MD;  Location: [facility] CV    OTHER SURGICAL HISTORY       OTHER SURGICAL HISTORYstent placement in head     TONSILLECTOMY      Family History   Problem Relation Age of Onset    Colon Cancer Mother     Hypertension Father     Coronary Artery Disease Father     Aneurysm Sister     Cerebrovascular Disease Sister     No Known Problems Brother     Diabetes Son     No Known Problems Son     No Known Problems Son     Breast Cancer No family hx of     Prostate Cancer No family hx of     Social History     Socioeconomic History    Marital status:      Spouse name: Not on file    Number of children: Not on file    Years of education: Not on file    Highest education level: Not on file   Occupational History    Not on file   Tobacco Use    Smoking status: Former    Smokeless tobacco: Never   Vaping Use    Vaping status: Never Used   Substance and Sexual Activity    Alcohol use: Not on file     Comment: Alcoholic Drinks/day: 1 glass per week    Drug use: Not Currently    Sexual activity: Not Currently   Other Topics Concern    Not on file   Social History Narrative    Not on file     Social Determinants of Health     Financial Resource Strain: Low Risk  (3/13/2024)    Financial Resource Strain     Within the past 12 months, have you or your family members you live with been unable to get utilities (heat, electricity) when it was really needed?: No   Food Insecurity: Low Risk  (3/13/2024)    Food Insecurity     Within the past 12 months, did you worry that your food would run out before you got money to buy more?: No     Within the past 12 months, did the food you bought just not last and you didn t have money to get more?: No   Transportation Needs: Low Risk  (3/13/2024)    Transportation Needs     Within the past 12 months, has lack of transportation kept you from medical appointments, getting your medicines, non-medical meetings or appointments, work, or from getting things that you need?: No   Physical Activity: Insufficiently Active (3/13/2024)    Exercise Vital Sign      Days of Exercise per Week: 1 day     Minutes of Exercise per Session: 20 min   Stress: Stress Concern Present (3/13/2024)    [employer] - Occupational Stress Questionnaire     Feeling of Stress : To some extent   Social Connections: Unknown (3/13/2024)    Social Connection and Isolation Panel [NHANES]     Frequency of Communication with Friends and Family: Not on file     Frequency of Social Gatherings with Friends and Family: Once a week     Attends Evangelical Services: Not on file     Active Member of Clubs or Organizations: Not on file     Attends Club or Organization Meetings: Not on file     Marital Status: Not on file   Interpersonal Safety: Low Risk  (3/13/2024)    Interpersonal Safety     Do you feel physically and emotionally safe where you currently live?: Yes     Within the past 12 months, have you been hit, slapped, kicked or otherwise physically hurt by someone?: No     Within the past 12 months, have you been humiliated or emotionally abused in other ways by your partner or ex-partner?: No   Housing Stability: Low Risk  (3/13/2024)    Housing Stability     Do you have housing? : Yes     Are you worried about losing your housing?: No          Medications  Allergies   Scheduled Meds:  Current Facility-Administered Medications   Medication Dose Route Frequency Provider Last Rate Last Admin    [Held by provider] apixaban ANTICOAGULANT (ELIQUIS) tablet 2.5 mg  2.5 mg Oral BID Nancy Li MD        atorvastatin (LIPITOR) tablet 20 mg  20 mg Oral QPM Nancy Li MD        cefTRIAXone (ROCEPHIN) 1 g vial to attach to  mL bag for ADULTS or NS 50 mL bag for PEDS  1 g Intravenous Q24H Nancy Li MD        cyanocobalamin (VITAMIN B-12) tablet 1,000 mcg  1,000 mcg Oral Daily Nancy Li MD   1,000 mcg at 06/19/24 1003    [START ON 6/20/2024] furosemide (LASIX) tablet 20 mg  20 mg Oral Daily Nancy Li MD        hydroxychloroquine  (PLAQUENIL) tablet 200 mg  200 mg Oral Daily Nancy Li MD   200 mg at 06/19/24 1003    leucovorin (WELLCOVORIN) tablet 5 mg  5 mg Oral Once per day on Monday Wednesday Friday Nancy Li MD   5 mg at 06/19/24 1003    metoprolol succinate ER (TOPROL XL) 24 hr tablet 75 mg  75 mg Oral Daily Nancy Li MD        sodium chloride (PF) 0.9% PF flush 3 mL  3 mL Intracatheter Q8H Nancy Li MD        tamsulosin (FLOMAX) capsule 0.8 mg  0.8 mg Oral Daily with supper Nancy Li MD         Continuous Infusions:  Current Facility-Administered Medications   Medication Dose Route Frequency Provider Last Rate Last Admin    Patient is already receiving anticoagulation with heparin, enoxaparin (LOVENOX), warfarin (COUMADIN)  or other anticoagulant medication   Does not apply Continuous PRN Nancy Li MD        sodium chloride 0.9 % infusion   Intravenous Continuous Nancy Li MD   Paused at 06/19/24 1315     PRN Meds:.  Current Facility-Administered Medications   Medication Dose Route Frequency Provider Last Rate Last Admin    acetaminophen (TYLENOL) tablet 650 mg  650 mg Oral Q4H PRN Nancy Li MD   650 mg at 06/19/24 1002    calcium carbonate (TUMS) chewable tablet 1,000 mg  1,000 mg Oral 4x Daily PRN Nancy Li MD        lidocaine (LMX4) cream   Topical Q1H PRN Nancy Li MD        lidocaine 1 % 0.1-1 mL  0.1-1 mL Other Q1H PRN Nancy Li MD        nitroGLYcerin (NITROSTAT) sublingual tablet 0.4 mg  0.4 mg Sublingual Q5 Min PRN Nancy Li MD        Patient is already receiving anticoagulation with heparin, enoxaparin (LOVENOX), warfarin (COUMADIN)  or other anticoagulant medication   Does not apply Continuous PRN Nancy Li MD        senna-docusate (SENOKOT-S/PERICOLACE) 8.6-50 MG per tablet 1 tablet  1 tablet Oral BID PRN Nancy Li MD        Or    senna-docusate (SENOKOT-S/PERICOLACE) 8.6-50 MG per  tablet 2 tablet  2 tablet Oral BID Nancy Wei MD        sodium chloride (PF) 0.9% PF flush 3 mL  3 mL Intracatheter q1 min prNancy Kimble MD        No Known Allergies      Lab Results    Chemistry/lipid CBC Cardiac Enzymes/BNP/TSH/INR   Lab Results   Component Value Date    BUN 25.2 (H) 06/19/2024     06/19/2024    CO2 20 (L) 06/19/2024    Lab Results   Component Value Date    WBC 10.9 06/19/2024    HGB 8.7 (L) 06/19/2024    HCT 33.8 (L) 06/19/2024     06/19/2024     (L) 06/19/2024    Lab Results   Component Value Date    TSH 3.67 02/22/2024    INR 1.19 (H) 04/10/2023

## 2024-06-19 NOTE — ED NOTES
The pt had a 14 beat run of v-tach, pt was alert and answering questions upon entering the room. He is now in a NSR, MD notified.

## 2024-06-19 NOTE — ANESTHESIA PREPROCEDURE EVALUATION
Anesthesia Pre-Procedure Evaluation    Patient: Felipe Verma   MRN: [id number] : 1940        Procedure : Procedure(s):  CYSTOSCOPY, WITH FULGURATION OF HEMORRHAGING BLOOD VESSEL AND THROMBUS REMOVAL          Past Medical History:   Diagnosis Date    Acute diverticulitis 2016    BPH with urinary obstruction     Brain aneurysm 2016    Chronic atrial fibrillation (H)     Coronary artery disease     Diverticulosis     Dysarthria 2016    Facial droop 2016    Heart attack (H) 04/10/2023    NSTEMI    History of subarachnoid hemorrhage 2016    Hyperlipidemia     Hypertension     Lung nodule < 6cm on CT 2018    Normocytic anemia     Paroxysmal atrial fibrillation (H)     Pure hypercholesterolemia     Rheumatoid arthritis involving multiple sites with positive rheumatoid factor (H)     Stage 3a chronic kidney disease (CKD) (H)     Stented coronary artery       Past Surgical History:   Procedure Laterality Date    CV CORONARY ANGIOGRAM N/A 2023    Procedure: Coronary Angiogram;  Surgeon: Alejandro Hitchcock MD;  Location: [facility] CATH LAB CV    CV PCI STENT DRUG ELUTING N/A 2023    Procedure: Percutaneous Coronary Intervention Stent;  Surgeon: Alejandro Hitchcock MD;  Location: [facility] CATH LAB CV    OTHER SURGICAL HISTORY      OTHER SURGICAL HISTORYstent placement in head     TONSILLECTOMY        No Known Allergies   Social History     Tobacco Use    Smoking status: Former    Smokeless tobacco: Never   Substance Use Topics    Alcohol use: Not on file     Comment: Alcoholic Drinks/day: 1 glass per week      Wt Readings from Last 1 Encounters:   24 79.4 kg (175 lb)        Anesthesia Evaluation            ROS/MED HX  ENT/Pulmonary:  - neg pulmonary ROS     Neurologic: Comment: History of subarachnoid hemorrhage   (-) no seizures and no CVA   Cardiovascular: Comment:  CAD s/p 3 drug-eluting stents in 2023    Elevated troponin (~200ng/L high sensitivity). He had  "a similar level back in februrary and march. Appears to be his baseline. Denies active chest pain.     (+)  hypertension- -  CAD - past MI - -   Taking blood thinners                     dysrhythmias, a-fib,             METS/Exercise Tolerance:     Hematologic:       Musculoskeletal:       GI/Hepatic:       Renal/Genitourinary:     (+) renal disease, type: CRI,            Endo:       Psychiatric/Substance Use:       Infectious Disease:       Malignancy:       Other:            Physical Exam    Airway        Mallampati: II   TM distance: > 3 FB   Neck ROM: full   Mouth opening: > 3 cm    Respiratory Devices and Support         Dental       (+) Modest Abnormalities - crowns, retainers, 1 or 2 missing teeth      Cardiovascular          Rhythm and rate: regular and normal     Pulmonary           breath sounds clear to auscultation           OUTSIDE LABS:  CBC:   Lab Results   Component Value Date    WBC 10.9 06/19/2024    WBC 5.9 04/23/2024    HGB 8.7 (L) 06/19/2024    HGB 11.1 (L) 06/19/2024    HCT 33.8 (L) 06/19/2024    HCT 32.8 (L) 04/23/2024     (L) 06/19/2024     (L) 04/23/2024     BMP:   Lab Results   Component Value Date     06/19/2024     06/19/2024    POTASSIUM 4.1 06/19/2024    POTASSIUM 3.9 06/19/2024    CHLORIDE 109 (H) 06/19/2024    CHLORIDE 106 06/19/2024    CO2 20 (L) 06/19/2024    CO2 20 (L) 06/19/2024    BUN 25.2 (H) 06/19/2024    BUN 25.1 (H) 06/19/2024    CR 1.52 (H) 06/19/2024    CR 1.52 (H) 06/19/2024     (H) 06/19/2024     (H) 06/19/2024     COAGS:   Lab Results   Component Value Date    PTT 94 (H) 04/11/2023    INR 1.19 (H) 04/10/2023     POC: No results found for: \"BGM\", \"HCG\", \"HCGS\"  HEPATIC:   Lab Results   Component Value Date    ALBUMIN 4.1 04/23/2024    PROTTOTAL 6.5 04/23/2024    ALT 10 04/23/2024    AST 18 04/23/2024    ALKPHOS 81 04/23/2024    BILITOTAL 0.4 04/23/2024     OTHER:   Lab Results   Component Value Date    A1C 6.0 (H) 06/19/2024    ABBEY " "8.0 (L) 06/19/2024    PHOS 2.3 (L) 06/19/2024    MAG 2.2 06/19/2024    LIPASE 29 04/10/2023    TSH 3.67 02/22/2024       Anesthesia Plan    ASA Status:  3, emergent    NPO Status:  NPO Appropriate    Anesthesia Type: General.     - Airway: ETT         Techniques and Equipment:     - Lines/Monitors: 2nd IV     Consents    Anesthesia Plan(s) and associated risks, benefits, and realistic alternatives discussed. Questions answered and patient/representative(s) expressed understanding.     - Discussed:     - Discussed with:  Patient            Postoperative Care       PONV prophylaxis: Ondansetron (or other 5HT-3), Dexamethasone or Solumedrol     Comments:    Other Comments: Elevated troponin. Possibly CKD per cardiology. Receiving 1u PRBC currently. Last dose of plavix and eliquis yesterday. Plan for GETA.            Luis M Ferreira MD    I have reviewed the pertinent notes and labs in the chart from the past 30 days and (re)examined the patient.  Any updates or changes from those notes are reflected in this note.            # Drug Induced Coagulation Defect: home medication list includes an anticoagulant medication  # Drug Induced Platelet Defect: home medication list includes an antiplatelet medication  # Overweight: Estimated body mass index is 26.61 kg/m  as calculated from the following:    Height as of this encounter: 1.727 m (5' 8\").    Weight as of this encounter: 79.4 kg (175 lb).      "

## 2024-06-19 NOTE — ANESTHESIA CARE TRANSFER NOTE
Patient: Felipe Verma    Procedure: Procedure(s):  CYSTOSCOPY, WITH FULGURATION OF HEMORRHAGING BLOOD VESSEL AND THROMBUS REMOVAL       Diagnosis: Edward hematuria [R31.0]  Anemia due to blood loss, acute [D62]  Diagnosis Additional Information: No value filed.    Anesthesia Type:   General     Note:    Oropharynx: oropharynx clear of all foreign objects  Level of Consciousness: awake  Oxygen Supplementation: face mask  Level of Supplemental Oxygen (L/min / FiO2): 6  Independent Airway: airway patency satisfactory and stable  Dentition: dentition unchanged  Vital Signs Stable: post-procedure vital signs reviewed and stable  Report to RN Given: handoff report given  Patient transferred to: PACU    Handoff Report: Identifed the Patient, Identified the Reponsible Provider, Reviewed the pertinent medical history, Discussed the surgical course, Reviewed Intra-OP anesthesia mangement and issues during anesthesia, Set expectations for post-procedure period and Allowed opportunity for questions and acknowledgement of understanding    Vitals:  Vitals Value Taken Time   /79 06/19/24 1707   Temp 98.4F    Pulse 93 06/19/24 1707   Resp 16 06/19/24 1707   SpO2 100 % 06/19/24 1707   Vitals shown include unfiled device data.    Electronically Signed By: OLY Spring Jr, CRNA  June 19, 2024  5:09 PM

## 2024-06-19 NOTE — ED NOTES
Three way placed but clotted initially. Catheter irrigated and flow returned, fluid was started with good catheter drainage.

## 2024-06-19 NOTE — H&P
[facility]    History and Physical - Hospitalist Service       Date of Admission:  6/19/2024    Assessment & Plan      Felipe Verma is a 83 year old male admitted on 6/19/2024. He has hx of HTN, hyperlipidemia, CAD s/p 3 drug-eluting stents in April 2023, and BPH, RA, who had TURP on 5/24/24 at [place]. He had catheter and then it was removed. He notes over last 3 days difficulty with urine      1.Urine retention  -with gross hematuria needed 3 way placed in ER--anticipate may see acute blood loss anemia here  -consent in chart for blood if needed  -Urology to see  -needs UA/UC-->UA pos will start ceftriaxone and follow UC    2.Hx of CAD with hx for chronic systolic HF(compensated now-last EF 40%)  -stents 4/23-->s/p PCI to mid LAD 4/23   -was on Plavix and DOAC  -would only want to hold short time  -hold Plavix, and check with cards if should even restart since >year from stent, continue DOAC when ok with Urology    3.hx of Afib  -on DOAC--hold    4.htn  -clarify meds  -with  mild SUSU, will hold ARB    5.lipids  -clarify meds    6.Deconditioning  -will need pt/ot eval    7.Hx of RA  -clarify meds    8.CKD 3 with mild SUSU  -watch daily labs  -no nsaids  -avoid hypotension  -hold ARB    9.Hypophos-replace per protocol    10.Hyperglycemia  -check A1c--6.0, preDM    Social-lives with wife, and she is on hospice    Code-full    Addendum  Called by RN NSVT 14 beats  -tele  -EKG  -trop, bnp, lytes, mag, phos, k  -get cards to see     I called son to update and he notes he lives with them        Diet:  cardiac  DVT Prophylaxis: DOAC--hold, scd  [place] Catheter: PRESENT, indication: Acute retention or obstruction, Gross hematuria  Lines: None     Cardiac Monitoring: None  Code Status:  full    Clinically Significant Risk Factors Present on Admission               # Drug Induced Coagulation Defect: home medication list includes an anticoagulant medication  # Drug Induced Platelet Defect: home  "medication list includes an antiplatelet medication   # Hypertension: Noted on problem list             # Overweight: Estimated body mass index is 26.61 kg/m  as calculated from the following:    Height as of this encounter: 1.727 m (5' 8\").    Weight as of this encounter: 79.4 kg (175 lb).              Disposition Plan     Medically Ready for Discharge: Anticipated in 2-4 Days           Nancy Li MD  Hospitalist Service  [facility]  Securely message with [employer] (more info)  Text page via [employer] Paging/Directory     ______________________________________________________________________    Chief Complaint   Difficulty with urination    History is obtained from the patient    History of Present Illness     Felipe Verma is a 83 year old male admitted on 6/19/2024. He has hx of HTN, hyperlipidemia, CAD s/p 3 drug-eluting stents in April 2023, and BPH, RA, who had TURP on 5/24/24 at [place]. He had catheter and then it was removed. He notes over last 3 days difficulty with urine  He notes surgery in May and had chaidez for a while  Over last 3-4 days more difficulty with urination and then gross hematuria  Here in ER 3 way was placed    He denied fevers  Notes felt chills here    No n/v  No cp    On blood thinners for stents and afib    Wife at home, on hospice     Past Medical History    Past Medical History:   Diagnosis Date    Acute diverticulitis 04/20/2016    BPH with urinary obstruction     Brain aneurysm 05/03/2016    Chronic atrial fibrillation (H)     Coronary artery disease     Diverticulosis     Dysarthria 2016    Facial droop 2016    Heart attack (H) 04/10/2023    NSTEMI    History of subarachnoid hemorrhage 05/03/2016    Hyperlipidemia     Hypertension     Lung nodule < 6cm on CT 06/06/2018    Normocytic anemia     Paroxysmal atrial fibrillation (H)     Pure hypercholesterolemia     Rheumatoid arthritis involving multiple sites with positive rheumatoid factor (H)     Stage " 3a chronic kidney disease (CKD) (H)     Stented coronary artery        Past Surgical History   Past Surgical History:   Procedure Laterality Date    CV CORONARY ANGIOGRAM N/A 4/11/2023    Procedure: Coronary Angiogram;  Surgeon: Alejandro Hitchcock MD;  Location: [facility] LAB CV    CV PCI STENT DRUG ELUTING N/A 4/11/2023    Procedure: Percutaneous Coronary Intervention Stent;  Surgeon: Alejandro Hitchcock MD;  Location: [facility] LAB CV    OTHER SURGICAL HISTORY      OTHER SURGICAL HISTORYstent placement in head     TONSILLECTOMY         Prior to Admission Medications   Prior to Admission Medications   Prescriptions Last Dose Informant Patient Reported? Taking?   acetaminophen (TYLENOL) 500 MG tablet   Yes No   Sig: Take 1,000 mg by mouth every 6 hours as needed for mild pain   apixaban ANTICOAGULANT (ELIQUIS) 2.5 MG tablet   No No   Sig: Take 1 tablet (2.5 mg) by mouth 2 times daily   atorvastatin (LIPITOR) 20 MG tablet   No No   Sig: Take 1 tablet (20 mg) by mouth every evening   calcium carbonate 500 mg, elemental, (OSCAL 500) 1250 (500 Ca) MG TABS tablet   Yes No   Sig: Take 1 tablet by mouth daily as needed (when remembers)   clopidogrel (PLAVIX) 75 MG tablet   No No   Sig: Take 1 tablet (75 mg) by mouth daily   furosemide (LASIX) 20 MG tablet   No No   Sig: TAKE 1 TABLET BY MOUTH ONCE DAILY FOR INCREASED SWELLING, WEIGHT GAIN, OR SHORTNESS OF BREATH   hydroxychloroquine (PLAQUENIL) 200 MG tablet   Yes No   Sig: Take 200 mg by mouth daily   leucovorin (WELLCOVORIN) 5 mg tablet   Yes No   Sig: Take 5 mg by mouth three times a week On Monday, Wednesday and Saturday. Do not overlap days of methotrexate   losartan (COZAAR) 25 MG tablet   No No   Sig: Take 1 tablet (25 mg) by mouth daily   methotrexate 2.5 MG tablet   Yes No   Sig: Take 12.5 mg by mouth twice a week 5 tablets (12.5mg) on Thursday and Friday   metoprolol succinate ER (TOPROL XL) 25 MG 24 hr tablet   No No   Sig: Take 3 tablets (75  mg) by mouth daily   nitroGLYcerin (NITROSTAT) 0.4 MG sublingual tablet   No No   Sig: For chest pain place 1 tablet under the tongue every 5 minutes for 3 doses. If symptoms persist 5 minutes after 1st dose call 911.   oxyBUTYnin ER (DITROPAN XL) 5 MG 24 hr tablet   No No   Sig: Take 5 tablets (25 mg) by mouth daily   tamsulosin (FLOMAX) 0.4 MG capsule   No No   Sig: Take 2 capsules (0.8 mg) by mouth daily (with dinner)   vitamin B-12 (CYANOCOBALAMIN) 1000 MCG tablet   Yes No   Sig: Take 1,000 mcg by mouth daily as needed (when remembers)   vitamin D3 (CHOLECALCIFEROL) 50 mcg (2000 units) tablet   Yes No   Sig: Take 1 tablet by mouth daily as needed (when remembers)      Facility-Administered Medications: None        Review of Systems    CONSTITUTIONAL:  negative  EYES:  negative  HEENT:  negative  RESPIRATORY:  negative  CARDIOVASCULAR:  cad, stents, afib  GASTROINTESTINAL:  negative  GENITOURINARY:  gross hematuria, just had TURP, urine retention   INTEGUMENT/BREAST:  negative  HEMATOLOGIC/LYMPHATIC:  negative  ALLERGIC/IMMUNOLOGIC:  negative  ENDOCRINE:  negative  MUSCULOSKELETAL:  negative  NEUROLOGICAL:  negative  BEHAVIOR/PSYCH:  negative    Social History   I have reviewed this patient's social history and updated it with pertinent information if needed.  Social History     Tobacco Use    Smoking status: Former    Smokeless tobacco: Never   Vaping Use    Vaping status: Never Used   Substance Use Topics    Drug use: Not Currently         Family History   I have reviewed this patient's family history and updated it with pertinent information if needed.  Family History   Problem Relation Age of Onset    Colon Cancer Mother     Hypertension Father     Coronary Artery Disease Father     Aneurysm Sister     Cerebrovascular Disease Sister     No Known Problems Brother     Diabetes Son     No Known Problems Son     No Known Problems Son     Breast Cancer No family hx of     Prostate Cancer No family hx of           Allergies   No Known Allergies     Physical Exam   Vital Signs: Temp: 97.7  F (36.5  C) Temp src: Oral BP: (!) 140/77 Pulse: 78   Resp: 22 SpO2: 97 % O2 Device: None (Room air)    Weight: 175 lbs 0 oz    Constitutional: awake, fatigued, alert, cooperative, and no apparent distress  Eyes: sclera clear  ENT: normocephalic, without obvious abnormality  Respiratory: no increased work of breathing, good air exchange, no retractions, and clear to auscultation  Cardiovascular: regular rate and rhythm and normal S1 and S2  GI: normal bowel sounds, soft, non-distended, and non-tender  Genitounirinary: 3 way cath in [place] urine at fist now clearing   Skin: no bruising or bleeding  Musculoskeletal: no lower extremity pitting edema present  Neurologic: Mental Status Exam:  Level of Alertness:   awake  Neuropsychiatric: General: normal, calm, and normal eye contact    Medical Decision Making       75 MINUTES SPENT BY ME on the date of service doing chart review, history, exam, documentation & further activities per the note.      Data     I have personally reviewed the following data over the past 24 hrs:    10.9  \   11.1 (L)   / 147 (L)     139 106 25.1 (H) /  171 (H)   3.9 20 (L) 1.52 (H) \       Imaging results reviewed over the past 24 hrs:   No results found for this or any previous visit (from the past 24 hour(s)).

## 2024-06-19 NOTE — OP NOTE
Date of Surgery: 6/19/24    Pre-operative Diagnosis:  Clot urinary retention    Post-operative Diagnosis:   Clot urinary retention    Procedure:   Cystoscopy with clot evacuation and fulguration  Complex chaidez catheter insertion    Surgeon: Bert Schmitz MD    Anesthesia: MAC    Estimated Blood Loss: 10 ml, large volume old clot burden irrigated out    Complications: None    Specimens:  None    Findings:   Successful evacuation of clot burden. Bleeding likely from prostatic varices, no evidence of malignancy    Indication for Procedure: 82yo M recently s/p TURP developed developed clot urinary retention after restarting anticoagulation which has been refractory to CBI. Patient presents now for cystoscopy with clot evacuation and fulguration.     Summary of Procedure:  After informed consent was obtained the patient was brought back to the operating room and placed in lithotomy position after induction of general anesthesia. A time out was performed.    Resectoscope was passed per urethra into the bladder. There were no urethral strictures. Large volume clot burden was irrigated out.     Cystoscopy was then performed showing well resected TUR defect, several bleeding mucosal vessels.  These were all fulgurated.    At this stage there was no further significant bleeding. Resectoscope was removed and 22Fr 3-way chaidez catheter was passed per urethra into the bladder, 30cc sterile water instilled in the balloon, concluding the procedure.     Disposition:  -Continue CBI overnight, titrate to light pink

## 2024-06-19 NOTE — ED NOTES
The pt reports increased pressure and pain in his bladder. Urinary flow noted to be slow out of catheter. The catheter was irrigated and several large clots removed, flow returned to normal. Pt reports relief of pain and pressure in his bladder.

## 2024-06-19 NOTE — ANESTHESIA POSTPROCEDURE EVALUATION
Patient: Felipe Verma    Procedure: Procedure(s):  CYSTOSCOPY, WITH FULGURATION OF HEMORRHAGING BLOOD VESSEL AND THROMBUS REMOVAL       Anesthesia Type:  General    Note:     Postop Pain Control: Uneventful            Sign Out: Well controlled pain   PONV: No   Neuro/Psych: Uneventful            Sign Out: Acceptable/Baseline neuro status   Airway/Respiratory: Uneventful            Sign Out: Acceptable/Baseline resp. status   CV/Hemodynamics: Uneventful            Sign Out: Acceptable CV status; No obvious hypovolemia; No obvious fluid overload   Other NRE: NONE   DID A NON-ROUTINE EVENT OCCUR? No           Last vitals:  Vitals Value Taken Time   /70 06/19/24 1800   Temp 36.9  C (98.5  F) 06/19/24 1705   Pulse 85 06/19/24 1810   Resp 17 06/19/24 1808   SpO2 98 % 06/19/24 1810   Vitals shown include unfiled device data.    Electronically Signed By: Ami Chew MD  June 19, 2024  6:14 PM

## 2024-06-19 NOTE — ED NOTES
"[facility] ED Handoff Report    ED Chief Complaint: hematuria    ED Diagnosis:  (R31.0) Edward hematuria  (primary encounter diagnosis)  Comment: three way catheter in place, CBI  Plan: Case Request: CYSTOSCOPY, WITH FULGURATION OF         HEMORRHAGING BLOOD VESSEL AND THROMBUS REMOVAL        To [facility] 1440    (R33.8) Acute urinary retention  Comment: 3way catheter in place  Plan: see comments above    (R31.0) Gross hematuria    (I47.29) Nonsustained ventricular tachycardia (H)  Comment: 14 beat run- provider notified by previous RN  Plan: continue on cardiac monitor    (D62) Anemia due to blood loss, acute  Comment: transfusing 1 unit PRBC now, continued up to [facility]. Robina RN aware. No s/s transfusion reaction.  Plan: Case Request: CYSTOSCOPY, WITH FULGURATION OF         HEMORRHAGING BLOOD VESSEL AND THROMBUS REMOVAL        Up to [facility] 1440       PMH:    Past Medical History:   Diagnosis Date    Acute diverticulitis 04/20/2016    BPH with urinary obstruction     Brain aneurysm 05/03/2016    Chronic atrial fibrillation (H)     Coronary artery disease     Diverticulosis     Dysarthria 2016    Facial droop 2016    Heart attack (H) 04/10/2023    NSTEMI    History of subarachnoid hemorrhage 05/03/2016    Hyperlipidemia     Hypertension     Lung nodule < 6cm on CT 06/06/2018    Normocytic anemia     Paroxysmal atrial fibrillation (H)     Pure hypercholesterolemia     Rheumatoid arthritis involving multiple sites with positive rheumatoid factor (H)     Stage 3a chronic kidney disease (CKD) (H)     Stented coronary artery         Code Status:  Full Code     Falls Risk: Yes Band: Applied    Current Living Situation/Residence: lives in a house     Elimination Status: Continent: indwelling catheter     Patients Preferred Language:  English     Needed: No    Vital Signs:  /71   Pulse 73   Temp 97.6  F (36.4  C) (Oral)   Resp 21   Ht 1.727 m (5' 8\")   Wt 79.4 kg (175 lb)   SpO2 98%   BMI 26.61 kg/m   "     Cardiac Rhythm: SR    Pain Score: 0/10    Is the Patient Confused:  No    Last Food or Drink: 6/18/24     Focused Assessment:  Pt continues on CBI-still bright red blood , Provider aware.     Tests Performed: Done: Labs and Imaging    Treatments Provided:  see mar    Family Dynamics/Concerns: No    Family Updated On Visitor Policy: Yes    Plan of Care Communicated to Family: Yes    Who Was Updated about Plan of Care: wife and son    Belongings Checklist Done and Signed by Patient: Yes    Medications sent with patient: ceftriaxone 1g. RN notified to give after blood transfusion completed.     Covid: asymptomatic , not tested    Additional Information: Hgb Q4 hours.  CBI- pt has received 14 bags so far    RN: Mini Pedro, RN 6/19/2024 2:39 PM

## 2024-06-19 NOTE — ANESTHESIA PROCEDURE NOTES
Airway       Patient location during procedure: OR       Procedure Start/Stop Times: 6/19/2024 4:06 PM  Staff -        CRNA: Bert Gandhi Jr., APRN CRNA       Performed By: CRNAIndications and Patient Condition       Indications for airway management: leo-procedural       Induction type:intravenous       Mask difficulty assessment: 2 - vent by mask + OA or adjuvant +/- NMBA    Final Airway Details       Final airway type: endotracheal airway       Successful airway: ETT - single  Endotracheal Airway Details        ETT size (mm): 7.5       Cuffed: yes       Successful intubation technique: direct laryngoscopy       DL Blade Type: MAC 4       Grade View of Cords: 1       Adjucts: stylet       Position: Right       Measured from: gums/teeth       Secured at (cm): 20       Bite block used: Oral Airway    Post intubation assessment        Placement verified by: capnometry and equal breath sounds        Number of attempts at approach: 1       Secured with: tape       Ease of procedure: easy       Dentition: Intact and Unchanged    Medication(s) Administered   Medication Administration Time: 6/19/2024 4:06 PM

## 2024-06-19 NOTE — INTERVAL H&P NOTE
"I have reviewed the surgical (or preoperative) H&P that is linked to this encounter, and examined the patient. There are no significant changes    Clinical Conditions Present on Arrival:  Clinically Significant Risk Factors Present on Admission                # Drug Induced Coagulation Defect: home medication list includes an anticoagulant medication  # Drug Induced Platelet Defect: home medication list includes an antiplatelet medication    #Precipitous drop in Hgb/Hct: Lowest Hgb this hospitalization: 8.7 g/dL. Will continue to monitor and treat/transfuse as appropriate.    # Overweight: Estimated body mass index is 26.61 kg/m  as calculated from the following:    Height as of this encounter: 1.727 m (5' 8\").    Weight as of this encounter: 79.4 kg (175 lb).       "

## 2024-06-19 NOTE — CONSULTS
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    Communication Assessment  Patient's communication style: spoken language (English or Bilingual)             Cognitive  Cognitive/Neuro/Behavioral: WDL                      Living Environment:   People in home: child(marilee), adult, spouse     Current living Arrangements: house      Able to return to prior arrangements: yes       Family/Social Support:  Care provided by: self  Provides care for: spouse  Marital Status:   Wife, Children          Description of Support System: Supportive, Involved    Support Assessment: Adequate family and caregiver support, Adequate social supports    Current Resources:   Patient receiving home care services: No     Community Resources: None  Equipment currently used at home: none  Supplies currently used at home: Other (dentures and readers)    Employment/Financial:  Employment Status:          Financial Concerns: none   Referral to Financial Worker: No       Does the patient's insurance plan have a 3 day qualifying hospital stay waiver?  No    Lifestyle & Psychosocial Needs:  Social Determinants of Health     Food Insecurity: Low Risk  (3/13/2024)    Food Insecurity     Within the past 12 months, did you worry that your food would run out before you got money to buy more?: No     Within the past 12 months, did the food you bought just not last and you didn t have money to get more?: No   Depression: Not at risk (3/13/2024)    PHQ-2     PHQ-2 Score: 1    Housing Stability: Low Risk  (3/13/2024)    Housing Stability     Do you have housing? : Yes     Are you worried about losing your housing?: No   Tobacco Use: Medium Risk (6/19/2024)    Patient History     Smoking Tobacco Use: Former     Smokeless Tobacco Use: Never     Passive Exposure: Not on file   Financial Resource Strain: Low Risk  (3/13/2024)    Financial Resource Strain     Within the past 12 months, have you or your family members you live with been unable to get utilities (heat, electricity) when it was really needed?: No   Alcohol Use: Unknown (11/12/2020)    [facility]     AUDIT-C     Frequency of Alcohol Consumption: 2-4 times a month     Average Number of Drinks: Not on file     Frequency of Binge Drinking: Not on file   Transportation Needs: Low Risk  (3/13/2024)    Transportation Needs     Within the past 12 months, has lack of transportation kept you from medical appointments, getting your medicines, non-medical meetings or appointments, work, or from getting things that you need?: No   Physical Activity: Insufficiently Active (3/13/2024)    Exercise Vital Sign     Days of Exercise per Week: 1 day     Minutes of Exercise per Session: 20 min   Interpersonal Safety: Low Risk  (3/13/2024)    Interpersonal Safety     Do you feel physically and emotionally safe where you currently live?: Yes     Within the past 12 months, have you been hit, slapped, kicked or otherwise physically hurt by someone?: No     Within the past 12 months, have you been humiliated or emotionally abused in other ways by your partner or ex-partner?: No   Stress: Stress Concern Present (3/13/2024)    [employer] - Occupational Stress Questionnaire     Feeling of Stress : To some extent   Social Connections: Unknown (3/13/2024)    Social Connection and Isolation Panel [NHANES]     Frequency of Communication with Friends and Family: Not on file      Frequency of Social Gatherings with Friends and Family: Once a week     Attends Baptism Services: Not on file     Active Member of Clubs or Organizations: Not on file     Attends Club or Organization Meetings: Not on file     Marital Status: Not on file   Health Literacy: Not on file       Functional Status:  Prior to admission patient needed assistance:   Dependent ADLs:: Independent, Ambulation-no assistive device  Dependent IADLs:: Independent  Assesssment of Functional Status: Not at  functional baseline    Mental Health Status:  Mental Health Status: No Current Concerns       Chemical Dependency Status:                Values/Beliefs:  Spiritual, Cultural Beliefs, Baptism Practices, Values that affect care:                 Additional Information:  Assessed, lives w/spouse and is caregiver to spouse (on hospice), son lives in basement. Pt is ind and no svcs. Family to transport. CM to follow for discharge needs.    Elmo Nuñez RN

## 2024-06-19 NOTE — ED TRIAGE NOTES
Pt arrives via EMS from home with complaint of lower abdominal pain, flank pain and difficulty urinating. Pt states that he has been unable to urinate for the past few days. Had an indwelling chaidez cath for 15 months that was removed on 5/24. Chaidez was placed due to fluid retention and enlarged prostate. Blood present around urethra.      Triage Assessment (Adult)       Row Name 06/19/24 0556          Triage Assessment    Airway WDL WDL        Respiratory WDL    Respiratory WDL WDL        Skin Circulation/Temperature WDL    Skin Circulation/Temperature WDL WDL        Cardiac WDL    Cardiac WDL WDL        Peripheral/Neurovascular WDL    Peripheral Neurovascular WDL WDL        Cognitive/Neuro/Behavioral WDL    Cognitive/Neuro/Behavioral WDL WDL

## 2024-06-19 NOTE — PHARMACY-ADMISSION MEDICATION HISTORY
"Pharmacist Admission Medication History    Admission medication history is complete. The information provided in this note is only as accurate as the sources available at the time of the update.    Information Source(s): Patient, Hospital records, and CareEverywhere/SureScripts via in-person    Pertinent Information: Flomax last filled 2/20/24 for 90 days so could be out or missing some doses. Does report with some of other medications that he gets off/misses a day.    Changes made to PTA medication list:  Added: None  Deleted: oxybutynin per pt report  Changed: None but see notes with methotrexate and leucovorin     Allergies reviewed with patient and updates made in EHR: yes    Medication History Completed By: [facility] 6/19/2024 8:36 AM    PTA Med List   Medication Sig Note Last Dose    acetaminophen (TYLENOL) 500 MG tablet Take 1,000 mg by mouth every 6 hours as needed for mild pain  Unknown at prn    apixaban ANTICOAGULANT (ELIQUIS) 2.5 MG tablet Take 1 tablet (2.5 mg) by mouth 2 times daily  6/18/2024 at pm    atorvastatin (LIPITOR) 20 MG tablet Take 1 tablet (20 mg) by mouth every evening  6/18/2024 at pm    calcium carbonate 500 mg, elemental, (OSCAL 500) 1250 (500 Ca) MG TABS tablet Take 1 tablet by mouth daily as needed (when remembers)  Past Week at prn    clopidogrel (PLAVIX) 75 MG tablet Take 1 tablet (75 mg) by mouth daily  6/18/2024 at am    furosemide (LASIX) 20 MG tablet TAKE 1 TABLET BY MOUTH ONCE DAILY FOR INCREASED SWELLING, WEIGHT GAIN, OR SHORTNESS OF BREATH  6/18/2024 at ? - or past week    hydroxychloroquine (PLAQUENIL) 200 MG tablet Take 200 mg by mouth daily 6/19/2024: 5/21/24 rheumatology MD note \"No reaction with chronic hcq 200 mg. He brought up the point that his ophthalmologist wants us to call reconsider hydroxychloroquine however notes are not available we will be monitoring closely due to macular degeneration. He is currently on a low-dose we will get notes from " "ophthalmology for further input.\" But hydroxychloroquine was last filled in Dec 2023 for #180 (appears to have been BID in past)     6/18/2024 at ?    leucovorin (WELLCOVORIN) 5 mg tablet Take 5 mg by mouth three times a week Do not overlap days of methotrexate - Mon, Wed, and Saturday 6/19/2024: May 2024 rheumatology note has to increase to 4x a wk but pt does not think he was told that so remains on 3x a wk dosing 6/18/2024 at am    losartan (COZAAR) 25 MG tablet Take 1 tablet (25 mg) by mouth daily  6/18/2024 at am    methotrexate 2.5 MG tablet Take 12.5 mg by mouth twice a week 5 tablets (12.5mg) on Thursday and Friday 6/19/2024: RX was written for Thursday and Friday dosing but in May 2024 rheumatology note it states \"Currently mtx 5 tablets Sunday and 5 tabs Monday\" pt reports Thurs and Fri is the right days unless he gets off on his days Past Week at Thurs/Fri    metoprolol succinate ER (TOPROL XL) 25 MG 24 hr tablet Take 3 tablets (75 mg) by mouth daily  6/18/2024 at am    nitroGLYcerin (NITROSTAT) 0.4 MG sublingual tablet For chest pain place 1 tablet under the tongue every 5 minutes for 3 doses. If symptoms persist 5 minutes after 1st dose call 911.  Unknown at prn    tamsulosin (FLOMAX) 0.4 MG capsule Take 2 capsules (0.8 mg) by mouth daily (with dinner)  6/18/2024 at pm    vitamin B-12 (CYANOCOBALAMIN) 1000 MCG tablet Take 1,000 mcg by mouth daily  6/18/2024 at am    vitamin D3 (CHOLECALCIFEROL) 50 mcg (2000 units) tablet Take 1 tablet by mouth daily as needed (when remembers)  Past Week at prn       "

## 2024-06-19 NOTE — ED NOTES
Bed: JNED-04  Expected date: 6/19/24  Expected time:   Means of arrival:   Comments:  [facility]  83 male  Bladder/flank pain/urine retention   Vallejo removed 2 weeks ago

## 2024-06-19 NOTE — ED PROVIDER NOTES
EMERGENCY DEPARTMENT ENCOUNTER      NAME: Felipe Verma  AGE: 83 year old male  YOB: 1940  MRN: [id number]  EVALUATION DATE & TIME: 6/19/2024  5:48 AM    PCP: Tessa Martinez    ED PROVIDER: Caesar Santana M.D.      Chief Complaint   Patient presents with    Flank Pain    Abdominal Pain         FINAL IMPRESSION:  Acute urinary retention  Gross hematuria  Paroxysmal  ventricular tachycardia    ED COURSE & MEDICAL DECISION MAKING:    Pertinent Labs & Imaging studies reviewed. (See chart for details)  83 year old male presents to the Emergency Department for evaluation of abdominal pain, decreased urination.  Review of records indicate patient underwent TURP in late May.  Uncertain as to when the catheter was removed.  Patient is very poor historian.  Today presenting with abdominal pain and decreased urination.  Unable to typified clearly how long this has been going on.  Denies any obvious blood within the urine.  Exam reveals an elderly male in minimal distress.  Heart and lungs unremarkable.  Mild suprapubic fullness and tenderness.  Small amount of blood at the meatus.  Will proceed with Vallejo catheter as bladder scans revealing almost 400 cc.  Baseline blood work being obtained assess for acute kidney injury.  Urine being obtained assess for infectious process.. Patient appears non toxic with stable vitals signs. Overall exam is benign.    6:05 AM I met with the patient for the initial interview and physical examination. Discussed plan for treatment and workup in the ED.    7:07 AM.  CBC unremarkable.  Hemoglobin 11.1.  This compares to 10.6 on 4/23/2024.  Basic metabolic reveals creatinine 1.52 essentially unchanged from most recent value.  Awaiting urine analysis.  Bladder being irrigated briefly.  Patient with marked hematuria and return of approximately 400 cc with placement of catheter.  Nurse reports catheter passed easily.  7:22 AM.  No longer able to irrigate through the coudé catheter.   Will exchanged for three-way catheter for continuous bladder irrigation..  At the conclusion of the encounter I discussed the results of all of the tests and the disposition. The questions were answered and return precautions provided. The patient or family acknowledged understanding and was agreeable with the care plan.   7:57 AM I went to recheck the patient and update on plan of care.  Vallejo catheter exchanged without difficulties.  Clotted near immediately but then able to initiate irrigation with success.  Plan will be for hospitalization given presentation.  Call also placed to urology.  8:13 AM.  Patient discussed with Dr. Li.  Patient will be observation status at awaiting return call from urology to  9:03 AM.  Discussed with urology.  They report his catheter was removed on May 28.  9:25 AM.  Patient with approximately 15 beat run of nonsustained ventricular tachycardia.  Patient sleeping at the time and reports no symptoms.  Did discuss the situation with Dr. Bain of cardiology.  Patient already on beta-blocker and anticoagulated.  Most recent echo with reduced ejection fraction of 40%.  No further measures at this time.  However patient will need to be admitted on telemetry.  Call placed to the admitting physician.      MEDICATIONS GIVEN IN THE EMERGENCY:  Medications - No data to display    NEW PRESCRIPTIONS STARTED AT TODAY'S ER VISIT  New Prescriptions    No medications on file          =================================================================    HPI    Patient information was obtained from: patient    Use of Intrepreter: N/A       Felipe Verma is a 83 year old male with a pertient medical history of NSTEMI, CAD, chronic Afb, CKD3, brain aneurysm, HTN, and HLD who presents to the ED for evaluation of flank pain and abdominal pain.     Patient endorses flank and abdominal pain with incontinence. He feels the urge to void, but often leaks into a diaper before he is able to use the  restroom. Decreased frequency. He has attempted to void today but was unsuccessful. Patient did recently have a Vallejo in s/p a TURP procedure, it is out now. Patient is a poor historian.    Per Chart Review:  5/24/24 [facility] - Admitted following a TURP for benign prostatic hyperplasia w/ lower urinary tract symptoms. Metoprolol and statin restarted, Plavic held. Given heparin. CBI turned off am of 5/25, non-bloody urine. Discharged home 5/25.     REVIEW OF SYSTEMS   Constitutional:  Denies fever, chills  Respiratory:  Denies productive cough or increased work of breathing  Cardiovascular:  Denies chest pain, palpitations  GI:  Denies abdominal pain, nausea, vomiting, or change in bowel or bladder habits   Musculoskeletal:  Denies any new muscle/joint swelling  Skin:  Denies rash   Neurologic:  Denies focal weakness  All systems negative except as marked.     PAST MEDICAL HISTORY:  Past Medical History:   Diagnosis Date    Acute diverticulitis 04/20/2016    BPH with urinary obstruction     Brain aneurysm 05/03/2016    Chronic atrial fibrillation (H)     Coronary artery disease     Diverticulosis     Dysarthria 2016    Facial droop 2016    Heart attack (H) 04/10/2023    NSTEMI    History of subarachnoid hemorrhage 05/03/2016    Hyperlipidemia     Hypertension     Lung nodule < 6cm on CT 06/06/2018    Normocytic anemia     Paroxysmal atrial fibrillation (H)     Pure hypercholesterolemia     Rheumatoid arthritis involving multiple sites with positive rheumatoid factor (H)     Stage 3a chronic kidney disease (CKD) (H)     Stented coronary artery        PAST SURGICAL HISTORY:  Past Surgical History:   Procedure Laterality Date    CV CORONARY ANGIOGRAM N/A 4/11/2023    Procedure: Coronary Angiogram;  Surgeon: Alejandro Hitchcock MD;  Location: [facility] CATH LAB CV    CV PCI STENT DRUG ELUTING N/A 4/11/2023    Procedure: Percutaneous Coronary Intervention Stent;  Surgeon: Alejandro Hitchcock MD;  Location: [facility]  CATH LAB CV    OTHER SURGICAL HISTORY      OTHER SURGICAL HISTORYstent placement in head     TONSILLECTOMY           CURRENT MEDICATIONS:    No current facility-administered medications for this encounter.    Current Outpatient Medications:     acetaminophen (TYLENOL) 500 MG tablet, Take 1,000 mg by mouth every 6 hours as needed for mild pain, Disp: , Rfl:     apixaban ANTICOAGULANT (ELIQUIS) 2.5 MG tablet, Take 1 tablet (2.5 mg) by mouth 2 times daily, Disp: 180 tablet, Rfl: 3    atorvastatin (LIPITOR) 20 MG tablet, Take 1 tablet (20 mg) by mouth every evening, Disp: 90 tablet, Rfl: 3    calcium carbonate 500 mg, elemental, (OSCAL 500) 1250 (500 Ca) MG TABS tablet, Take 1 tablet by mouth daily as needed (when remembers), Disp: , Rfl:     clopidogrel (PLAVIX) 75 MG tablet, Take 1 tablet (75 mg) by mouth daily, Disp: 90 tablet, Rfl: 3    furosemide (LASIX) 20 MG tablet, TAKE 1 TABLET BY MOUTH ONCE DAILY FOR INCREASED SWELLING, WEIGHT GAIN, OR SHORTNESS OF BREATH, Disp: 90 tablet, Rfl: 3    hydroxychloroquine (PLAQUENIL) 200 MG tablet, Take 200 mg by mouth daily, Disp: , Rfl:     leucovorin (WELLCOVORIN) 5 mg tablet, Take 5 mg by mouth three times a week On Monday, Wednesday and Saturday. Do not overlap days of methotrexate, Disp: , Rfl:     losartan (COZAAR) 25 MG tablet, Take 1 tablet (25 mg) by mouth daily, Disp: 90 tablet, Rfl: 3    methotrexate 2.5 MG tablet, Take 12.5 mg by mouth twice a week 5 tablets (12.5mg) on Thursday and Friday, Disp: , Rfl:     metoprolol succinate ER (TOPROL XL) 25 MG 24 hr tablet, Take 3 tablets (75 mg) by mouth daily, Disp: 270 tablet, Rfl: 3    nitroGLYcerin (NITROSTAT) 0.4 MG sublingual tablet, For chest pain place 1 tablet under the tongue every 5 minutes for 3 doses. If symptoms persist 5 minutes after 1st dose call 911., Disp: 25 tablet, Rfl: 1    oxyBUTYnin ER (DITROPAN XL) 5 MG 24 hr tablet, Take 5 tablets (25 mg) by mouth daily, Disp: 450 tablet, Rfl: 3    tamsulosin (FLOMAX)  0.4 MG capsule, Take 2 capsules (0.8 mg) by mouth daily (with dinner), Disp: 180 capsule, Rfl: 3    vitamin B-12 (CYANOCOBALAMIN) 1000 MCG tablet, Take 1,000 mcg by mouth daily as needed (when remembers), Disp: , Rfl:     vitamin D3 (CHOLECALCIFEROL) 50 mcg (2000 units) tablet, Take 1 tablet by mouth daily as needed (when remembers), Disp: , Rfl:     ALLERGIES:  No Known Allergies    FAMILY HISTORY:  Family History   Problem Relation Age of Onset    Colon Cancer Mother     Hypertension Father     Coronary Artery Disease Father     Aneurysm Sister     Cerebrovascular Disease Sister     No Known Problems Brother     Diabetes Son     No Known Problems Son     No Known Problems Son     Breast Cancer No family hx of     Prostate Cancer No family hx of        SOCIAL HISTORY:   Social History     Socioeconomic History    Marital status:    Tobacco Use    Smoking status: Former    Smokeless tobacco: Never   Vaping Use    Vaping status: Never Used   Substance and Sexual Activity    Drug use: Not Currently    Sexual activity: Not Currently     Social Determinants of Health     Financial Resource Strain: Low Risk  (3/13/2024)    Financial Resource Strain     Within the past 12 months, have you or your family members you live with been unable to get utilities (heat, electricity) when it was really needed?: No   Food Insecurity: Low Risk  (3/13/2024)    Food Insecurity     Within the past 12 months, did you worry that your food would run out before you got money to buy more?: No     Within the past 12 months, did the food you bought just not last and you didn t have money to get more?: No   Transportation Needs: Low Risk  (3/13/2024)    Transportation Needs     Within the past 12 months, has lack of transportation kept you from medical appointments, getting your medicines, non-medical meetings or appointments, work, or from getting things that you need?: No   Physical Activity: Insufficiently Active (3/13/2024)     "Exercise Vital Sign     Days of Exercise per Week: 1 day     Minutes of Exercise per Session: 20 min   Stress: Stress Concern Present (3/13/2024)    [employer] - Occupational Stress Questionnaire     Feeling of Stress : To some extent   Social Connections: Unknown (3/13/2024)    Social Connection and Isolation Panel [NHANES]     Frequency of Social Gatherings with Friends and Family: Once a week   Interpersonal Safety: Low Risk  (3/13/2024)    Interpersonal Safety     Do you feel physically and emotionally safe where you currently live?: Yes     Within the past 12 months, have you been hit, slapped, kicked or otherwise physically hurt by someone?: No     Within the past 12 months, have you been humiliated or emotionally abused in other ways by your partner or ex-partner?: No   Housing Stability: Low Risk  (3/13/2024)    Housing Stability     Do you have housing? : Yes     Are you worried about losing your housing?: No       VITALS:  Patient Vitals for the past 24 hrs:   BP Temp Temp src Pulse Resp SpO2 Height Weight   06/19/24 0554 (!) 143/90 97.7  F (36.5  C) Oral 86 18 98 % 1.727 m (5' 8\") 79.4 kg (175 lb)        PHYSICAL EXAM    Constitutional:  Awake, alert, in mild distress  HENT:  Normocephalic, Atraumatic. Bilateral external ears normal. Oropharynx moist. Nose normal. Neck- Normal range of motion with no guarding, No midline cervical tenderness, Supple, No stridor.   Eyes:  PERRL, EOMI with no signs of entrapment, Conjunctiva normal, No discharge.   Respiratory:  Normal breath sounds, No respiratory distress, No wheezing.    Cardiovascular:  Normal heart rate, Normal rhythm, No appreciable rubs or gallops.   GI:  Soft, suprapubic fullness and slight tenderness, No distension, No palpable masses  Musculoskeletal:  Intact distal pulses, No edema. Good range of motion in all major joints. No tenderness to palpation or major deformities noted.  Integument:  Warm, Dry, No erythema, No " rash.   Neurologic:  Alert & oriented, Normal motor function, Normal sensory function, No focal deficits noted.   Psychiatric:  Affect normal, Judgment normal, Mood normal.     LAB:  All pertinent labs reviewed and interpreted.     Results for orders placed or performed during the hospital encounter of 06/19/24   [place] Draw     Status: None    Narrative    The following orders were created for panel order [place] Draw.  Procedure                               Abnormality         Status                     ---------                               -----------         ------                     Extra Blue Top Tube[[id number]]                              Final result               Extra Red Top Tube[[id number]]                               Final result               Extra Green Top (Lithium...[[id number]]                      Final result               Extra Purple Top Tube[[id number]]                            Final result                 Please view results for these tests on the individual orders.   Extra Blue Top Tube     Status: None   Result Value Ref Range    Hold Specimen JIC    Extra Red Top Tube     Status: None   Result Value Ref Range    Hold Specimen JIC    Extra Green Top (Lithium Heparin) Tube     Status: None   Result Value Ref Range    Hold Specimen JIC    Extra Purple Top Tube     Status: None   Result Value Ref Range    Hold Specimen JIC    Basic metabolic panel     Status: Abnormal   Result Value Ref Range    Sodium 139 135 - 145 mmol/L    Potassium 3.9 3.4 - 5.3 mmol/L    Chloride 106 98 - 107 mmol/L    Carbon Dioxide (CO2) 20 (L) 22 - 29 mmol/L    Anion Gap 13 7 - 15 mmol/L    Urea Nitrogen 25.1 (H) 8.0 - 23.0 mg/dL    Creatinine 1.52 (H) 0.67 - 1.17 mg/dL    GFR Estimate 45 (L) >60 mL/min/1.73m2    Calcium 8.2 (L) 8.8 - 10.2 mg/dL    Glucose 171 (H) 70 - 99 mg/dL   CBC (+ platelets, no diff)     Status: Abnormal   Result Value Ref Range    WBC Count 10.9 4.0 - 11.0 10e3/uL    RBC Count 3.38 (L)  4.40 - 5.90 10e6/uL    Hemoglobin 11.1 (L) 13.3 - 17.7 g/dL    Hematocrit 33.8 (L) 40.0 - 53.0 %     78 - 100 fL    MCH 32.8 26.5 - 33.0 pg    MCHC 32.8 31.5 - 36.5 g/dL    RDW 13.0 10.0 - 15.0 %    Platelet Count 147 (L) 150 - 450 10e3/uL   Troponin T, High Sensitivity     Status: Abnormal   Result Value Ref Range    Troponin T, High Sensitivity 220 (HH) <=22 ng/L   Troponin T, High Sensitivity     Status: Abnormal   Result Value Ref Range    Troponin T, High Sensitivity 239 (HH) <=22 ng/L   Nt probnp inpatient     Status: Normal   Result Value Ref Range    N terminal Pro BNP Inpatient 1,424 0 - 1,800 pg/mL   Magnesium     Status: Normal   Result Value Ref Range    Magnesium 2.2 1.7 - 2.3 mg/dL   Phosphorus     Status: Abnormal   Result Value Ref Range    Phosphorus 2.3 (L) 2.5 - 4.5 mg/dL   Basic metabolic panel     Status: Abnormal   Result Value Ref Range    Sodium 142 135 - 145 mmol/L    Potassium 4.1 3.4 - 5.3 mmol/L    Chloride 109 (H) 98 - 107 mmol/L    Carbon Dioxide (CO2) 20 (L) 22 - 29 mmol/L    Anion Gap 13 7 - 15 mmol/L    Urea Nitrogen 25.2 (H) 8.0 - 23.0 mg/dL    Creatinine 1.52 (H) 0.67 - 1.17 mg/dL    GFR Estimate 45 (L) >60 mL/min/1.73m2    Calcium 8.0 (L) 8.8 - 10.2 mg/dL    Glucose 129 (H) 70 - 99 mg/dL          ECG #1: Normal sinus rhythm.  Rate of 91.  Normal QRS.  Biphasic T waves in the lateral leads.  When compared to March 12, 2024 T wave changes in lateral leads are new.      ECG #2: Normal sinus rhythm.  Rate of 90.  Normal QRS.  Normal ST segments.  Previous T wave inversions have resolved.    I have independently reviewed and interpreted the EKGs.  I, Angelita Powell, am serving as a scribe to document services personally performed by Caesar Santana MD, based on my observation and the provider's statements to me. I, Caesar Santana MD attest that Angelita Powell is acting in a scribe capacity, has observed my performance of the services and has documented them in accordance with my  direction.    Caesar Santana M.D.  Emergency Medicine  [facility] EMERGENCY DEPARTMENT     Caesar Santana MD  06/19/24 0814       Caesar Santana MD  06/19/24 0903       Caesar Santana MD  06/19/24 0931       Caesar Santana MD  06/19/24 1033

## 2024-06-19 NOTE — ED NOTES
Vallejo catheter noted to not be draining with large clots in tubing. Attempted to flush without success. MD notified and a three way catheter was ordered.

## 2024-06-20 ENCOUNTER — APPOINTMENT (OUTPATIENT)
Dept: OCCUPATIONAL THERAPY | Facility: HOSPITAL | Age: 84
DRG: 713 | End: 2024-06-20
Attending: INTERNAL MEDICINE
Payer: MEDICARE

## 2024-06-20 ENCOUNTER — APPOINTMENT (OUTPATIENT)
Dept: PHYSICAL THERAPY | Facility: HOSPITAL | Age: 84
DRG: 713 | End: 2024-06-20
Attending: INTERNAL MEDICINE
Payer: MEDICARE

## 2024-06-20 LAB
ANION GAP SERPL CALCULATED.3IONS-SCNC: 11 MMOL/L (ref 7–15)
BACTERIA UR CULT: NO GROWTH
BUN SERPL-MCNC: 29.2 MG/DL (ref 8–23)
CALCIUM SERPL-MCNC: 7.9 MG/DL (ref 8.8–10.2)
CHLORIDE SERPL-SCNC: 108 MMOL/L (ref 98–107)
CREAT SERPL-MCNC: 1.5 MG/DL (ref 0.67–1.17)
DEPRECATED HCO3 PLAS-SCNC: 20 MMOL/L (ref 22–29)
EGFRCR SERPLBLD CKD-EPI 2021: 46 ML/MIN/1.73M2
ERYTHROCYTE [DISTWIDTH] IN BLOOD BY AUTOMATED COUNT: 14 % (ref 10–15)
GLUCOSE SERPL-MCNC: 163 MG/DL (ref 70–99)
HCT VFR BLD AUTO: 29 % (ref 40–53)
HGB BLD-MCNC: 10.1 G/DL (ref 13.3–17.7)
HGB BLD-MCNC: 9.7 G/DL (ref 13.3–17.7)
HGB BLD-MCNC: 9.7 G/DL (ref 13.3–17.7)
HGB BLD-MCNC: 9.8 G/DL (ref 13.3–17.7)
HGB BLD-MCNC: 9.9 G/DL (ref 13.3–17.7)
MAGNESIUM SERPL-MCNC: 2.1 MG/DL (ref 1.7–2.3)
MCH RBC QN AUTO: 32.6 PG (ref 26.5–33)
MCHC RBC AUTO-ENTMCNC: 33.4 G/DL (ref 31.5–36.5)
MCV RBC AUTO: 97 FL (ref 78–100)
PHOSPHATE SERPL-MCNC: 2.4 MG/DL (ref 2.5–4.5)
PLATELET # BLD AUTO: 111 10E3/UL (ref 150–450)
POTASSIUM SERPL-SCNC: 4.7 MMOL/L (ref 3.4–5.3)
RBC # BLD AUTO: 2.98 10E6/UL (ref 4.4–5.9)
SODIUM SERPL-SCNC: 139 MMOL/L (ref 135–145)
WBC # BLD AUTO: 12.1 10E3/UL (ref 4–11)

## 2024-06-20 PROCEDURE — 250N000013 HC RX MED GY IP 250 OP 250 PS 637: Performed by: INTERNAL MEDICINE

## 2024-06-20 PROCEDURE — 99233 SBSQ HOSP IP/OBS HIGH 50: CPT | Performed by: INTERNAL MEDICINE

## 2024-06-20 PROCEDURE — 97535 SELF CARE MNGMENT TRAINING: CPT | Mod: GO

## 2024-06-20 PROCEDURE — 97116 GAIT TRAINING THERAPY: CPT | Mod: GP

## 2024-06-20 PROCEDURE — 85018 HEMOGLOBIN: CPT | Performed by: INTERNAL MEDICINE

## 2024-06-20 PROCEDURE — 83735 ASSAY OF MAGNESIUM: CPT | Performed by: INTERNAL MEDICINE

## 2024-06-20 PROCEDURE — 85014 HEMATOCRIT: CPT | Performed by: INTERNAL MEDICINE

## 2024-06-20 PROCEDURE — 97530 THERAPEUTIC ACTIVITIES: CPT | Mod: GO

## 2024-06-20 PROCEDURE — 97110 THERAPEUTIC EXERCISES: CPT | Mod: GP

## 2024-06-20 PROCEDURE — 120N000004 HC R&B MS OVERFLOW

## 2024-06-20 PROCEDURE — 97165 OT EVAL LOW COMPLEX 30 MIN: CPT | Mod: GO

## 2024-06-20 PROCEDURE — 80048 BASIC METABOLIC PNL TOTAL CA: CPT | Performed by: INTERNAL MEDICINE

## 2024-06-20 PROCEDURE — 84100 ASSAY OF PHOSPHORUS: CPT | Performed by: INTERNAL MEDICINE

## 2024-06-20 PROCEDURE — 97161 PT EVAL LOW COMPLEX 20 MIN: CPT | Mod: GP

## 2024-06-20 PROCEDURE — 250N000011 HC RX IP 250 OP 636: Mod: JZ | Performed by: INTERNAL MEDICINE

## 2024-06-20 PROCEDURE — 99232 SBSQ HOSP IP/OBS MODERATE 35: CPT | Performed by: INTERNAL MEDICINE

## 2024-06-20 PROCEDURE — 36415 COLL VENOUS BLD VENIPUNCTURE: CPT | Performed by: INTERNAL MEDICINE

## 2024-06-20 RX ADMIN — TAMSULOSIN HYDROCHLORIDE 0.8 MG: 0.4 CAPSULE ORAL at 16:30

## 2024-06-20 RX ADMIN — ATORVASTATIN CALCIUM 20 MG: 10 TABLET, FILM COATED ORAL at 20:18

## 2024-06-20 RX ADMIN — CEFTRIAXONE SODIUM 1 G: 1 INJECTION, POWDER, FOR SOLUTION INTRAMUSCULAR; INTRAVENOUS at 12:29

## 2024-06-20 RX ADMIN — ACETAMINOPHEN 650 MG: 325 TABLET ORAL at 00:51

## 2024-06-20 RX ADMIN — FUROSEMIDE 20 MG: 20 TABLET ORAL at 08:39

## 2024-06-20 RX ADMIN — POTASSIUM & SODIUM PHOSPHATES POWDER PACK 280-160-250 MG 1 PACKET: 280-160-250 PACK at 16:31

## 2024-06-20 RX ADMIN — CYANOCOBALAMIN TAB 1000 MCG 1000 MCG: 1000 TAB at 08:38

## 2024-06-20 RX ADMIN — POTASSIUM & SODIUM PHOSPHATES POWDER PACK 280-160-250 MG 1 PACKET: 280-160-250 PACK at 08:39

## 2024-06-20 RX ADMIN — HYDROXYCHLOROQUINE SULFATE 200 MG: 200 TABLET, FILM COATED ORAL at 08:39

## 2024-06-20 RX ADMIN — POTASSIUM & SODIUM PHOSPHATES POWDER PACK 280-160-250 MG 1 PACKET: 280-160-250 PACK at 12:29

## 2024-06-20 RX ADMIN — METOPROLOL SUCCINATE 75 MG: 50 TABLET, EXTENDED RELEASE ORAL at 08:38

## 2024-06-20 ASSESSMENT — ACTIVITIES OF DAILY LIVING (ADL)
ADLS_ACUITY_SCORE: 27
ADLS_ACUITY_SCORE: 30
ADLS_ACUITY_SCORE: 27
ADLS_ACUITY_SCORE: 30
ADLS_ACUITY_SCORE: 27
ADLS_ACUITY_SCORE: 30
ADLS_ACUITY_SCORE: 27
ADLS_ACUITY_SCORE: 27

## 2024-06-20 NOTE — PROGRESS NOTES
Assessment/Plan:  Wide QRS tachycardia: The patient has no recurrent wide QRS tachycardia since admission.  The patient has frequent short episodes of atrial tachycardia in the atrial fibrillation.  Telemetry monitor data recorded 14 beats of wide QRS tachycardia which could be caused by atrial fibrillation with aberrancy conduction since QRS morphology was different as PVC morphology.  Continue metoprolol XL 75 mg daily.  Continue telemetry monitor.  Chronic anticoagulation: The patient has been taking Plavix and Eliquis for CAD and paroxysmal atrial fibrillation.  He has been taking Plavix for 14 months which can be discontinued due to gross hematuria.  Eliquis is held due to active bleeding.  Consider to restart the Eliquis when hematuria is resolved.  The patient is scheduled for placement of Watchman device instead of chronic anticoagulation.  Coronary artery disease a status post CANDIE to mid LAD in April 2023: No chest pain and shortness of breath.  The patient had single-vessel disease which was treated with drug-eluting stent.  Mildly elevated hypersensitivity troponin was chronic, which could be related to chronic kidney disease.  Continue to follow-up with the primary cardiologist.  Paroxysmal atrial fibrillation: He is in sinus arrhythmia with very short episodes of atrial tachycardia, atrial fibrillation.  Continue metoprolol XL 75 mg daily.  Restart Eliquis at 5 mg twice a day when hematuria is resolved.    Benign essential hypertension, dyslipidemia, stage III CKD, rheumatoid arthritis, anemia, gross hematuria: Please see primary team of management for details.    No new recommendations from cardiology service.  We will sign off now.  Please call for questions.    Subjective:  Interval History:  Has no complaints of chest pain or shortness of breath.  No palpitations.  No dizziness.  Gross hematuria is improving.    Current Medications:  Scheduled Meds:  Current Facility-Administered Medications    Medication Dose Route Frequency Provider Last Rate Last Admin    atorvastatin (LIPITOR) tablet 20 mg  20 mg Oral QPM Nancy Li MD   20 mg at 06/19/24 2022    cefTRIAXone (ROCEPHIN) 1 g vial to attach to  mL bag for ADULTS or NS 50 mL bag for PEDS  1 g Intravenous Q24H Nancy Li MD        cyanocobalamin (VITAMIN B-12) tablet 1,000 mcg  1,000 mcg Oral Daily Nancy Li MD   1,000 mcg at 06/20/24 0838    furosemide (LASIX) tablet 20 mg  20 mg Oral Daily Nancy Li MD   20 mg at 06/20/24 0839    hydroxychloroquine (PLAQUENIL) tablet 200 mg  200 mg Oral Daily Nancy Li MD   200 mg at 06/20/24 0839    leucovorin (WELLCOVORIN) tablet 5 mg  5 mg Oral Once per day on Monday Wednesday Friday Nancy Li MD   5 mg at 06/19/24 1003    metoprolol succinate ER (TOPROL XL) 24 hr tablet 75 mg  75 mg Oral Daily Nancy Li MD   75 mg at 06/20/24 0838    potassium & sodium phosphates (NEUTRA-PHOS) Packet 1 packet  1 packet Oral or Feeding Tube Q4H Nancy Li MD   1 packet at 06/20/24 0839    sodium chloride (PF) 0.9% PF flush 3 mL  3 mL Intracatheter Q8H Nancy Li MD   3 mL at 06/20/24 0839    tamsulosin (FLOMAX) capsule 0.8 mg  0.8 mg Oral Daily with supper Nancy Li MD   0.8 mg at 06/19/24 2050       Objective:   Vital signs in last 24 hours:  Temp:  [97.6  F (36.4  C)-98.5  F (36.9  C)] 97.6  F (36.4  C)  Pulse:  [67-92] 71  Resp:  [14-23] 18  BP: ()/(7-79) 134/71  SpO2:  [96 %-99 %] 98 %  Weight:   [unfilled]     Physical Exam:  General Appearance:   Awake, Alert, No acute distress.   HEENT:  No scleral icterus; the mucous membranes were moist.   Neck: No cervical bruits. No jugular venous distention   Lungs:   Lungs are clear to auscultation. No crackles. No wheezing.   Cardiovascular:   RRR, normal first and second heart sounds with no murmurs. No rubs or gallops.     Abdomen:  Soft. No tenderness. Bowels sounds are  "present.  Vallejo catheter in position.   Extremities: No leg edema. Equal posterior tibial pulsese.   Skin: Warm, Dry. No rashes.   Neurologic: Mood and affect are appropriate. No focal deficits.         Cardiographics:   Report: personally reviewed. .      Tele monitoring - sinus rhythm, short episodes of atrial fibrillation, no wide QRS tachycardia    Echocardiogram April 23, 2024 :  1. The left ventricle is normal in size. Left ventricular systolic performance  is moderately reduced. The ejection fraction is estimated to be 40%.  2. There is mild-moderate global reduction in left ventricular systolic  performance with more noticeable anteroseptal and apical hypokinesis (no  apical mural thrombus is detected).  3. No significant valvular heart disease is identified on this study.  4. Normal right ventricular size and systolic performance.  5. There is mild left atrial enlargement.      Lab Results:   personally reviewed.     Lab Results   Component Value Date     06/20/2024    CO2 20 06/20/2024    BUN 29.2 06/20/2024     No results found for: \"CKTOTAL\", \"CKMB\", \"TROPONINI\"  Lab Results   Component Value Date    WBC 12.1 06/20/2024    HGB 9.7 06/20/2024    HGB 9.7 06/20/2024    HCT 29.0 06/20/2024    MCV 97 06/20/2024     06/20/2024     No results found for: \"CHOL\", \"TRIG\", \"HDL\", \"LDL\", \"LDLDIRECT\"        "

## 2024-06-20 NOTE — PROGRESS NOTES
"Care Management Follow Up    Length of Stay (days): 1    Expected Discharge Date: 06/21/2024     Concerns to be Addressed:     Care progression  Patient plan of care discussed at interdisciplinary rounds: Yes    Anticipated Discharge Disposition:  TBD     Anticipated Discharge Services:  TBD  Anticipated Discharge DME:  [facility]    Patient/family educated on Medicare website which has current facility and service quality ratings:  NA  Education Provided on the Discharge Plan:  Yes per team  Patient/Family in Agreement with the Plan:  NA    Referrals Placed by CM/SW:  NA  Private pay costs discussed: Not applicable    Additional Information:  Per provider, Dr. Li, patient is not ready. Maybe here for a few more days.    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 Son live in basement. Ind and no svcs. Family to transport.\"     RNCM to follow for medical progression, recommendations, and final discharge plan.     Pat Tejeda RN     Met with patient and his son at bedside to discuss OT discharge rec for home care. Patient in agreement and requested for RN/OT. No PT.    Referral sent  "

## 2024-06-20 NOTE — PROGRESS NOTES
"  [facility] - POSTOP PROGRESS NOTE    PLACE OF SERVICE:  [facility]     SURGERY: POD # 1 after Cystoscopy with clot evacuation and fulguration by  Dr. Schmitz  for clot urinary retention      SUBJECTIVE:   Events: no acute events overnight    CBI at slow rate this AM, urine is clear yellow. Pt denies abdominal and bilateral flank pain, fever, chills. Tolerating chaidez.     OBJECTIVE:  PHYSICAL EXAM  Vital signs:  Temp: 97.6  F (36.4  C) Temp src: Oral BP: 138/70 Pulse: 61   Resp: 18 SpO2: 97 % O2 Device: None (Room air) Oxygen Delivery: 2 LPM Height: 172.7 cm (5' 8\") Weight: 81.8 kg (180 lb 5.4 oz)  Estimated body mass index is 27.42 kg/m  as calculated from the following:    Height as of this encounter: 1.727 m (5' 8\").    Weight as of this encounter: 81.8 kg (180 lb 5.4 oz).    General: NAD, alert, cooperative, sitting up in chair.   Neuro: A&O x 3. Moving all extremities equally.   Abdomen: Soft, non-tender, no bilateral CVA tenderness.   : Uncircumcised penis without significant erythema, glans penis slightly edematous, foreskin noted retracted. With pt assistance and KY jelly, able to reduce the foreskin over glans. 3 way chaidez draining clear yellow urine. CBI off at 1410.     LABS:  INR   Date Value Ref Range Status   04/10/2023 1.19 (H) 0.85 - 1.15 Final      Lab Results   Component Value Date    WBC 12.1 06/20/2024     Lab Results   Component Value Date    HGB 10.1 06/20/2024     Lab Results   Component Value Date     06/20/2024     Creatinine   Date Value Ref Range Status   06/20/2024 1.50 (H) 0.67 - 1.17 mg/dL Final     Sodium   Date Value Ref Range Status   06/20/2024 139 135 - 145 mmol/L Final     Comment:     Reference intervals for this test were updated on 09/26/2023 to more accurately reflect our healthy population. There may be differences in the flagging of prior results with similar values performed with this method. Interpretation of those prior results can be made in the " context of the updated reference intervals.      Potassium   Date Value Ref Range Status   06/20/2024 4.7 3.4 - 5.3 mmol/L Final     Magnesium   Date Value Ref Range Status   06/20/2024 2.1 1.7 - 2.3 mg/dL Final     UA RESULTS:  Recent Labs   Lab Test 06/19/24  1254   COLOR Brown*   APPEARANCE Turbid*   URINEGLC Negative   URINEBILI Negative   URINEKETONE Negative   SG 1.005   UBLD >1.0 mg/dL*   URINEPH 5.0   PROTEIN 30*   NITRITE Negative   LEUKEST 75 Deanna/uL*   RBCU >182*   WBCU 137*     UC 6/19: no growth     Lab Results: personally reviewed.     ASSESSMENT/PLAN:  POD #1 after Cystoscopy with clot evacuation and fulguration by Dr. Schmitz for clot urinary retention      - 83 yr old male with hx of GH and UR 2/2 BPH s/p TURP 5/24/24 by Dr. Jennings, on Eliquis for Afib, admitted with GH and clot retention requiring 3 way chaidez, manual irrigation and CBI.   - Urine clear, CBI off at 1410. If urine remains translucent light pink or clearer at 1600, ok to proceed with TOV with PVR checks.   - Hgb 10.1 today after 1 unit PRBC on 6/19. Monitor.   - UC 6/19: no growth. No recent UTI symptoms. WBC WNL. Afebrile. Ok from Urology standpoint to discontinue antibiotic.   - Per Dr. Jennings, recommend anticoagulation hold x 1 week. ASA restart tomorrow if essential, prefer a few more days without ASA if safe to hold.   - Will continue to follow.     Discussed patient with  Dr. Schmitz  and Dr. Jennings.       John Ochoa, APRN, CNP  [facility]   [phone]

## 2024-06-20 NOTE — PROGRESS NOTES
"   06/20/24 0902   Appointment Info   Signing Clinician's Name / Credentials (PT) Isabel Rosado, PT, DPT   Rehab Comments (PT) RN verbal approval for activity out of bed   Living Environment   People in Home spouse;child(marilee), adult  (son able to assist as needed)   Current Living Arrangements house   Home Accessibility stairs to enter home   Number of Stairs, Main Entrance 6   Stair Railings, Main Entrance railing on left side (ascending)   Living Environment Comments pt reports being caregiver for wife however son can assist with cares. pt also reports having assist for cares for wife from other providers that come in to the home; chair lift to get up stairs if needed - can live on main level   Self-Care   Equipment Currently Used at Home none  (has FWW if needed but does not typically use)   Fall history within last six months no   Activity/Exercise/Self-Care Comment pt reports being independent with functional mobility at baseline   General Information   Onset of Illness/Injury or Date of Surgery 06/19/24   Referring Physician Nancy Li MD   Patient/Family Therapy Goals Statement (PT) Return to Home   Pertinent History of Current Problem (include personal factors and/or comorbidities that impact the POC) Per Chart Review H&P -\"83 year old male admitted on 6/19/2024. He has hx of HTN, hyperlipidemia, CAD s/p 3 drug-eluting stents in April 2023, and BPH, RA, who had TURP on 5/24/24 at [place]. He had catheter and then it was removed. He notes over last 3 days difficulty with urine\"   Existing Precautions/Restrictions fall   Range of Motion (ROM)   Range of Motion ROM is WFL   Strength (Manual Muscle Testing)   Strength (Manual Muscle Testing) Deficits observed during functional mobility   Transfers   Transfers sit-stand transfer   Sit-Stand Transfer   Sit-Stand Cotton (Transfers) supervision;verbal cues;contact guard   Assistive Device (Sit-Stand Transfers) walker, front-wheeled   Gait/Stairs " (Locomotion)   [place] Level (Gait) verbal cues;contact guard   Assistive Device (Gait) walker, front-wheeled   Distance in Feet (Gait) 5   Pattern (Gait) step-through;swing-through   Deviations/Abnormal Patterns (Gait) gait speed decreased;gabriel decreased   Clinical Impression   Criteria for Skilled Therapeutic Intervention Yes, treatment indicated   PT Diagnosis (PT) Impaired Functional Mobility   Influenced by the following impairments weakness, impaired balance   Functional limitations due to impairments gait, transfers, stairs   Clinical Presentation (PT Evaluation Complexity) stable   Clinical Presentation Rationale pt presents as medically diagnosed   Clinical Decision Making (Complexity) low complexity   Planned Therapy Interventions (PT) balance training;bed mobility training;gait training;home exercise program;neuromuscular re-education;strengthening;stair training;transfer training;home program guidelines   Risk & Benefits of therapy have been explained evaluation/treatment results reviewed;patient   PT Total Evaluation Time   PT Eval, Low Complexity Minutes (72737) 10   Physical Therapy Goals   PT Frequency Daily   PT Predicted Duration/Target Date for Goal Attainment 06/26/24   PT Goals Transfers;Gait;Stairs   PT: Transfers Sit to/from stand;Assistive device;Modified independent   PT: Gait Supervision/stand-by assist;Rolling walker;150 feet   PT: Stairs Minimal assist;Within precautions;6 stairs;Rail on left   Interventions   Interventions Quick Adds Gait Training;Therapeutic Activity;Therapeutic Procedure   Therapeutic Procedure/Exercise   Ther. Procedure: strength, endurance, ROM, flexibillity Minutes (34138) 11   Symptoms Noted During/After Treatment fatigue   Treatment Detail/Skilled Intervention Pt completed Seated & reclined LE therex; 8-10 reps, completed bilateral, cueing for safety/technique/specific muscle activation/5 second isometric holds, SBA   Therapeutic Activity   Symptoms Noted  During/After Treatment Fatigue   Treatment Detail/Skilled Intervention Sit to/from stand: cueing for safety/technique/hand placement on stable surface, CGA to SBA; Walker sizing & education completed   Gait Training   Gait Training Minutes (61386) 9   Symptoms Noted During/After Treatment (Gait Training) fatigue   Treatment Detail/Skilled Intervention pt ambulated in hallway with FWW. cueing for safety/technique/posture/FWW management. No loss of balance noted.   Distance in Feet 150   [place] Level (Gait Training) contact guard   Physical Assistance Level (Gait Training) supervision;verbal cues   Weight Bearing (Gait Training) full weight-bearing   Assistive Device (Gait Training) rolling walker   Pattern Analysis (Gait Training) swing-through gait   Gait Analysis Deviations decreased gabriel;decreased step length   Impairments (Gait Analysis/Training) strength decreased   PT Discharge Planning   PT Plan gait with/without FWW, LE therex, stairs as appropriate   PT Discharge Recommendation (DC Rec) home with assist   PT Rationale for DC Rec anticipate pt will be able to complete required functional mobility at home. pt ambulated in hallway & completed sit to/from stand transfers CGA to SBA. pt son at home and can assist pt as needed.   PT Brief overview of current status CGA gait with '; CGA to SBA sit to/from stand transfers   PT Equipment Needed at Discharge walker, rolling  (pt owns)   Total Session Time   Timed Code Treatment Minutes 20   Total Session Time (sum of timed and untimed services) 30

## 2024-06-20 NOTE — PLAN OF CARE
Problem: UTI (Urinary Tract Infection)  Goal: Improved Infection Symptoms  Outcome: Progressing     Problem: Dysrhythmia  Goal: Normalized Cardiac Rhythm  Outcome: Progressing   Goal Outcome Evaluation:       SR w/ occasional PAC's. Pt.'s urine color light tan with some clots this morning after overnight continuous bladder irrigation. Pt reported mild catheter-insertion associated pain - tylenol given with some relief. Hgb checks q4h, stable this AM.

## 2024-06-20 NOTE — PLAN OF CARE
Problem: Adult Inpatient Plan of Care  Goal: Plan of Care Review  Description: The Plan of Care Review/Shift note should be completed every shift.  The Outcome Evaluation is a brief statement about your assessment that the patient is improving, declining, or no change.  This information will be displayed automatically on your shift  note.  6/20/2024 1621 by Mell Vasques RN  Outcome: Progressing  6/20/2024 1146 by Mell Vasques RN  Outcome: Progressing   Goal Outcome Evaluation:               Urine remained clear, chaidez discontinued at 1600

## 2024-06-20 NOTE — PROGRESS NOTES
Occupational Therapy      06/20/24 2448   Appointment Info   Signing Clinician's Name / Credentials (OT) Nora Newman OTR/L   Living Environment   People in Home spouse;child(marilee), adult   Current Living Arrangements house   Home Accessibility stairs to enter home   Number of Stairs, Main Entrance 4   Stair Railings, Main Entrance railings safe and in good condition   Transportation Anticipated family or friend will provide   Living Environment Comments Pt reporting spouse is bedbound and on hospice at home- pt reporting hospice does come into home a few times a week to provide care for his wife, son likes in home pt reporting son could assist as needed.   Self-Care   Usual Activity Tolerance good   Current Activity Tolerance moderate   Regular Exercise No   Equipment Currently Used at Home other (see comments)  (doesnt use device but has fww)   Fall history within last six months no   Activity/Exercise/Self-Care Comment pt reporting being very ind. at home   Instrumental Activities of Daily Living (IADL)   IADL Comments Pt reports being Ind. w/ IADL tasks pt drives/cooks/cleans son can assist as needed   General Information   Onset of Illness/Injury or Date of Surgery 06/19/24   Referring Physician Nancy Li MD   Additional Occupational Profile Info/Pertinent History of Current Problem 84yo M recently s/p TURP developed developed clot urinary retention after restarting anticoagulation which has been refractory to CBI. Patient presents now for cystoscopy with clot evacuation and fulguration.   Existing Precautions/Restrictions fall  (continuous bladder irregation)   Cognitive Status Examination   Orientation Status orientation to person, place and time   Range of Motion Comprehensive   General Range of Motion no range of motion deficits identified   Coordination   Upper Extremity Coordination No deficits were identified   Bed Mobility   Bed Mobility No deficits identified   Transfers   Transfers sit-stand  transfer   Sit-Stand Transfer   Sit-Stand Wheeler (Transfers) contact guard;supervision;verbal cues   Assistive Device (Sit-Stand Transfers) walker, front-wheeled   Balance   Balance Assessment sit to stand dynamic balance   Sit-to-Stand Balance contact guard;verbal cues;supervision   Activities of Daily Living   BADL Assessment/Intervention lower body dressing   Lower Body Dressing Assessment/Training   Position (Lower Body Dressing) unsupported standing   Wheeler Level (Lower Body Dressing) verbal cues;supervision;contact guard assist   Clinical Impression   Criteria for Skilled Therapeutic Interventions Met (OT) Yes, treatment indicated   OT Diagnosis decreased ADL ind.   OT Problem List-Impairments impacting ADL problems related to;activity tolerance impaired;balance;mobility;strength   Assessment of Occupational Performance 1-3 Performance Deficits   Identified Performance Deficits decreased ADL ind./safety   Planned Therapy Interventions (OT) ADL retraining;balance training;strengthening;transfer training;home program guidelines;progressive activity/exercise   Clinical Decision Making Complexity (OT) problem focused assessment/low complexity   Risk & Benefits of therapy have been explained evaluation/treatment results reviewed;risks/benefits reviewed   OT Total Evaluation Time   OT Eval, Low Complexity Minutes (35046) 10   OT Goals   Therapy Frequency (OT) Daily   OT Predicted Duration/Target Date for Goal Attainment 06/27/24   OT Goals Hygiene/Grooming;Lower Body Dressing;Transfers   OT: Hygiene/Grooming supervision/stand-by assist;using adaptive equipment   OT: Lower Body Dressing Supervision/stand-by assist;using adaptive equipment   OT: Transfer Supervision/stand-by assist;with assistive device   Self-Care/Home Management   Self-Care/Home Mgmt/ADL, Compensatory, Meal Prep Minutes (06268) 10   Treatment Detail/Skilled Intervention Pt up in bedupon OT arrival pt agreeable to session- pt completed  additional STS w/ CGA cues for tech/safety w/ fww as pt does not typically use device at home- pt hooked up to continuous bladder irrigation RN okay'd activity- pt had no c/o dizziness upon STS. Pt ambulated w/in room w/ CGA/SBA w/ fww no LOB noted. Pt stood for light g/h sBA/CGA no LOB noted- pt up in recliner at end alarm on   Therapeutic Activities   Therapeutic Activity Minutes (98036) 8   Treatment Detail/Skilled Intervention Pt engaged in dynamic standing/functional mobility task w/ CGA-SBA w/fww for support no LOB noted pt demonstrating good static/dynamic balance w/ device and reporting no Shortness of breath w/ functional mobility task.   OT Discharge Planning   OT Plan g/h at sink, LB dressing, endurance/tnrf safety   OT Discharge Recommendation (DC Rec) home with assist;home with home care occupational therapy   OT Rationale for DC Rec Pt currently ambulating w/ CGA/SBA w/ fww no LOB noted during evaluation- pt does report he lives in home w/ spouse and son. Pt stated he does provide some care for spouse who is bedbound on hospice- unclear on how much assist he is actually providing or how much support son can provide. Pending progress w/ pt- and pt son ability to provide increased support/supervision pt may be able to d/c home- if not able to get increased support at home may benefit from TCU to get stronger w/ ADL routine/mobility   OT Brief overview of current status CGA/SBA fww   Total Session Time   Timed Code Treatment Minutes 18   Total Session Time (sum of timed and untimed services) 28

## 2024-06-20 NOTE — PROGRESS NOTES
[facility]    Medicine Progress Note - Hospitalist Service    Date of Admission:  6/19/2024    Assessment & Plan      Felipe Verma is a 83 year old male admitted on 6/19/2024. He has hx of HTN, hyperlipidemia, CAD s/p 3 drug-eluting stents in April 2023, and BPH, RA, who had TURP on 5/24/24 at [place]. He had catheter and then it was removed. He notes over last 3 days difficulty with urine      1.Urine retention with large clots  -with gross hematuria needed 3 way placed in ER--then had to go to OR with urology 6/19 for Cystoscopy with clot evacuation and fulguration and Complex chaidez catheter insertion  -now with 3 way urine looks much better  -had to hold DOAC and stop Plavix  -will await Urology guidance on when ok restart DOAC and likely NOT restart plavix but use asa instead    2.Acute blood loss anemia-from severe gross hematuria and clots that had to be evacuated from bladder  -prior to OR bleed down to 8.7, then got 1 unit pRBC  -still trend hgb    3.Abnl UA  -Concern uti  -ceftriaxone and follow Uc    4.NSVT  -preop  -tele  -make sure lytes stay stable  -cards aware    5.Hx of CAD with hx for chronic systolic HF(compensated now-last EF 40%)  -stents 4/23-->s/p PCI to mid LAD 4/23   -cards stopped plavix and likely to only restart asa when Urology ok  -trop was 200's and that looks to be his baseline    6.hx of Afib  -on DOAC--hold still and restart when ok with Urology    7.htn  -with  mild SUSU, will hold ARB, creat better today   -on lasix and metoprolol    8.lipids  -statin    9.Deconditioning  -pt/ot eval    10.Hx of RA  -on meds  -hold methotrexate     11.CKD 3 with mild SUSU  -watch daily labs  -no nsaids  -avoid hypotension  -hold ARB  -today creat 1.50    12.Hypophos-replaced per protocol    13.Hyperglycemia  -check A1c--6.0, preDM    Social-lives with wife, and she is on hospice    Code-full    I called son to update at 1528              Diet: Regular Diet Adult    DVT  "Prophylaxis: Pneumatic Compression Devices  [place] Catheter: PRESENT, indication: Acute retention or obstruction, Gross hematuria, Surgical procedure  Lines: None     Cardiac Monitoring: None  Code Status: Full Code      Clinically Significant Risk Factors                # Thrombocytopenia: Lowest platelets = 111 in last 2 days, will monitor for bleeding   # Hypertension: Noted on problem list           #Precipitous drop in Hgb/Hct: Lowest Hgb this hospitalization: 8.7 g/dL. Will continue to monitor and treat/transfuse as appropriate.     # Overweight: Estimated body mass index is 27.42 kg/m  as calculated from the following:    Height as of this encounter: 1.727 m (5' 8\").    Weight as of this encounter: 81.8 kg (180 lb 5.4 oz)., PRESENT ON ADMISSION     # Financial/Environmental Concerns: none         Disposition Plan     Medically Ready for Discharge: Anticipated in 2-4 Days             Nancy Li MD  Hospitalist Service  [facility]  Securely message with [employer] (more info)  Text page via [employer] Paging/Directory   ______________________________________________________________________    Interval History   He feels better  He notes some irritation with 3 way cath  No n/v  Passing gas  No cp    Physical Exam   Vital Signs: Temp: 97.6  F (36.4  C) Temp src: Oral BP: 134/71 Pulse: 71   Resp: 18 SpO2: 98 % O2 Device: None (Room air) Oxygen Delivery: 2 LPM  Weight: 180 lbs 5.38 oz    Constitutional: awake, alert, cooperative, and no apparent distress  Eyes: sclera clear  ENT: normocephalic, without obvious abnormality  Respiratory: no increased work of breathing, good air exchange, no retractions, and clear to auscultation  Cardiovascular: regular rate and rhythm and normal S1 and S2  GI: normal bowel sounds, soft, non-distended, and non-tender  Genitounirinary: 3 way in , yellow urine today   Skin: no bruising or bleeding  Musculoskeletal: no lower extremity pitting edema " present  Neurologic: Mental Status Exam:  Level of Alertness:   awake  Neuropsychiatric: General: normal, calm, and normal eye contact    Medical Decision Making       50 MINUTES SPENT BY ME on the date of service doing chart review, history, exam, documentation & further activities per the note.      Data     I have personally reviewed the following data over the past 24 hrs:    12.1 (H)  \   9.7 (L); 9.7 (L)   / 111 (L)     139 108 (H) 29.2 (H) /  163 (H)   4.7 20 (L) 1.50 (H) \     Trop: 226 (HH) BNP: N/A     TSH: N/A T4: N/A A1C: N/A       Imaging results reviewed over the past 24 hrs:   No results found for this or any previous visit (from the past 24 hour(s)).

## 2024-06-20 NOTE — PLAN OF CARE
Problem: UTI (Urinary Tract Infection)  Goal: Improved Infection Symptoms  Outcome: Progressing   Goal Outcome Evaluation:       CBI continous has been clear, no clots seen  Problem: Dysrhythmia  Goal: Normalized Cardiac Rhythm  Outcome: Progressing           Has been in NSR

## 2024-06-21 ENCOUNTER — APPOINTMENT (OUTPATIENT)
Dept: OCCUPATIONAL THERAPY | Facility: HOSPITAL | Age: 84
DRG: 713 | End: 2024-06-21
Payer: MEDICARE

## 2024-06-21 LAB
ANION GAP SERPL CALCULATED.3IONS-SCNC: 10 MMOL/L (ref 7–15)
BASOPHILS # BLD AUTO: 0 10E3/UL (ref 0–0.2)
BASOPHILS # BLD AUTO: 0 10E3/UL (ref 0–0.2)
BASOPHILS NFR BLD AUTO: 0 %
BASOPHILS NFR BLD AUTO: 0 %
BUN SERPL-MCNC: 31.8 MG/DL (ref 8–23)
CALCIUM SERPL-MCNC: 8.1 MG/DL (ref 8.8–10.2)
CHLORIDE SERPL-SCNC: 110 MMOL/L (ref 98–107)
CREAT SERPL-MCNC: 1.36 MG/DL (ref 0.67–1.17)
DEPRECATED HCO3 PLAS-SCNC: 21 MMOL/L (ref 22–29)
EGFRCR SERPLBLD CKD-EPI 2021: 52 ML/MIN/1.73M2
EOSINOPHIL # BLD AUTO: 0 10E3/UL (ref 0–0.7)
EOSINOPHIL # BLD AUTO: 0 10E3/UL (ref 0–0.7)
EOSINOPHIL NFR BLD AUTO: 0 %
EOSINOPHIL NFR BLD AUTO: 0 %
ERYTHROCYTE [DISTWIDTH] IN BLOOD BY AUTOMATED COUNT: 14 % (ref 10–15)
ERYTHROCYTE [DISTWIDTH] IN BLOOD BY AUTOMATED COUNT: 14.2 % (ref 10–15)
ERYTHROCYTE [DISTWIDTH] IN BLOOD BY AUTOMATED COUNT: 14.2 % (ref 10–15)
GLUCOSE SERPL-MCNC: 117 MG/DL (ref 70–99)
HCT VFR BLD AUTO: 28.4 % (ref 40–53)
HCT VFR BLD AUTO: 29 % (ref 40–53)
HCT VFR BLD AUTO: 29 % (ref 40–53)
HGB BLD-MCNC: 9.3 G/DL (ref 13.3–17.7)
HGB BLD-MCNC: 9.5 G/DL (ref 13.3–17.7)
HGB BLD-MCNC: 9.6 G/DL (ref 13.3–17.7)
HGB BLD-MCNC: 9.6 G/DL (ref 13.3–17.7)
IMM GRANULOCYTES # BLD: 0.1 10E3/UL
IMM GRANULOCYTES # BLD: 0.1 10E3/UL
IMM GRANULOCYTES NFR BLD: 1 %
IMM GRANULOCYTES NFR BLD: 1 %
LACTATE SERPL-SCNC: 1.5 MMOL/L (ref 0.7–2)
LYMPHOCYTES # BLD AUTO: 2.4 10E3/UL (ref 0.8–5.3)
LYMPHOCYTES # BLD AUTO: 2.4 10E3/UL (ref 0.8–5.3)
LYMPHOCYTES NFR BLD AUTO: 15 %
LYMPHOCYTES NFR BLD AUTO: 17 %
MAGNESIUM SERPL-MCNC: 2.2 MG/DL (ref 1.7–2.3)
MCH RBC QN AUTO: 32.2 PG (ref 26.5–33)
MCH RBC QN AUTO: 32.2 PG (ref 26.5–33)
MCH RBC QN AUTO: 32.8 PG (ref 26.5–33)
MCHC RBC AUTO-ENTMCNC: 33.1 G/DL (ref 31.5–36.5)
MCHC RBC AUTO-ENTMCNC: 33.1 G/DL (ref 31.5–36.5)
MCHC RBC AUTO-ENTMCNC: 33.5 G/DL (ref 31.5–36.5)
MCV RBC AUTO: 97 FL (ref 78–100)
MCV RBC AUTO: 97 FL (ref 78–100)
MCV RBC AUTO: 98 FL (ref 78–100)
MONOCYTES # BLD AUTO: 0.9 10E3/UL (ref 0–1.3)
MONOCYTES # BLD AUTO: 1.1 10E3/UL (ref 0–1.3)
MONOCYTES NFR BLD AUTO: 6 %
MONOCYTES NFR BLD AUTO: 7 %
NEUTROPHILS # BLD AUTO: 10.9 10E3/UL (ref 1.6–8.3)
NEUTROPHILS # BLD AUTO: 12.5 10E3/UL (ref 1.6–8.3)
NEUTROPHILS NFR BLD AUTO: 76 %
NEUTROPHILS NFR BLD AUTO: 78 %
NRBC # BLD AUTO: 0 10E3/UL
NRBC # BLD AUTO: 0 10E3/UL
NRBC BLD AUTO-RTO: 0 /100
NRBC BLD AUTO-RTO: 0 /100
PATH REPORT.COMMENTS IMP SPEC: NORMAL
PATH REPORT.COMMENTS IMP SPEC: NORMAL
PATH REPORT.FINAL DX SPEC: NORMAL
PATH REPORT.MICROSCOPIC SPEC OTHER STN: NORMAL
PATH REPORT.RELEVANT HX SPEC: NORMAL
PHOSPHATE SERPL-MCNC: 2.6 MG/DL (ref 2.5–4.5)
PLATELET # BLD AUTO: 113 10E3/UL (ref 150–450)
PLATELET # BLD AUTO: 126 10E3/UL (ref 150–450)
PLATELET # BLD AUTO: 126 10E3/UL (ref 150–450)
POTASSIUM SERPL-SCNC: 4.7 MMOL/L (ref 3.4–5.3)
PROCALCITONIN SERPL IA-MCNC: 0.13 NG/ML
RBC # BLD AUTO: 2.9 10E6/UL (ref 4.4–5.9)
RBC # BLD AUTO: 2.98 10E6/UL (ref 4.4–5.9)
RBC # BLD AUTO: 2.98 10E6/UL (ref 4.4–5.9)
RETICS # AUTO: 0.06 10E6/UL (ref 0.03–0.1)
RETICS/RBC NFR AUTO: 1.9 % (ref 0.5–2)
SODIUM SERPL-SCNC: 141 MMOL/L (ref 135–145)
WBC # BLD AUTO: 14.3 10E3/UL (ref 4–11)
WBC # BLD AUTO: 16.1 10E3/UL (ref 4–11)
WBC # BLD AUTO: 16.1 10E3/UL (ref 4–11)

## 2024-06-21 PROCEDURE — 97535 SELF CARE MNGMENT TRAINING: CPT | Mod: GO

## 2024-06-21 PROCEDURE — 80048 BASIC METABOLIC PNL TOTAL CA: CPT | Performed by: INTERNAL MEDICINE

## 2024-06-21 PROCEDURE — 85027 COMPLETE CBC AUTOMATED: CPT | Performed by: INTERNAL MEDICINE

## 2024-06-21 PROCEDURE — 83735 ASSAY OF MAGNESIUM: CPT | Performed by: INTERNAL MEDICINE

## 2024-06-21 PROCEDURE — 84100 ASSAY OF PHOSPHORUS: CPT | Performed by: INTERNAL MEDICINE

## 2024-06-21 PROCEDURE — 83605 ASSAY OF LACTIC ACID: CPT | Performed by: INTERNAL MEDICINE

## 2024-06-21 PROCEDURE — 85045 AUTOMATED RETICULOCYTE COUNT: CPT | Performed by: INTERNAL MEDICINE

## 2024-06-21 PROCEDURE — 85025 COMPLETE CBC W/AUTO DIFF WBC: CPT | Performed by: INTERNAL MEDICINE

## 2024-06-21 PROCEDURE — 99232 SBSQ HOSP IP/OBS MODERATE 35: CPT | Performed by: INTERNAL MEDICINE

## 2024-06-21 PROCEDURE — 85060 BLOOD SMEAR INTERPRETATION: CPT | Performed by: PATHOLOGY

## 2024-06-21 PROCEDURE — 250N000013 HC RX MED GY IP 250 OP 250 PS 637: Performed by: INTERNAL MEDICINE

## 2024-06-21 PROCEDURE — 36415 COLL VENOUS BLD VENIPUNCTURE: CPT | Performed by: INTERNAL MEDICINE

## 2024-06-21 PROCEDURE — 120N000004 HC R&B MS OVERFLOW

## 2024-06-21 PROCEDURE — 84145 PROCALCITONIN (PCT): CPT | Performed by: INTERNAL MEDICINE

## 2024-06-21 RX ORDER — ASPIRIN 81 MG/1
81 TABLET ORAL DAILY
Status: DISCONTINUED | OUTPATIENT
Start: 2024-06-21 | End: 2024-06-22 | Stop reason: HOSPADM

## 2024-06-21 RX ORDER — LOSARTAN POTASSIUM 25 MG/1
25 TABLET ORAL DAILY
Status: DISCONTINUED | OUTPATIENT
Start: 2024-06-21 | End: 2024-06-22 | Stop reason: HOSPADM

## 2024-06-21 RX ADMIN — METOPROLOL SUCCINATE 75 MG: 50 TABLET, EXTENDED RELEASE ORAL at 08:30

## 2024-06-21 RX ADMIN — CALCIUM CARBONATE (ANTACID) CHEW TAB 500 MG 1000 MG: 500 CHEW TAB at 22:55

## 2024-06-21 RX ADMIN — FUROSEMIDE 20 MG: 20 TABLET ORAL at 08:30

## 2024-06-21 RX ADMIN — ATORVASTATIN CALCIUM 20 MG: 10 TABLET, FILM COATED ORAL at 19:22

## 2024-06-21 RX ADMIN — LEUCOVORIN CALCIUM 5 MG: 5 TABLET ORAL at 08:31

## 2024-06-21 RX ADMIN — CYANOCOBALAMIN TAB 1000 MCG 1000 MCG: 1000 TAB at 08:31

## 2024-06-21 RX ADMIN — LOSARTAN POTASSIUM 25 MG: 25 TABLET, FILM COATED ORAL at 12:25

## 2024-06-21 RX ADMIN — TAMSULOSIN HYDROCHLORIDE 0.8 MG: 0.4 CAPSULE ORAL at 16:12

## 2024-06-21 RX ADMIN — ASPIRIN 81 MG: 81 TABLET, COATED ORAL at 12:31

## 2024-06-21 RX ADMIN — HYDROXYCHLOROQUINE SULFATE 200 MG: 200 TABLET, FILM COATED ORAL at 08:31

## 2024-06-21 RX ADMIN — CALCIUM CARBONATE (ANTACID) CHEW TAB 500 MG 1000 MG: 500 CHEW TAB at 16:18

## 2024-06-21 RX ADMIN — ACETAMINOPHEN 650 MG: 325 TABLET ORAL at 08:46

## 2024-06-21 ASSESSMENT — ACTIVITIES OF DAILY LIVING (ADL)
ADLS_ACUITY_SCORE: 29
ADLS_ACUITY_SCORE: 28
ADLS_ACUITY_SCORE: 29
ADLS_ACUITY_SCORE: 28
ADLS_ACUITY_SCORE: 29

## 2024-06-21 NOTE — PLAN OF CARE
Occupational Therapy Discharge Summary    Reason for therapy discharge:    All goals and outcomes met, no further needs identified.    Progress towards therapy goal(s). See goals on Care Plan in [facility] electronic health record for goal details.  Goals met    Therapy recommendation(s):    Home with family support as needed

## 2024-06-21 NOTE — PROGRESS NOTES
"Care Management Follow Up    Length of Stay (days): 2    Expected Discharge Date: 06/21/2024     Concerns to be Addressed:     Care progression  Patient plan of care discussed at interdisciplinary rounds: Yes    Anticipated Discharge Disposition:  Home with home care - [facility] for RN/OT     Anticipated Discharge Services:  home care - [facility] for RN/OT  Anticipated Discharge DME:  NA    Patient/family educated on Medicare website which has current facility and service quality ratings:  NA  Education Provided on the Discharge Plan:  Yes per team  Patient/Family in Agreement with the Plan:  NA    Referrals Placed by CM/SW:  NA  Private pay costs discussed: Not applicable    Additional Information:  Per provider, Dr. Li, patient is not ready. Catheter is out. Urine improved. Has elevated WBC. Will stay to monitor. Maybe discharge tomorrow.    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 Son live in basement. Ind and no svcs. Accepted to [facility] for RN/OT. Family to transport.\"     RNCM to follow for medical progression, recommendations, and final discharge plan.     Pat Tejeda RN     "

## 2024-06-21 NOTE — PROGRESS NOTES
"  [facility] - POSTOP PROGRESS NOTE    PLACE OF SERVICE:  [facility]     SURGERY: POD # 2 after Cystoscopy with clot evacuation and fulguration by  Dr. Schmitz  for clot urinary retention      SUBJECTIVE:   Events: no acute events overnight    Patient voiding on his own, external catheter in place. Denies pain or discomfort. Denies f/c/n/v. Tolerating breakfast this morning,  no nausea.     OBJECTIVE:  PHYSICAL EXAM  Vital signs:  Temp: 97.8  F (36.6  C) Temp src: Oral BP: (!) 158/77 Pulse: 62   Resp: 20 SpO2: 98 % O2 Device: None (Room air) Oxygen Delivery: 2 LPM Height: 172.7 cm (5' 8\") Weight: 77.9 kg (171 lb 11.8 oz)  Estimated body mass index is 26.11 kg/m  as calculated from the following:    Height as of this encounter: 1.727 m (5' 8\").    Weight as of this encounter: 77.9 kg (171 lb 11.8 oz).    General: NAD, alert, cooperative, sitting up in chair.   Neuro: A&O x 3. Moving all extremities equally.   Abdomen: Soft, non-tender, no bilateral CVA tenderness.   : external catheter in place, draining clear yellow urine    LABS:  INR   Date Value Ref Range Status   04/10/2023 1.19 (H) 0.85 - 1.15 Final      Lab Results   Component Value Date    WBC 12.1 06/20/2024     Lab Results   Component Value Date    HGB 10.1 06/20/2024     Lab Results   Component Value Date     06/20/2024     Creatinine   Date Value Ref Range Status   06/21/2024 1.36 (H) 0.67 - 1.17 mg/dL Final     Sodium   Date Value Ref Range Status   06/21/2024 141 135 - 145 mmol/L Final     Comment:     Reference intervals for this test were updated on 09/26/2023 to more accurately reflect our healthy population. There may be differences in the flagging of prior results with similar values performed with this method. Interpretation of those prior results can be made in the context of the updated reference intervals.      Potassium   Date Value Ref Range Status   06/21/2024 4.7 3.4 - 5.3 mmol/L Final     Magnesium   Date Value Ref " Range Status   06/21/2024 2.2 1.7 - 2.3 mg/dL Final       UC 6/19: no growth     Lab Results: personally reviewed.     ASSESSMENT/PLAN:  POD #2 after Cystoscopy with clot evacuation and fulguration by Dr. Schmitz for clot urinary retention      - 83 yr old male with hx of GH and UR 2/2 BPH s/p TURP 5/24/24 by Dr. Jennings, on Eliquis for Afib, admitted with GH and clot retention requiring CBI.   - Urine remains clear, passed TOV yesterday.   - Hgb 9.5 today.  - UC 6/19: no growth. No UTI symptoms. WBC WNL. Afebrile. No indication for ongoing antibiotics.   - Per Dr. Jennings, recommend anticoagulation hold x 1 week. ASA restart today if essential.   - Message sent to our team to help arrange outpatient follow up in clinic. Okay to discharge from our standpoint once cleared per medicine team.     Zulay Montes PA-C  [facility]   [phone]

## 2024-06-21 NOTE — PLAN OF CARE
Problem: UTI (Urinary Tract Infection)  Goal: Improved Infection Symptoms  6/21/2024 0437 by Marysol Leon, RN  Outcome: Progressing     Problem: Dysrhythmia  Goal: Normalized Cardiac Rhythm  6/21/2024 0437 by Marysol Leon, RN  Outcome: Progressing     Goal Outcome Evaluation:       SR w/ PAC's. Pt urinating very frequently throughout shift [id number], yellow urine. All PVR's under 300ml. Used primofit overnight to promote rest. Pt reporting no pain. Hgb remains stable. P protocol ran, recheck in AM. Pt indep in room.

## 2024-06-21 NOTE — PLAN OF CARE
Problem: Comorbidity Management  Goal: Blood Pressure in Desired Range  Outcome: Progressing  Intervention: Maintain Blood Pressure Management  Recent Flowsheet Documentation  Taken 6/21/2024 0800 by Justine Whiting RN  Medication Review/Management: medications reviewed   Goal Outcome Evaluation:       Patient alert and oriented x 4. BP medications given for /77. Lasix given and patient diuresis well clear yellow urine and no blood clots. Urology in and discussed plan of care. Patient has not resumed eliquis yet and did update the Urology PA. NSR with PAC's. WBC 16.1, Temp 97.8. LA 1.5. Good appetite. Tylenol effective for headache. Call light in reach.

## 2024-06-21 NOTE — PROGRESS NOTES
[facility]    Medicine Progress Note - Hospitalist Service    Date of Admission:  6/19/2024    Assessment & Plan   Felipe Verma is a 83 year old male admitted on 6/19/2024. He has hx of HTN, hyperlipidemia, CAD s/p 3 drug-eluting stents in April 2023, and BPH, RA, who had TURP on 5/24/24 at [facility]. He had catheter and then it was removed. He notes over last 3 days difficulty with urine      1.Urine retention with large clots  -with gross hematuria needed 3 way placed in ER--then had to go to OR with urology 6/19 for Cystoscopy with clot evacuation and fulguration and Complex chaidez catheter insertion  -cath discontinued 6/20  -urine is much improved  -had to hold DOAC and stop Plavix and not restart Plavix, will replace with asa 81 mg  -start asa today  -hold DOAC for a week then restart    2.Acute blood loss anemia-from severe gross hematuria and clots that had to be evacuated from bladder  -prior to OR bleed down to 8.7, then got 1 unit pRBC  -still trend hgb, today 9.5    3.Abnl UA  -Concern uti  -ceftriaxone and follow Uc--neg so stopped antibiotic    4.NSVT  -preop   -tele  -make sure lytes stay stable  -cards aware and did eval  -stay on b-blocker    5.Hx of CAD with hx for chronic systolic HF(compensated now-last EF 40%)  -stents 4/23-->s/p PCI to mid LAD 4/23   -cards stopped plavix and likely to only restart asa when Urology ok  -trop was 200's and that looks to be his baseline-->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     6.hx of Afib  -on DOAC--hold still and restart when ok with Urology--which is in a week    7.htn  -with  mild SUSU, we held ARB, creat better now 1.3  -on lasix and metoprolol  -will add back losartan 25 mg    8.lipids  -statin    9.Deconditioning  -pt/ot eval    10.Hx of RA  -on meds  -hold methotrexate     11.CKD 3 with mild SUSU  -watch daily labs  -no nsaids  -avoid hypotension  -held ARB--restart now  -today creat 1.3 this is improved now  closer  "to baseline    12.Hypophos-replaced per protocol    13.Hyperglycemia  -check A1c--6.0, preDM    14.Leukocytosis  -could be stress response to brisk bleeding and needed transfusion  -anemia more stable  -has also mild thrombocytopenia  -no fever  -UC neg  -sent smear  -lactic acid and prcal neg  -hold off on further antibiotics(was on ceftriaxone for possible uti, stopped since uc neg)  -trend    Social-lives with wife, and she is on hospice, son lives there too    Code-full    I called son to update at 1357--I left detailed message                  Diet: Regular Diet Adult    DVT Prophylaxis: Pneumatic Compression Devices  [place] Catheter: Not present  Lines: None     Cardiac Monitoring: ACTIVE order. Indication: Tachyarrhythmias, acute (48 hours)  Code Status: Full Code      Clinically Significant Risk Factors                # Thrombocytopenia: Lowest platelets = 111 in last 2 days, will monitor for bleeding   # Hypertension: Noted on problem list           #Precipitous drop in Hgb/Hct: Lowest Hgb this hospitalization: 8.7 g/dL. Will continue to monitor and treat/transfuse as appropriate.     # Overweight: Estimated body mass index is 26.11 kg/m  as calculated from the following:    Height as of this encounter: 1.727 m (5' 8\").    Weight as of this encounter: 77.9 kg (171 lb 11.8 oz)., PRESENT ON ADMISSION     # Financial/Environmental Concerns: none         Disposition Plan     Medically Ready for Discharge: Anticipated Tomorrow             Nancy Li MD  Hospitalist Service  [facility]  Securely message with Man[employer]ube (more info)  Text page via in3Dgallery Paging/Directory   ______________________________________________________________________    Interval History   He has cath out  Feels he is doing ok  No pain  No fevers  No n/v  Takes meds for RA, no cancer      Physical Exam   Vital Signs: Temp: 97.8  F (36.6  C) Temp src: Oral BP: (!) 158/77 Pulse: 62   Resp: 20 SpO2: 98 % O2 Device: " None (Room air)    Weight: 171 lbs 11.81 oz    Constitutional: awake, alert, cooperative, and no apparent distress  Eyes: sclera clear  Respiratory: no increased work of breathing, good air exchange, no retractions, and clear to auscultation  Cardiovascular: regular rate and rhythm and normal S1 and S2  GI: normal bowel sounds, soft, non-distended, and non-tender  Skin: no bruising or bleeding  Musculoskeletal: no lower extremity pitting edema present  Neurologic: Mental Status Exam:  Level of Alertness:   awake  Neuropsychiatric: General: normal, calm, and normal eye contact    Medical Decision Making       35 MINUTES SPENT BY ME on the date of service doing chart review, history, exam, documentation & further activities per the note.      Data     I have personally reviewed the following data over the past 24 hrs:    14.3 (H)  \   9.5 (L)   / 113 (L)     141 110 (H) 31.8 (H) /  117 (H)   4.7 21 (L) 1.36 (H) \     Procal: 0.13 CRP: N/A Lactic Acid: 1.5       Ferritin:  N/A % Retic:  1.9 LDH:  N/A       Imaging results reviewed over the past 24 hrs:   No results found for this or any previous visit (from the past 24 hour(s)).

## 2024-06-22 ENCOUNTER — APPOINTMENT (OUTPATIENT)
Dept: PHYSICAL THERAPY | Facility: HOSPITAL | Age: 84
DRG: 713 | End: 2024-06-22
Payer: MEDICARE

## 2024-06-22 VITALS
BODY MASS INDEX: 25.57 KG/M2 | SYSTOLIC BLOOD PRESSURE: 151 MMHG | OXYGEN SATURATION: 98 % | TEMPERATURE: 97.6 F | HEART RATE: 67 BPM | HEIGHT: 68 IN | WEIGHT: 168.7 LBS | DIASTOLIC BLOOD PRESSURE: 76 MMHG | RESPIRATION RATE: 20 BRPM

## 2024-06-22 LAB
ANION GAP SERPL CALCULATED.3IONS-SCNC: 8 MMOL/L (ref 7–15)
BUN SERPL-MCNC: 36.2 MG/DL (ref 8–23)
CALCIUM SERPL-MCNC: 8.8 MG/DL (ref 8.8–10.2)
CHLORIDE SERPL-SCNC: 106 MMOL/L (ref 98–107)
CREAT SERPL-MCNC: 1.41 MG/DL (ref 0.67–1.17)
DEPRECATED HCO3 PLAS-SCNC: 24 MMOL/L (ref 22–29)
EGFRCR SERPLBLD CKD-EPI 2021: 49 ML/MIN/1.73M2
ERYTHROCYTE [DISTWIDTH] IN BLOOD BY AUTOMATED COUNT: 14 % (ref 10–15)
GLUCOSE SERPL-MCNC: 94 MG/DL (ref 70–99)
HCT VFR BLD AUTO: 27.7 % (ref 40–53)
HGB BLD-MCNC: 9.2 G/DL (ref 13.3–17.7)
MAGNESIUM SERPL-MCNC: 2 MG/DL (ref 1.7–2.3)
MCH RBC QN AUTO: 32.5 PG (ref 26.5–33)
MCHC RBC AUTO-ENTMCNC: 33.2 G/DL (ref 31.5–36.5)
MCV RBC AUTO: 98 FL (ref 78–100)
PHOSPHATE SERPL-MCNC: 3.1 MG/DL (ref 2.5–4.5)
PLATELET # BLD AUTO: 110 10E3/UL (ref 150–450)
POTASSIUM SERPL-SCNC: 5 MMOL/L (ref 3.4–5.3)
RBC # BLD AUTO: 2.83 10E6/UL (ref 4.4–5.9)
SODIUM SERPL-SCNC: 138 MMOL/L (ref 135–145)
WBC # BLD AUTO: 8.5 10E3/UL (ref 4–11)

## 2024-06-22 PROCEDURE — 36415 COLL VENOUS BLD VENIPUNCTURE: CPT | Performed by: INTERNAL MEDICINE

## 2024-06-22 PROCEDURE — 84100 ASSAY OF PHOSPHORUS: CPT | Performed by: INTERNAL MEDICINE

## 2024-06-22 PROCEDURE — 97116 GAIT TRAINING THERAPY: CPT | Mod: GP

## 2024-06-22 PROCEDURE — 99239 HOSP IP/OBS DSCHRG MGMT >30: CPT | Performed by: INTERNAL MEDICINE

## 2024-06-22 PROCEDURE — 250N000013 HC RX MED GY IP 250 OP 250 PS 637: Performed by: INTERNAL MEDICINE

## 2024-06-22 PROCEDURE — 85027 COMPLETE CBC AUTOMATED: CPT | Performed by: INTERNAL MEDICINE

## 2024-06-22 PROCEDURE — 83735 ASSAY OF MAGNESIUM: CPT | Performed by: INTERNAL MEDICINE

## 2024-06-22 PROCEDURE — 97110 THERAPEUTIC EXERCISES: CPT | Mod: GP

## 2024-06-22 PROCEDURE — 80048 BASIC METABOLIC PNL TOTAL CA: CPT | Performed by: INTERNAL MEDICINE

## 2024-06-22 RX ORDER — ASPIRIN 81 MG/1
81 TABLET ORAL DAILY
Qty: 30 TABLET | Refills: 3 | Status: SHIPPED | OUTPATIENT
Start: 2024-06-23

## 2024-06-22 RX ADMIN — ASPIRIN 81 MG: 81 TABLET, COATED ORAL at 08:28

## 2024-06-22 RX ADMIN — HYDROXYCHLOROQUINE SULFATE 200 MG: 200 TABLET, FILM COATED ORAL at 08:28

## 2024-06-22 RX ADMIN — FUROSEMIDE 20 MG: 20 TABLET ORAL at 08:27

## 2024-06-22 RX ADMIN — LOSARTAN POTASSIUM 25 MG: 25 TABLET, FILM COATED ORAL at 08:28

## 2024-06-22 RX ADMIN — METOPROLOL SUCCINATE 75 MG: 50 TABLET, EXTENDED RELEASE ORAL at 08:28

## 2024-06-22 RX ADMIN — CYANOCOBALAMIN TAB 1000 MCG 1000 MCG: 1000 TAB at 08:28

## 2024-06-22 ASSESSMENT — ACTIVITIES OF DAILY LIVING (ADL)
ADLS_ACUITY_SCORE: 28

## 2024-06-22 NOTE — PROGRESS NOTES
Physical Therapy Discharge Summary    Reason for therapy discharge:    Discharged to home.    Progress towards therapy goal(s). See goals on Care Plan in [facility] electronic health record for goal details.  Goals not met.  Barriers to achieving goals:   discharge from facility.    Therapy recommendation(s):    No further therapy is recommended.

## 2024-06-22 NOTE — PLAN OF CARE
Problem: Comorbidity Management  Goal: Blood Pressure in Desired Range  Outcome: Progressing  Intervention: Maintain Blood Pressure Management  Recent Flowsheet Documentation  Taken 6/22/2024 0800 by Justine Whiting RN  Medication Review/Management: medications reviewed   Goal Outcome Evaluation:       Patient alert and oriented x 4.Denied an pain. Appetite very good. NSR. Asprin started yesterday. Independent. Urine yellow. WBC 8.5 and improved since yesterday which was 14.3. HGB 9.2. Left knee with a scab. LSC. O2 sats 98%. Call light in reach.

## 2024-06-22 NOTE — PLAN OF CARE
Problem: Adult Inpatient Plan of Care  Goal: Plan of Care Review  Description: The Plan of Care Review/Shift note should be completed every shift.  The Outcome Evaluation is a brief statement about your assessment that the patient is improving, declining, or no change.  This information will be displayed automatically on your shift  note.  Outcome: Adequate for Care Transition   Goal Outcome Evaluation:         Medication discharge teaching completed. Patient verbalized medications and he is aware to watch for any bleeding since on ASA. Aware to start Eliquis 6-27-24. Family to get him in an hour. Belongings packed.

## 2024-06-22 NOTE — PLAN OF CARE
Problem: UTI (Urinary Tract Infection)  Goal: Improved Infection Symptoms  6/21/2024 2016 by Josh Cross, RN  Outcome: Progressing  6/21/2024 2016 by Josh Cross RN  Outcome: Progressing  6/21/2024 2013 by Josh Cross RN  Outcome: Progressing     Problem: Urinary Elimination Management  Goal: Effective Urinary Elimination/Continence  Outcome: Progressing     Problem: Anemia  Goal: Anemia Symptom Improvement  Outcome: Progressing   Goal Outcome Evaluation:             Pt has yellow straw colored urine output.  Pt denies pain.   PT is axox4 calm and cooperative. Pt is up with standby assist to bathroom.

## 2024-06-22 NOTE — PLAN OF CARE
Problem: Urinary Elimination Management  Goal: Effective Urinary Elimination/Continence  Outcome: Progressing     Problem: Anemia  Goal: Anemia Symptom Improvement  Outcome: Progressing     Goal Outcome Evaluation:         Hemoglobin 9.2 this morning. Voiding well. PVR of 138. No hematuria noted. WBC normal now. Possible discharge home today.

## 2024-06-22 NOTE — DISCHARGE SUMMARY
[facility] MEDICINE  DISCHARGE SUMMARY     Primary Care Physician: Tessa Martinez  Admission Date: 6/19/2024   Discharge Provider: Nancy Li MD Discharge Date: 6/22/2024   Diet:   Active Diet and Nourishment Order   Procedures    Regular Diet Adult    Diet       Code Status: Full Code   Activity: DCACTIVITY: Activity as tolerated        Condition at Discharge: Stable     REASON FOR PRESENTATION(See Admission Note for Details)   Hematuria and difficulty voiding    PRINCIPAL & ACTIVE DISCHARGE DIAGNOSES     Active Problems:    Gross hematuria    Acute urinary retention    Nonsustained ventricular tachycardia (H)      PENDING LABS     Unresulted Labs Ordered in the Past 30 Days of this Admission       No orders found from 5/20/2024 to 6/20/2024.              PROCEDURES ( this hospitalization only)      Procedure(s):  CYSTOSCOPY, WITH FULGURATION OF HEMORRHAGING BLOOD VESSEL AND THROMBUS REMOVAL    RECOMMENDATIONS TO OUTPATIENT PROVIDER FOR F/U VISIT     Follow-up Appointments     Follow-up and recommended labs and tests       Follow up with primary care provider, Tessa Martinez, within 3-4 days   for hospital follow- up.  The following labs/tests are recommended: bmp,   cbc.  And with primary provider check A1c in 3 -6 months for pre-diabetes  Follow up with Urology clinic in 2 weeks                DISPOSITION     Home with home care    SUMMARY OF HOSPITAL COURSE:      Felipe Verma is a 83 year old male admitted on 6/19/2024. He has hx of HTN, hyperlipidemia, CAD s/p 3 drug-eluting stents in April 2023, and BPH, RA, who had TURP on 5/24/24 at [place]. He had catheter and then it was removed. He notes over last 3 days difficulty with urine        1.Urine retention with large clots  -with gross hematuria needed 3 way placed in ER--then had to go to OR with urology 6/19 for Cystoscopy with clot evacuation and fulguration and Complex chaidez catheter insertion  -cath  discontinued 6/20  -urine is much improved  -had to hold DOAC and stop Plavix and not restart Plavix, will replace with asa 81 mg  -started asa 6/21  -hold DOAC for a week then restart at home     2.Acute blood loss anemia-from severe gross hematuria and clots that had to be evacuated from bladder  -prior to OR bleed down to 8.7, then got 1 unit pRBC  -hgb, today 9.2     3.Abnl UA  -Concern uti  -ceftriaxone and follow Uc--neg so stopped antibiotic     4.NSVT  -preop   -tele  -make sure lytes stay stable  -cards aware and did eval  -stay on b-blocker     5.Hx of CAD with hx for chronic systolic HF(compensated now-last EF 40%)  -stents 4/23-->s/p PCI to mid LAD 4/23   -cards stopped plavix and likely to only restart asa when Urology ok  -trop was 200's and that looks to be his baseline-->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      6.hx of Afib  -on DOAC--hold still and restart when ok with Urology--which is in a week     7.htn  -with  mild SUSU, we held ARB, creat better now 1.4  -on lasix and metoprolol  - added back losartan 25 mg     8.lipids  -statin     9.Deconditioning  -pt/ot eval     10.Hx of RA  -on meds  -hold methotrexate here     11.CKD 3 with mild SUSU  -watch daily labs  -no nsaids  -avoid hypotension  -held ARB--restart now  -today creat 1.4 this is improved now  closer to baseline     12.Hypophos-replaced per protocol     13.Hyperglycemia  -check A1c--6.0, preDM--told pt to follow up with pcp and try to cut carbs some and he agreed     14.Leukocytosis  -could be stress response to brisk bleeding and needed transfusion  -anemia more stable  -has also mild thrombocytopenia  -no fever  -UC neg  -sent smear  -lactic acid and prcal neg  -hold off on further antibiotics(was on ceftriaxone for possible uti, stopped since uc neg)  -trend  -today wbc normal 8.5, may have been up in response to bleed  -recheck cbc with PCP     Social-lives with wife, and she is on hospice, son lives there too      Code-full     Home today with home care         Discharge Medications with Med changes:     Current Discharge Medication List        START taking these medications    Details   aspirin 81 MG EC tablet Take 1 tablet (81 mg) by mouth daily  Qty: 30 tablet, Refills: 3    Associated Diagnoses: NSTEMI (non-ST elevated myocardial infarction) (H)           CONTINUE these medications which have CHANGED    Details   apixaban ANTICOAGULANT (ELIQUIS) 2.5 MG tablet Take 1 tablet (2.5 mg) by mouth 2 times daily    Associated Diagnoses: Chronic atrial fibrillation (H)           CONTINUE these medications which have NOT CHANGED    Details   acetaminophen (TYLENOL) 500 MG tablet Take 1,000 mg by mouth every 6 hours as needed for mild pain      atorvastatin (LIPITOR) 20 MG tablet Take 1 tablet (20 mg) by mouth every evening  Qty: 90 tablet, Refills: 3    Associated Diagnoses: Hyperlipidemia, unspecified hyperlipidemia type      calcium carbonate 500 mg, elemental, (OSCAL 500) 1250 (500 Ca) MG TABS tablet Take 1 tablet by mouth daily as needed (when remembers)      furosemide (LASIX) 20 MG tablet TAKE 1 TABLET BY MOUTH ONCE DAILY FOR INCREASED SWELLING, WEIGHT GAIN, OR SHORTNESS OF BREATH  Qty: 90 tablet, Refills: 3    Associated Diagnoses: Chronic atrial fibrillation (H); Benign essential hypertension      hydroxychloroquine (PLAQUENIL) 200 MG tablet Take 200 mg by mouth daily      leucovorin (WELLCOVORIN) 5 mg tablet Take 5 mg by mouth three times a week Do not overlap days of methotrexate - Mon, Wed, and Saturday      losartan (COZAAR) 25 MG tablet Take 1 tablet (25 mg) by mouth daily  Qty: 90 tablet, Refills: 3    Associated Diagnoses: Benign essential hypertension      methotrexate 2.5 MG tablet Take 12.5 mg by mouth twice a week 5 tablets (12.5mg) on Thursday and Friday      metoprolol succinate ER (TOPROL XL) 25 MG 24 hr tablet Take 3 tablets (75 mg) by mouth daily  Qty: 270 tablet, Refills: 3    Associated Diagnoses:  Paroxysmal atrial fibrillation (H)      nitroGLYcerin (NITROSTAT) 0.4 MG sublingual tablet For chest pain place 1 tablet under the tongue every 5 minutes for 3 doses. If symptoms persist 5 minutes after 1st dose call 911.  Qty: 25 tablet, Refills: 1    Associated Diagnoses: NSTEMI (non-ST elevated myocardial infarction) (H)      tamsulosin (FLOMAX) 0.4 MG capsule Take 2 capsules (0.8 mg) by mouth daily (with dinner)  Qty: 180 capsule, Refills: 3    Associated Diagnoses: Benign prostatic hyperplasia with urinary retention      vitamin B-12 (CYANOCOBALAMIN) 1000 MCG tablet Take 1,000 mcg by mouth daily      vitamin D3 (CHOLECALCIFEROL) 50 mcg (2000 units) tablet Take 1 tablet by mouth daily as needed (when remembers)           STOP taking these medications       clopidogrel (PLAVIX) 75 MG tablet Comments:   Reason for Stopping:                     Rationale for medication changes:      Holding doac til next week  Stopped plavix  Started asa 81 mg        Consults       UROLOGY IP CONSULT  CARE MANAGEMENT / SOCIAL WORK IP CONSULT  PHYSICAL THERAPY ADULT IP CONSULT  OCCUPATIONAL THERAPY ADULT IP CONSULT  CARDIOLOGY IP CONSULT  UROLOGY IP CONSULT  PHYSICAL THERAPY ADULT IP CONSULT  OCCUPATIONAL THERAPY ADULT IP CONSULT    Immunizations given this encounter     Most Recent Immunizations   Administered Date(s) Administered    COVID-19 Bivalent 12+ (Pfizer) 12/15/2022    COVID-19 MONOVALENT 12+ (Pfizer) 11/20/2021    Influenza (H1N1) 02/11/2010    Influenza (High Dose) 3 valent vaccine 10/26/2012    Influenza (IIV3) PF 10/15/2011    Influenza Vaccine 18-64 (Flublok) 12/15/2022    Influenza Vaccine 65+ (Fluzone HD) 03/13/2024    Influenza, seasonal, injectable, PF 11/04/2009    Pneumo Conj 13-V (2010&after) 11/20/2014    Pneumococcal 23 valent 11/07/2005    TDAP (Adacel,Boostrix) 05/24/2020    Td (Adult), Adsorbed 04/02/2004    Zoster vaccine, live 05/18/2016           Anticoagulation Information      Recent INR results: No  "results for input(s): \"INR\" in the last 168 hours.  Warfarin doses (if applicable) or name of other anticoagulant: holding doac til next week      SIGNIFICANT IMAGING FINDINGS     No results found for this visit on 06/19/24.    SIGNIFICANT LABORATORY FINDINGS     Most Recent 3 CBC's:  Recent Labs   Lab Test 06/22/24  0427 06/21/24  0715 06/21/24  0450   WBC 8.5 14.3* 16.1*  16.1*   HGB 9.2* 9.5* 9.6*  9.6*   MCV 98 98 97  97   * 113* 126*  126*     Most Recent 3 BMP's:  Recent Labs   Lab Test 06/22/24  0427 06/21/24  0450 06/20/24  0629    141 139   POTASSIUM 5.0 4.7 4.7   CHLORIDE 106 110* 108*   CO2 24 21* 20*   BUN 36.2* 31.8* 29.2*   CR 1.41* 1.36* 1.50*   ANIONGAP 8 10 11   ABBEY 8.8 8.1* 7.9*   GLC 94 117* 163*             Discharge Orders        Home Care Referral      Reason for your hospital stay    Hematuria and difficulty voiding     Follow-up and recommended labs and tests     Follow up with primary care provider, Tessa Martinez, within 3-4 days for hospital follow- up.  The following labs/tests are recommended: bmp, cbc.  And with primary provider check A1c in 3 -6 months for pre-diabetes  Follow up with Urology clinic in 2 weeks     Activity    Your activity upon discharge: activity as tolerated     Diet    Follow this diet upon discharge: Orders Placed This Encounter      Regular Diet Adult       Examination   Physical Exam   Temp:  [97.6  F (36.4  C)-97.9  F (36.6  C)] 97.6  F (36.4  C)  Pulse:  [59-96] 67  Resp:  [16-20] 20  BP: (117-153)/(58-76) 151/76  SpO2:  [96 %-99 %] 98 %  Wt Readings from Last 1 Encounters:   06/22/24 76.5 kg (168 lb 11.2 oz)       See today's note      Please see EMR for more detailed significant labs, imaging, consultant notes etc.    I, Nancy Li MD, personally saw the patient today and spent greater than 30 minutes discharging this patient.    Nancy Li MD  [facility]    CC:Tessa Martinez   "

## 2024-06-22 NOTE — PLAN OF CARE
Goal Outcome Evaluation:         Heart Failure Care Map  GOALS TO BE MET BEFORE DISCHARGE:    1. Decrease congestion and/or edema with diuretic therapy to achieve near optimal volume status.     Dyspnea improved: Yes, satisfactory for discharge.   Edema improved: No, further care required to meet this goal. Please explain Pt has +1 edema in BLE.  Pt is on fluid and Na restrictions.        Last 24 hour I/O:   Intake/Output Summary (Last 24 hours) at 6/21/2024 2013  Last data filed at 6/21/2024 1913  Gross per 24 hour   Intake 1770 ml   Output 3300 ml   Net -1530 ml           Net I/O and Weights since admission:   05/22 2300 - 06/21 2259  In: 3290 [P.O.:2940]  Out: 10816 [Urine:17168]  Net: -88703     Vitals:    06/19/24 0554 06/20/24 0417 06/21/24 0310   Weight: 79.4 kg (175 lb) 81.8 kg (180 lb 5.4 oz) 77.9 kg (171 lb 11.8 oz)       2.  O2 sats > 90% on room air, or at prior home O2 therapy level.      Able to wean O2 this shift to keep sats above 90%?: Yes, satisfactory for discharge.   Does patient use Home O2? No          Current oxygenation status:   SpO2: 99 %     O2 Device: None (Room air),      3.  Tolerates ambulation and mobility near baseline.     Ambulation: Yes, satisfactory for discharge.   Times patient ambulated with staff this shift: 2    Please review the Heart Failure Care Map for additional HF goal outcomes.    Josh Cross RN  6/21/2024

## 2024-06-22 NOTE — PROGRESS NOTES
"  [facility] - POSTOP PROGRESS NOTE    PLACE OF SERVICE:  [facility] [place]     SURGERY: POD # 2 after Cystoscopy with clot evacuation and fulguration by  Dr. Schmitz  for clot urinary retention      SUBJECTIVE:   Events: no acute events overnight    Patient resting comfortably in bed. Endorses continue to void on his own with some urinary incontinence requiring him to wear depends.  Most recent pvr was 138 ml per patient. Denies pain or discomfort. Denies fever, chills, nausea, vomiting. Tolerating regular diet.      OBJECTIVE:  PHYSICAL EXAM  Vital signs:  Temp: 97.8  F (36.6  C) Temp src: Oral BP: (!) 153/74 Pulse: 96   Resp: 20 SpO2: 96 % O2 Device: None (Room air) Oxygen Delivery: 2 LPM Height: 172.7 cm (5' 8\") Weight: 76.5 kg (168 lb 11.2 oz)  Estimated body mass index is 25.65 kg/m  as calculated from the following:    Height as of this encounter: 1.727 m (5' 8\").    Weight as of this encounter: 76.5 kg (168 lb 11.2 oz).    General: NAD, alert, cooperative, sitting up in chair.   Neuro: A&O x 3. Moving all extremities equally.   Abdomen: Soft, non-tender, no bilateral CVA tenderness.   : Urine observed in toilet hat, yellow, clear without clots.     LABS:  INR   Date Value Ref Range Status   04/10/2023 1.19 (H) 0.85 - 1.15 Final      Lab Results   Component Value Date    WBC 12.1 06/20/2024     Lab Results   Component Value Date    HGB 10.1 06/20/2024     Lab Results   Component Value Date     06/20/2024     Creatinine   Date Value Ref Range Status   06/22/2024 1.41 (H) 0.67 - 1.17 mg/dL Final     Sodium   Date Value Ref Range Status   06/22/2024 138 135 - 145 mmol/L Final     Comment:     Reference intervals for this test were updated on 09/26/2023 to more accurately reflect our healthy population. There may be differences in the flagging of prior results with similar values performed with this method. Interpretation of those prior results can be made in the context of the updated reference " intervals.      Potassium   Date Value Ref Range Status   06/22/2024 5.0 3.4 - 5.3 mmol/L Final     Magnesium   Date Value Ref Range Status   06/22/2024 2.0 1.7 - 2.3 mg/dL Final       UC 6/19: no growth     Lab Results: personally reviewed.     ASSESSMENT/PLAN:  POD #2 after Cystoscopy with clot evacuation and fulguration by Dr. Schmitz for clot urinary retention      - 83 yr old male with hx of GH and UR 2/2 BPH s/p TURP 5/24/24 by Dr. Jennings, on Eliquis for Afib, admitted with GH and clot retention requiring CBI.   - Urine remains clear, after resuming ASA yesterday.  - Hgb 9.2 today.  - UC 6/19: no growth. No UTI symptoms. WBC WNL. Afebrile. No indication for ongoing antibiotics.   - Per Dr. Jennings, recommend anticoagulation hold x 1 week.   - Message sent to our team to help arrange outpatient follow up in clinic. Okay to discharge from our standpoint once cleared per medicine team.   - Urology will sign off.    Eduardo Reyes PA-C  [facility]   [phone]

## 2024-06-22 NOTE — PROGRESS NOTES
Care Management Discharge Note    Discharge Date: 06/22/2024     Discharge Disposition: Home    Discharge Services: Home Care (RN, OT)    Discharge DME: None    Discharge Transportation: family or friend will provide    Private pay costs discussed: Not applicable    Does the patient's insurance plan have a 3 day qualifying hospital stay waiver?  No    PAS Confirmation Code: N/A  Patient/family educated on Medicare website which has current facility and service quality ratings: N/A    Education Provided on the Discharge Plan: Per Team  Persons Notified of Discharge Plans: Patient  Patient/Family in Agreement with the Plan: Yes    Handoff Referral Completed: Yes    Additional Information:  SWCM reviewed chart updates and spoke with hospitalist. Pt will discharge home today, accepted with [facility] for RN/OT home care. No other CM needs. Family transport.    MORRIS Vickers

## 2024-06-22 NOTE — PROGRESS NOTES
[facility]    Medicine Progress Note - Hospitalist Service    Date of Admission:  6/19/2024    Assessment & Plan   Felipe Verma is a 83 year old male admitted on 6/19/2024. He has hx of HTN, hyperlipidemia, CAD s/p 3 drug-eluting stents in April 2023, and BPH, RA, who had TURP on 5/24/24 at [facility]. He had catheter and then it was removed. He notes over last 3 days difficulty with urine      1.Urine retention with large clots  -with gross hematuria needed 3 way placed in ER--then had to go to OR with urology 6/19 for Cystoscopy with clot evacuation and fulguration and Complex chaidez catheter insertion  -cath discontinued 6/20  -urine is much improved  -had to hold DOAC and stop Plavix and not restart Plavix, will replace with asa 81 mg  -started asa 6/21  -hold DOAC for a week then restart at home    2.Acute blood loss anemia-from severe gross hematuria and clots that had to be evacuated from bladder  -prior to OR bleed down to 8.7, then got 1 unit pRBC  -hgb, today 9.2    3.Abnl UA  -Concern uti  -ceftriaxone and follow Uc--neg so stopped antibiotic    4.NSVT  -preop   -tele  -make sure lytes stay stable  -cards aware and did eval  -stay on b-blocker    5.Hx of CAD with hx for chronic systolic HF(compensated now-last EF 40%)  -stents 4/23-->s/p PCI to mid LAD 4/23   -cards stopped plavix and likely to only restart asa when Urology ok  -trop was 200's and that looks to be his baseline-->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     6.hx of Afib  -on DOAC--hold still and restart when ok with Urology--which is in a week    7.htn  -with  mild SUSU, we held ARB, creat better now 1.4  -on lasix and metoprolol  - added back losartan 25 mg    8.lipids  -statin    9.Deconditioning  -pt/ot eval    10.Hx of RA  -on meds  -hold methotrexate here    11.CKD 3 with mild SUSU  -watch daily labs  -no nsaids  -avoid hypotension  -held ARB--restart now  -today creat 1.4 this is improved now  closer  "to baseline    12.Hypophos-replaced per protocol    13.Hyperglycemia  -check A1c--6.0, preDM--told pt to follow up with pcp and try to cut carbs some and he agreed    14.Leukocytosis  -could be stress response to brisk bleeding and needed transfusion  -anemia more stable  -has also mild thrombocytopenia  -no fever  -UC neg  -sent smear  -lactic acid and prcal neg  -hold off on further antibiotics(was on ceftriaxone for possible uti, stopped since uc neg)  -trend  -today wbc normal 8.5, may have been up in response to bleed  -recheck cbc with PCP    Social-lives with wife, and she is on hospice, son lives there too    Code-full    Home today with home care                  Diet: Regular Diet Adult    DVT Prophylaxis: Pneumatic Compression Devices  [place] Catheter: Not present  Lines: None     Cardiac Monitoring: ACTIVE order. Indication: Tachyarrhythmias, acute (48 hours)  Code Status: Full Code      Clinically Significant Risk Factors                # Thrombocytopenia: Lowest platelets = 110 in last 2 days, will monitor for bleeding   # Hypertension: Noted on problem list           #Precipitous drop in Hgb/Hct: Lowest Hgb this hospitalization: 8.7 g/dL. Will continue to monitor and treat/transfuse as appropriate.     # Overweight: Estimated body mass index is 25.65 kg/m  as calculated from the following:    Height as of this encounter: 1.727 m (5' 8\").    Weight as of this encounter: 76.5 kg (168 lb 11.2 oz)., PRESENT ON ADMISSION     # Financial/Environmental Concerns: none         Disposition Plan     Medically Ready for Discharge: Anticipated Today             Nancy Li MD  Hospitalist Service  [facility]  Securely message with [employer] (more info)  Text page via [facility] Paging/Directory   ______________________________________________________________________    Interval History   He feels good  Voiding yellow and no pain  Wants to go home  Tolerated asa    Physical Exam   Vital " Signs: Temp: 97.6  F (36.4  C) Temp src: Oral BP: (!) 151/76 Pulse: 67   Resp: 20 SpO2: 98 % O2 Device: None (Room air)    Weight: 168 lbs 11.2 oz    Constitutional: awake, alert, cooperative, and no apparent distress  Eyes: sclera clear  Respiratory: No increased work of breathing, good air exchange, clear to auscultation bilaterally, no crackles or wheezing  Cardiovascular: regular rate and rhythm and normal S1 and S2  GI: normal bowel sounds, soft, non-distended, and non-tender  Skin: no bruising or bleeding  Musculoskeletal: no lower extremity pitting edema present  Neurologic: Mental Status Exam:  Level of Alertness:   awake  Neuropsychiatric: General: normal, calm, and normal eye contact        Data     I have personally reviewed the following data over the past 24 hrs:    8.5  \   9.2 (L)   / 110 (L)     138 106 36.2 (H) /  94   5.0 24 1.41 (H) \       Imaging results reviewed over the past 24 hrs:   No results found for this or any previous visit (from the past 24 hour(s)).

## 2024-06-25 ENCOUNTER — TELEPHONE (OUTPATIENT)
Dept: FAMILY MEDICINE | Facility: CLINIC | Age: 84
End: 2024-06-25

## 2024-06-25 ENCOUNTER — OFFICE VISIT (OUTPATIENT)
Dept: FAMILY MEDICINE | Facility: CLINIC | Age: 84
End: 2024-06-25
Payer: MEDICARE

## 2024-06-25 VITALS
SYSTOLIC BLOOD PRESSURE: 120 MMHG | BODY MASS INDEX: 26.52 KG/M2 | OXYGEN SATURATION: 99 % | HEART RATE: 58 BPM | HEIGHT: 68 IN | WEIGHT: 175 LBS | DIASTOLIC BLOOD PRESSURE: 68 MMHG | TEMPERATURE: 97.4 F

## 2024-06-25 DIAGNOSIS — N40.1 BPH WITH URINARY OBSTRUCTION: ICD-10-CM

## 2024-06-25 DIAGNOSIS — N13.8 BPH WITH URINARY OBSTRUCTION: ICD-10-CM

## 2024-06-25 DIAGNOSIS — I10 BENIGN ESSENTIAL HYPERTENSION: Primary | ICD-10-CM

## 2024-06-25 DIAGNOSIS — I48.20 CHRONIC ATRIAL FIBRILLATION (H): ICD-10-CM

## 2024-06-25 DIAGNOSIS — Z79.02 ANTIPLATELET OR ANTITHROMBOTIC LONG-TERM USE: ICD-10-CM

## 2024-06-25 DIAGNOSIS — D62 ANEMIA DUE TO BLOOD LOSS, ACUTE: ICD-10-CM

## 2024-06-25 LAB
ANION GAP SERPL CALCULATED.3IONS-SCNC: 12 MMOL/L (ref 7–15)
BUN SERPL-MCNC: 35.4 MG/DL (ref 8–23)
CALCIUM SERPL-MCNC: 9.4 MG/DL (ref 8.8–10.2)
CHLORIDE SERPL-SCNC: 104 MMOL/L (ref 98–107)
CREAT SERPL-MCNC: 1.63 MG/DL (ref 0.67–1.17)
DEPRECATED HCO3 PLAS-SCNC: 24 MMOL/L (ref 22–29)
EGFRCR SERPLBLD CKD-EPI 2021: 42 ML/MIN/1.73M2
ERYTHROCYTE [DISTWIDTH] IN BLOOD BY AUTOMATED COUNT: 14 % (ref 10–15)
GLUCOSE SERPL-MCNC: 109 MG/DL (ref 70–99)
HCT VFR BLD AUTO: 31.4 % (ref 40–53)
HGB BLD-MCNC: 10 G/DL (ref 13.3–17.7)
MCH RBC QN AUTO: 32.1 PG (ref 26.5–33)
MCHC RBC AUTO-ENTMCNC: 31.8 G/DL (ref 31.5–36.5)
MCV RBC AUTO: 101 FL (ref 78–100)
PLATELET # BLD AUTO: 150 10E3/UL (ref 150–450)
POTASSIUM SERPL-SCNC: 4.4 MMOL/L (ref 3.4–5.3)
RBC # BLD AUTO: 3.12 10E6/UL (ref 4.4–5.9)
SODIUM SERPL-SCNC: 140 MMOL/L (ref 135–145)
WBC # BLD AUTO: 8.5 10E3/UL (ref 4–11)

## 2024-06-25 PROCEDURE — 36415 COLL VENOUS BLD VENIPUNCTURE: CPT | Performed by: FAMILY MEDICINE

## 2024-06-25 PROCEDURE — 99495 TRANSJ CARE MGMT MOD F2F 14D: CPT | Performed by: FAMILY MEDICINE

## 2024-06-25 PROCEDURE — 85027 COMPLETE CBC AUTOMATED: CPT | Performed by: FAMILY MEDICINE

## 2024-06-25 PROCEDURE — 80048 BASIC METABOLIC PNL TOTAL CA: CPT | Performed by: FAMILY MEDICINE

## 2024-06-25 NOTE — LETTER
July 1, 2024      Nikos Pagenancy  [address] [place] [address]        Dear Nikos,    The labs are stable. The Hemoglobin has improved. If you have any questions or concerns, please call the clinic at the number listed above.     Sincerely,    Tessa Martinez M.D.     Resulted Orders   Basic metabolic panel  (Ca, Cl, CO2, Creat, Gluc, K, Na, BUN)   Result Value Ref Range    Sodium 140 135 - 145 mmol/L    Potassium 4.4 3.4 - 5.3 mmol/L    Chloride 104 98 - 107 mmol/L    Carbon Dioxide (CO2) 24 22 - 29 mmol/L    Anion Gap 12 7 - 15 mmol/L    Urea Nitrogen 35.4 (H) 8.0 - 23.0 mg/dL    Creatinine 1.63 (H) 0.67 - 1.17 mg/dL    GFR Estimate 42 (L) >60 mL/min/1.73m2      Comment:      eGFR calculated using 2021 CKD-EPI equation.    Calcium 9.4 8.8 - 10.2 mg/dL    Glucose 109 (H) 70 - 99 mg/dL   CBC with platelets   Result Value Ref Range    WBC Count 8.5 4.0 - 11.0 10e3/uL    RBC Count 3.12 (L) 4.40 - 5.90 10e6/uL    Hemoglobin 10.0 (L) 13.3 - 17.7 g/dL    Hematocrit 31.4 (L) 40.0 - 53.0 %     (H) 78 - 100 fL    MCH 32.1 26.5 - 33.0 pg    MCHC 31.8 31.5 - 36.5 g/dL    RDW 14.0 10.0 - 15.0 %    Platelet Count 150 150 - 450 10e3/uL

## 2024-06-25 NOTE — PROGRESS NOTES
Assessment & Plan     Benign essential hypertension  Stable   - Basic metabolic panel  (Ca, Cl, CO2, Creat, Gluc, K, Na, BUN); Future  - Basic metabolic panel  (Ca, Cl, CO2, Creat, Gluc, K, Na, BUN)    Chronic atrial fibrillation (H)  stable    Antiplatelet or antithrombotic long-term use  Monitoring   - CBC with platelets; Future  - CBC with platelets    BPH with urinary obstruction -- [place] on 4/10/23  Follow up with urology     Anemia due to blood loss, acute  Recheck   Results for orders placed or performed in visit on 06/25/24   Basic metabolic panel  (Ca, Cl, CO2, Creat, Gluc, K, Na, BUN)     Status: Abnormal   Result Value Ref Range    Sodium 140 135 - 145 mmol/L    Potassium 4.4 3.4 - 5.3 mmol/L    Chloride 104 98 - 107 mmol/L    Carbon Dioxide (CO2) 24 22 - 29 mmol/L    Anion Gap 12 7 - 15 mmol/L    Urea Nitrogen 35.4 (H) 8.0 - 23.0 mg/dL    Creatinine 1.63 (H) 0.67 - 1.17 mg/dL    GFR Estimate 42 (L) >60 mL/min/1.73m2    Calcium 9.4 8.8 - 10.2 mg/dL    Glucose 109 (H) 70 - 99 mg/dL   CBC with platelets     Status: Abnormal   Result Value Ref Range    WBC Count 8.5 4.0 - 11.0 10e3/uL    RBC Count 3.12 (L) 4.40 - 5.90 10e6/uL    Hemoglobin 10.0 (L) 13.3 - 17.7 g/dL    Hematocrit 31.4 (L) 40.0 - 53.0 %     (H) 78 - 100 fL    MCH 32.1 26.5 - 33.0 pg    MCHC 31.8 31.5 - 36.5 g/dL    RDW 14.0 10.0 - 15.0 %    Platelet Count 150 150 - 450 10e3/uL             MED REC REQUIRED  Post Medication Reconciliation Status: discharge medications reconciled, continue medications without change    See Patient Instructions    Claudine Knott is a 83 year old, presenting for the following health issues:  Hospital F/U        6/25/2024     3:54 PM   Additional Questions   Roomed by Karen DEVI CMA     [facility]         Hospital Follow-up Visit:  Hospital/Nursing Home/IP Rehab Facility: [facility]  Date of Admission: 6/19/24  Date of Discharge: 6/22/24  Reason(s) for Admission: Gross hematuria     "Acute urinary retention    Nonsustained ventricular tachycardia (H)  Was the patient in the ICU or did the patient experience delirium during hospitalization?  No  Do you have any other stressors you would like to discuss with your provider? No    Problems taking medications regularly:  None  Medication changes since discharge: None  Problems adhering to non-medication therapy:  None    Summary of hospitalization:  [facility] discharge summary reviewed  Diagnostic Tests/Treatments reviewed.  Follow up needed: none  Other Healthcare Providers Involved in Patient s Care:          cardiology  Update since discharge: improved.         Plan of care communicated with patient             Doing much better he does not want to have home health care come he does not really need this. There is hospice coming in for his wife       Review of Systems  Constitutional, HEENT, cardiovascular, pulmonary, gi and gu systems are negative, except as otherwise noted.      Objective    /68 (BP Location: Right arm, Patient Position: Sitting, Cuff Size: Adult Regular)   Pulse 58   Temp 97.4  F (36.3  C) (Tympanic)   Ht 1.727 m (5' 8\")   Wt 79.4 kg (175 lb)   SpO2 99%   BMI 26.61 kg/m    Body mass index is 26.61 kg/m .  Physical Exam   GENERAL: alert and no distress  RESP: lungs clear to auscultation - no rales, rhonchi or wheezes  CV: irregularly irregular rhythm, normal S1 S2, no S3 or S4, and no peripheral edema  PSYCH: mentation appears normal, affect normal/bright            Signed Electronically by: Tessa Martinez MD    "

## 2024-06-25 NOTE — TELEPHONE ENCOUNTER
Reason for Call:  Other call back    Detailed comments: needs ok to start homecare on 6/27, reach out to further assist.    Phone Number Patient can be reached at: Other phone number:  [phone]    Best Time: anytime    Can we leave a detailed message on this number? YES    Call taken on 6/25/2024 at 4:25 PM by Kelly Flannery

## 2024-06-25 NOTE — TELEPHONE ENCOUNTER
He doesn't need home care he is not home bound. I don't know why they ordered this he doesn;t need it. Tessa Martinez M.D.

## 2024-06-26 ENCOUNTER — TRANSFERRED RECORDS (OUTPATIENT)
Dept: HEALTH INFORMATION MANAGEMENT | Facility: CLINIC | Age: 84
End: 2024-06-26
Payer: MEDICARE

## 2024-06-26 NOTE — TELEPHONE ENCOUNTER
"This was the providers reasoning for ordering home care, if you click on the order in the referral you can see the full document.     \"Additional services needed: Occupational Therapy ADLs  Home Safety      Further, I certify that my clinical findings support that this patient is homebound (i.e. absences from home require considerable and taxing effort and are for medical reasons or Religion services or infrequently or of short duration when for other reasons) related to:Patient has difficulty ambulating >100 ft     Based on the above findings, I certify that this patient is confined to the home and needs intermittent skilled nursing care, physical therapy and/or speech therapy. The patient is under my care, and I have initiated the establishment of the plan of care. This patient will be followed by a physician who will periodically review the plan of care\"    This is probably why they called the clinic  \"PLEASE EVALUATE AND TREAT (Evaluation timeline is 24 - 48 hrs. Please call if there is need for a variance to this timeline).\"     Please advise.   Marine Santana RN on 6/26/2024 at 8:46 AM    "

## 2024-06-26 NOTE — TELEPHONE ENCOUNTER
Yesterday he said he did not need it and I agree. He is going to decline services. Tessa Martinez M.D.

## 2024-06-27 ENCOUNTER — TELEPHONE (OUTPATIENT)
Dept: FAMILY MEDICINE | Facility: CLINIC | Age: 84
End: 2024-06-27
Payer: MEDICARE

## 2024-06-27 ENCOUNTER — HOSPITAL ENCOUNTER (INPATIENT)
Facility: HOSPITAL | Age: 84
Setting detail: SURGERY ADMIT
DRG: 274 | End: 2024-06-27
Attending: INTERNAL MEDICINE | Admitting: INTERNAL MEDICINE
Payer: MEDICARE

## 2024-06-27 DIAGNOSIS — I48.20 CHRONIC ATRIAL FIBRILLATION (H): ICD-10-CM

## 2024-06-27 NOTE — TELEPHONE ENCOUNTER
See message below. Phoenix, nurse with [facility], calls with update. Says that she saw pt today for start of care after a recent hospitalization, says that pt was not accepting of home care services. Phoenix says that she provided a lot of education to the pt on available services that they could provide for him (med management, therapy services, etc). Pt says he can manage his meds on his own, and if needing assistance, will call the clinic or ask his son. Pt states that he will not allow the nurse to look at his private parts, and does not need any therapy services. She says that he then went up and down his stairs twice. Phoenix says that p's son and wife tried to convince the pt to work with home care, pt still declined. Pt says that his wife is on hospice and he has enough going on. Phoenix notified her supervisor, and advised family to reach out to care team if another referral is needed in the future. Routing to PCP to update.    Paradise Louis RN  [facility]

## 2024-06-27 NOTE — TELEPHONE ENCOUNTER
Son Shailesh called (C2C on file) stating patient is refusing his homecare, he does not feel that he needs it and patient states he was in to see Dr. Ray on 6/25/24 and she never mentioned this.    Noted homecare orders placed during discharge from [facility] on 6/22/24 (see below)    Your provider has ordered home health services. If you have not been contacted within 2 days of your discharge please  call the selected Home Care agency listed on your Discharge document. If a Home Care agency is NOT listed, please call  [phone].  Reason for Referral: Skilled Nursing  Skilled Nursing Eval and Treat for: Disease management  Other  Other Reason for Referral: 82 y/o with cad, afib, just had serious  bleeding and cysto with clot removal , now holding  Eliquis few more days, check cbc, bmp in 3-4 days and call to MD.  Additional Services Needed: Occupational Therapy  Occupational Therapy Eval and Treat for: ADLs  Home Safety  Is the patient homebound?: Yes  Homebound Status (describe the functional limitations that support this patient is confined to his/her home. Medicaid  recipients are not required to be homebound.): Patient has difficulty ambulating >100 ft  I attest that I saw or will see the patient on this date: 6/22/2024  Provider to follow patient: SUSY RAY FESTUS Cash states he would benefit from the homecare and was hoping Dr. Ray could advise further on this.    Message routed to Dr. Ray/[facility] team to advise.     Julie Behrendt RN

## 2024-07-10 LAB
ATRIAL RATE - MUSE: 91 BPM
DIASTOLIC BLOOD PRESSURE - MUSE: 68 MMHG
INTERPRETATION ECG - MUSE: NORMAL
P AXIS - MUSE: 65 DEGREES
PR INTERVAL - MUSE: 156 MS
QRS DURATION - MUSE: 88 MS
QT - MUSE: 370 MS
QTC - MUSE: 455 MS
R AXIS - MUSE: -14 DEGREES
SYSTOLIC BLOOD PRESSURE - MUSE: 114 MMHG
T AXIS - MUSE: 98 DEGREES
VENTRICULAR RATE- MUSE: 91 BPM

## 2024-08-28 NOTE — H&P (VIEW-ONLY)
HEART CARE ENCOUNTER NOTE       [facility]  [phone]      Assessment/Recommendations   1.  Paroxysmal atrial fibrillation: I have personally reviewed this patient's chart and have spoken with the patient about the treatment options, including KI device.  He has a [id number] score of at least 5 for age >75, HTN, CAD, CHF.  He has a HAS-BLED score of 3 for age, bleeding predisposition, history of SAH.   He is not a good candidate for long-term anticoagulation due to history of subarachnoid hemorrhage and chronic hematuria.    The patient is scheduled for next Thursday.  Is been taking aspirin 81 mg daily in addition to his Eliquis in anticipation for the procedure.  After implant, he will stay on Eliquis and aspirin until 6 weeks postprocedure.  He will then stop Eliquis, continue aspirin and start Plavix 75 mg daily for 4 months.  He will then stop Plavix and remain on aspirin 81 mg daily indefinitely.   He will have a CAROL ANN or CTA approximately 3 months and 1 year post-implant.  He understands that the risks of the procedure are <2% and include, but are not limited to device embolization, air embolism, myocardial perforation, device thrombosis, ASD, stroke, or death.  We discussed expected recovery and follow-up.       The patient is a good candidate for proceeding with left atrial appendage screening implant.  His questions were answered to his satisfaction.  Written consents have been signed and witnessed by me.   His labs and EKG has been reviewed.     2.  Coronary artery disease status post CANDIE x 3 to the LAD April 2023  -he currently denies symptoms of angina    3.  Hyperlipidemia -LDL 61    4.  Chronic systolic heart failure, LVEF 40% - currently compensated. He is taking Lasix 20 mg daily. He should continue metoprolol, losartan    5.  Chronic kidney disease stage III -creatinine is stable on today's labs    6.  Hypertension blood pressure is elevated today.  Continue losartan 25 mg  daily    7. History of subarachnoid hemorrhage - 2016. No residual deficits     8.  Chronic normocytic anemia -hemoglobin is stable at 11.1 on today's labs       History of Present Illness/Subjective    Felipe Verma is a 83 year old male who comes in today for history and physical prior to LAAO    Felipe Verma has a past history of permanent atrial fibrillation, coronary artery disease status post CANDIE x 3 to the LAD April 2023, hyperlipidemia, chronic kidney disease stage III, history of subarachnoid hemorrhage, history of chronic hematuria, rheumatoid arthritis     He has had issues with chronic hematuria resulting in several admissions in the past.. He also has a history of subarachnoid hemorrhage in 2016 after presenting with slurred speech and left facial droop.  He denies any residual deficits from this event.  He is currently taking Eliquis and is recently started taking a baby aspirin anticipation for the procedure.  He denies current bleeding issues and he reports his hematuria has improved after cauterization.  He overall is feeling well.  He denies any chest discomfort, shortness of breath, orthopnea.  He reports occasional lightheadedness.  He reports minimal swelling to his legs    Felipe Verma denies chest discomfort, palpitations, shortness of breath, paroxysmal nocturnal dyspnea, orthopnea, lightheadedness, dizziness, pre-syncope, or syncope.  Felipe Verma also denies any weight loss, changes in appetite, nausea or vomiting.     Medical, surgical, family, social history, and medications were reviewed and updated as necessary.    ECHO results (from 2/23/2024):  Interpretation Summary     1. The left ventricle is normal in size. Left ventricular systolic performance  is moderately reduced. The ejection fraction is estimated to be 40%.  2. There is mild-moderate global reduction in left ventricular systolic  performance with more noticeable anteroseptal and apical hypokinesis  "(no  apical mural thrombus is detected).  3. No significant valvular heart disease is identified on this study.  4. Normal right ventricular size and systolic performance.  5. There is mild left atrial enlargement.     When compared to the prior real-time echocardiogram dated 11 April 2023, the  wall motion abnormality involving the LAD distribution does not appear quite  as pronounced though the ejection fraction appears similar on both studies.  The degree of mitral insufficiency appears slightly less on the current study.    EKG (personally reviewed and interpreted):  Sinus rhythm with PACs  Ventricular rate 81, , QRS 94, QT/QTc 370/439       Physical Examination Review of Systems   Vitals: BP (!) 152/91 (BP Location: Left arm, Patient Position: Sitting, Cuff Size: Adult Regular)   Pulse 74   Resp 16   Ht 1.727 m (5' 8\")   Wt 82.1 kg (181 lb)   SpO2 99%   BMI 27.52 kg/m    BMI= Body mass index is 27.52 kg/m .  Wt Readings from Last 3 Encounters:   08/29/24 82.1 kg (181 lb)   06/25/24 79.4 kg (175 lb)   06/22/24 76.5 kg (168 lb 11.2 oz)       General Appearance:   Alert, cooperative and in no acute distress   ENT/Mouth: membranes moist, no oral lesions or bleeding gums.      EYES:  no scleral icterus, normal conjunctivae   Neck: Thyroid not visualized   Chest/Lungs:   lungs are clear to auscultation, no rales or wheezing   Cardiovascular:   Regular . Normal first and second heart sounds with no murmurs, rubs or gallops; the carotid, radial and posterior tibial pulses are intact, trace edema bilaterally    Abdomen:  Soft and nontender. Bowel sounds are present in all quadrants   Extremities: no cyanosis or clubbing   Skin: no xanthelasma, warm.    Neurologic: normal gait, normal  bilateral, no tremors   Psychiatric: Normal mood and affect       Please refer above for cardiac ROS details.      Medical History  Surgical History Family History Social History   Past Medical History:   Diagnosis Date    " Acute diverticulitis 04/20/2016    BPH with urinary obstruction     Brain aneurysm 05/03/2016    Chronic atrial fibrillation (H)     Coronary artery disease     Diverticulosis     Dysarthria 2016    Facial droop 2016    Heart attack (H) 04/10/2023    NSTEMI    History of subarachnoid hemorrhage 05/03/2016    Hyperlipidemia     Hypertension     Lung nodule < 6cm on CT 06/06/2018    Normocytic anemia     Paroxysmal atrial fibrillation (H)     Pure hypercholesterolemia     Rheumatoid arthritis involving multiple sites with positive rheumatoid factor (H)     Stage 3a chronic kidney disease (CKD) (H)     Stented coronary artery      Past Surgical History:   Procedure Laterality Date    CV CORONARY ANGIOGRAM N/A 4/11/2023    Procedure: Coronary Angiogram;  Surgeon: Alejandro Hitchcock MD;  Location: [facility] LAB CV    CV PCI STENT DRUG ELUTING N/A 4/11/2023    Procedure: Percutaneous Coronary Intervention Stent;  Surgeon: Alejandro Hitchcock MD;  Location: [facility] CATH LAB CV    CYSTOSCOPY, FULGURATE BLEEDERS, EVACUATE CLOT(S), COMBINED N/A 6/19/2024    Procedure: CYSTOSCOPY, WITH FULGURATION OF HEMORRHAGING BLOOD VESSEL AND THROMBUS REMOVAL;  Surgeon: Bert Schmitz MD;  Location: [facility] [place] OR    OTHER SURGICAL HISTORY      OTHER SURGICAL HISTORYstent placement in head     TONSILLECTOMY       Family History   Problem Relation Age of Onset    Colon Cancer Mother     Hypertension Father     Coronary Artery Disease Father     Aneurysm Sister     Cerebrovascular Disease Sister     No Known Problems Brother     Diabetes Son     No Known Problems Son     No Known Problems Son     Breast Cancer No family hx of     Prostate Cancer No family hx of     Social History     Socioeconomic History    Marital status:      Spouse name: Not on file    Number of children: Not on file    Years of education: Not on file    Highest education level: Not on file   Occupational History    Not on file   Tobacco Use     Smoking status: Former    Smokeless tobacco: Never   Vaping Use    Vaping status: Never Used   Substance and Sexual Activity    Alcohol use: Not on file     Comment: Alcoholic Drinks/day: 1 glass per week    Drug use: Not Currently    Sexual activity: Not Currently   Other Topics Concern    Not on file   Social History Narrative    Not on file     Social Determinants of Health     Financial Resource Strain: Low Risk  (3/13/2024)    Financial Resource Strain     Within the past 12 months, have you or your family members you live with been unable to get utilities (heat, electricity) when it was really needed?: No   Food Insecurity: Low Risk  (3/13/2024)    Food Insecurity     Within the past 12 months, did you worry that your food would run out before you got money to buy more?: No     Within the past 12 months, did the food you bought just not last and you didn t have money to get more?: No   Transportation Needs: Low Risk  (3/13/2024)    Transportation Needs     Within the past 12 months, has lack of transportation kept you from medical appointments, getting your medicines, non-medical meetings or appointments, work, or from getting things that you need?: No   Physical Activity: Insufficiently Active (3/13/2024)    Exercise Vital Sign     Days of Exercise per Week: 1 day     Minutes of Exercise per Session: 20 min   Stress: Stress Concern Present (3/13/2024)    [employer] [place] [employer] - Occupational Stress Questionnaire     Feeling of Stress : To some extent   Social Connections: Unknown (3/13/2024)    Social Connection and Isolation Panel [NHANES]     Frequency of Communication with Friends and Family: Not on file     Frequency of Social Gatherings with Friends and Family: Once a week     Attends Qu[employer] Services: Not on file     Active Member of Clubs or Organizations: Not on file     Attends Club or Organization Meetings: Not on file     Marital Status: Not on file   Interpersonal Safety:  Low Risk  (3/13/2024)    Interpersonal Safety     Do you feel physically and emotionally safe where you currently live?: Yes     Within the past 12 months, have you been hit, slapped, kicked or otherwise physically hurt by someone?: No     Within the past 12 months, have you been humiliated or emotionally abused in other ways by your partner or ex-partner?: No   Housing Stability: Low Risk  (3/13/2024)    Housing Stability     Do you have housing? : Yes     Are you worried about losing your housing?: No          Medications  Allergies   Current Outpatient Medications   Medication Sig Dispense Refill    acetaminophen (TYLENOL) 500 MG tablet Take 1,000 mg by mouth every 6 hours as needed for mild pain      apixaban ANTICOAGULANT (ELIQUIS) 2.5 MG tablet Take 1 tablet (2.5 mg) by mouth 2 times daily      aspirin 81 MG EC tablet Take 1 tablet (81 mg) by mouth daily 30 tablet 3    atorvastatin (LIPITOR) 20 MG tablet Take 1 tablet (20 mg) by mouth every evening 90 tablet 3    calcium carbonate 500 mg, elemental, (OSCAL 500) 1250 (500 Ca) MG TABS tablet Take 1 tablet by mouth daily as needed (when remembers)      furosemide (LASIX) 20 MG tablet TAKE 1 TABLET BY MOUTH ONCE DAILY FOR INCREASED SWELLING, WEIGHT GAIN, OR SHORTNESS OF BREATH 90 tablet 3    hydroxychloroquine (PLAQUENIL) 200 MG tablet Take 200 mg by mouth daily      leucovorin (WELLCOVORIN) 5 mg tablet Take 5 mg by mouth three times a week Do not overlap days of methotrexate - Mon, Wed, and Saturday      losartan (COZAAR) 25 MG tablet Take 1 tablet (25 mg) by mouth daily 90 tablet 3    methotrexate 2.5 MG tablet Take 12.5 mg by mouth twice a week 5 tablets (12.5mg) on Thursday and Friday      metoprolol succinate ER (TOPROL XL) 25 MG 24 hr tablet Take 3 tablets (75 mg) by mouth daily 270 tablet 3    tamsulosin (FLOMAX) 0.4 MG capsule Take 2 capsules (0.8 mg) by mouth daily (with dinner) 180 capsule 3    vitamin B-12 (CYANOCOBALAMIN) 1000 MCG tablet Take 1,000 mcg  "by mouth as needed. (when remembers)      vitamin D3 (CHOLECALCIFEROL) 50 mcg (2000 units) tablet Take 1 tablet by mouth daily as needed (when remembers)      nitroGLYcerin (NITROSTAT) 0.4 MG sublingual tablet For chest pain place 1 tablet under the tongue every 5 minutes for 3 doses. If symptoms persist 5 minutes after 1st dose call 911. 25 tablet 1    No Known Allergies      Lab Results    Chemistry/lipid CBC Cardiac Enzymes/BNP/TSH/INR   No results for input(s): \"CHOL\", \"HDL\", \"LDL\", \"TRIG\", \"CHOLHDLRATIO\" in the last 86749 hours.  No results for input(s): \"LDL\" in the last 54615 hours.  Recent Labs   Lab Test 06/25/24  1655      POTASSIUM 4.4   CHLORIDE 104   CO2 24   *   BUN 35.4*   CR 1.63*   GFRESTIMATED 42*   ABBEY 9.4     Recent Labs   Lab Test 06/25/24  1655 06/22/24  0427 06/21/24  0450   CR 1.63* 1.41* 1.36*     Recent Labs   Lab Test 06/19/24  0601   A1C 6.0*    Recent Labs   Lab Test 06/25/24  1655   WBC 8.5   HGB 10.0*   HCT 31.4*   *        Recent Labs   Lab Test 06/25/24  1655 06/22/24  0427 06/21/24  0715   HGB 10.0* 9.2* 9.5*    No results for input(s): \"TROPONINI\" in the last 04544 hours.  Recent Labs   Lab Test 06/19/24  0601 04/10/23  2209   NTBNPI 1,424 325     Recent Labs   Lab Test 02/22/24  1432   TSH 3.67     Recent Labs   Lab Test 04/10/23  2209   INR 1.19*        40 minutes spent on the date of encounter doing education, consent signing, chart prep/review, review of outside records, review of test results, interpretation with above tests, patient visit, and documentation.      This note has been dictated using voice recognition software. Any grammatical or context distortions are unintentional and inherent to the software.    Rika Rawls PA-C  Structural Heart Program  [facility]   "

## 2024-08-28 NOTE — PROGRESS NOTES
HEART CARE ENCOUNTER NOTE       [facility]  [phone]      Assessment/Recommendations   1.  Paroxysmal atrial fibrillation: I have personally reviewed this patient's chart and have spoken with the patient about the treatment options, including KI device.  He has a [id number] score of at least 5 for age >75, HTN, CAD, CHF.  He has a HAS-BLED score of 3 for age, bleeding predisposition, history of SAH.   He is not a good candidate for long-term anticoagulation due to history of subarachnoid hemorrhage and chronic hematuria.    The patient is scheduled for next Thursday.  Is been taking aspirin 81 mg daily in addition to his Eliquis in anticipation for the procedure.  After implant, he will stay on Eliquis and aspirin until 6 weeks postprocedure.  He will then stop Eliquis, continue aspirin and start Plavix 75 mg daily for 4 months.  He will then stop Plavix and remain on aspirin 81 mg daily indefinitely.   He will have a CAROL ANN or CTA approximately 3 months and 1 year post-implant.  He understands that the risks of the procedure are <2% and include, but are not limited to device embolization, air embolism, myocardial perforation, device thrombosis, ASD, stroke, or death.  We discussed expected recovery and follow-up.       The patient is a good candidate for proceeding with left atrial appendage screening implant.  His questions were answered to his satisfaction.  Written consents have been signed and witnessed by me.   His labs and EKG has been reviewed.     2.  Coronary artery disease status post CANDIE x 3 to the LAD April 2023  -he currently denies symptoms of angina    3.  Hyperlipidemia -LDL 61    4.  Chronic systolic heart failure, LVEF 40% - currently compensated. He is taking Lasix 20 mg daily. He should continue metoprolol, losartan    5.  Chronic kidney disease stage III -creatinine is stable on today's labs    6.  Hypertension blood pressure is elevated today.  Continue losartan 25 mg  daily    7. History of subarachnoid hemorrhage - 2016. No residual deficits     8.  Chronic normocytic anemia -hemoglobin is stable at 11.1 on today's labs       History of Present Illness/Subjective    Felipe Verma is a 83 year old male who comes in today for history and physical prior to LAAO    Felipe Verma has a past history of permanent atrial fibrillation, coronary artery disease status post CANDIE x 3 to the LAD April 2023, hyperlipidemia, chronic kidney disease stage III, history of subarachnoid hemorrhage, history of chronic hematuria, rheumatoid arthritis     He has had issues with chronic hematuria resulting in several admissions in the past.. He also has a history of subarachnoid hemorrhage in 2016 after presenting with slurred speech and left facial droop.  He denies any residual deficits from this event.  He is currently taking Eliquis and is recently started taking a baby aspirin anticipation for the procedure.  He denies current bleeding issues and he reports his hematuria has improved after cauterization.  He overall is feeling well.  He denies any chest discomfort, shortness of breath, orthopnea.  He reports occasional lightheadedness.  He reports minimal swelling to his legs    Felipe Verma denies chest discomfort, palpitations, shortness of breath, paroxysmal nocturnal dyspnea, orthopnea, lightheadedness, dizziness, pre-syncope, or syncope.  Felipe Verma also denies any weight loss, changes in appetite, nausea or vomiting.     Medical, surgical, family, social history, and medications were reviewed and updated as necessary.    ECHO results (from 2/23/2024):  Interpretation Summary     1. The left ventricle is normal in size. Left ventricular systolic performance  is moderately reduced. The ejection fraction is estimated to be 40%.  2. There is mild-moderate global reduction in left ventricular systolic  performance with more noticeable anteroseptal and apical hypokinesis  "(no  apical mural thrombus is detected).  3. No significant valvular heart disease is identified on this study.  4. Normal right ventricular size and systolic performance.  5. There is mild left atrial enlargement.     When compared to the prior real-time echocardiogram dated 11 April 2023, the  wall motion abnormality involving the LAD distribution does not appear quite  as pronounced though the ejection fraction appears similar on both studies.  The degree of mitral insufficiency appears slightly less on the current study.    EKG (personally reviewed and interpreted):  Sinus rhythm with PACs  Ventricular rate 81, , QRS 94, QT/QTc 370/439       Physical Examination Review of Systems   Vitals: BP (!) 152/91 (BP Location: Left arm, Patient Position: Sitting, Cuff Size: Adult Regular)   Pulse 74   Resp 16   Ht 1.727 m (5' 8\")   Wt 82.1 kg (181 lb)   SpO2 99%   BMI 27.52 kg/m    BMI= Body mass index is 27.52 kg/m .  Wt Readings from Last 3 Encounters:   08/29/24 82.1 kg (181 lb)   06/25/24 79.4 kg (175 lb)   06/22/24 76.5 kg (168 lb 11.2 oz)       General Appearance:   Alert, cooperative and in no acute distress   ENT/Mouth: membranes moist, no oral lesions or bleeding gums.      EYES:  no scleral icterus, normal conjunctivae   Neck: Thyroid not visualized   Chest/Lungs:   lungs are clear to auscultation, no rales or wheezing   Cardiovascular:   Regular . Normal first and second heart sounds with no murmurs, rubs or gallops; the carotid, radial and posterior tibial pulses are intact, trace edema bilaterally    Abdomen:  Soft and nontender. Bowel sounds are present in all quadrants   Extremities: no cyanosis or clubbing   Skin: no xanthelasma, warm.    Neurologic: normal gait, normal  bilateral, no tremors   Psychiatric: Normal mood and affect       Please refer above for cardiac ROS details.      Medical History  Surgical History Family History Social History   Past Medical History:   Diagnosis Date    " Acute diverticulitis 04/20/2016    BPH with urinary obstruction     Brain aneurysm 05/03/2016    Chronic atrial fibrillation (H)     Coronary artery disease     Diverticulosis     Dysarthria 2016    Facial droop 2016    Heart attack (H) 04/10/2023    NSTEMI    History of subarachnoid hemorrhage 05/03/2016    Hyperlipidemia     Hypertension     Lung nodule < 6cm on CT 06/06/2018    Normocytic anemia     Paroxysmal atrial fibrillation (H)     Pure hypercholesterolemia     Rheumatoid arthritis involving multiple sites with positive rheumatoid factor (H)     Stage 3a chronic kidney disease (CKD) (H)     Stented coronary artery      Past Surgical History:   Procedure Laterality Date    CV CORONARY ANGIOGRAM N/A 4/11/2023    Procedure: Coronary Angiogram;  Surgeon: Alejandro Hitchcock MD;  Location: [facility] LAB CV    CV PCI STENT DRUG ELUTING N/A 4/11/2023    Procedure: Percutaneous Coronary Intervention Stent;  Surgeon: Alejandro Hitchcock MD;  Location: [facility] CATH LAB CV    CYSTOSCOPY, FULGURATE BLEEDERS, EVACUATE CLOT(S), COMBINED N/A 6/19/2024    Procedure: CYSTOSCOPY, WITH FULGURATION OF HEMORRHAGING BLOOD VESSEL AND THROMBUS REMOVAL;  Surgeon: Bert Schmitz MD;  Location: [facility] [place] [place]    OTHER SURGICAL HISTORY      OTHER SURGICAL HISTORYstent placement in head     TONSILLECTOMY       Family History   Problem Relation Age of Onset    Colon Cancer Mother     Hypertension Father     Coronary Artery Disease Father     Aneurysm Sister     Cerebrovascular Disease Sister     No Known Problems Brother     Diabetes Son     No Known Problems Son     No Known Problems Son     Breast Cancer No family hx of     Prostate Cancer No family hx of     Social History     Socioeconomic History    Marital status:      Spouse name: Not on file    Number of children: Not on file    Years of education: Not on file    Highest education level: Not on file   Occupational History    Not on file   Tobacco Use     Smoking status: Former    Smokeless tobacco: Never   Vaping Use    Vaping status: Never Used   Substance and Sexual Activity    Alcohol use: Not on file     Comment: Alcoholic Drinks/day: 1 glass per week    Drug use: Not Currently    Sexual activity: Not Currently   Other Topics Concern    Not on file   Social History Narrative    Not on file     Social Determinants of Health     Financial Resource Strain: Low Risk  (3/13/2024)    Financial Resource Strain     Within the past 12 months, have you or your family members you live with been unable to get utilities (heat, electricity) when it was really needed?: No   Food Insecurity: Low Risk  (3/13/2024)    Food Insecurity     Within the past 12 months, did you worry that your food would run out before you got money to buy more?: No     Within the past 12 months, did the food you bought just not last and you didn t have money to get more?: No   Transportation Needs: Low Risk  (3/13/2024)    Transportation Needs     Within the past 12 months, has lack of transportation kept you from medical appointments, getting your medicines, non-medical meetings or appointments, work, or from getting things that you need?: No   Physical Activity: Insufficiently Active (3/13/2024)    Exercise Vital Sign     Days of Exercise per Week: 1 day     Minutes of Exercise per Session: 20 min   Stress: Stress Concern Present (3/13/2024)    [employer] [place] [employer] - Occupational Stress Questionnaire     Feeling of Stress : To some extent   Social Connections: Unknown (3/13/2024)    Social Connection and Isolation Panel [NHANES]     Frequency of Communication with Friends and Family: Not on file     Frequency of Social Gatherings with Friends and Family: Once a week     Attends [employer] Services: Not on file     Active Member of Clubs or Organizations: Not on file     Attends Club or Organization Meetings: Not on file     Marital Status: Not on file   Interpersonal Safety:  Low Risk  (3/13/2024)    Interpersonal Safety     Do you feel physically and emotionally safe where you currently live?: Yes     Within the past 12 months, have you been hit, slapped, kicked or otherwise physically hurt by someone?: No     Within the past 12 months, have you been humiliated or emotionally abused in other ways by your partner or ex-partner?: No   Housing Stability: Low Risk  (3/13/2024)    Housing Stability     Do you have housing? : Yes     Are you worried about losing your housing?: No          Medications  Allergies   Current Outpatient Medications   Medication Sig Dispense Refill    acetaminophen (TYLENOL) 500 MG tablet Take 1,000 mg by mouth every 6 hours as needed for mild pain      apixaban ANTICOAGULANT (ELIQUIS) 2.5 MG tablet Take 1 tablet (2.5 mg) by mouth 2 times daily      aspirin 81 MG EC tablet Take 1 tablet (81 mg) by mouth daily 30 tablet 3    atorvastatin (LIPITOR) 20 MG tablet Take 1 tablet (20 mg) by mouth every evening 90 tablet 3    calcium carbonate 500 mg, elemental, (OSCAL 500) 1250 (500 Ca) MG TABS tablet Take 1 tablet by mouth daily as needed (when remembers)      furosemide (LASIX) 20 MG tablet TAKE 1 TABLET BY MOUTH ONCE DAILY FOR INCREASED SWELLING, WEIGHT GAIN, OR SHORTNESS OF BREATH 90 tablet 3    hydroxychloroquine (PLAQUENIL) 200 MG tablet Take 200 mg by mouth daily      leucovorin (WELLCOVORIN) 5 mg tablet Take 5 mg by mouth three times a week Do not overlap days of methotrexate - Mon, Wed, and Saturday      losartan (COZAAR) 25 MG tablet Take 1 tablet (25 mg) by mouth daily 90 tablet 3    methotrexate 2.5 MG tablet Take 12.5 mg by mouth twice a week 5 tablets (12.5mg) on Thursday and Friday      metoprolol succinate ER (TOPROL XL) 25 MG 24 hr tablet Take 3 tablets (75 mg) by mouth daily 270 tablet 3    tamsulosin (FLOMAX) 0.4 MG capsule Take 2 capsules (0.8 mg) by mouth daily (with dinner) 180 capsule 3    vitamin B-12 (CYANOCOBALAMIN) 1000 MCG tablet Take 1,000 mcg  "by mouth as needed. (when remembers)      vitamin D3 (CHOLECALCIFEROL) 50 mcg (2000 units) tablet Take 1 tablet by mouth daily as needed (when remembers)      nitroGLYcerin (NITROSTAT) 0.4 MG sublingual tablet For chest pain place 1 tablet under the tongue every 5 minutes for 3 doses. If symptoms persist 5 minutes after 1st dose call 911. 25 tablet 1    No Known Allergies      Lab Results    Chemistry/lipid CBC Cardiac Enzymes/BNP/TSH/INR   No results for input(s): \"CHOL\", \"HDL\", \"LDL\", \"TRIG\", \"CHOLHDLRATIO\" in the last 56220 hours.  No results for input(s): \"LDL\" in the last 94920 hours.  Recent Labs   Lab Test 06/25/24  1655      POTASSIUM 4.4   CHLORIDE 104   CO2 24   *   BUN 35.4*   CR 1.63*   GFRESTIMATED 42*   ABBEY 9.4     Recent Labs   Lab Test 06/25/24  1655 06/22/24  0427 06/21/24  0450   CR 1.63* 1.41* 1.36*     Recent Labs   Lab Test 06/19/24  0601   A1C 6.0*    Recent Labs   Lab Test 06/25/24  1655   WBC 8.5   HGB 10.0*   HCT 31.4*   *        Recent Labs   Lab Test 06/25/24  1655 06/22/24  0427 06/21/24  0715   HGB 10.0* 9.2* 9.5*    No results for input(s): \"TROPONINI\" in the last 55177 hours.  Recent Labs   Lab Test 06/19/24  0601 04/10/23  2209   NTBNPI 1,424 325     Recent Labs   Lab Test 02/22/24  1432   TSH 3.67     Recent Labs   Lab Test 04/10/23  2209   INR 1.19*        40 minutes spent on the date of encounter doing education, consent signing, chart prep/review, review of outside records, review of test results, interpretation with above tests, patient visit, and documentation.      This note has been dictated using voice recognition software. Any grammatical or context distortions are unintentional and inherent to the software.    Rika Rawls PA-C  Structural Heart Program  [facility]   "

## 2024-08-29 ENCOUNTER — DOCUMENTATION ONLY (OUTPATIENT)
Dept: CARDIOLOGY | Facility: CLINIC | Age: 84
End: 2024-08-29

## 2024-08-29 ENCOUNTER — OFFICE VISIT (OUTPATIENT)
Dept: CARDIOLOGY | Facility: CLINIC | Age: 84
End: 2024-08-29
Payer: MEDICARE

## 2024-08-29 ENCOUNTER — LAB (OUTPATIENT)
Dept: CARDIOLOGY | Facility: CLINIC | Age: 84
End: 2024-08-29
Payer: MEDICARE

## 2024-08-29 ENCOUNTER — ALLIED HEALTH/NURSE VISIT (OUTPATIENT)
Dept: CARDIOLOGY | Facility: CLINIC | Age: 84
End: 2024-08-29
Payer: MEDICARE

## 2024-08-29 ENCOUNTER — PREP FOR PROCEDURE (OUTPATIENT)
Dept: CARDIOLOGY | Facility: CLINIC | Age: 84
End: 2024-08-29

## 2024-08-29 VITALS
HEIGHT: 68 IN | RESPIRATION RATE: 16 BRPM | BODY MASS INDEX: 27.43 KG/M2 | OXYGEN SATURATION: 99 % | DIASTOLIC BLOOD PRESSURE: 91 MMHG | SYSTOLIC BLOOD PRESSURE: 152 MMHG | HEART RATE: 74 BPM | WEIGHT: 181 LBS

## 2024-08-29 DIAGNOSIS — N18.31 STAGE 3A CHRONIC KIDNEY DISEASE (H): ICD-10-CM

## 2024-08-29 DIAGNOSIS — I48.21 PERMANENT ATRIAL FIBRILLATION (H): Primary | ICD-10-CM

## 2024-08-29 DIAGNOSIS — I48.0 PAROXYSMAL ATRIAL FIBRILLATION (H): Primary | ICD-10-CM

## 2024-08-29 DIAGNOSIS — I25.10 CORONARY ARTERY DISEASE INVOLVING NATIVE CORONARY ARTERY OF NATIVE HEART WITHOUT ANGINA PECTORIS: ICD-10-CM

## 2024-08-29 DIAGNOSIS — I48.0 PAROXYSMAL ATRIAL FIBRILLATION (H): ICD-10-CM

## 2024-08-29 DIAGNOSIS — I10 BENIGN ESSENTIAL HYPERTENSION: ICD-10-CM

## 2024-08-29 DIAGNOSIS — E78.5 HYPERLIPIDEMIA, UNSPECIFIED HYPERLIPIDEMIA TYPE: ICD-10-CM

## 2024-08-29 PROBLEM — I48.20 CHRONIC ATRIAL FIBRILLATION (H): Status: RESOLVED | Noted: 2021-06-17 | Resolved: 2024-08-29

## 2024-08-29 LAB
ANION GAP SERPL CALCULATED.3IONS-SCNC: 12 MMOL/L (ref 7–15)
BUN SERPL-MCNC: 25.3 MG/DL (ref 8–23)
CALCIUM SERPL-MCNC: 8.7 MG/DL (ref 8.8–10.4)
CHLORIDE SERPL-SCNC: 106 MMOL/L (ref 98–107)
CREAT SERPL-MCNC: 1.43 MG/DL (ref 0.67–1.17)
EGFRCR SERPLBLD CKD-EPI 2021: 49 ML/MIN/1.73M2
ERYTHROCYTE [DISTWIDTH] IN BLOOD BY AUTOMATED COUNT: 14.7 % (ref 10–15)
GLUCOSE SERPL-MCNC: 92 MG/DL (ref 70–99)
HCO3 SERPL-SCNC: 23 MMOL/L (ref 22–29)
HCT VFR BLD AUTO: 34.8 % (ref 40–53)
HGB BLD-MCNC: 11.1 G/DL (ref 13.3–17.7)
MCH RBC QN AUTO: 32 PG (ref 26.5–33)
MCHC RBC AUTO-ENTMCNC: 31.9 G/DL (ref 31.5–36.5)
MCV RBC AUTO: 100 FL (ref 78–100)
PLATELET # BLD AUTO: 153 10E3/UL (ref 150–450)
POTASSIUM SERPL-SCNC: 4.2 MMOL/L (ref 3.4–5.3)
RBC # BLD AUTO: 3.47 10E6/UL (ref 4.4–5.9)
SODIUM SERPL-SCNC: 141 MMOL/L (ref 135–145)
WBC # BLD AUTO: 7.6 10E3/UL (ref 4–11)

## 2024-08-29 PROCEDURE — 85027 COMPLETE CBC AUTOMATED: CPT | Performed by: PHYSICIAN ASSISTANT

## 2024-08-29 PROCEDURE — 99207 PR NO CHARGE NURSE ONLY: CPT

## 2024-08-29 PROCEDURE — 36415 COLL VENOUS BLD VENIPUNCTURE: CPT | Performed by: PHYSICIAN ASSISTANT

## 2024-08-29 PROCEDURE — 99215 OFFICE O/P EST HI 40 MIN: CPT | Performed by: PHYSICIAN ASSISTANT

## 2024-08-29 PROCEDURE — 93000 ELECTROCARDIOGRAM COMPLETE: CPT | Performed by: STUDENT IN AN ORGANIZED HEALTH CARE EDUCATION/TRAINING PROGRAM

## 2024-08-29 PROCEDURE — 80048 BASIC METABOLIC PNL TOTAL CA: CPT | Performed by: PHYSICIAN ASSISTANT

## 2024-08-29 RX ORDER — LIDOCAINE 40 MG/G
CREAM TOPICAL
Status: CANCELLED | OUTPATIENT
Start: 2024-08-29

## 2024-08-29 RX ORDER — ASPIRIN 81 MG/1
81 TABLET ORAL ONCE
Status: CANCELLED | OUTPATIENT
Start: 2024-08-29 | End: 2024-08-29

## 2024-08-29 RX ORDER — CEFAZOLIN SODIUM/WATER 2 G/20 ML
2 SYRINGE (ML) INTRAVENOUS
Status: CANCELLED | OUTPATIENT
Start: 2024-09-05

## 2024-08-29 NOTE — PROGRESS NOTES
Felipe BERMUDEZ [facility]  [address] [place] [address]  [phone] (home)     Patient in to see RN for Pre-LAAC visit on 8/29/2024    All pre-procedure labs drawn: 8/29/2024, To be collected   EKG obtained: Yes   Labs reviewed: To be collected 8/29/2024  Renal Issues: Yes  Diabetic?: No  Device?: No          Pre-procedure instructions  Patient instructed to be NPO after midnight the evening prior to procedure.  Patient instructed to shower the evening before or the morning of the procedure.  Leave all valuables at home (jewlery, rings, watches, large amounts of money).  Patient understands there are two visitors allowed during patients stay.    Patient instructed to arrange for transportation home following procedure from a responsible family member of friend. No driving for at least 72 hours post-procedure.  Patient instructed to have a responsible adult with them for 24 hours post-procedure.  Post-procedure follow up process.    Patient instructed on medications:   Take Eliquis, Metoprolol    Aspirin 81mg morning of procedure: To be given in pre-procedure area    Education was given to patient regarding what to expect pre-procedure.     Patient was informed procedure will be done at [facility], [address] [place] [address]. They have been instructed to check in at the  at the Hospital and their arrival time is at 5:30 am    LAAC procedure, CAROL ANN  and blood consent signed at the time of the appt: Yes    All questions were answered to family and patient by RN.    Patient present at the time of appointment. One of his three sons (Shailesh Franz, [place]) will be present day of procedure.    Shreya Marlow RN BSN  Structural Heart Coordinator   [facility]  [phone]    Patient Active Problem List   Diagnosis    Brain aneurysm    BPH with urinary obstruction -- [place] on 4/10/23    Benign essential hypertension    Antiplatelet or antithrombotic  long-term use    Diverticulosis    Hypertension    Normocytic anemia    Hyperlipidemia    Chronic atrial fibrillation (H)    Localized swelling of left lower extremity    Stage 3a chronic kidney disease (H)    NSTEMI -- S/P CANDIE x 3 to LAD on 4/10/23    Dysarthria    Facial droop    Impotence of organic origin    Pure hypercholesterolemia    Rheumatoid arthritis involving multiple sites with positive rheumatoid factor (H)    History of subarachnoid hemorrhage    Acute diverticulitis    Edward hematuria    Retention of urine    Paroxysmal atrial fibrillation (H)    Elevated troponin    Near syncope    Gross hematuria    Acute urinary retention    Nonsustained ventricular tachycardia (H)     Current Outpatient Medications   Medication Sig Dispense Refill    acetaminophen (TYLENOL) 500 MG tablet Take 1,000 mg by mouth every 6 hours as needed for mild pain      apixaban ANTICOAGULANT (ELIQUIS) 2.5 MG tablet Take 1 tablet (2.5 mg) by mouth 2 times daily      aspirin 81 MG EC tablet Take 1 tablet (81 mg) by mouth daily 30 tablet 3    atorvastatin (LIPITOR) 20 MG tablet Take 1 tablet (20 mg) by mouth every evening 90 tablet 3    calcium carbonate 500 mg, elemental, (OSCAL 500) 1250 (500 Ca) MG TABS tablet Take 1 tablet by mouth daily as needed (when remembers)      furosemide (LASIX) 20 MG tablet TAKE 1 TABLET BY MOUTH ONCE DAILY FOR INCREASED SWELLING, WEIGHT GAIN, OR SHORTNESS OF BREATH 90 tablet 3    hydroxychloroquine (PLAQUENIL) 200 MG tablet Take 200 mg by mouth daily      leucovorin (WELLCOVORIN) 5 mg tablet Take 5 mg by mouth three times a week Do not overlap days of methotrexate - Mon, Wed, and Saturday      losartan (COZAAR) 25 MG tablet Take 1 tablet (25 mg) by mouth daily 90 tablet 3    methotrexate 2.5 MG tablet Take 12.5 mg by mouth twice a week 5 tablets (12.5mg) on Thursday and Friday      metoprolol succinate ER (TOPROL XL) 25 MG 24 hr tablet Take 3 tablets (75 mg) by mouth daily 270 tablet 3     nitroGLYcerin (NITROSTAT) 0.4 MG sublingual tablet For chest pain place 1 tablet under the tongue every 5 minutes for 3 doses. If symptoms persist 5 minutes after 1st dose call 911. 25 tablet 1    tamsulosin (FLOMAX) 0.4 MG capsule Take 2 capsules (0.8 mg) by mouth daily (with dinner) 180 capsule 3    vitamin B-12 (CYANOCOBALAMIN) 1000 MCG tablet Take 1,000 mcg by mouth as needed. (when remembers)      vitamin D3 (CHOLECALCIFEROL) 50 mcg (2000 units) tablet Take 1 tablet by mouth daily as needed (when remembers)       No current facility-administered medications for this visit.      No Known Allergies

## 2024-08-29 NOTE — PATIENT INSTRUCTIONS
Felipe Verma,    It was a pleasure to see you today in the clinic regarding your pre-op for [facility].     My recommendations after this visit include:     - please go to [facility] at the instructed time on Thursday      If you have questions or concerns, please call using the numbers below:  After Hours/Scheduling  [phone]    Otherwise you can dial the nurse directly at:  GIGI Bettencourt RN  [phone]    Rika Rawls PA-C  Structural Heart Program  [facility]

## 2024-08-29 NOTE — LETTER
8/29/2024    Tessa Martinez MD  [address] [facility] [address]    RE: Felipe BERMUDEZ Luci       Dear Colleague,     I had the pleasure of seeing Felipe BERMUDEZ Kayleenancy in the [facility].  HEART CARE ENCOUNTER NOTE       [facility]  [phone]      Assessment/Recommendations   1.  Paroxysmal atrial fibrillation: I have personally reviewed this patient's chart and have spoken with the patient about the treatment options, including KI device.  He has a [id number] score of at least 5 for age >75, HTN, CAD, CHF.  He has a HAS-BLED score of 3 for age, bleeding predisposition, history of SAH.   He is not a good candidate for long-term anticoagulation due to history of subarachnoid hemorrhage and chronic hematuria.    The patient is scheduled for next Thursday.  Is been taking aspirin 81 mg daily in addition to his Eliquis in anticipation for the procedure.  After implant, he will stay on Eliquis and aspirin until 6 weeks postprocedure.  He will then stop Eliquis, continue aspirin and start Plavix 75 mg daily for 4 months.  He will then stop Plavix and remain on aspirin 81 mg daily indefinitely.   He will have a CAROL ANN or CTA approximately 3 months and 1 year post-implant.  He understands that the risks of the procedure are <2% and include, but are not limited to device embolization, air embolism, myocardial perforation, device thrombosis, ASD, stroke, or death.  We discussed expected recovery and follow-up.       The patient is a good candidate for proceeding with left atrial appendage screening implant.  His questions were answered to his satisfaction.  Written consents have been signed and witnessed by me.   His labs and EKG has been reviewed.     2.  Coronary artery disease status post CANDIE x 3 to the LAD April 2023  -he currently denies symptoms of angina    3.  Hyperlipidemia -LDL 61    4.  Chronic systolic heart failure, LVEF 40% - currently compensated. He is taking Lasix  20 mg daily. He should continue metoprolol, losartan    5.  Chronic kidney disease stage III -creatinine is stable on today's labs    6.  Hypertension blood pressure is elevated today.  Continue losartan 25 mg daily    7. History of subarachnoid hemorrhage - 2016. No residual deficits     8.  Chronic normocytic anemia -hemoglobin is stable at 11.1 on today's labs       History of Present Illness/Subjective    Felipe Verma is a 83 year old male who comes in today for history and physical prior to LAAO    Felipe Verma has a past history of permanent atrial fibrillation, coronary artery disease status post CANDIE x 3 to the LAD April 2023, hyperlipidemia, chronic kidney disease stage III, history of subarachnoid hemorrhage, history of chronic hematuria, rheumatoid arthritis     He has had issues with chronic hematuria resulting in several admissions in the past.. He also has a history of subarachnoid hemorrhage in 2016 after presenting with slurred speech and left facial droop.  He denies any residual deficits from this event.  He is currently taking Eliquis and is recently started taking a baby aspirin anticipation for the procedure.  He denies current bleeding issues and he reports his hematuria has improved after cauterization.  He overall is feeling well.  He denies any chest discomfort, shortness of breath, orthopnea.  He reports occasional lightheadedness.  He reports minimal swelling to his legs    Felipe Verma denies chest discomfort, palpitations, shortness of breath, paroxysmal nocturnal dyspnea, orthopnea, lightheadedness, dizziness, pre-syncope, or syncope.  Felipe Verma also denies any weight loss, changes in appetite, nausea or vomiting.     Medical, surgical, family, social history, and medications were reviewed and updated as necessary.    ECHO results (from 2/23/2024):  Interpretation Summary     1. The left ventricle is normal in size. Left ventricular systolic performance  is  "moderately reduced. The ejection fraction is estimated to be 40%.  2. There is mild-moderate global reduction in left ventricular systolic  performance with more noticeable anteroseptal and apical hypokinesis (no  apical mural thrombus is detected).  3. No significant valvular heart disease is identified on this study.  4. Normal right ventricular size and systolic performance.  5. There is mild left atrial enlargement.     When compared to the prior real-time echocardiogram dated 11 April 2023, the  wall motion abnormality involving the LAD distribution does not appear quite  as pronounced though the ejection fraction appears similar on both studies.  The degree of mitral insufficiency appears slightly less on the current study.    EKG (personally reviewed and interpreted):  Sinus rhythm with PACs  Ventricular rate 81, , QRS 94, QT/QTc 370/439       Physical Examination Review of Systems   Vitals: BP (!) 152/91 (BP Location: Left arm, Patient Position: Sitting, Cuff Size: Adult Regular)   Pulse 74   Resp 16   Ht 1.727 m (5' 8\")   Wt 82.1 kg (181 lb)   SpO2 99%   BMI 27.52 kg/m    BMI= Body mass index is 27.52 kg/m .  Wt Readings from Last 3 Encounters:   08/29/24 82.1 kg (181 lb)   06/25/24 79.4 kg (175 lb)   06/22/24 76.5 kg (168 lb 11.2 oz)       General Appearance:   Alert, cooperative and in no acute distress   ENT/Mouth: membranes moist, no oral lesions or bleeding gums.      EYES:  no scleral icterus, normal conjunctivae   Neck: Thyroid not visualized   Chest/Lungs:   lungs are clear to auscultation, no rales or wheezing   Cardiovascular:   Regular . Normal first and second heart sounds with no murmurs, rubs or gallops; the carotid, radial and posterior tibial pulses are intact, trace edema bilaterally    Abdomen:  Soft and nontender. Bowel sounds are present in all quadrants   Extremities: no cyanosis or clubbing   Skin: no xanthelasma, warm.    Neurologic: normal gait, normal  bilateral, no " tremors   Psychiatric: Normal mood and affect       Please refer above for cardiac ROS details.      Medical History  Surgical History Family History Social History   Past Medical History:   Diagnosis Date     Acute diverticulitis 04/20/2016     BPH with urinary obstruction      Brain aneurysm 05/03/2016     Chronic atrial fibrillation (H)      Coronary artery disease      Diverticulosis      Dysarthria 2016     Facial droop 2016     Heart attack (H) 04/10/2023    NSTEMI     History of subarachnoid hemorrhage 05/03/2016     Hyperlipidemia      Hypertension      Lung nodule < 6cm on CT 06/06/2018     Normocytic anemia      Paroxysmal atrial fibrillation (H)      Pure hypercholesterolemia      Rheumatoid arthritis involving multiple sites with positive rheumatoid factor (H)      Stage 3a chronic kidney disease (CKD) (H)      Stented coronary artery      Past Surgical History:   Procedure Laterality Date     CV CORONARY ANGIOGRAM N/A 4/11/2023    Procedure: Coronary Angiogram;  Surgeon: Alejandro Hitchcock MD;  Location: [facility] CV     CV PCI STENT DRUG ELUTING N/A 4/11/2023    Procedure: Percutaneous Coronary Intervention Stent;  Surgeon: Alejandro Hitchcock MD;  Location: [facility] CV     CYSTOSCOPY, FULGURATE BLEEDERS, EVACUATE CLOT(S), COMBINED N/A 6/19/2024    Procedure: CYSTOSCOPY, WITH FULGURATION OF HEMORRHAGING BLOOD VESSEL AND THROMBUS REMOVAL;  Surgeon: Bert Schmitz MD;  Location: [facility] [place] OR     OTHER SURGICAL HISTORY      OTHER SURGICAL HISTORYstent placement in head      TONSILLECTOMY       Family History   Problem Relation Age of Onset     Colon Cancer Mother      Hypertension Father      Coronary Artery Disease Father      Aneurysm Sister      Cerebrovascular Disease Sister      No Known Problems Brother      Diabetes Son      No Known Problems Son      No Known Problems Son      Breast Cancer No family hx of      Prostate Cancer No family hx of     Social History      Socioeconomic History     Marital status:      Spouse name: Not on file     Number of children: Not on file     Years of education: Not on file     Highest education level: Not on file   Occupational History     Not on file   Tobacco Use     Smoking status: Former     Smokeless tobacco: Never   Vaping Use     Vaping status: Never Used   Substance and Sexual Activity     Alcohol use: Not on file     Comment: Alcoholic Drinks/day: 1 glass per week     Drug use: Not Currently     Sexual activity: Not Currently   Other Topics Concern     Not on file   Social History Narrative     Not on file     Social Determinants of Health     Financial Resource Strain: Low Risk  (3/13/2024)    Financial Resource Strain      Within the past 12 months, have you or your family members you live with been unable to get utilities (heat, electricity) when it was really needed?: No   Food Insecurity: Low Risk  (3/13/2024)    Food Insecurity      Within the past 12 months, did you worry that your food would run out before you got money to buy more?: No      Within the past 12 months, did the food you bought just not last and you didn t have money to get more?: No   Transportation Needs: Low Risk  (3/13/2024)    Transportation Needs      Within the past 12 months, has lack of transportation kept you from medical appointments, getting your medicines, non-medical meetings or appointments, work, or from getting things that you need?: No   Physical Activity: Insufficiently Active (3/13/2024)    Exercise Vital Sign      Days of Exercise per Week: 1 day      Minutes of Exercise per Session: 20 min   Stress: Stress Concern Present (3/13/2024)    [employer] [place] [employer] - Occupational Stress Questionnaire      Feeling of Stress : To some extent   Social Connections: Unknown (3/13/2024)    Social Connection and Isolation Panel [NHANES]      Frequency of Communication with Friends and Family: Not on file      Frequency of  Social Gatherings with Friends and Family: Once a week      Attends [employer] Services: Not on file      Active Member of Clubs or Organizations: Not on file      Attends Club or Organization Meetings: Not on file      Marital Status: Not on file   Interpersonal Safety: Low Risk  (3/13/2024)    Interpersonal Safety      Do you feel physically and emotionally safe where you currently live?: Yes      Within the past 12 months, have you been hit, slapped, kicked or otherwise physically hurt by someone?: No      Within the past 12 months, have you been humiliated or emotionally abused in other ways by your partner or ex-partner?: No   Housing Stability: Low Risk  (3/13/2024)    Housing Stability      Do you have housing? : Yes      Are you worried about losing your housing?: No          Medications  Allergies   Current Outpatient Medications   Medication Sig Dispense Refill     acetaminophen (TYLENOL) 500 MG tablet Take 1,000 mg by mouth every 6 hours as needed for mild pain       apixaban ANTICOAGULANT (ELIQUIS) 2.5 MG tablet Take 1 tablet (2.5 mg) by mouth 2 times daily       aspirin 81 MG EC tablet Take 1 tablet (81 mg) by mouth daily 30 tablet 3     atorvastatin (LIPITOR) 20 MG tablet Take 1 tablet (20 mg) by mouth every evening 90 tablet 3     calcium carbonate 500 mg, elemental, (OSCAL 500) 1250 (500 Ca) MG TABS tablet Take 1 tablet by mouth daily as needed (when remembers)       furosemide (LASIX) 20 MG tablet TAKE 1 TABLET BY MOUTH ONCE DAILY FOR INCREASED SWELLING, WEIGHT GAIN, OR SHORTNESS OF BREATH 90 tablet 3     hydroxychloroquine (PLAQUENIL) 200 MG tablet Take 200 mg by mouth daily       leucovorin (WELLCOVORIN) 5 mg tablet Take 5 mg by mouth three times a week Do not overlap days of methotrexate - Mon, Wed, and Saturday       losartan (COZAAR) 25 MG tablet Take 1 tablet (25 mg) by mouth daily 90 tablet 3     methotrexate 2.5 MG tablet Take 12.5 mg by mouth twice a week 5 tablets (12.5mg) on Thursday and  "Friday       metoprolol succinate ER (TOPROL XL) 25 MG 24 hr tablet Take 3 tablets (75 mg) by mouth daily 270 tablet 3     tamsulosin (FLOMAX) 0.4 MG capsule Take 2 capsules (0.8 mg) by mouth daily (with dinner) 180 capsule 3     vitamin B-12 (CYANOCOBALAMIN) 1000 MCG tablet Take 1,000 mcg by mouth as needed. (when remembers)       vitamin D3 (CHOLECALCIFEROL) 50 mcg (2000 units) tablet Take 1 tablet by mouth daily as needed (when remembers)       nitroGLYcerin (NITROSTAT) 0.4 MG sublingual tablet For chest pain place 1 tablet under the tongue every 5 minutes for 3 doses. If symptoms persist 5 minutes after 1st dose call 911. 25 tablet 1    No Known Allergies      Lab Results    Chemistry/lipid CBC Cardiac Enzymes/BNP/TSH/INR   No results for input(s): \"CHOL\", \"HDL\", \"LDL\", \"TRIG\", \"CHOLHDLRATIO\" in the last 28850 hours.  No results for input(s): \"LDL\" in the last 03982 hours.  Recent Labs   Lab Test 06/25/24  1655      POTASSIUM 4.4   CHLORIDE 104   CO2 24   *   BUN 35.4*   CR 1.63*   GFRESTIMATED 42*   ABBEY 9.4     Recent Labs   Lab Test 06/25/24  1655 06/22/24  0427 06/21/24  0450   CR 1.63* 1.41* 1.36*     Recent Labs   Lab Test 06/19/24  0601   A1C 6.0*    Recent Labs   Lab Test 06/25/24  1655   WBC 8.5   HGB 10.0*   HCT 31.4*   *        Recent Labs   Lab Test 06/25/24  1655 06/22/24  0427 06/21/24  0715   HGB 10.0* 9.2* 9.5*    No results for input(s): \"TROPONINI\" in the last 29756 hours.  Recent Labs   Lab Test 06/19/24  0601 04/10/23  2209   NTBNPI 1,424 325     Recent Labs   Lab Test 02/22/24  1432   TSH 3.67     Recent Labs   Lab Test 04/10/23  2209   INR 1.19*        40 minutes spent on the date of encounter doing education, consent signing, chart prep/review, review of outside records, review of test results, interpretation with above tests, patient visit, and documentation.      This note has been dictated using voice recognition software. Any grammatical or context distortions " are unintentional and inherent to the software.    Rika Rawls PA-C  Structural Heart Program  [facility]       Thank you for allowing me to participate in the care of your patient.      Sincerely,     Rika Rawls PA-C     [facility]  cc:   Anthony Goodrich MD  [address]  [place] [address]

## 2024-08-29 NOTE — PROGRESS NOTES
MODIFIED THOMAS SCALE   Timepoint: Pre-LAAC    Previous score: initial THOMAS    Score Description   0 No symptoms at all   1 No significant disability despite symptoms; able to carry out all usual duties and activities   2 Slight disability; unable to carry out all previous activities, but able to look after own affairs without assistance   3 Moderate disability; requiring some help, but able to walk without assistance   4 Moderately severe disability; unable to walk without assistance and unable to attend to own bodily needs without assistance   5 Severe disability; bedridden, incontinent and requiring constant nursing care and attention   6 Dead    Total score (0 - 6):  0, Patient has hx of SAH 2016 but denies any remaining symptoms.     Change in score if s/p LAAC? No  If yes, notify implanting cardiologist.    Shreya Marlow RN BSN  Structural Heart Coordinator   [facility]  [phone]

## 2024-08-31 LAB
ATRIAL RATE - MUSE: 81 BPM
DIASTOLIC BLOOD PRESSURE - MUSE: NORMAL MMHG
INTERPRETATION ECG - MUSE: NORMAL
P AXIS - MUSE: 71 DEGREES
PR INTERVAL - MUSE: 188 MS
QRS DURATION - MUSE: 94 MS
QT - MUSE: 378 MS
QTC - MUSE: 439 MS
R AXIS - MUSE: -23 DEGREES
SYSTOLIC BLOOD PRESSURE - MUSE: NORMAL MMHG
T AXIS - MUSE: 70 DEGREES
VENTRICULAR RATE- MUSE: 81 BPM

## 2024-09-04 ENCOUNTER — ANESTHESIA EVENT (OUTPATIENT)
Dept: CARDIOLOGY | Facility: HOSPITAL | Age: 84
DRG: 274 | End: 2024-09-04
Payer: MEDICARE

## 2024-09-05 ENCOUNTER — HOSPITAL ENCOUNTER (INPATIENT)
Facility: HOSPITAL | Age: 84
LOS: 1 days | Discharge: HOME | DRG: 274 | End: 2024-09-05
Attending: INTERNAL MEDICINE | Admitting: INTERNAL MEDICINE
Payer: MEDICARE

## 2024-09-05 ENCOUNTER — APPOINTMENT (OUTPATIENT)
Dept: RADIOLOGY | Facility: HOSPITAL | Age: 84
DRG: 274 | End: 2024-09-05
Attending: PHYSICIAN ASSISTANT
Payer: MEDICARE

## 2024-09-05 ENCOUNTER — ANESTHESIA (OUTPATIENT)
Dept: CARDIOLOGY | Facility: HOSPITAL | Age: 84
DRG: 274 | End: 2024-09-05
Payer: MEDICARE

## 2024-09-05 ENCOUNTER — HOSPITAL ENCOUNTER (OUTPATIENT)
Dept: CARDIOLOGY | Facility: HOSPITAL | Age: 84
Discharge: HOME | DRG: 274 | End: 2024-09-05
Attending: NURSE PRACTITIONER | Admitting: INTERNAL MEDICINE
Payer: MEDICARE

## 2024-09-05 VITALS
WEIGHT: 178 LBS | OXYGEN SATURATION: 97 % | HEIGHT: 68 IN | RESPIRATION RATE: 13 BRPM | BODY MASS INDEX: 26.98 KG/M2 | DIASTOLIC BLOOD PRESSURE: 62 MMHG | TEMPERATURE: 97.4 F | HEART RATE: 60 BPM | SYSTOLIC BLOOD PRESSURE: 107 MMHG

## 2024-09-05 DIAGNOSIS — I48.0 PAROXYSMAL ATRIAL FIBRILLATION (H): ICD-10-CM

## 2024-09-05 DIAGNOSIS — I48.20 CHRONIC ATRIAL FIBRILLATION (H): ICD-10-CM

## 2024-09-05 DIAGNOSIS — I48.21 PERMANENT ATRIAL FIBRILLATION (H): ICD-10-CM

## 2024-09-05 LAB
ABO/RH(D): NORMAL
ACT BLD: 362 SECONDS (ref 74–150)
ANTIBODY SCREEN: NEGATIVE
BLD PROD TYP BPU: NORMAL
BLD PROD TYP BPU: NORMAL
BLOOD COMPONENT TYPE: NORMAL
BLOOD COMPONENT TYPE: NORMAL
CODING SYSTEM: NORMAL
CODING SYSTEM: NORMAL
CREAT SERPL-MCNC: 1.57 MG/DL (ref 0.67–1.17)
CROSSMATCH: NORMAL
CROSSMATCH: NORMAL
EGFRCR SERPLBLD CKD-EPI 2021: 43 ML/MIN/1.73M2
HGB BLD-MCNC: 9.3 G/DL (ref 13.3–17.7)
LVEF ECHO: NORMAL
SPECIMEN EXPIRATION DATE: NORMAL
UNIT ABO/RH: NORMAL
UNIT ABO/RH: NORMAL
UNIT NUMBER: NORMAL
UNIT NUMBER: NORMAL
UNIT STATUS: NORMAL
UNIT STATUS: NORMAL
UNIT TYPE ISBT: 6200
UNIT TYPE ISBT: 6200

## 2024-09-05 PROCEDURE — 82565 ASSAY OF CREATININE: CPT | Performed by: PHYSICIAN ASSISTANT

## 2024-09-05 PROCEDURE — 33340 PERQ CLSR TCAT L ATR APNDGE: CPT | Performed by: ANESTHESIOLOGY

## 2024-09-05 PROCEDURE — 250N000011 HC RX IP 250 OP 636: Performed by: INTERNAL MEDICINE

## 2024-09-05 PROCEDURE — C1889 IMPLANT/INSERT DEVICE, NOC: HCPCS | Performed by: INTERNAL MEDICINE

## 2024-09-05 PROCEDURE — 93355 ECHO TRANSESOPHAGEAL (TEE): CPT

## 2024-09-05 PROCEDURE — 120N000001 HC R&B MED SURG/OB

## 2024-09-05 PROCEDURE — 36415 COLL VENOUS BLD VENIPUNCTURE: CPT | Performed by: PHYSICIAN ASSISTANT

## 2024-09-05 PROCEDURE — 250N000013 HC RX MED GY IP 250 OP 250 PS 637: Performed by: INTERNAL MEDICINE

## 2024-09-05 PROCEDURE — 33340 PERQ CLSR TCAT L ATR APNDGE: CPT | Performed by: INTERNAL MEDICINE

## 2024-09-05 PROCEDURE — 33340 PERQ CLSR TCAT L ATR APNDGE: CPT | Performed by: NURSE ANESTHETIST, CERTIFIED REGISTERED

## 2024-09-05 PROCEDURE — 250N000013 HC RX MED GY IP 250 OP 250 PS 637: Performed by: PHYSICIAN ASSISTANT

## 2024-09-05 PROCEDURE — 255N000002 HC RX 255 OP 636: Performed by: INTERNAL MEDICINE

## 2024-09-05 PROCEDURE — 99100 ANES PT EXTEME AGE<1 YR&>70: CPT | Performed by: NURSE ANESTHETIST, CERTIFIED REGISTERED

## 2024-09-05 PROCEDURE — 71045 X-RAY EXAM CHEST 1 VIEW: CPT

## 2024-09-05 PROCEDURE — 710N000010 HC RECOVERY PHASE 1, LEVEL 2, PER MIN

## 2024-09-05 PROCEDURE — 02L73DK OCCLUSION OF LEFT ATRIAL APPENDAGE WITH INTRALUMINAL DEVICE, PERCUTANEOUS APPROACH: ICD-10-PCS | Performed by: INTERNAL MEDICINE

## 2024-09-05 PROCEDURE — 85347 COAGULATION TIME ACTIVATED: CPT

## 2024-09-05 PROCEDURE — 250N000009 HC RX 250: Performed by: INTERNAL MEDICINE

## 2024-09-05 PROCEDURE — 272N000001 HC OR GENERAL SUPPLY STERILE: Performed by: INTERNAL MEDICINE

## 2024-09-05 PROCEDURE — 33340 PERQ CLSR TCAT L ATR APNDGE: CPT | Mod: Q0 | Performed by: INTERNAL MEDICINE

## 2024-09-05 PROCEDURE — 258N000003 HC RX IP 258 OP 636: Performed by: ANESTHESIOLOGY

## 2024-09-05 PROCEDURE — 93355 ECHO TRANSESOPHAGEAL (TEE): CPT | Performed by: INTERNAL MEDICINE

## 2024-09-05 PROCEDURE — 86923 COMPATIBILITY TEST ELECTRIC: CPT | Performed by: INTERNAL MEDICINE

## 2024-09-05 PROCEDURE — C1894 INTRO/SHEATH, NON-LASER: HCPCS | Performed by: INTERNAL MEDICINE

## 2024-09-05 PROCEDURE — 85018 HEMOGLOBIN: CPT | Performed by: PHYSICIAN ASSISTANT

## 2024-09-05 PROCEDURE — 36415 COLL VENOUS BLD VENIPUNCTURE: CPT | Performed by: INTERNAL MEDICINE

## 2024-09-05 PROCEDURE — 370N000017 HC ANESTHESIA TECHNICAL FEE, PER MIN: Performed by: INTERNAL MEDICINE

## 2024-09-05 PROCEDURE — 250N000011 HC RX IP 250 OP 636: Performed by: ANESTHESIOLOGY

## 2024-09-05 PROCEDURE — 86900 BLOOD TYPING SEROLOGIC ABO: CPT | Performed by: INTERNAL MEDICINE

## 2024-09-05 PROCEDURE — 250N000009 HC RX 250: Performed by: ANESTHESIOLOGY

## 2024-09-05 PROCEDURE — 258N000003 HC RX IP 258 OP 636: Performed by: INTERNAL MEDICINE

## 2024-09-05 DEVICE — OCCLUDER CV WATCHMAN FLX OD27 MM M635WU50270: Type: IMPLANTABLE DEVICE | Site: HEART | Status: FUNCTIONAL

## 2024-09-05 RX ORDER — HYDROMORPHONE HCL IN WATER/PF 6 MG/30 ML
0.4 PATIENT CONTROLLED ANALGESIA SYRINGE INTRAVENOUS EVERY 5 MIN PRN
Status: DISCONTINUED | OUTPATIENT
Start: 2024-09-05 | End: 2024-09-05 | Stop reason: HOSPADM

## 2024-09-05 RX ORDER — EPHEDRINE SULFATE 50 MG/ML
INJECTION, SOLUTION INTRAMUSCULAR; INTRAVENOUS; SUBCUTANEOUS PRN
Status: DISCONTINUED | OUTPATIENT
Start: 2024-09-05 | End: 2024-09-05

## 2024-09-05 RX ORDER — CEFAZOLIN SODIUM/WATER 2 G/20 ML
2 SYRINGE (ML) INTRAVENOUS
Status: DISCONTINUED | OUTPATIENT
Start: 2024-09-05 | End: 2024-09-05 | Stop reason: HOSPADM

## 2024-09-05 RX ORDER — ONDANSETRON 4 MG/1
4 TABLET, ORALLY DISINTEGRATING ORAL EVERY 30 MIN PRN
Status: DISCONTINUED | OUTPATIENT
Start: 2024-09-05 | End: 2024-09-05 | Stop reason: HOSPADM

## 2024-09-05 RX ORDER — LIDOCAINE HYDROCHLORIDE AND EPINEPHRINE 10; 10 MG/ML; UG/ML
INJECTION, SOLUTION INFILTRATION; PERINEURAL
Status: DISCONTINUED | OUTPATIENT
Start: 2024-09-05 | End: 2024-09-05 | Stop reason: HOSPADM

## 2024-09-05 RX ORDER — ACETAMINOPHEN 325 MG/1
650 TABLET ORAL EVERY 4 HOURS PRN
Status: DISCONTINUED | OUTPATIENT
Start: 2024-09-05 | End: 2024-09-05 | Stop reason: HOSPADM

## 2024-09-05 RX ORDER — PROTAMINE SULFATE 10 MG/ML
INJECTION, SOLUTION INTRAVENOUS PRN
Status: DISCONTINUED | OUTPATIENT
Start: 2024-09-05 | End: 2024-09-05

## 2024-09-05 RX ORDER — DEXAMETHASONE SODIUM PHOSPHATE 4 MG/ML
4 INJECTION, SOLUTION INTRA-ARTICULAR; INTRALESIONAL; INTRAMUSCULAR; INTRAVENOUS; SOFT TISSUE
Status: DISCONTINUED | OUTPATIENT
Start: 2024-09-05 | End: 2024-09-05 | Stop reason: HOSPADM

## 2024-09-05 RX ORDER — DEXAMETHASONE SODIUM PHOSPHATE 10 MG/ML
INJECTION, SOLUTION INTRAMUSCULAR; INTRAVENOUS PRN
Status: DISCONTINUED | OUTPATIENT
Start: 2024-09-05 | End: 2024-09-05

## 2024-09-05 RX ORDER — ONDANSETRON 2 MG/ML
4 INJECTION INTRAMUSCULAR; INTRAVENOUS EVERY 30 MIN PRN
Status: DISCONTINUED | OUTPATIENT
Start: 2024-09-05 | End: 2024-09-05 | Stop reason: HOSPADM

## 2024-09-05 RX ORDER — FENTANYL CITRATE 50 UG/ML
50 INJECTION, SOLUTION INTRAMUSCULAR; INTRAVENOUS EVERY 5 MIN PRN
Status: DISCONTINUED | OUTPATIENT
Start: 2024-09-05 | End: 2024-09-05 | Stop reason: HOSPADM

## 2024-09-05 RX ORDER — HYDROMORPHONE HCL IN WATER/PF 6 MG/30 ML
0.2 PATIENT CONTROLLED ANALGESIA SYRINGE INTRAVENOUS EVERY 5 MIN PRN
Status: DISCONTINUED | OUTPATIENT
Start: 2024-09-05 | End: 2024-09-05 | Stop reason: HOSPADM

## 2024-09-05 RX ORDER — ASPIRIN 81 MG/1
81 TABLET ORAL ONCE
Status: COMPLETED | OUTPATIENT
Start: 2024-09-05 | End: 2024-09-05

## 2024-09-05 RX ORDER — ACETAMINOPHEN 325 MG/1
975 TABLET ORAL EVERY 4 HOURS PRN
Status: DISCONTINUED | OUTPATIENT
Start: 2024-09-05 | End: 2024-09-05 | Stop reason: HOSPADM

## 2024-09-05 RX ORDER — SODIUM CHLORIDE, SODIUM LACTATE, POTASSIUM CHLORIDE, CALCIUM CHLORIDE 600; 310; 30; 20 MG/100ML; MG/100ML; MG/100ML; MG/100ML
INJECTION, SOLUTION INTRAVENOUS CONTINUOUS
Status: DISCONTINUED | OUTPATIENT
Start: 2024-09-05 | End: 2024-09-05 | Stop reason: HOSPADM

## 2024-09-05 RX ORDER — NALOXONE HYDROCHLORIDE 0.4 MG/ML
0.2 INJECTION, SOLUTION INTRAMUSCULAR; INTRAVENOUS; SUBCUTANEOUS
Status: DISCONTINUED | OUTPATIENT
Start: 2024-09-05 | End: 2024-09-05 | Stop reason: HOSPADM

## 2024-09-05 RX ORDER — NALOXONE HYDROCHLORIDE 0.4 MG/ML
0.4 INJECTION, SOLUTION INTRAMUSCULAR; INTRAVENOUS; SUBCUTANEOUS
Status: DISCONTINUED | OUTPATIENT
Start: 2024-09-05 | End: 2024-09-05 | Stop reason: HOSPADM

## 2024-09-05 RX ORDER — SODIUM CHLORIDE 9 MG/ML
INJECTION, SOLUTION INTRAVENOUS CONTINUOUS PRN
Status: DISCONTINUED | OUTPATIENT
Start: 2024-09-05 | End: 2024-09-05

## 2024-09-05 RX ORDER — ONDANSETRON 2 MG/ML
INJECTION INTRAMUSCULAR; INTRAVENOUS PRN
Status: DISCONTINUED | OUTPATIENT
Start: 2024-09-05 | End: 2024-09-05

## 2024-09-05 RX ORDER — PROPOFOL 10 MG/ML
INJECTION, EMULSION INTRAVENOUS PRN
Status: DISCONTINUED | OUTPATIENT
Start: 2024-09-05 | End: 2024-09-05

## 2024-09-05 RX ORDER — LIDOCAINE HYDROCHLORIDE 10 MG/ML
INJECTION, SOLUTION INFILTRATION; PERINEURAL PRN
Status: DISCONTINUED | OUTPATIENT
Start: 2024-09-05 | End: 2024-09-05

## 2024-09-05 RX ORDER — FENTANYL CITRATE 50 UG/ML
100 INJECTION, SOLUTION INTRAMUSCULAR; INTRAVENOUS ONCE
Status: DISCONTINUED | OUTPATIENT
Start: 2024-09-05 | End: 2024-09-05 | Stop reason: HOSPADM

## 2024-09-05 RX ORDER — NALOXONE HYDROCHLORIDE 0.4 MG/ML
0.1 INJECTION, SOLUTION INTRAMUSCULAR; INTRAVENOUS; SUBCUTANEOUS
Status: DISCONTINUED | OUTPATIENT
Start: 2024-09-05 | End: 2024-09-05 | Stop reason: HOSPADM

## 2024-09-05 RX ORDER — FENTANYL CITRATE 50 UG/ML
25 INJECTION, SOLUTION INTRAMUSCULAR; INTRAVENOUS
Status: DISCONTINUED | OUTPATIENT
Start: 2024-09-05 | End: 2024-09-05 | Stop reason: HOSPADM

## 2024-09-05 RX ORDER — OXYCODONE HYDROCHLORIDE 5 MG/1
5 TABLET ORAL EVERY 4 HOURS PRN
Status: DISCONTINUED | OUTPATIENT
Start: 2024-09-05 | End: 2024-09-05 | Stop reason: HOSPADM

## 2024-09-05 RX ORDER — SODIUM CHLORIDE 9 MG/ML
INJECTION, SOLUTION INTRAVENOUS CONTINUOUS
Status: ACTIVE | OUTPATIENT
Start: 2024-09-05 | End: 2024-09-05

## 2024-09-05 RX ORDER — LIDOCAINE 40 MG/G
CREAM TOPICAL
Status: DISCONTINUED | OUTPATIENT
Start: 2024-09-05 | End: 2024-09-05 | Stop reason: HOSPADM

## 2024-09-05 RX ORDER — ONDANSETRON 4 MG/1
4 TABLET, ORALLY DISINTEGRATING ORAL EVERY 6 HOURS PRN
Status: DISCONTINUED | OUTPATIENT
Start: 2024-09-05 | End: 2024-09-05 | Stop reason: HOSPADM

## 2024-09-05 RX ORDER — FENTANYL CITRATE 50 UG/ML
25 INJECTION, SOLUTION INTRAMUSCULAR; INTRAVENOUS EVERY 5 MIN PRN
Status: DISCONTINUED | OUTPATIENT
Start: 2024-09-05 | End: 2024-09-05 | Stop reason: HOSPADM

## 2024-09-05 RX ORDER — IODIXANOL 320 MG/ML
INJECTION, SOLUTION INTRAVASCULAR
Status: DISCONTINUED | OUTPATIENT
Start: 2024-09-05 | End: 2024-09-05 | Stop reason: HOSPADM

## 2024-09-05 RX ORDER — OXYCODONE HYDROCHLORIDE 5 MG/1
10 TABLET ORAL EVERY 4 HOURS PRN
Status: DISCONTINUED | OUTPATIENT
Start: 2024-09-05 | End: 2024-09-05 | Stop reason: HOSPADM

## 2024-09-05 RX ORDER — FENTANYL CITRATE 50 UG/ML
100 INJECTION, SOLUTION INTRAMUSCULAR; INTRAVENOUS
Status: DISCONTINUED | OUTPATIENT
Start: 2024-09-05 | End: 2024-09-05

## 2024-09-05 RX ORDER — ONDANSETRON 2 MG/ML
4 INJECTION INTRAMUSCULAR; INTRAVENOUS EVERY 6 HOURS PRN
Status: DISCONTINUED | OUTPATIENT
Start: 2024-09-05 | End: 2024-09-05 | Stop reason: HOSPADM

## 2024-09-05 RX ORDER — FENTANYL CITRATE 50 UG/ML
INJECTION, SOLUTION INTRAMUSCULAR; INTRAVENOUS PRN
Status: DISCONTINUED | OUTPATIENT
Start: 2024-09-05 | End: 2024-09-05

## 2024-09-05 RX ORDER — HEPARIN SODIUM 1000 [USP'U]/ML
INJECTION, SOLUTION INTRAVENOUS; SUBCUTANEOUS
Status: DISCONTINUED | OUTPATIENT
Start: 2024-09-05 | End: 2024-09-05 | Stop reason: HOSPADM

## 2024-09-05 RX ADMIN — Medication 5 MG: at 07:22

## 2024-09-05 RX ADMIN — Medication 5 MG: at 07:12

## 2024-09-05 RX ADMIN — FENTANYL CITRATE 50 MCG: 50 INJECTION INTRAMUSCULAR; INTRAVENOUS at 07:04

## 2024-09-05 RX ADMIN — SODIUM CHLORIDE: 9 INJECTION, SOLUTION INTRAVENOUS at 07:20

## 2024-09-05 RX ADMIN — PHENYLEPHRINE HYDROCHLORIDE 100 MCG: 10 INJECTION INTRAVENOUS at 07:12

## 2024-09-05 RX ADMIN — PHENYLEPHRINE HYDROCHLORIDE 0.3 MCG/KG/MIN: 10 INJECTION INTRAVENOUS at 07:06

## 2024-09-05 RX ADMIN — PHENYLEPHRINE HYDROCHLORIDE 100 MCG: 10 INJECTION INTRAVENOUS at 07:07

## 2024-09-05 RX ADMIN — SODIUM CHLORIDE 500 ML: 9 INJECTION, SOLUTION INTRAVENOUS at 06:06

## 2024-09-05 RX ADMIN — PHENYLEPHRINE HYDROCHLORIDE 100 MCG: 10 INJECTION INTRAVENOUS at 07:16

## 2024-09-05 RX ADMIN — LIDOCAINE HYDROCHLORIDE 50 MG: 10 INJECTION, SOLUTION INFILTRATION; PERINEURAL at 07:04

## 2024-09-05 RX ADMIN — PROPOFOL 120 MG: 10 INJECTION, EMULSION INTRAVENOUS at 07:03

## 2024-09-05 RX ADMIN — ROCURONIUM BROMIDE 20 MG: 50 INJECTION, SOLUTION INTRAVENOUS at 07:48

## 2024-09-05 RX ADMIN — Medication 2 G: at 07:08

## 2024-09-05 RX ADMIN — ROCURONIUM BROMIDE 70 MG: 50 INJECTION, SOLUTION INTRAVENOUS at 07:04

## 2024-09-05 RX ADMIN — SUGAMMADEX 200 MG: 100 INJECTION, SOLUTION INTRAVENOUS at 08:14

## 2024-09-05 RX ADMIN — ASPIRIN 81 MG: 81 TABLET, COATED ORAL at 06:08

## 2024-09-05 RX ADMIN — ROCURONIUM BROMIDE 10 MG: 50 INJECTION, SOLUTION INTRAVENOUS at 07:52

## 2024-09-05 RX ADMIN — PROTAMINE SULFATE 80 MG: 10 INJECTION, SOLUTION INTRAVENOUS at 08:07

## 2024-09-05 RX ADMIN — ONDANSETRON 4 MG: 2 INJECTION INTRAMUSCULAR; INTRAVENOUS at 08:09

## 2024-09-05 RX ADMIN — ACETAMINOPHEN 650 MG: 325 TABLET ORAL at 10:45

## 2024-09-05 RX ADMIN — Medication 5 MG: at 07:26

## 2024-09-05 RX ADMIN — DEXAMETHASONE SODIUM PHOSPHATE 10 MG: 10 INJECTION, SOLUTION INTRAMUSCULAR; INTRAVENOUS at 08:09

## 2024-09-05 RX ADMIN — Medication 5 MG: at 07:34

## 2024-09-05 ASSESSMENT — ACTIVITIES OF DAILY LIVING (ADL)
ADLS_ACUITY_SCORE: 39
ADLS_ACUITY_SCORE: 35
ADLS_ACUITY_SCORE: 39

## 2024-09-05 ASSESSMENT — ENCOUNTER SYMPTOMS
SEIZURES: 0
DYSRHYTHMIAS: 1

## 2024-09-05 NOTE — PLAN OF CARE
Watchman insertion due to elevated bleeding on Eliquis                         B grossly atleast 3/5

## 2024-09-05 NOTE — ANESTHESIA POSTPROCEDURE EVALUATION
Patient: Felipe Verma    Procedure: Procedure(s):  Left Atrial Appendage Closure - WM       Anesthesia Type:  General    Note:  Disposition: Outpatient   Postop Pain Control: Uneventful            Sign Out: Well controlled pain   PONV: No   Neuro/Psych: Uneventful            Sign Out: Acceptable/Baseline neuro status   Airway/Respiratory: Uneventful            Sign Out: Acceptable/Baseline resp. status   CV/Hemodynamics: Uneventful            Sign Out: Acceptable CV status; No obvious hypovolemia; No obvious fluid overload   Other NRE: NONE   DID A NON-ROUTINE EVENT OCCUR?            Last vitals:  Vitals Value Taken Time   /59 09/05/24 0915   Temp     Pulse 65 09/05/24 0920   Resp 15 09/05/24 0920   SpO2 97 % 09/05/24 0920   Vitals shown include unfiled device data.    Electronically Signed By: Marine Bear MD  September 5, 2024  9:48 AM

## 2024-09-05 NOTE — ANESTHESIA PREPROCEDURE EVALUATION
Anesthesia Pre-Procedure Evaluation    Patient: Felipe Verma   MRN: [id number] : 1940        Procedure : Procedure(s):  CYSTOSCOPY, WITH FULGURATION OF HEMORRHAGING BLOOD VESSEL AND THROMBUS REMOVAL          Past Medical History:   Diagnosis Date    Acute diverticulitis 2016    BPH with urinary obstruction     Brain aneurysm 2016    Chronic atrial fibrillation (H)     Coronary artery disease     Diverticulosis     Dysarthria 2016    Facial droop 2016    Heart attack (H) 04/10/2023    NSTEMI    History of subarachnoid hemorrhage 2016    Hyperlipidemia     Hypertension     Lung nodule < 6cm on CT 2018    Normocytic anemia     Paroxysmal atrial fibrillation (H)     Pure hypercholesterolemia     Rheumatoid arthritis involving multiple sites with positive rheumatoid factor (H)     Stage 3a chronic kidney disease (CKD) (H)     Stented coronary artery       Past Surgical History:   Procedure Laterality Date    CV CORONARY ANGIOGRAM N/A 2023    Procedure: Coronary Angiogram;  Surgeon: Alejandro Hitchcock MD;  Location: [facility] CV    CV PCI STENT DRUG ELUTING N/A 2023    Procedure: Percutaneous Coronary Intervention Stent;  Surgeon: Alejandro Hitchcock MD;  Location: [facility] CV    CYSTOSCOPY, FULGURATE BLEEDERS, EVACUATE CLOT(S), COMBINED N/A 2024    Procedure: CYSTOSCOPY, WITH FULGURATION OF HEMORRHAGING BLOOD VESSEL AND THROMBUS REMOVAL;  Surgeon: Bert Schmitz MD;  Location: [facility] [place] OR    OTHER SURGICAL HISTORY      OTHER SURGICAL HISTORYstent placement in head     TONSILLECTOMY        No Known Allergies   Social History     Tobacco Use    Smoking status: Former    Smokeless tobacco: Never   Substance Use Topics    Alcohol use: Not on file     Comment: Alcoholic Drinks/day: 1 glass per week      Wt Readings from Last 1 Encounters:   24 80.7 kg (178 lb)        Anesthesia Evaluation            ROS/MED HX  ENT/Pulmonary:  - neg  pulmonary ROS     Neurologic: Comment: History of subarachnoid hemorrhage   (-) no seizures and no CVA   Cardiovascular: Comment:  CAD s/p 3 drug-eluting stents in April 2023    Elevated troponin (~200ng/L high sensitivity). He had a similar level back in februrary and march. Appears to be his baseline. Denies active chest pain.     1. The left ventricle is normal in size. Left ventricular systolic performance  is moderately reduced. The ejection fraction is estimated to be 40%.  2. There is mild-moderate global reduction in left ventricular systolic  performance with more noticeable anteroseptal and apical hypokinesis (no  apical mural thrombus is detected).  3. No significant valvular heart disease is identified on this study.  4. Normal right ventricular size and systolic performance.  5. There is mild left atrial enlargement.     When compared to the prior real-time echocardiogram dated 11 April 2023, the  wall motion abnormality involving the LAD distribution does not appear quite  as pronounced though the ejection fraction appears similar on both studies.  The degree of mitral insufficiency appears slightly less on the current study.      (+) Dyslipidemia hypertension- -  CAD - past MI - -   Taking blood thinners   CHF  Last EF: 40                dysrhythmias, a-fib, Irregular Heartbeat/Palpitations,            METS/Exercise Tolerance:     Hematologic:       Musculoskeletal:   (+)  arthritis,             GI/Hepatic:       Renal/Genitourinary:     (+) renal disease, type: CRI,            Endo:     (+)               Obesity,       Psychiatric/Substance Use:       Infectious Disease:       Malignancy:       Other:            Physical Exam    Airway        Mallampati: III   TM distance: > 3 FB   Neck ROM: full   Mouth opening: > 3 cm    Respiratory Devices and Support         Dental       (+) Edentulous      Cardiovascular          Rhythm and rate: regular and normal     Pulmonary           breath sounds clear to  "auscultation           OUTSIDE LABS:  CBC:   Lab Results   Component Value Date    WBC 7.6 08/29/2024    WBC 8.5 06/25/2024    HGB 11.1 (L) 08/29/2024    HGB 10.0 (L) 06/25/2024    HCT 34.8 (L) 08/29/2024    HCT 31.4 (L) 06/25/2024     08/29/2024     06/25/2024     BMP:   Lab Results   Component Value Date     08/29/2024     06/25/2024    POTASSIUM 4.2 08/29/2024    POTASSIUM 4.4 06/25/2024    CHLORIDE 106 08/29/2024    CHLORIDE 104 06/25/2024    CO2 23 08/29/2024    CO2 24 06/25/2024    BUN 25.3 (H) 08/29/2024    BUN 35.4 (H) 06/25/2024    CR 1.43 (H) 08/29/2024    CR 1.63 (H) 06/25/2024    GLC 92 08/29/2024     (H) 06/25/2024     COAGS:   Lab Results   Component Value Date    PTT 94 (H) 04/11/2023    INR 1.19 (H) 04/10/2023     POC: No results found for: \"BGM\", \"HCG\", \"HCGS\"  HEPATIC:   Lab Results   Component Value Date    ALBUMIN 4.1 04/23/2024    PROTTOTAL 6.5 04/23/2024    ALT 10 04/23/2024    AST 18 04/23/2024    ALKPHOS 81 04/23/2024    BILITOTAL 0.4 04/23/2024     OTHER:   Lab Results   Component Value Date    LACT 1.5 06/21/2024    A1C 6.0 (H) 06/19/2024    ABBEY 8.7 (L) 08/29/2024    PHOS 3.1 06/22/2024    MAG 2.0 06/22/2024    LIPASE 29 04/10/2023    TSH 3.67 02/22/2024       Anesthesia Plan    ASA Status:  3    NPO Status:  NPO Appropriate    Anesthesia Type: General.     - Airway: ETT         Techniques and Equipment:     - Lines/Monitors: 2nd IV     Consents    Anesthesia Plan(s) and associated risks, benefits, and realistic alternatives discussed. Questions answered and patient/representative(s) expressed understanding.     - Discussed:     - Discussed with:  Patient            Postoperative Care       PONV prophylaxis: Ondansetron (or other 5HT-3), Dexamethasone or Solumedrol     Comments:    Other Comments:   GETA + 2  PIVs    Patient history and physical exam reviewed. NPO status appropriate. Focused physical and history performed, pre-op evaluation updated. RBA " "discussed re: anesthesia and patients questions answered.              Marine Bear MD    I have reviewed the pertinent notes and labs in the chart from the past 30 days and (re)examined the patient.  Any updates or changes from those notes are reflected in this note.            # Drug Induced Coagulation Defect: home medication list includes an anticoagulant medication  # Drug Induced Platelet Defect: home medication list includes an antiplatelet medication  # Overweight: Estimated body mass index is 27.06 kg/m  as calculated from the following:    Height as of this encounter: 1.727 m (5' 8\").    Weight as of this encounter: 80.7 kg (178 lb).      "

## 2024-09-05 NOTE — ANESTHESIA CARE TRANSFER NOTE
Patient: Felipe Verma    Procedure: Procedure(s):  Left Atrial Appendage Closure - WM       Diagnosis: other  Diagnosis Additional Information: No value filed.    Anesthesia Type:   General     Note:    Oropharynx: oropharynx clear of all foreign objects  Level of Consciousness: drowsy  Oxygen Supplementation: face mask  Level of Supplemental Oxygen (L/min / FiO2): 6  Independent Airway: airway patency satisfactory and stable  Dentition: dentition unchanged  Vital Signs Stable: post-procedure vital signs reviewed and stable  Report to RN Given: handoff report given  Patient transferred to: Cardiac Special Care          Vitals:  Vitals Value Taken Time   /62    Temp 97.4    Pulse 70    Resp 16    SpO2 97        Electronically Signed By: OLY Washington CRNA  September 5, 2024  8:24 AM

## 2024-09-05 NOTE — ANESTHESIA PROCEDURE NOTES
Airway         Procedure Start/Stop Times: 9/5/2024 7:06 AM  Staff -        CRNA: Prabha Baig APRN CRNA       Performed By: CRNA  Consent for Airway        Urgency: elective  Indications and Patient Condition       Indications for airway management: leo-procedural       Induction type:intravenous       Mask difficulty assessment: 1 - vent by mask    Final Airway Details       Final airway type: endotracheal airway       Successful airway: ETT - single  Endotracheal Airway Details        ETT size (mm): 7.5       Cuffed: yes       Successful intubation technique: video laryngoscopy       VL Blade Size: Glidescope 4       Grade View of Cords: 1       Adjucts: stylet       Position: Right       Measured from: gums/teeth       Secured at (cm): 23       Bite block used: None    Post intubation assessment        Placement verified by: capnometry and equal breath sounds        Number of attempts at approach: 1       Number of other approaches attempted: 0       Secured with: tape       Ease of procedure: easy       Dentition: Intact    Medication(s) Administered   Medication Administration Time: 9/5/2024 7:06 AM

## 2024-09-05 NOTE — INTERVAL H&P NOTE
"I have reviewed the surgical (or preoperative) H&P that is linked to this encounter, and examined the patient. There are no significant changes    Clinical Conditions Present on Arrival:  Clinically Significant Risk Factors Present on Admission                # Drug Induced Coagulation Defect: home medication list includes an anticoagulant medication  # Drug Induced Platelet Defect: home medication list includes an antiplatelet medication      # Overweight: Estimated body mass index is 27.06 kg/m  as calculated from the following:    Height as of this encounter: 1.727 m (5' 8\").    Weight as of this encounter: 80.7 kg (178 lb).       "

## 2024-09-05 NOTE — DISCHARGE INSTRUCTIONS
Going home after Left Atrial Appendage Occlusion Device Implant    Once Home:  You should arrange for someone to stay with you for the first 24 hours after discharge  Make sure you are taking all of your medications as directed in your discharge summary.  Do not stop taking these medications (especially your blood thinner and baby aspirin) without speaking to your provider.   Increase fluid intake for two days    No lifting more than 10 pounds for 5 days  No aggressive exercise for 5 days  No driving x 3 day  You may shower tomorrow; no bath x 5 days  Return to work in 3 days if you have a sedentary job or 5 days if you do manual labor      Care of groin site  It is normal to have a small bruise or lump at the site.  Do not scrub the site.  For the first 2 days: Do not stoop or squat. When you cough, laugh, sneeze or move your bowels, hold your hand over the puncture site and press gently.  Do not use lotion or powder near the puncture site for 3 days.  Ok to use ice packs at groin sites 20 minutes at a time for groin discomfort    If you start bleeding from the site in your groin, lie down flat and press firmly on the site. Call your doctor as soon as you can.    Call your doctor if:  You have a large or growing hard lump around the site.  The site is red, swollen, hot or tender.  Blood or fluid is draining from the site.  You have chills or a fever greater than 101 F (38 C).  Your leg or arm feels numb or cool.  You have hives, a rash or unusual itching.    To reduce the risk of infection, avoid dental procedures (including cleanings) for the first 6 weeks.  Contact your cardiology clinic for an antibiotic should you need to see the dentist in the first 6 weeks post left atrial appendage implant.    Dial 911 if you have bleeding that is heavy or does not stop OR for any NEW signs/symptoms of a stroke:  Visual disturbance  Problems with talking  Smile only occurs on half your face  Numbness on one side of your face  or body  Sudden headache  Confusion  Problems with walking or balance    Follow up appointments:     6 Week Post Implant Visit Date: October 30 at 10:30 am with Prachi Theodore PA-C  At [facility]  [address], [address]  [facility] [address]    Post-procedure Transesophageal Echocardiogram: Please arrange for a responsible adult to drive you to and from this appointment. There will be nothing to eat or drink for at least 6 hours prior to your arrival time for the CAROL ANN.  Date: November 27 at 12 pm -  Location: [facility]      Your Procedural Physician was: Dr. Goodrich     To reach the [facility] nurse coordinators please call:  Shreya Marlow RN    [phone]        If you have issues tonight when you get home:      Before 6 pm, call [phone] and enter your phone number.  Prachi, the PA will call you back.      If after 6 pm, call the [facility] at [phone] and you will be connected to the on call doctor                                                                    If you are calling after hours, please listen to the entire voicemail, a live  will answer at the end of the message

## 2024-09-05 NOTE — Clinical Note
0 : 17. 45 : 23. 90 : 22. 135 : 20. 0 : 17. 45 : 23.5. 90 : 19. 135 : 17. 0 : 22.9 . 45 : 20.6 . 90 : 20.9 . 135 : 22.5 . KI type used: OtherPosition: Passed. [place]: Passed. Size: Accepted. Seal: Complete. Jet: 0.

## 2024-09-05 NOTE — PROGRESS NOTES
Patient received from Melissa RN @ 6801. He is kept comfortable during post-procedure stay. VSS. Complained of pain in his penis. Upon inspection, it is noted that the foreskin is really red and inflamed. Patient is noted to be very sensitive and grimaced with pain even when the gown touches it. Cleansed with saline and dried the area. Patient was advised to keep this dry and clean. He stated that he has incontinence and that might have aggravated this. Rika MOORE & Farhana NP made aware and thought that this might be an incontinence dermatitis, and advised that the patient has to be seen by his primary physician to get the proper medicine for it. Patient is very hesitant about this and thinking of self medicating it. His son who was with him told this RN that between him and his other brother, they will make sure that he will see the doctor for this. Patient's right femoral access site remains dry & free from signs of bleeding. Tolerated food and fluids. Voided and ambulated without issues. Appointments already made & included in AVS. Dr. Goodrich was able to speak with patient post procedure. Post-op instructions reviewed and packet given to patient & son. Able to ask questions. Verbalized no concerns. Belongings returned. Discharged in stable condition.

## 2024-09-05 NOTE — PLAN OF CARE
Pt doing well post procedure. No complaints or concerns. Feeling well. Right groin site intact. Vitals stable. Son will  early afternoon.

## 2024-09-05 NOTE — DISCHARGE SUMMARY
HEART CARE INPATIENT ENCOUNTER NOTE      Structural Discharge Summary    Primary Care Physician:  Tessa Martinez    Discharge Provider: Rika Rawls PA-C     Admission Date: 9/5/2024. Admission Diagnoses: Paroxysmal atrial fibrillation (H) [I48.0]   Discharge Date: September 5, 2024   Disposition: Home   Condition at Discharge: stable  Code Status: Full Code     Principal Diagnosis:  Paroxysmal atrial fibrillation    Discharge Diagnoses:  Active Problems:    Paroxysmal atrial fibrillation (H)    Permanent atrial fibrillation (H)      Consult/s: none  Significant Diagnostic Studies:   Procedural CAROL ANN -    Interpretation Summary     Left ventricular function is decreased. The ejection fraction is 45-50%  (mildly reduced).  Normal right ventricle size and systolic function.  The left atrium is moderately dilated.  No thrombus is detected in the left atrial appendage.  Watchman device noted in left atrial appendage. The device is well seated with  no leakage by color flow Doppler imaging.  There is no pericardial effusion.    CXR:  EXAM: XR CHEST PORT 1 VIEW  LOCATION: [facility]  DATE: 9/5/2024     INDICATION: s p watchman  COMPARISON: X-ray 3/12/2024                                                                      IMPRESSION: Left atrial appendage occlusion device appears to be in satisfactory position. No pneumothorax. Low lung volumes with accentuation of the cardiac silhouette and central vessels. Mild left basilar pulmonary opacities likely reflect   atelectasis. The right lung is clear. No pleural effusion.    **I have personally reviewed the film and visualize the device in correct placement**    Treatments: procedures: LAAO implant (Watchman FLX)    Discharge Medications:   Current Discharge Medication List        CONTINUE these medications which have NOT CHANGED    Details   acetaminophen (TYLENOL) 500 MG tablet Take 1,000 mg by mouth every 6 hours as needed for mild pain       apixaban ANTICOAGULANT (ELIQUIS) 2.5 MG tablet Take 1 tablet (2.5 mg) by mouth 2 times daily    Associated Diagnoses: Chronic atrial fibrillation (H)      aspirin 81 MG EC tablet Take 1 tablet (81 mg) by mouth daily  Qty: 30 tablet, Refills: 3    Associated Diagnoses: NSTEMI (non-ST elevated myocardial infarction) (H)      atorvastatin (LIPITOR) 20 MG tablet Take 1 tablet (20 mg) by mouth every evening  Qty: 90 tablet, Refills: 3    Associated Diagnoses: Hyperlipidemia, unspecified hyperlipidemia type      calcium carbonate 500 mg, elemental, (OSCAL 500) 1250 (500 Ca) MG TABS tablet Take 1 tablet by mouth daily as needed (when remembers)      furosemide (LASIX) 20 MG tablet TAKE 1 TABLET BY MOUTH ONCE DAILY FOR INCREASED SWELLING, WEIGHT GAIN, OR SHORTNESS OF BREATH  Qty: 90 tablet, Refills: 3    Associated Diagnoses: Chronic atrial fibrillation (H); Benign essential hypertension      hydroxychloroquine (PLAQUENIL) 200 MG tablet Take 200 mg by mouth daily      leucovorin (WELLCOVORIN) 5 mg tablet Take 5 mg by mouth three times a week Do not overlap days of methotrexate - Mon, Wed, and Saturday      losartan (COZAAR) 25 MG tablet Take 1 tablet (25 mg) by mouth daily  Qty: 90 tablet, Refills: 3    Associated Diagnoses: Benign essential hypertension      metoprolol succinate ER (TOPROL XL) 25 MG 24 hr tablet Take 3 tablets (75 mg) by mouth daily  Qty: 270 tablet, Refills: 3    Associated Diagnoses: Paroxysmal atrial fibrillation (H)      nitroGLYcerin (NITROSTAT) 0.4 MG sublingual tablet For chest pain place 1 tablet under the tongue every 5 minutes for 3 doses. If symptoms persist 5 minutes after 1st dose call 911.  Qty: 25 tablet, Refills: 1    Associated Diagnoses: NSTEMI (non-ST elevated myocardial infarction) (H)      tamsulosin (FLOMAX) 0.4 MG capsule Take 2 capsules (0.8 mg) by mouth daily (with dinner)  Qty: 180 capsule, Refills: 3    Associated Diagnoses: Benign prostatic hyperplasia with urinary retention  "     vitamin B-12 (CYANOCOBALAMIN) 1000 MCG tablet Take 1,000 mcg by mouth as needed. (when remembers)      vitamin D3 (CHOLECALCIFEROL) 50 mcg (2000 units) tablet Take 1 tablet by mouth daily as needed (when remembers)      methotrexate 2.5 MG tablet Take 12.5 mg by mouth twice a week 5 tablets (12.5mg) on Thursday and Friday             Discharge Instructions:    Follow up appointment with Prachi Theodore PA-C in 45 days (October 30)    Follow up Echocardiogram in 3-4 months (November 27)    Diet: Regular diet. Increase fluids over the next 2 days.   Activity: Activity as tolerated   Restrictions: No lifting >10 lbs or vigorous exercise for 5 days. No driving for 3 days. May return to work in 5 days  Wound / drain care: OK to shower next day. Keep wound clean and dry. Ok to use Ice packs PRN for discomfort. No baths, hot tubs or swimming pools for 5 days.    Hospital Summary:   Mr. Felipe Verma is a 83 year old male who underwent successful LAAO implant with 27 mm Watchman FLX device. The patient was recovered in [facility], on bedrest for 2 hours.  The patient then dangled at the edge of bed and ambulated; he voided without difficulty.  The vascular access site remained stable prior to discharge.  Post procedure the patient denied chest pain/pressure, palpitations, or shortness of breath.      Repeat labs were reviewed and were stable.  Activity restrictions and reportable signs and symptoms were discussed with the patient who verbalizes understanding.  He will continue aspirin 81 mg daily indefinitely and Eliquis for 6 weeks. He will then stop Eliquis and start Plavix 75 mg daily for approximately 4 months. He will then stop Plavix and remain on aspirin 81 mg daily indefinitely.    Follow up is arranged as above.        Physical Examination   /59   Pulse 66   Temp 97.4  F (36.3  C) (Oral)   Resp 18   Ht 1.727 m (5' 8\")   Wt 80.7 kg (178 lb)   SpO2 97%   BMI 27.06 kg/m    Body mass index is 27.06 " kg/m .  Wt Readings from Last 3 Encounters:   09/05/24 80.7 kg (178 lb)   08/29/24 82.1 kg (181 lb)   06/25/24 79.4 kg (175 lb)       Intake/Output Summary (Last 24 hours) at 9/5/2024 1006  Last data filed at 9/5/2024 0817  Gross per 24 hour   Intake 800 ml   Output --   Net 800 ml     General Appearance:   no distress, normal body habitus   Chest/Lungs:   Normal respiratory effort. Respirations are even and unlabored. Lungs are clear to auscultation, no rales or wheezing. No chest wall tenderness.    Cardiovascular:   Regular. Normal S1, S2 with no murmurs, rubs, or gallops; the carotid, radial, dorsalis pedis and posterior tibial pulses are intact   Abdomen:  soft, non-tender to palpation, non-distended. + bowel sounds.   Extremities: No edema    Skin: Right groin site WNL   Neurologic: Alert and oriented x3, calm and able to move all 4 extremities appropriately            Lab Results    Chemistry/lipid CBC    Creat 9/5/2024 - 1.57 Hgb 9/5/2024 - 9.3        Rika Rawls PA-C  Structural Heart Program  [facility]

## 2024-09-06 ENCOUNTER — VIRTUAL VISIT (OUTPATIENT)
Dept: CARDIOLOGY | Facility: CLINIC | Age: 84
End: 2024-09-06
Payer: MEDICARE

## 2024-09-06 ENCOUNTER — PREP FOR PROCEDURE (OUTPATIENT)
Dept: CARDIOLOGY | Facility: CLINIC | Age: 84
End: 2024-09-06

## 2024-09-06 ENCOUNTER — PATIENT OUTREACH (OUTPATIENT)
Dept: CARE COORDINATION | Facility: CLINIC | Age: 84
End: 2024-09-06

## 2024-09-06 DIAGNOSIS — Z95.818 PRESENCE OF WATCHMAN LEFT ATRIAL APPENDAGE CLOSURE DEVICE: Primary | ICD-10-CM

## 2024-09-06 PROCEDURE — 99207 PR NO CHARGE NURSE ONLY: CPT | Mod: 93

## 2024-09-06 RX ORDER — LIDOCAINE 40 MG/G
CREAM TOPICAL
OUTPATIENT
Start: 2024-09-06

## 2024-09-06 NOTE — PROGRESS NOTES
Phone call to patient for post op 27mm Watchman FLX device placement with Dr Goodrich on 9/5/2024.    Pt denies CP/SOB, no difficulty swallowing or sore throat.    No peripheral edema noted, no abdominal bloating or discomfort.     Pt denies any neurological changes at this time.    Pt denies generalized or localized pain at this time.   Patient is having bowel movements and voiding without difficulty.    Patient has not yet removed bandage and would like to wait until later. Patient was given explicit instructions on what to monitor for once bandage removed and who to call if there are concerns. Patient has both writer's direct number and number for on call Cardiology service.    Pt continues anticoagulation medications: Eliquis 5 mg BID and daily 81 mg Aspirin, per post-LAAC Watchman protocol.    Reviewed post op LAAC healing process, f/u appts, physical restrictions, nutritional requirements, when to contact the heart clinic, and contact information was given to the pt for further concerns or questions.  Pt verbalized understanding.     Shreya Marlow RN BSN  Structural Heart Coordinator   [facility]  [phone]    The following information was relayed to the patient over the phone / sent via Linguastat:    Nikos    Here is some information regarding aftercare following your Watchman implant, including what to monitor for, when to give us a call, and our contact information at the bottom.    Your anticoagulation medication (Eliquis 5 mg twice daily):    It is important to remain on your anticoagulation medication uninterrupted after your Watchman device implant to reduce your risk of a stroke or heart attack, DO NOT STOP THIS MEDICATION.  You will remain on once daily 81 mg Aspirin for the remainder of your life  You will continue your anticoagulation medication until you see the Structural THUAN in clinic to discuss further medication changes.    Healing from your left atrial  closure device implant:    Stay well hydrated, it is important to increase your fluid intake during your recovery period.  Eat foods high in protein to help the healing process.  Gradually increase your activity over the several days, back to baseline;  No aggressive exercise for 5 days post-procedure.  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  No lifting more that 10 lb for 5 days after procedure.  No driving for 3 days post-procedure.   Keep your incision site clean, dry and open to air.  Ok to use ice packs at groin sites for 20 minutes at a time for groin discomfort.   Please delay any dental procedures until 6 weeks post implant. You will not require anti-biotic prophylaxis treatment after this time frame.  If you need urgent dental care prior to the 6 week post-procedure restriction, please contact your cardiologist for prophylactic antibiotic.     Please call me if any of the following occur:    Shortness of breath, dizziness, or chest pain.  Changes in your groin sites including:  Swelling, hardening, drainage, increase in bruising or an increase in pain.          Dial 911 for signs/symptoms of a stroke:    New onset visual disturbance.  New problems with talking/speech.  Smile only occurs on half your face.  Numbness on one side of your body or face.  Sudden headache.  New onset of confusion.  New problems with walking or balance.     Important information regarding your future follow up appointments:    45 Day post-implant office visit with our Structural Advance Practice Practitioner (THUAN)  3 month post-implant CAORL ANN to assess your Watchman Device.  You will be contacted after your CAROL ANN is complete to discuss results within 2-3 days by a Structural RN Coordinator.  6 month in clinic office visit with our Structural THUNA  1 year post-implant imaging study to assess your Watchman Device  1 year post-implant office visit with our Structural THUAN  2 year  post-implant office visit with our Structural THUAN    Thank you,    Shreya Marlow, Structural Heart Coordinator -[phone]    After hours please contact the on call service at [phone]

## 2024-09-06 NOTE — PROGRESS NOTES
[facility]    Background: Transitional Care Management program identified per system criteria and reviewed by [facility] Resource [place] team for possible outreach.    Assessment: Upon chart review, CCR Team member will not proceed with patient outreach related to this episode of Transitional Care Management program due to reason below:    Patient has a follow up appointment with an appropriate provider today for hospital discharge    Patient has an appointment today with a provider in Cardiology from [facility]. Patient's appointment today is appropriate for ED/Hospital discharge and follow-up. No [facility] outreach call needed at this time.    Plan: Transitional Care Management episode addressed appropriately per reason noted above.      ISAK Garcia  [phone]  [facility]     *Connected Care Resource Team does NOT follow patient ongoing. Referrals are identified based on internal discharge reports and the outreach is to ensure patient has an understanding of their discharge instructions.

## 2024-09-10 ENCOUNTER — OFFICE VISIT (OUTPATIENT)
Dept: FAMILY MEDICINE | Facility: CLINIC | Age: 84
End: 2024-09-10
Payer: MEDICARE

## 2024-09-10 VITALS
HEIGHT: 68 IN | OXYGEN SATURATION: 98 % | HEART RATE: 62 BPM | DIASTOLIC BLOOD PRESSURE: 78 MMHG | BODY MASS INDEX: 28.04 KG/M2 | SYSTOLIC BLOOD PRESSURE: 139 MMHG | TEMPERATURE: 97.9 F | WEIGHT: 185 LBS

## 2024-09-10 DIAGNOSIS — Z23 NEED FOR PROPHYLACTIC VACCINATION AND INOCULATION AGAINST INFLUENZA: ICD-10-CM

## 2024-09-10 DIAGNOSIS — Z48.89 ENCOUNTER FOR POST SURGICAL WOUND CHECK: ICD-10-CM

## 2024-09-10 DIAGNOSIS — B37.9 CANDIDA INFECTION: Primary | ICD-10-CM

## 2024-09-10 PROCEDURE — G0008 ADMIN INFLUENZA VIRUS VAC: HCPCS | Performed by: FAMILY MEDICINE

## 2024-09-10 PROCEDURE — 99213 OFFICE O/P EST LOW 20 MIN: CPT | Mod: 25 | Performed by: FAMILY MEDICINE

## 2024-09-10 PROCEDURE — 90662 IIV NO PRSV INCREASED AG IM: CPT | Performed by: FAMILY MEDICINE

## 2024-09-10 RX ORDER — CLOTRIMAZOLE 1 %
CREAM (GRAM) TOPICAL 2 TIMES DAILY
Qty: 15 G | Refills: 0 | Status: SHIPPED | OUTPATIENT
Start: 2024-09-10

## 2024-09-10 NOTE — PROGRESS NOTES
"  Assessment & Plan     Candida infection  Tip of penis   - clotrimazole (LOTRIMIN) 1 % external cream; Apply topically 2 times daily. For 1-2 weeks    Need for prophylactic vaccination and inoculation against influenza  Done     Encounter for post surgical wound check  Healing well dressing reapplied         MED REC REQUIRED  Post Medication Reconciliation Status: discharge medications reconciled and changed, per note/orders  BMI  Estimated body mass index is 28.13 kg/m  as calculated from the following:    Height as of this encounter: 1.727 m (5' 8\").    Weight as of this encounter: 83.9 kg (185 lb).         CONSULTATION/REFERRAL to cardiology      Subjective   Nikos is a 83 year old, presenting for the following health issues:  Surgical Followup        9/10/2024    11:41 AM   Additional Questions   Roomed by Karen DEVI CMA     [facility]     Surgery Follow up 9/5/24, feeling ok.   Check inssion area.     Hospital Follow-up Visit:    Hospital/Nursing Home/IP Rehab Facility:  [facility]   Date of Admission: 9/6  Date of Discharge: /9/6  Reason(s) for Admission: watchman    Was the patient in the ICU or did the patient experience delirium during hospitalization?  No  Do you have any other stressors you would like to discuss with your provider? No    Problems taking medications regularly:  None  Medication changes since discharge: None  Problems adhering to non-medication therapy:  None    Summary of hospitalization:  [facility] discharge summary reviewed  Diagnostic Tests/Treatments reviewed.  Follow up needed: with cardiology    Other Healthcare Providers Involved in Patient s Care:         Specialist appointment - cards  Update since discharge: improved.         Plan of care communicated with patient           Had the watchman also had some annoying irritation on the tip of the penis this has improved but they wanted him to be seen for this.             Objective    /78 (BP Location: Right arm, " "Patient Position: Sitting, Cuff Size: Adult Regular)   Pulse 62   Temp 97.9  F (36.6  C) (Tympanic)   Ht 1.727 m (5' 8\")   Wt 83.9 kg (185 lb)   SpO2 98%   BMI 28.13 kg/m    Body mass index is 28.13 kg/m .  Physical Exam   GENERAL: alert and no distress  Right groin wound bruising wound closed no evidence of infection   Tip of penis slightly red with no discharge           Signed Electronically by: Tessa Martinez MD    "

## 2024-10-19 DIAGNOSIS — I48.20 CHRONIC ATRIAL FIBRILLATION (H): ICD-10-CM

## 2024-10-21 RX ORDER — APIXABAN 2.5 MG/1
2.5 TABLET, FILM COATED ORAL 2 TIMES DAILY
Qty: 180 TABLET | Refills: 0 | Status: SHIPPED | OUTPATIENT
Start: 2024-10-21 | End: 2024-10-30

## 2024-10-30 ENCOUNTER — OFFICE VISIT (OUTPATIENT)
Dept: CARDIOLOGY | Facility: CLINIC | Age: 84
End: 2024-10-30
Payer: MEDICARE

## 2024-10-30 VITALS
HEART RATE: 69 BPM | BODY MASS INDEX: 28.04 KG/M2 | DIASTOLIC BLOOD PRESSURE: 80 MMHG | RESPIRATION RATE: 16 BRPM | HEIGHT: 68 IN | WEIGHT: 185 LBS | SYSTOLIC BLOOD PRESSURE: 136 MMHG | OXYGEN SATURATION: 96 %

## 2024-10-30 DIAGNOSIS — I10 BENIGN ESSENTIAL HYPERTENSION: ICD-10-CM

## 2024-10-30 DIAGNOSIS — I48.0 PAROXYSMAL ATRIAL FIBRILLATION (H): ICD-10-CM

## 2024-10-30 DIAGNOSIS — E78.2 MIXED HYPERLIPIDEMIA: ICD-10-CM

## 2024-10-30 DIAGNOSIS — I21.4 NSTEMI (NON-ST ELEVATED MYOCARDIAL INFARCTION) (H): ICD-10-CM

## 2024-10-30 DIAGNOSIS — Z95.818 PRESENCE OF WATCHMAN LEFT ATRIAL APPENDAGE CLOSURE DEVICE: Primary | ICD-10-CM

## 2024-10-30 DIAGNOSIS — Z86.79 HISTORY OF SUBARACHNOID HEMORRHAGE: ICD-10-CM

## 2024-10-30 DIAGNOSIS — I50.22 CHRONIC SYSTOLIC HEART FAILURE (H): ICD-10-CM

## 2024-10-30 PROCEDURE — G2211 COMPLEX E/M VISIT ADD ON: HCPCS | Performed by: INTERNAL MEDICINE

## 2024-10-30 PROCEDURE — 99214 OFFICE O/P EST MOD 30 MIN: CPT | Performed by: INTERNAL MEDICINE

## 2024-10-30 RX ORDER — CLOPIDOGREL BISULFATE 75 MG/1
75 TABLET ORAL DAILY
Qty: 30 TABLET | Refills: 4 | Status: SHIPPED | OUTPATIENT
Start: 2024-10-30

## 2024-10-30 RX ORDER — ALENDRONATE SODIUM 70 MG/1
TABLET ORAL
COMMUNITY

## 2024-10-30 NOTE — PATIENT INSTRUCTIONS
Felipe Verma,    - stop Elqiuis after tonight's dose  - tomorrow start taking plavix along with the aspirin    It was a pleasure speaking with you today in the clinic regarding your upcoming CAROL ANN.     IMPORTANT INFORMATION REGARDING YOUR      Transesophageal Echocardiogram (CAROL ANN) and Left Atrial Appendage Occlusion Device Implant        Transesophageal Echocardiogram (CAROL ANN) : Wednesday, November 27    Please arrive at  noon  for registration and preparation for your procedure.   Have nothing to eat or drink after midnight the night prior to your procedure.   Please take all of your medications with a small sip of water prior to coming in for you procedure, with the exception of any vitamins/minerals, diabetic agents or diuretics.   You will require a responsible  to bring you home following this procedure.  You will stay on aspirin 81 mg daily and Plavix 75 mg daily until March 5    Parking information:     Please arrive at [facility], Located at: [address] [place] [address]  Please park in Lot A if open (Lot B is overflow for patients).  Please check in at the main hospital desk at [facility]   From there you will be directed downstairs to the radiology deck where you will check in with heart care for your procedure.     - if you have questions, please call  [phone]     - if you can't get ahold of anyone at that number, please try:    Shreya Marlow RN @ [phone] or Mechelle Castillo RN @ [phone]     We will see you in March for a 6-month follow-up visit and to remind you to stop your Plavix.         After hours/scheduling line  [phone]

## 2024-10-30 NOTE — PROGRESS NOTES
HEART CARE ENCOUNTER NOTE       [facility]  [phone]      Assessment/Recommendations   1.  Paroxysmal atrial fibrillation - he underwent watchman implant on 9/5/24 with a 27 mm watchman FLX device.  He is currently taking aspirin 81 mg daily and Eliquis.  He can stop Eliquis.  Continue ASA and start taking Plavix 75 mg daily (new Rx sent to pharmacy today).  Continue Plavix until March 5, then stop it and continue ASA 81 mg daily indefinitely. He will have repeat imaging on November 27 to check the device.  We will see him for a 6-month follow-up in March and again at 1 year post implant.     MODIFIED THOMAS SCALE   Timepoint: 4-6 wk Post-LAAC    Previous score: 0    Score Description   0 No symptoms at all   1 No significant disability despite symptoms; able to carry out all usual duties and activities   2 Slight disability; unable to carry out all previous activities, but able to look after own affairs without assistance   3 Moderate disability; requiring some help, but able to walk without assistance   4 Moderately severe disability; unable to walk without assistance and unable to attend to own bodily needs without assistance   5 Severe disability; bedridden, incontinent and requiring constant nursing care and attention   6 Dead    Total score (0 - 6):  0    Change in score if s/p LAAC? No      2.  Coronary artery disease - status post CANDIE x 3 to the LAD April 2023  - he currently denies symptoms of angina.  He should continue ASA and statin therapy.  Las LDL documented was in March 2023 and was 61.     3.  Chronic systolic heart failure, NYHA class II -  Current LVEF 40%.  He is euvolemic by exam.   He should continue taking lasix 20 mg daily, metoprolol succinate and losartan    4. Hypertension - BP controlled.  He should continue losartan 25 mg daily and metoprolol succinate 75 mg daily      The longitudinal plan of care for atrial fibrillation, HTN, CAD and heart failure was addressed  "during this visit.  Due to added complexity of care, we will continue to support Nikos and the subsequent management of this condition(s) and with the ongoing continuity of care of this condition(s).       History of Present Illness/Subjective    Felipe Verma is a 83 year old male who comes in today for his 6-week follow-up after watchman implant.    Felipe Verma has a past history of permanent atrial fibrillation, coronary artery disease status post CANDIE x 3 to the LAD April 2023, hyperlipidemia, chronic kidney disease stage III, history of subarachnoid hemorrhage, history of chronic hematuria and rheumatoid arthritis     He had watchman implant on September 5.  Since that time, he has been feeling pretty well.  He does get some mild dizziness upon standing, but says he drinks a lot of water.  He denies any chest pain or shortness of breath.  He has not noticed any increased bleeding with the addition of the aspirin.  He has had no strokelike events or symptoms.    His wife passed away in the last month or so, so he has had a lot of stress around that situation.  One of his children is currently living with him.      Medical, surgical, family, social history, and medications were reviewed and updated as necessary.    Procedural CAROL ANN from 9/5/24 (report reviewed):  Interpretation Summary     Left ventricular function is decreased. The ejection fraction is 45-50%  (mildly reduced).  Normal right ventricle size and systolic function.  The left atrium is moderately dilated.  No thrombus is detected in the left atrial appendage.  Watchman device noted in left atrial appendage. The device is well seated with  no leakage by color flow Doppler imaging.  There is no pericardial effusion.       Physical Examination Review of Systems   Vitals: /80 (BP Location: Right arm, Patient Position: Sitting, Cuff Size: Adult Regular)   Pulse 69   Resp 16   Ht 1.727 m (5' 8\")   Wt 83.9 kg (185 lb)   SpO2 96%   BMI " 28.13 kg/m    BMI= Body mass index is 28.13 kg/m .  Wt Readings from Last 3 Encounters:   10/30/24 83.9 kg (185 lb)   09/10/24 83.9 kg (185 lb)   09/05/24 80.7 kg (178 lb)       General Appearance:   Alert, cooperative and in no acute distress   ENT/Mouth: membranes moist, no oral lesions or bleeding gums.      EYES:  no scleral icterus, normal conjunctivae   Neck: Thyroid not visualized   Chest/Lungs:   lungs are clear to auscultation, no rales or wheezing   Cardiovascular:   Regular . Normal first and second heart sounds with no murmurs, rubs or gallops; the carotid, radial and posterior tibial pulses are intact, trace edema bilaterally    Abdomen:  Soft and nontender. Bowel sounds are present in all quadrants   Extremities: no cyanosis or clubbing   Skin: no xanthelasma, warm.    Neurologic: normal gait, normal  bilateral, no tremors   Psychiatric: Normal mood and affect       Please refer above for cardiac ROS details.      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    Chronic atrial fibrillation (H)     Coronary artery disease     Diverticulosis     Dysarthria 2016    Facial droop 2016    Heart attack (H) 04/10/2023    NSTEMI    History of subarachnoid hemorrhage 05/03/2016    Hyperlipidemia     Hypertension     Lung nodule < 6cm on CT 06/06/2018    Normocytic anemia     Paroxysmal atrial fibrillation (H)     Pure hypercholesterolemia     Rheumatoid arthritis involving multiple sites with positive rheumatoid factor (H)     Stage 3a chronic kidney disease (CKD) (H)     Stented coronary artery      Past Surgical History:   Procedure Laterality Date    CV CORONARY ANGIOGRAM N/A 4/11/2023    Procedure: Coronary Angiogram;  Surgeon: Alejandro Hitchcock MD;  Location: [facility] CATH LAB CV    CV LEFT ATRIAL APPENDAGE CLOSURE Right 9/5/2024    Procedure:  Left Atrial Appendage Closure - WM;  Surgeon: Anthony Goodrich MD;  Location: [facility] LAB CV    CV PCI STENT DRUG ELUTING N/A 4/11/2023    Procedure: Percutaneous Coronary Intervention Stent;  Surgeon: Alejandro Hitchcock MD;  Location: [facility] LAB CV    CYSTOSCOPY, FULGURATE BLEEDERS, EVACUATE CLOT(S), COMBINED N/A 6/19/2024    Procedure: CYSTOSCOPY, WITH FULGURATION OF HEMORRHAGING BLOOD VESSEL AND THROMBUS REMOVAL;  Surgeon: Bert Schmitz MD;  Location: [facility] Main OR    OTHER SURGICAL HISTORY      OTHER SURGICAL HISTORYstent placement in head     TONSILLECTOMY       Family History   Problem Relation Age of Onset    Colon Cancer Mother     Hypertension Father     Coronary Artery Disease Father     Aneurysm Sister     Cerebrovascular Disease Sister     No Known Problems Brother     Diabetes Son     No Known Problems Son     No Known Problems Son     Breast Cancer No family hx of     Prostate Cancer No family hx of     Social History     Socioeconomic History    Marital status:      Spouse name: Not on file    Number of children: Not on file    Years of education: Not on file    Highest education level: Not on file   Occupational History    Not on file   Tobacco Use    Smoking status: Former    Smokeless tobacco: Never   Vaping Use    Vaping status: Never Used   Substance and Sexual Activity    Alcohol use: Not on file     Comment: Alcoholic Drinks/day: 1 glass per week    Drug use: Not Currently    Sexual activity: Not Currently   Other Topics Concern    Not on file   Social History Narrative    Not on file     Social Drivers of Health     Financial Resource Strain: Low Risk  (3/13/2024)    Financial Resource Strain     Within the past 12 months, have you or your family members you live with been unable to get utilities (heat, electricity) when it was really needed?: No   Food Insecurity: Low Risk  (3/13/2024)    Food Insecurity     Within the past 12 months, did you worry that your  food would run out before you got money to buy more?: No     Within the past 12 months, did the food you bought just not last and you didn t have money to get more?: No   Transportation Needs: Low Risk  (3/13/2024)    Transportation Needs     Within the past 12 months, has lack of transportation kept you from medical appointments, getting your medicines, non-medical meetings or appointments, work, or from getting things that you need?: No   Physical Activity: Insufficiently Active (3/13/2024)    Exercise Vital Sign     Days of Exercise per Week: 1 day     Minutes of Exercise per Session: 20 min   Stress: Stress Concern Present (3/13/2024)    [employer] [place] [employer] - Occupational Stress Questionnaire     Feeling of Stress : To some extent   Social Connections: Unknown (3/13/2024)    Social Connection and Isolation Panel [NHANES]     Frequency of Communication with Friends and Family: Not on file     Frequency of Social Gatherings with Friends and Family: Once a week     Attends Buddhism Services: Not on file     Active Member of Clubs or Organizations: Not on file     Attends Club or Organization Meetings: Not on file     Marital Status: Not on file   Interpersonal Safety: Low Risk  (9/5/2024)    Interpersonal Safety     Do you feel physically and emotionally safe where you currently live?: Yes     Within the past 12 months, have you been hit, slapped, kicked or otherwise physically hurt by someone?: No     Within the past 12 months, have you been humiliated or emotionally abused in other ways by your partner or ex-partner?: No   Housing Stability: Low Risk  (3/13/2024)    Housing Stability     Do you have housing? : Yes     Are you worried about losing your housing?: No          Medications  Allergies   Current Outpatient Medications   Medication Sig Dispense Refill    acetaminophen (TYLENOL) 500 MG tablet Take 1,000 mg by mouth every 6 hours as needed for mild pain      alendronate (FOSAMAX) 70  "MG tablet TAKE 1 TABLET BY MOUTH ONCE A WEEK BEFORE BREAKFAST      aspirin 81 MG EC tablet Take 1 tablet (81 mg) by mouth daily 30 tablet 3    atorvastatin (LIPITOR) 20 MG tablet Take 1 tablet (20 mg) by mouth every evening 90 tablet 3    calcium carbonate 500 mg, elemental, (OSCAL 500) 1250 (500 Ca) MG TABS tablet Take 1 tablet by mouth daily as needed (when remembers)      clopidogrel (PLAVIX) 75 MG tablet Take 1 tablet (75 mg) by mouth daily. 30 tablet 4    clotrimazole (LOTRIMIN) 1 % external cream Apply topically 2 times daily. For 1-2 weeks 15 g 0    furosemide (LASIX) 20 MG tablet TAKE 1 TABLET BY MOUTH ONCE DAILY FOR INCREASED SWELLING, WEIGHT GAIN, OR SHORTNESS OF BREATH 90 tablet 3    hydroxychloroquine (PLAQUENIL) 200 MG tablet Take 200 mg by mouth daily      leucovorin (WELLCOVORIN) 5 mg tablet Take 5 mg by mouth three times a week Do not overlap days of methotrexate - Mon, Wed, and Saturday      losartan (COZAAR) 25 MG tablet Take 1 tablet (25 mg) by mouth daily 90 tablet 3    methotrexate 2.5 MG tablet Take 12.5 mg by mouth twice a week 5 tablets (12.5mg) on Thursday and Friday      metoprolol succinate ER (TOPROL XL) 25 MG 24 hr tablet Take 3 tablets (75 mg) by mouth daily 270 tablet 3    nitroGLYcerin (NITROSTAT) 0.4 MG sublingual tablet For chest pain place 1 tablet under the tongue every 5 minutes for 3 doses. If symptoms persist 5 minutes after 1st dose call 911. 25 tablet 1    tamsulosin (FLOMAX) 0.4 MG capsule Take 2 capsules (0.8 mg) by mouth daily (with dinner) 180 capsule 3    vitamin B-12 (CYANOCOBALAMIN) 1000 MCG tablet Take 1,000 mcg by mouth as needed. (when remembers)      vitamin D3 (CHOLECALCIFEROL) 50 mcg (2000 units) tablet Take 1 tablet by mouth daily as needed (when remembers)      No Known Allergies      Lab Results    Chemistry/lipid CBC Cardiac Enzymes/BNP/TSH/INR   No results for input(s): \"CHOL\", \"HDL\", \"LDL\", \"TRIG\", \"CHOLHDLRATIO\" in the last 70862 hours.  No results for " "input(s): \"LDL\" in the last 19120 hours.      Last Comprehensive Metabolic Panel:  Lab Results   Component Value Date     08/29/2024    POTASSIUM 4.2 08/29/2024    CHLORIDE 106 08/29/2024    CO2 23 08/29/2024    ANIONGAP 12 08/29/2024    GLC 92 08/29/2024    BUN 25.3 (H) 08/29/2024    CR 1.57 (H) 09/05/2024    GFRESTIMATED 43 (L) 09/05/2024    ABBEY 8.7 (L) 08/29/2024           Recent Labs   Lab Test 06/19/24  0601   A1C 6.0*    CBC RESULTS:   Recent Labs   Lab Test 09/05/24  0857 08/29/24  1444   WBC  --  7.6   RBC  --  3.47*   HGB 9.3* 11.1*   HCT  --  34.8*   MCV  --  100   MCH  --  32.0   MCHC  --  31.9   RDW  --  14.7   PLT  --  153        No results for input(s): \"TROPONINI\" in the last 98185 hours.  Recent Labs   Lab Test 06/19/24  0601 04/10/23  2209   NTBNPI 1,424 325     Recent Labs   Lab Test 02/22/24  1432   TSH 3.67     Recent Labs   Lab Test 04/10/23  2209   INR 1.19*            This note has been dictated using voice recognition software. Any grammatical or context distortions are unintentional and inherent to the software.        "

## 2024-10-30 NOTE — LETTER
10/30/2024    Tessa Martinez MD  [address] [facility] [address]    RE: Felipe Verma       Dear Colleague,     I had the pleasure of seeing Felipe AIDAN Verma in the [facility].  HEART CARE ENCOUNTER NOTE       [facility]  [phone]      Assessment/Recommendations   1.  Paroxysmal atrial fibrillation - he underwent watchman implant on 9/5/24 with a 27 mm watchman FLX device.  He is currently taking aspirin 81 mg daily and Eliquis.  He can stop Eliquis.  Continue ASA and start taking Plavix 75 mg daily (new Rx sent to pharmacy today).  Continue Plavix until March 5, then stop it and continue ASA 81 mg daily indefinitely. He will have repeat imaging on November 27 to check the device.  We will see him for a 6-month follow-up in March and again at 1 year post implant.     MODIFIED THOMAS SCALE   Timepoint: 4-6 wk Post-LAAC    Previous score: 0    Score Description   0 No symptoms at all   1 No significant disability despite symptoms; able to carry out all usual duties and activities   2 Slight disability; unable to carry out all previous activities, but able to look after own affairs without assistance   3 Moderate disability; requiring some help, but able to walk without assistance   4 Moderately severe disability; unable to walk without assistance and unable to attend to own bodily needs without assistance   5 Severe disability; bedridden, incontinent and requiring constant nursing care and attention   6 Dead    Total score (0 - 6):  0    Change in score if s/p LAAC? No      2.  Coronary artery disease - status post CANDIE x 3 to the LAD April 2023  - he currently denies symptoms of angina.  He should continue ASA and statin therapy.  Las LDL documented was in March 2023 and was 61.     3.  Chronic systolic heart failure, NYHA class II -  Current LVEF 40%.  He is euvolemic by exam.   He should continue taking lasix 20 mg daily, metoprolol succinate and  losartan    4. Hypertension - BP controlled.  He should continue losartan 25 mg daily and metoprolol succinate 75 mg daily      The longitudinal plan of care for atrial fibrillation, HTN, CAD and heart failure was addressed during this visit.  Due to added complexity of care, we will continue to support Nikos and the subsequent management of this condition(s) and with the ongoing continuity of care of this condition(s).       History of Present Illness/Subjective    Felipe Verma is a 83 year old male who comes in today for his 6-week follow-up after watchman implant.    Felipe Verma has a past history of permanent atrial fibrillation, coronary artery disease status post CANDIE x 3 to the LAD April 2023, hyperlipidemia, chronic kidney disease stage III, history of subarachnoid hemorrhage, history of chronic hematuria and rheumatoid arthritis     He had watchman implant on September 5.  Since that time, he has been feeling pretty well.  He does get some mild dizziness upon standing, but says he drinks a lot of water.  He denies any chest pain or shortness of breath.  He has not noticed any increased bleeding with the addition of the aspirin.  He has had no strokelike events or symptoms.    His wife passed away in the last month or so, so he has had a lot of stress around that situation.  One of his children is currently living with him.      Medical, surgical, family, social history, and medications were reviewed and updated as necessary.    Procedural CAROL ANN from 9/5/24 (report reviewed):  Interpretation Summary     Left ventricular function is decreased. The ejection fraction is 45-50%  (mildly reduced).  Normal right ventricle size and systolic function.  The left atrium is moderately dilated.  No thrombus is detected in the left atrial appendage.  Watchman device noted in left atrial appendage. The device is well seated with  no leakage by color flow Doppler imaging.  There is no pericardial effusion.    "    Physical Examination Review of Systems   Vitals: /80 (BP Location: Right arm, Patient Position: Sitting, Cuff Size: Adult Regular)   Pulse 69   Resp 16   Ht 1.727 m (5' 8\")   Wt 83.9 kg (185 lb)   SpO2 96%   BMI 28.13 kg/m    BMI= Body mass index is 28.13 kg/m .  Wt Readings from Last 3 Encounters:   10/30/24 83.9 kg (185 lb)   09/10/24 83.9 kg (185 lb)   09/05/24 80.7 kg (178 lb)       General Appearance:   Alert, cooperative and in no acute distress   ENT/Mouth: membranes moist, no oral lesions or bleeding gums.      EYES:  no scleral icterus, normal conjunctivae   Neck: Thyroid not visualized   Chest/Lungs:   lungs are clear to auscultation, no rales or wheezing   Cardiovascular:   Regular . Normal first and second heart sounds with no murmurs, rubs or gallops; the carotid, radial and posterior tibial pulses are intact, trace edema bilaterally    Abdomen:  Soft and nontender. Bowel sounds are present in all quadrants   Extremities: no cyanosis or clubbing   Skin: no xanthelasma, warm.    Neurologic: normal gait, normal  bilateral, no tremors   Psychiatric: Normal mood and affect       Please refer above for cardiac ROS details.      Medical History  Surgical History Family History Social History   Past Medical History:   Diagnosis Date     Acute diverticulitis 04/20/2016     BPH with urinary obstruction      Brain aneurysm 05/03/2016     Chronic atrial fibrillation (H)      Coronary artery disease      Diverticulosis      Dysarthria 2016     Facial droop 2016     Heart attack (H) 04/10/2023    NSTEMI     History of subarachnoid hemorrhage 05/03/2016     Hyperlipidemia      Hypertension      Lung nodule < 6cm on CT 06/06/2018     Normocytic anemia      Paroxysmal atrial fibrillation (H)      Pure hypercholesterolemia      Rheumatoid arthritis involving multiple sites with positive rheumatoid factor (H)      Stage 3a chronic kidney disease (CKD) (H)      Stented coronary artery      Past " Surgical History:   Procedure Laterality Date     CV CORONARY ANGIOGRAM N/A 4/11/2023    Procedure: Coronary Angiogram;  Surgeon: Alejandro Hitchcock MD;  Location: [facility] CV     CV LEFT ATRIAL APPENDAGE CLOSURE Right 9/5/2024    Procedure: Left Atrial Appendage Closure - WM;  Surgeon: Anthony Goodrich MD;  Location: [facility] CV     CV PCI STENT DRUG ELUTING N/A 4/11/2023    Procedure: Percutaneous Coronary Intervention Stent;  Surgeon: Alejandro Hitchcock MD;  Location: [facility] CV     CYSTOSCOPY, FULGURATE BLEEDERS, EVACUATE CLOT(S), COMBINED N/A 6/19/2024    Procedure: CYSTOSCOPY, WITH FULGURATION OF HEMORRHAGING BLOOD VESSEL AND THROMBUS REMOVAL;  Surgeon: Bert Schmitz MD;  Location: [facility] OR     OTHER SURGICAL HISTORY      OTHER SURGICAL HISTORYstent placement in head      TONSILLECTOMY       Family History   Problem Relation Age of Onset     Colon Cancer Mother      Hypertension Father      Coronary Artery Disease Father      Aneurysm Sister      Cerebrovascular Disease Sister      No Known Problems Brother      Diabetes Son      No Known Problems Son      No Known Problems Son      Breast Cancer No family hx of      Prostate Cancer No family hx of     Social History     Socioeconomic History     Marital status:      Spouse name: Not on file     Number of children: Not on file     Years of education: Not on file     Highest education level: Not on file   Occupational History     Not on file   Tobacco Use     Smoking status: Former     Smokeless tobacco: Never   Vaping Use     Vaping status: Never Used   Substance and Sexual Activity     Alcohol use: Not on file     Comment: Alcoholic Drinks/day: 1 glass per week     Drug use: Not Currently     Sexual activity: Not Currently   Other Topics Concern     Not on file   Social History Narrative     Not on file     Social Drivers of Health     Financial Resource Strain: Low Risk  (3/13/2024)    Financial  Resource Strain      Within the past 12 months, have you or your family members you live with been unable to get utilities (heat, electricity) when it was really needed?: No   Food Insecurity: Low Risk  (3/13/2024)    Food Insecurity      Within the past 12 months, did you worry that your food would run out before you got money to buy more?: No      Within the past 12 months, did the food you bought just not last and you didn t have money to get more?: No   Transportation Needs: Low Risk  (3/13/2024)    Transportation Needs      Within the past 12 months, has lack of transportation kept you from medical appointments, getting your medicines, non-medical meetings or appointments, work, or from getting things that you need?: No   Physical Activity: Insufficiently Active (3/13/2024)    Exercise Vital Sign      Days of Exercise per Week: 1 day      Minutes of Exercise per Session: 20 min   Stress: Stress Concern Present (3/13/2024)    [employer] [place] [employer] - Occupational Stress Questionnaire      Feeling of Stress : To some extent   Social Connections: Unknown (3/13/2024)    Social Connection and Isolation Panel [NHANES]      Frequency of Communication with Friends and Family: Not on file      Frequency of Social Gatherings with Friends and Family: Once a week      Attends Protestant Services: Not on file      Active Member of Clubs or Organizations: Not on file      Attends Club or Organization Meetings: Not on file      Marital Status: Not on file   Interpersonal Safety: Low Risk  (9/5/2024)    Interpersonal Safety      Do you feel physically and emotionally safe where you currently live?: Yes      Within the past 12 months, have you been hit, slapped, kicked or otherwise physically hurt by someone?: No      Within the past 12 months, have you been humiliated or emotionally abused in other ways by your partner or ex-partner?: No   Housing Stability: Low Risk  (3/13/2024)    Housing Stability      Do  you have housing? : Yes      Are you worried about losing your housing?: No          Medications  Allergies   Current Outpatient Medications   Medication Sig Dispense Refill     acetaminophen (TYLENOL) 500 MG tablet Take 1,000 mg by mouth every 6 hours as needed for mild pain       alendronate (FOSAMAX) 70 MG tablet TAKE 1 TABLET BY MOUTH ONCE A WEEK BEFORE BREAKFAST       aspirin 81 MG EC tablet Take 1 tablet (81 mg) by mouth daily 30 tablet 3     atorvastatin (LIPITOR) 20 MG tablet Take 1 tablet (20 mg) by mouth every evening 90 tablet 3     calcium carbonate 500 mg, elemental, (OSCAL 500) 1250 (500 Ca) MG TABS tablet Take 1 tablet by mouth daily as needed (when remembers)       clopidogrel (PLAVIX) 75 MG tablet Take 1 tablet (75 mg) by mouth daily. 30 tablet 4     clotrimazole (LOTRIMIN) 1 % external cream Apply topically 2 times daily. For 1-2 weeks 15 g 0     furosemide (LASIX) 20 MG tablet TAKE 1 TABLET BY MOUTH ONCE DAILY FOR INCREASED SWELLING, WEIGHT GAIN, OR SHORTNESS OF BREATH 90 tablet 3     hydroxychloroquine (PLAQUENIL) 200 MG tablet Take 200 mg by mouth daily       leucovorin (WELLCOVORIN) 5 mg tablet Take 5 mg by mouth three times a week Do not overlap days of methotrexate - Mon, Wed, and Saturday       losartan (COZAAR) 25 MG tablet Take 1 tablet (25 mg) by mouth daily 90 tablet 3     methotrexate 2.5 MG tablet Take 12.5 mg by mouth twice a week 5 tablets (12.5mg) on Thursday and Friday       metoprolol succinate ER (TOPROL XL) 25 MG 24 hr tablet Take 3 tablets (75 mg) by mouth daily 270 tablet 3     nitroGLYcerin (NITROSTAT) 0.4 MG sublingual tablet For chest pain place 1 tablet under the tongue every 5 minutes for 3 doses. If symptoms persist 5 minutes after 1st dose call 911. 25 tablet 1     tamsulosin (FLOMAX) 0.4 MG capsule Take 2 capsules (0.8 mg) by mouth daily (with dinner) 180 capsule 3     vitamin B-12 (CYANOCOBALAMIN) 1000 MCG tablet Take 1,000 mcg by mouth as needed. (when remembers)    "    vitamin D3 (CHOLECALCIFEROL) 50 mcg (2000 units) tablet Take 1 tablet by mouth daily as needed (when remembers)      No Known Allergies      Lab Results    Chemistry/lipid CBC Cardiac Enzymes/BNP/TSH/INR   No results for input(s): \"CHOL\", \"HDL\", \"LDL\", \"TRIG\", \"CHOLHDLRATIO\" in the last 47814 hours.  No results for input(s): \"LDL\" in the last 48829 hours.      Last Comprehensive Metabolic Panel:  Lab Results   Component Value Date     08/29/2024    POTASSIUM 4.2 08/29/2024    CHLORIDE 106 08/29/2024    CO2 23 08/29/2024    ANIONGAP 12 08/29/2024    GLC 92 08/29/2024    BUN 25.3 (H) 08/29/2024    CR 1.57 (H) 09/05/2024    GFRESTIMATED 43 (L) 09/05/2024    ABBEY 8.7 (L) 08/29/2024           Recent Labs   Lab Test 06/19/24  0601   A1C 6.0*    CBC RESULTS:   Recent Labs   Lab Test 09/05/24  0857 08/29/24  1444   WBC  --  7.6   RBC  --  3.47*   HGB 9.3* 11.1*   HCT  --  34.8*   MCV  --  100   MCH  --  32.0   MCHC  --  31.9   RDW  --  14.7   PLT  --  153        No results for input(s): \"TROPONINI\" in the last 45261 hours.  Recent Labs   Lab Test 06/19/24  0601 04/10/23  2209   NTBNPI 1,424 325     Recent Labs   Lab Test 02/22/24  1432   TSH 3.67     Recent Labs   Lab Test 04/10/23  2209   INR 1.19*            This note has been dictated using voice recognition software. Any grammatical or context distortions are unintentional and inherent to the software.          Thank you for allowing me to participate in the care of your patient.      Sincerely,     DEXTER IBARRA PA-C     [facility]  cc:   Dexter Ibarra PA-C  [address]  [place] [address]      "

## 2024-11-26 ENCOUNTER — TELEPHONE (OUTPATIENT)
Dept: CARDIOLOGY | Facility: HOSPITAL | Age: 84
End: 2024-11-26
Payer: MEDICARE

## 2024-11-27 ENCOUNTER — HOSPITAL ENCOUNTER (OUTPATIENT)
Dept: CARDIOLOGY | Facility: HOSPITAL | Age: 84
Discharge: HOME | End: 2024-11-27
Attending: NURSE PRACTITIONER | Admitting: INTERNAL MEDICINE
Payer: MEDICARE

## 2024-11-27 VITALS
SYSTOLIC BLOOD PRESSURE: 140 MMHG | HEART RATE: 60 BPM | DIASTOLIC BLOOD PRESSURE: 73 MMHG | OXYGEN SATURATION: 98 % | TEMPERATURE: 97 F | RESPIRATION RATE: 18 BRPM

## 2024-11-27 DIAGNOSIS — I48.20 CHRONIC ATRIAL FIBRILLATION (H): ICD-10-CM

## 2024-11-27 LAB — LVEF ECHO: NORMAL

## 2024-11-27 PROCEDURE — 93312 ECHO TRANSESOPHAGEAL: CPT | Mod: 26 | Performed by: INTERNAL MEDICINE

## 2024-11-27 PROCEDURE — 250N000011 HC RX IP 250 OP 636: Performed by: INTERNAL MEDICINE

## 2024-11-27 PROCEDURE — 250N000009 HC RX 250: Performed by: INTERNAL MEDICINE

## 2024-11-27 PROCEDURE — 99152 MOD SED SAME PHYS/QHP 5/>YRS: CPT | Performed by: INTERNAL MEDICINE

## 2024-11-27 PROCEDURE — 93325 DOPPLER ECHO COLOR FLOW MAPG: CPT

## 2024-11-27 PROCEDURE — 93320 DOPPLER ECHO COMPLETE: CPT

## 2024-11-27 PROCEDURE — 93325 DOPPLER ECHO COLOR FLOW MAPG: CPT | Mod: 26 | Performed by: INTERNAL MEDICINE

## 2024-11-27 PROCEDURE — 93320 DOPPLER ECHO COMPLETE: CPT | Mod: 26 | Performed by: INTERNAL MEDICINE

## 2024-11-27 RX ORDER — ACETAMINOPHEN 325 MG/1
650 TABLET ORAL EVERY 4 HOURS PRN
Status: DISCONTINUED | OUTPATIENT
Start: 2024-11-27 | End: 2024-11-27 | Stop reason: HOSPADM

## 2024-11-27 RX ORDER — MAGNESIUM HYDROXIDE/ALUMINUM HYDROXICE/SIMETHICONE 120; 1200; 1200 MG/30ML; MG/30ML; MG/30ML
30 SUSPENSION ORAL EVERY 8 HOURS PRN
Status: DISCONTINUED | OUTPATIENT
Start: 2024-11-27 | End: 2024-11-27 | Stop reason: HOSPADM

## 2024-11-27 RX ORDER — FENTANYL CITRATE 50 UG/ML
INJECTION, SOLUTION INTRAMUSCULAR; INTRAVENOUS
Status: COMPLETED | OUTPATIENT
Start: 2024-11-27 | End: 2024-11-27

## 2024-11-27 RX ORDER — BENZOCAINE/MENTHOL 6 MG-10 MG
1 LOZENGE MUCOUS MEMBRANE 3 TIMES DAILY PRN
Status: DISCONTINUED | OUTPATIENT
Start: 2024-11-27 | End: 2024-11-27 | Stop reason: HOSPADM

## 2024-11-27 RX ADMIN — MIDAZOLAM HYDROCHLORIDE 1 MG: 1 INJECTION, SOLUTION INTRAMUSCULAR; INTRAVENOUS at 12:59

## 2024-11-27 RX ADMIN — TOPICAL ANESTHETIC 0.5 ML: 200 SPRAY DENTAL; PERIODONTAL at 12:59

## 2024-11-27 RX ADMIN — FENTANYL CITRATE 50 MCG: 50 INJECTION, SOLUTION INTRAMUSCULAR; INTRAVENOUS at 13:01

## 2024-11-27 ASSESSMENT — ACTIVITIES OF DAILY LIVING (ADL)
ADLS_ACUITY_SCORE: 57
ADLS_ACUITY_SCORE: 57

## 2024-11-27 NOTE — DISCHARGE INSTRUCTIONS
1. You are required to have someone accompany you home.    2. Rest today. Do not drive or operate machinery today. Over-activity may produce nausea and dizziness.    3. You should follow your normal diet. Drink plenty of fluids. Do not drink any alcoholic beverages for 24 hours. *(Alcohol may interact with the medications you received today).  Cold food and fluids at 1410 this afternoon.    4. NO HOT FOODS or LIQUIDS FOR 6 HOURS after the procedure.   Not until 7:15 PM this evening.    5. You may have a sore throat or cough. This is normal. These symptoms should resolve in 24 hours.     6. If you have further questions call your doctor:

## 2024-12-02 ENCOUNTER — TRANSFERRED RECORDS (OUTPATIENT)
Dept: HEALTH INFORMATION MANAGEMENT | Facility: CLINIC | Age: 84
End: 2024-12-02
Payer: MEDICARE

## 2024-12-03 ENCOUNTER — TELEPHONE (OUTPATIENT)
Dept: CARDIOLOGY | Facility: CLINIC | Age: 84
End: 2024-12-03
Payer: MEDICARE

## 2024-12-03 NOTE — TELEPHONE ENCOUNTER
"Patient s/p LAAC BSCI - WM FLX, 9/05/24. Per post-LAAC medication protocol, patient to remain on once daily 81 mg ASA for life and once daily 75 mg Plavix until six months post-LAAC, which will be 3/05/25. Imaging interpretation summary shows:    \"Watchman device noted in left atrial appendage. The device is well seated with no leakage by color flow Doppler imaging.\"    Phone call to patient, discussed results. Post-LAAC Imaging shows no concern for DRT or leak around edges of device. EF 60-65% (improved) compared to intra-op CAROL ANN. Patient stated they will remain on medications as prescribed, otherwise patient will be seen at 6 month lorraine to discuss medication changes. Patient appreciative and has writer's direct office number for questions or concerns. Call transferred to scheduling to arrange 6 month follow-up. No further questions at this time.  "

## 2025-02-11 ENCOUNTER — PATIENT OUTREACH (OUTPATIENT)
Dept: CARE COORDINATION | Facility: CLINIC | Age: 85
End: 2025-02-11
Payer: MEDICARE

## 2025-02-25 ENCOUNTER — PATIENT OUTREACH (OUTPATIENT)
Dept: CARE COORDINATION | Facility: CLINIC | Age: 85
End: 2025-02-25
Payer: MEDICARE

## 2025-03-10 DIAGNOSIS — I10 BENIGN ESSENTIAL HYPERTENSION: ICD-10-CM

## 2025-03-10 DIAGNOSIS — E78.5 HYPERLIPIDEMIA, UNSPECIFIED HYPERLIPIDEMIA TYPE: ICD-10-CM

## 2025-03-10 DIAGNOSIS — I48.0 PAROXYSMAL ATRIAL FIBRILLATION (H): ICD-10-CM

## 2025-03-10 RX ORDER — LOSARTAN POTASSIUM 25 MG/1
25 TABLET ORAL DAILY
Qty: 90 TABLET | Refills: 0 | Status: SHIPPED | OUTPATIENT
Start: 2025-03-10

## 2025-03-10 RX ORDER — METOPROLOL SUCCINATE 25 MG/1
75 TABLET, EXTENDED RELEASE ORAL DAILY
Qty: 270 TABLET | Refills: 0 | Status: SHIPPED | OUTPATIENT
Start: 2025-03-10

## 2025-03-10 RX ORDER — ATORVASTATIN CALCIUM 20 MG/1
20 TABLET, FILM COATED ORAL EVERY EVENING
Qty: 90 TABLET | Refills: 0 | Status: SHIPPED | OUTPATIENT
Start: 2025-03-10

## 2025-03-10 NOTE — TELEPHONE ENCOUNTER
Please call patient. They are due for an office visit /follow up and possibly labs.  Refilled [id number] you . Tessa Martinez M.D.

## 2025-03-20 ENCOUNTER — OFFICE VISIT (OUTPATIENT)
Dept: CARDIOLOGY | Facility: CLINIC | Age: 85
End: 2025-03-20
Attending: INTERNAL MEDICINE
Payer: MEDICARE

## 2025-03-20 VITALS
WEIGHT: 189 LBS | SYSTOLIC BLOOD PRESSURE: 155 MMHG | RESPIRATION RATE: 14 BRPM | BODY MASS INDEX: 28.74 KG/M2 | DIASTOLIC BLOOD PRESSURE: 87 MMHG | HEART RATE: 61 BPM

## 2025-03-20 DIAGNOSIS — I10 BENIGN ESSENTIAL HYPERTENSION: ICD-10-CM

## 2025-03-20 DIAGNOSIS — Z95.818 PRESENCE OF WATCHMAN LEFT ATRIAL APPENDAGE CLOSURE DEVICE: ICD-10-CM

## 2025-03-20 DIAGNOSIS — I25.10 CORONARY ARTERY DISEASE INVOLVING NATIVE CORONARY ARTERY OF NATIVE HEART WITHOUT ANGINA PECTORIS: ICD-10-CM

## 2025-03-20 DIAGNOSIS — I48.0 PAROXYSMAL ATRIAL FIBRILLATION (H): ICD-10-CM

## 2025-03-20 DIAGNOSIS — I50.22 CHRONIC SYSTOLIC HEART FAILURE (H): Primary | ICD-10-CM

## 2025-03-20 NOTE — PATIENT INSTRUCTIONS
Felipe Verma,    It was a pleasure to see you today in the clinic regarding your Watchman follow-up.     My recommendations after this visit include:     - Stop taking Plavix (AKA clopidogrel). Continue taking aspirin 81 mg daily indefinitely   - no medication changes today   - we will repeat imaging in September and see you again for Watchman follow-up around that time   - monitor your blood pressure at home and keep a log. Please follow-up if your systolic blood pressure (top number) is persistently > 150s      If you have questions or concerns, please call using the numbers below:      After Hours/Scheduling  [phone]    Otherwise you can dial the nurse directly at:    Mechelle Castillo RN  [phone]    Rika Rawls PA-C  Structural Heart Program  [facility]

## 2025-03-20 NOTE — PROGRESS NOTES
HEART CARE ENCOUNTER NOTE       [facility]  [phone]      Assessment/Recommendations   1.  Paroxysmal atrial fibrillation: Status post LAAO with 27 mm Watchman FLX Pro device on 9/5/2024.  He should continue aspirin 81 mg daily indefinitely.  We will see him again for watchman follow-up in September and repeat imaging at that time.    MODIFIED THOMAS SCALE   Timepoint: 6mo Post-LAAC    Previous score: 0    Score Description   0 No symptoms at all   1 No significant disability despite symptoms; able to carry out all usual duties and activities   2 Slight disability; unable to carry out all previous activities, but able to look after own affairs without assistance   3 Moderate disability; requiring some help, but able to walk without assistance   4 Moderately severe disability; unable to walk without assistance and unable to attend to own bodily needs without assistance   5 Severe disability; bedridden, incontinent and requiring constant nursing care and attention   6 Dead    Total score (0 - 6):  0    Change in score if s/p LAAC? No     2.  Chronic systolic heart failure, NYHA class II -LVEF 60%-65% November 2024.  Currently compensated.  Continue current doses of Lasix, metoprolol succinate, losartan    3.  Coronary artery disease -that is post CANDIE x 3 to the LAD April 2023.  Continue aspirin and statin therapy.  Denies symptoms of angina    4.  Hypertension -on the higher side today.  He does not monitor his blood pressure at home.  I recommended for him to monitor his blood pressure on a regular basis and follow-up with his systolic blood pressure is persistently greater than 150.    The longitudinal plan of care for the diagnosis(es)/condition(s) as documented were addressed during this visit. Due to the added complexity in care, I will continue to support Nikos in the subsequent management and with ongoing continuity of care.        History of Present Illness/Subjective    Felipe BERMUDEZ  Luci is a 84 year old male who comes in today for 6-month follow-up after AVILA Wang AIDAN Verma has a past history of permanent atrial fibrillation, coronary artery disease status post CANDIE x 3 to the LAD April 2023, hyperlipidemia, chronic kidney disease stage III, history of subarachnoid hemorrhage, history of chronic hematuria and rheumatoid arthritis     Nikos has been doing well.  He reports that he is still taking his Plavix and aspirin.  He denies current bleeding issues.  He does report easy bruising.  He denies stroke since watchman implant.  He denies chest discomfort, shortness of breath, leg swelling.  He does sleep with the head of the bed slightly elevated but he also reports that the batteries do not work so he is unable to lower it at this time.  He denies PND.  He reports a little unsteadiness when first standing which has been ongoing for months.  He denies dizziness, lightheadedness when just sitting or with exertion.  He denies presyncope or syncope.      Medical, surgical, family, social history, and medications were reviewed and updated as necessary.    ECHO results (from 11/27/2024):  Interpretation Summary     Left ventricular function is normal.The ejection fraction is 60-65%.  Normal right ventricle size and systolic function.  Watchman device noted in left atrial appendage. The device is well seated with  no leakage by color flow Doppler imaging.  There is mild to moderate (1-2+) mitral regurgitation.       Physical Examination Review of Systems   Vitals: BP (!) 155/87 (BP Location: Right arm, Patient Position: Sitting, Cuff Size: Adult Large)   Pulse 61   Resp 14   Wt 85.7 kg (189 lb)   BMI 28.74 kg/m    BMI= Body mass index is 28.74 kg/m .  Wt Readings from Last 3 Encounters:   03/20/25 85.7 kg (189 lb)   10/30/24 83.9 kg (185 lb)   09/10/24 83.9 kg (185 lb)       General Appearance:   Alert, cooperative and in no acute distress   ENT/Mouth: membranes moist, no oral lesions or  bleeding gums.      EYES:  no scleral icterus, normal conjunctivae   Neck: Thyroid not visualized   Chest/Lungs:   lungs are clear to auscultation, no rales or wheezing   Cardiovascular:   Regular. Normal first and second heart sounds with no murmurs, rubs or gallops;  trace edema bilaterally    Abdomen:  Soft and nontender.    Extremities: no cyanosis or clubbing   Skin: no xanthelasma, warm.    Neurologic: normal gait, normal  bilateral, no tremors   Psychiatric: Normal mood and affect       Please refer above for cardiac ROS details.      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    Chronic atrial fibrillation (H)     Coronary artery disease     Diverticulosis     Dysarthria 2016    Facial droop 2016    Heart attack (H) 04/10/2023    NSTEMI    History of subarachnoid hemorrhage 05/03/2016    Hyperlipidemia     Hypertension     Lung nodule < 6cm on CT 06/06/2018    Normocytic anemia     Paroxysmal atrial fibrillation (H)     Pure hypercholesterolemia     Rheumatoid arthritis involving multiple sites with positive rheumatoid factor (H)     Stage 3a chronic kidney disease (CKD) (H)     Stented coronary artery      Past Surgical History:   Procedure Laterality Date    CV CORONARY ANGIOGRAM N/A 4/11/2023    Procedure: Coronary Angiogram;  Surgeon: Alejandro Hitchcock MD;  Location: [facility]    CV LEFT ATRIAL APPENDAGE CLOSURE Right 9/5/2024    Procedure: Left Atrial Appendage Closure - WM;  Surgeon: Anthony Goodrich MD;  Location: [facility]    CV PCI STENT DRUG ELUTING N/A 4/11/2023    Procedure: Percutaneous Coronary Intervention Stent;  Surgeon: Alejandro Hitchcock MD;  Location: [facility] CV    CYSTOSCOPY, FULGURATE BLEEDERS, EVACUATE CLOT(S), COMBINED N/A 6/19/2024    Procedure: CYSTOSCOPY, WITH FULGURATION OF  HEMORRHAGING BLOOD VESSEL AND THROMBUS REMOVAL;  Surgeon: Bert Schmitz MD;  Location: [facility] Main OR    OTHER SURGICAL HISTORY      OTHER SURGICAL HISTORYstent placement in head     TONSILLECTOMY       Family History   Problem Relation Age of Onset    Colon Cancer Mother     Hypertension Father     Coronary Artery Disease Father     Aneurysm Sister     Cerebrovascular Disease Sister     No Known Problems Brother     Diabetes Son     No Known Problems Son     No Known Problems Son     Breast Cancer No family hx of     Prostate Cancer No family hx of     Social History     Socioeconomic History    Marital status:      Spouse name: Not on file    Number of children: Not on file    Years of education: Not on file    Highest education level: Not on file   Occupational History    Not on file   Tobacco Use    Smoking status: Former    Smokeless tobacco: Never   Vaping Use    Vaping status: Never Used   Substance and Sexual Activity    Alcohol use: Not on file     Comment: Alcoholic Drinks/day: 1 glass per week    Drug use: Not Currently    Sexual activity: Not Currently   Other Topics Concern    Not on file   Social History Narrative    Not on file     Social Drivers of Health     Financial Resource Strain: Low Risk  (3/13/2024)    Financial Resource Strain     Within the past 12 months, have you or your family members you live with been unable to get utilities (heat, electricity) when it was really needed?: No   Food Insecurity: Low Risk  (3/13/2024)    Food Insecurity     Within the past 12 months, did you worry that your food would run out before you got money to buy more?: No     Within the past 12 months, did the food you bought just not last and you didn t have money to get more?: No   Transportation Needs: Low Risk  (3/13/2024)    Transportation Needs     Within the past 12 months, has lack of transportation kept you from medical appointments, getting your medicines, non-medical meetings or  appointments, work, or from getting things that you need?: No   Physical Activity: Insufficiently Active (3/13/2024)    Exercise Vital Sign     Days of Exercise per Week: 1 day     Minutes of Exercise per Session: 20 min   Stress: Stress Concern Present (3/13/2024)    [employer] [place] [employer] - Occupational Stress Questionnaire     Feeling of Stress : To some extent   Social Connections: Unknown (3/13/2024)    Social Connection and Isolation Panel [NHANES]     Frequency of Communication with Friends and Family: Not on file     Frequency of Social Gatherings with Friends and Family: Once a week     Attends Spiritism Services: Not on file     Active Member of Clubs or Organizations: Not on file     Attends Club or Organization Meetings: Not on file     Marital Status: Not on file   Interpersonal Safety: Low Risk  (11/27/2024)    Interpersonal Safety     Do you feel physically and emotionally safe where you currently live?: Yes     Within the past 12 months, have you been hit, slapped, kicked or otherwise physically hurt by someone?: No     Within the past 12 months, have you been humiliated or emotionally abused in other ways by your partner or ex-partner?: No   Housing Stability: Low Risk  (3/13/2024)    Housing Stability     Do you have housing? : Yes     Are you worried about losing your housing?: No          Medications  Allergies   Current Outpatient Medications   Medication Sig Dispense Refill    acetaminophen (TYLENOL) 500 MG tablet Take 1,000 mg by mouth every 6 hours as needed for mild pain      alendronate (FOSAMAX) 70 MG tablet TAKE 1 TABLET BY MOUTH ONCE A WEEK BEFORE BREAKFAST      aspirin 81 MG EC tablet Take 1 tablet (81 mg) by mouth daily 30 tablet 3    atorvastatin (LIPITOR) 20 MG tablet Take 1 tablet by mouth in the evening 90 tablet 0    calcium carbonate 500 mg, elemental, (OSCAL 500) 1250 (500 Ca) MG TABS tablet Take 1 tablet by mouth daily as needed (when remembers)       "furosemide (LASIX) 20 MG tablet TAKE 1 TABLET BY MOUTH ONCE DAILY FOR INCREASED SWELLING, WEIGHT GAIN, OR SHORTNESS OF BREATH 90 tablet 3    hydroxychloroquine (PLAQUENIL) 200 MG tablet Take 200 mg by mouth daily      leucovorin (WELLCOVORIN) 5 mg tablet Take 5 mg by mouth three times a week Do not overlap days of methotrexate - Mon, Wed, and Saturday      losartan (COZAAR) 25 MG tablet Take 1 tablet by mouth once daily 90 tablet 0    methotrexate 2.5 MG tablet Take 12.5 mg by mouth twice a week 5 tablets (12.5mg) on Thursday and Friday      metoprolol succinate ER (TOPROL XL) 25 MG 24 hr tablet Take 3 tablets by mouth once daily 270 tablet 0    nitroGLYcerin (NITROSTAT) 0.4 MG sublingual tablet For chest pain place 1 tablet under the tongue every 5 minutes for 3 doses. If symptoms persist 5 minutes after 1st dose call 911. 25 tablet 1    vitamin B-12 (CYANOCOBALAMIN) 1000 MCG tablet Take 1,000 mcg by mouth as needed. (when remembers)      vitamin D3 (CHOLECALCIFEROL) 50 mcg (2000 units) tablet Take 1 tablet by mouth daily as needed (when remembers)      clotrimazole (LOTRIMIN) 1 % external cream Apply topically 2 times daily. For 1-2 weeks (Patient not taking: Reported on 3/20/2025) 15 g 0    tamsulosin (FLOMAX) 0.4 MG capsule Take 2 capsules (0.8 mg) by mouth daily (with dinner) (Patient not taking: Reported on 3/20/2025) 180 capsule 3    No Known Allergies      Lab Results    Chemistry/lipid CBC Cardiac Enzymes/BNP/TSH/INR   No results for input(s): \"CHOL\", \"HDL\", \"LDL\", \"TRIG\", \"CHOLHDLRATIO\" in the last 71656 hours.  No results for input(s): \"LDL\" in the last 65557 hours.  Recent Labs   Lab Test 09/05/24  0857 08/29/24  1444   NA  --  141   POTASSIUM  --  4.2   CHLORIDE  --  106   CO2  --  23   GLC  --  92   BUN  --  25.3*   CR 1.57* 1.43*   GFRESTIMATED 43* 49*   ABBEY  --  8.7*     Recent Labs   Lab Test 09/05/24  0857 08/29/24  1444 06/25/24  1655   CR 1.57* 1.43* 1.63*     Recent Labs   Lab Test " "06/19/24  0601   A1C 6.0*    Recent Labs   Lab Test 09/05/24  0857 08/29/24  1444   WBC  --  7.6   HGB 9.3* 11.1*   HCT  --  34.8*   MCV  --  100   PLT  --  153     Recent Labs   Lab Test 09/05/24  0857 08/29/24  1444 06/25/24  1655   HGB 9.3* 11.1* 10.0*    No results for input(s): \"TROPONINI\" in the last 55250 hours.  Recent Labs   Lab Test 06/19/24  0601 04/10/23  2209   NTBNPI 1,424 325     Recent Labs   Lab Test 02/22/24  1432   TSH 3.67     Recent Labs   Lab Test 04/10/23  2209   INR 1.19*        30 minutes spent on the date of encounter doing education, chart prep/review, review of outside records, review of test results, interpretation with above tests, patient visit, and documentation.      This note has been dictated using voice recognition software. Any grammatical or context distortions are unintentional and inherent to the software.    Rika Rawls PA-C  Structural Heart Program  [facility]   "

## 2025-03-20 NOTE — LETTER
3/20/2025    Tessa Martinez MD  [address] [facility] [address]    RE: Felipe AIDAN Verma       Dear Colleague,     I had the pleasure of seeing Felipe BERMUDEZ Kayleenancy in the [facility].  HEART CARE ENCOUNTER NOTE       [facility]  [phone]      Assessment/Recommendations   1.  Paroxysmal atrial fibrillation: Status post LAAO with 27 mm Watchman FLX Pro device on 9/5/2024.  He should continue aspirin 81 mg daily indefinitely.  We will see him again for watchman follow-up in September and repeat imaging at that time.    MODIFIED THOMAS SCALE   Timepoint: 6mo Post-LAAC    Previous score: 0    Score Description   0 No symptoms at all   1 No significant disability despite symptoms; able to carry out all usual duties and activities   2 Slight disability; unable to carry out all previous activities, but able to look after own affairs without assistance   3 Moderate disability; requiring some help, but able to walk without assistance   4 Moderately severe disability; unable to walk without assistance and unable to attend to own bodily needs without assistance   5 Severe disability; bedridden, incontinent and requiring constant nursing care and attention   6 Dead    Total score (0 - 6):  0    Change in score if s/p LAAC? No     2.  Chronic systolic heart failure, NYHA class II -LVEF 60%-65% November 2024.  Currently compensated.  Continue current doses of Lasix, metoprolol succinate, losartan    3.  Coronary artery disease -that is post CANDIE x 3 to the LAD April 2023.  Continue aspirin and statin therapy.  Denies symptoms of angina    4.  Hypertension -on the higher side today.  He does not monitor his blood pressure at home.  I recommended for him to monitor his blood pressure on a regular basis and follow-up with his systolic blood pressure is persistently greater than 150.    The longitudinal plan of care for the diagnosis(es)/condition(s) as documented were addressed during  this visit. Due to the added complexity in care, I will continue to support Nikos in the subsequent management and with ongoing continuity of care.        History of Present Illness/Subjective    Felipe Verma is a 84 year old male who comes in today for 6-month follow-up after LAAO    Felipe Verma has a past history of permanent atrial fibrillation, coronary artery disease status post CANDIE x 3 to the LAD April 2023, hyperlipidemia, chronic kidney disease stage III, history of subarachnoid hemorrhage, history of chronic hematuria and rheumatoid arthritis     Nikos has been doing well.  He reports that he is still taking his Plavix and aspirin.  He denies current bleeding issues.  He does report easy bruising.  He denies stroke since watchman implant.  He denies chest discomfort, shortness of breath, leg swelling.  He does sleep with the head of the bed slightly elevated but he also reports that the batteries do not work so he is unable to lower it at this time.  He denies PND.  He reports a little unsteadiness when first standing which has been ongoing for months.  He denies dizziness, lightheadedness when just sitting or with exertion.  He denies presyncope or syncope.      Medical, surgical, family, social history, and medications were reviewed and updated as necessary.    ECHO results (from 11/27/2024):  Interpretation Summary     Left ventricular function is normal.The ejection fraction is 60-65%.  Normal right ventricle size and systolic function.  Watchman device noted in left atrial appendage. The device is well seated with  no leakage by color flow Doppler imaging.  There is mild to moderate (1-2+) mitral regurgitation.       Physical Examination Review of Systems   Vitals: BP (!) 155/87 (BP Location: Right arm, Patient Position: Sitting, Cuff Size: Adult Large)   Pulse 61   Resp 14   Wt 85.7 kg (189 lb)   BMI 28.74 kg/m    BMI= Body mass index is 28.74 kg/m .  Wt Readings from Last 3 Encounters:    03/20/25 85.7 kg (189 lb)   10/30/24 83.9 kg (185 lb)   09/10/24 83.9 kg (185 lb)       General Appearance:   Alert, cooperative and in no acute distress   ENT/Mouth: membranes moist, no oral lesions or bleeding gums.      EYES:  no scleral icterus, normal conjunctivae   Neck: Thyroid not visualized   Chest/Lungs:   lungs are clear to auscultation, no rales or wheezing   Cardiovascular:   Regular. Normal first and second heart sounds with no murmurs, rubs or gallops;  trace edema bilaterally    Abdomen:  Soft and nontender.    Extremities: no cyanosis or clubbing   Skin: no xanthelasma, warm.    Neurologic: normal gait, normal  bilateral, no tremors   Psychiatric: Normal mood and affect       Please refer above for cardiac ROS details.      Medical History  Surgical History Family History Social History   Past Medical History:   Diagnosis Date     Acute diverticulitis 04/20/2016     BPH with urinary obstruction      Brain aneurysm 05/03/2016     Chronic atrial fibrillation (H)      Coronary artery disease      Diverticulosis      Dysarthria 2016     Facial droop 2016     Heart attack (H) 04/10/2023    NSTEMI     History of subarachnoid hemorrhage 05/03/2016     Hyperlipidemia      Hypertension      Lung nodule < 6cm on CT 06/06/2018     Normocytic anemia      Paroxysmal atrial fibrillation (H)      Pure hypercholesterolemia      Rheumatoid arthritis involving multiple sites with positive rheumatoid factor (H)      Stage 3a chronic kidney disease (CKD) (H)      Stented coronary artery      Past Surgical History:   Procedure Laterality Date     CV CORONARY ANGIOGRAM N/A 4/11/2023    Procedure: Coronary Angiogram;  Surgeon: Alejandro Hitchcock MD;  Location: [facility] CV     CV LEFT ATRIAL APPENDAGE CLOSURE Right 9/5/2024    Procedure: Left Atrial Appendage Closure - WM;  Surgeon: Anthony Goodrich MD;  Location: [facility] CV     CV PCI STENT DRUG ELUTING N/A 4/11/2023    Procedure: Percutaneous  Coronary Intervention Stent;  Surgeon: Alejandro Hitchcock MD;  Location: [facility] CATH LAB CV     CYSTOSCOPY, FULGURATE BLEEDERS, EVACUATE CLOT(S), COMBINED N/A 6/19/2024    Procedure: CYSTOSCOPY, WITH FULGURATION OF HEMORRHAGING BLOOD VESSEL AND THROMBUS REMOVAL;  Surgeon: Bert Schmitz MD;  Location: [facility] Main OR     OTHER SURGICAL HISTORY      OTHER SURGICAL HISTORYstent placement in head      TONSILLECTOMY       Family History   Problem Relation Age of Onset     Colon Cancer Mother      Hypertension Father      Coronary Artery Disease Father      Aneurysm Sister      Cerebrovascular Disease Sister      No Known Problems Brother      Diabetes Son      No Known Problems Son      No Known Problems Son      Breast Cancer No family hx of      Prostate Cancer No family hx of     Social History     Socioeconomic History     Marital status:      Spouse name: Not on file     Number of children: Not on file     Years of education: Not on file     Highest education level: Not on file   Occupational History     Not on file   Tobacco Use     Smoking status: Former     Smokeless tobacco: Never   Vaping Use     Vaping status: Never Used   Substance and Sexual Activity     Alcohol use: Not on file     Comment: Alcoholic Drinks/day: 1 glass per week     Drug use: Not Currently     Sexual activity: Not Currently   Other Topics Concern     Not on file   Social History Narrative     Not on file     Social Drivers of Health     Financial Resource Strain: Low Risk  (3/13/2024)    Financial Resource Strain      Within the past 12 months, have you or your family members you live with been unable to get utilities (heat, electricity) when it was really needed?: No   Food Insecurity: Low Risk  (3/13/2024)    Food Insecurity      Within the past 12 months, did you worry that your food would run out before you got money to buy more?: No      Within the past 12 months, did the food you bought just not last and you  didn t have money to get more?: No   Transportation Needs: Low Risk  (3/13/2024)    Transportation Needs      Within the past 12 months, has lack of transportation kept you from medical appointments, getting your medicines, non-medical meetings or appointments, work, or from getting things that you need?: No   Physical Activity: Insufficiently Active (3/13/2024)    Exercise Vital Sign      Days of Exercise per Week: 1 day      Minutes of Exercise per Session: 20 min   Stress: Stress Concern Present (3/13/2024)    [employer] [place] [employer] - Occupational Stress Questionnaire      Feeling of Stress : To some extent   Social Connections: Unknown (3/13/2024)    Social Connection and Isolation Panel [NHANES]      Frequency of Communication with Friends and Family: Not on file      Frequency of Social Gatherings with Friends and Family: Once a week      Attends Sikh Services: Not on file      Active Member of Clubs or Organizations: Not on file      Attends Club or Organization Meetings: Not on file      Marital Status: Not on file   Interpersonal Safety: Low Risk  (11/27/2024)    Interpersonal Safety      Do you feel physically and emotionally safe where you currently live?: Yes      Within the past 12 months, have you been hit, slapped, kicked or otherwise physically hurt by someone?: No      Within the past 12 months, have you been humiliated or emotionally abused in other ways by your partner or ex-partner?: No   Housing Stability: Low Risk  (3/13/2024)    Housing Stability      Do you have housing? : Yes      Are you worried about losing your housing?: No          Medications  Allergies   Current Outpatient Medications   Medication Sig Dispense Refill     acetaminophen (TYLENOL) 500 MG tablet Take 1,000 mg by mouth every 6 hours as needed for mild pain       alendronate (FOSAMAX) 70 MG tablet TAKE 1 TABLET BY MOUTH ONCE A WEEK BEFORE BREAKFAST       aspirin 81 MG EC tablet Take 1 tablet (81 mg) by  "mouth daily 30 tablet 3     atorvastatin (LIPITOR) 20 MG tablet Take 1 tablet by mouth in the evening 90 tablet 0     calcium carbonate 500 mg, elemental, (OSCAL 500) 1250 (500 Ca) MG TABS tablet Take 1 tablet by mouth daily as needed (when remembers)       furosemide (LASIX) 20 MG tablet TAKE 1 TABLET BY MOUTH ONCE DAILY FOR INCREASED SWELLING, WEIGHT GAIN, OR SHORTNESS OF BREATH 90 tablet 3     hydroxychloroquine (PLAQUENIL) 200 MG tablet Take 200 mg by mouth daily       leucovorin (WELLCOVORIN) 5 mg tablet Take 5 mg by mouth three times a week Do not overlap days of methotrexate - Mon, Wed, and Saturday       losartan (COZAAR) 25 MG tablet Take 1 tablet by mouth once daily 90 tablet 0     methotrexate 2.5 MG tablet Take 12.5 mg by mouth twice a week 5 tablets (12.5mg) on Thursday and Friday       metoprolol succinate ER (TOPROL XL) 25 MG 24 hr tablet Take 3 tablets by mouth once daily 270 tablet 0     nitroGLYcerin (NITROSTAT) 0.4 MG sublingual tablet For chest pain place 1 tablet under the tongue every 5 minutes for 3 doses. If symptoms persist 5 minutes after 1st dose call 911. 25 tablet 1     vitamin B-12 (CYANOCOBALAMIN) 1000 MCG tablet Take 1,000 mcg by mouth as needed. (when remembers)       vitamin D3 (CHOLECALCIFEROL) 50 mcg (2000 units) tablet Take 1 tablet by mouth daily as needed (when remembers)       clotrimazole (LOTRIMIN) 1 % external cream Apply topically 2 times daily. For 1-2 weeks (Patient not taking: Reported on 3/20/2025) 15 g 0     tamsulosin (FLOMAX) 0.4 MG capsule Take 2 capsules (0.8 mg) by mouth daily (with dinner) (Patient not taking: Reported on 3/20/2025) 180 capsule 3    No Known Allergies      Lab Results    Chemistry/lipid CBC Cardiac Enzymes/BNP/TSH/INR   No results for input(s): \"CHOL\", \"HDL\", \"LDL\", \"TRIG\", \"CHOLHDLRATIO\" in the last 31486 hours.  No results for input(s): \"LDL\" in the last 65208 hours.  Recent Labs   Lab Test 09/05/24  0857 08/29/24  1444   NA  --  141 " "  POTASSIUM  --  4.2   CHLORIDE  --  106   CO2  --  23   GLC  --  92   BUN  --  25.3*   CR 1.57* 1.43*   GFRESTIMATED 43* 49*   ABBEY  --  8.7*     Recent Labs   Lab Test 09/05/24  0857 08/29/24  1444 06/25/24  1655   CR 1.57* 1.43* 1.63*     Recent Labs   Lab Test 06/19/24  0601   A1C 6.0*    Recent Labs   Lab Test 09/05/24  0857 08/29/24  1444   WBC  --  7.6   HGB 9.3* 11.1*   HCT  --  34.8*   MCV  --  100   PLT  --  153     Recent Labs   Lab Test 09/05/24  0857 08/29/24  1444 06/25/24  1655   HGB 9.3* 11.1* 10.0*    No results for input(s): \"TROPONINI\" in the last 49357 hours.  Recent Labs   Lab Test 06/19/24  0601 04/10/23  2209   NTBNPI 1,424 325     Recent Labs   Lab Test 02/22/24  1432   TSH 3.67     Recent Labs   Lab Test 04/10/23  2209   INR 1.19*        30 minutes spent on the date of encounter doing education, chart prep/review, review of outside records, review of test results, interpretation with above tests, patient visit, and documentation.      This note has been dictated using voice recognition software. Any grammatical or context distortions are unintentional and inherent to the software.    Rika Rawls PA-C  Structural Heart Program  [facility]       Thank you for allowing me to participate in the care of your patient.      Sincerely,     Rika Rawls PA-C     [facility]  cc:   Anthony Goodrich MD  [address] [facility] [address]  [place] [address]      "

## 2025-03-30 DIAGNOSIS — I48.20 CHRONIC ATRIAL FIBRILLATION (H): ICD-10-CM

## 2025-03-30 DIAGNOSIS — I10 BENIGN ESSENTIAL HYPERTENSION: ICD-10-CM

## 2025-03-31 RX ORDER — FUROSEMIDE 20 MG/1
TABLET ORAL
Qty: 90 TABLET | Refills: 1 | Status: SHIPPED | OUTPATIENT
Start: 2025-03-31

## 2025-04-23 DIAGNOSIS — R33.8 BENIGN PROSTATIC HYPERPLASIA WITH URINARY RETENTION: ICD-10-CM

## 2025-04-23 DIAGNOSIS — N40.1 BENIGN PROSTATIC HYPERPLASIA WITH URINARY RETENTION: ICD-10-CM

## 2025-04-23 NOTE — TELEPHONE ENCOUNTER
Requested Prescriptions   Pending Prescriptions Disp Refills    tamsulosin (FLOMAX) 0.4 MG capsule [Pharmacy Med Name: Tamsulosin HCl 0.4 MG Oral Capsule] 180 capsule 0     Sig: TAKE 2 CAPSULES BY MOUTH ONCE DAILY WITH SUPPER       Alpha Blockers Failed - 4/23/2025  1:55 PM        Failed - Most recent blood pressure under 140/90 in past 12 months     BP Readings from Last 3 Encounters:   03/20/25 (!) 155/87   11/27/24 (!) 140/73   10/30/24 136/80       No data recorded            Failed - Medication is active on med list and the sig matches. RN to manually verify dose and sig if red X/fail.     If the protocol passes (green check), you do not need to verify med dose and sig.    A prescription matches if they are the same clinical intention.    For Example: once daily and every morning are the same.    The protocol can not identify upper and lower case letters as matching and will fail.     For Example: Take 1 tablet (50 mg) by mouth daily     TAKE 1 TABLET (50 MG) BY MOUTH DAILY    For all fails (red x), verify dose and sig.    If the refill does match what is on file, the RN can still proceed to approve the refill request.       If they do not match, route to the appropriate provider.             Passed - Patient does not have Tadalafil, Vardenafil, or Sildenafil on their medication list        Passed - Recent (12 mo) or future (90 days) visit within the authorizing provider's specialty     The patient must have completed an in-person or virtual visit within the past 12 months or has a future visit scheduled within the next 90 days with the authorizing provider s specialty.  Urgent care and e-visits do not qualify as an office visit for this protocol.          Passed - Medication indicated for associated diagnosis     Medication is associated with one or more of the following diagnoses:  Benign prostatic hyperplasia  Disorder of the urinary system  Erectile dysfunction  Neurogenic bladder  Overactive  bladder  Raynaud s  Ureteral stent-related symptoms  Urinary retention  Posttraumatic Stress Disorder  Lower urinary tract symptoms  Hypertensive disorder  Urinary frequency due to benign prostatic hypertrophy          Passed - Patient is 18 years of age or older   Directions verified.       Julie Behrendt RN

## 2025-04-24 RX ORDER — TAMSULOSIN HYDROCHLORIDE 0.4 MG/1
CAPSULE ORAL
Qty: 180 CAPSULE | Refills: 0 | Status: SHIPPED | OUTPATIENT
Start: 2025-04-24

## 2025-04-30 DIAGNOSIS — I48.0 PAROXYSMAL ATRIAL FIBRILLATION (H): ICD-10-CM

## 2025-04-30 RX ORDER — METOPROLOL SUCCINATE 25 MG/1
75 TABLET, EXTENDED RELEASE ORAL DAILY
Qty: 270 TABLET | Refills: 0 | Status: SHIPPED | OUTPATIENT
Start: 2025-04-30

## 2025-06-05 ENCOUNTER — HOSPITAL ENCOUNTER (EMERGENCY)
Facility: CLINIC | Age: 85
Discharge: HOME | End: 2025-06-05
Payer: MEDICARE

## 2025-06-05 VITALS
RESPIRATION RATE: 18 BRPM | SYSTOLIC BLOOD PRESSURE: 123 MMHG | OXYGEN SATURATION: 97 % | HEART RATE: 81 BPM | DIASTOLIC BLOOD PRESSURE: 81 MMHG | TEMPERATURE: 97.7 F

## 2025-06-05 DIAGNOSIS — J30.2 SEASONAL ALLERGIES: ICD-10-CM

## 2025-06-05 DIAGNOSIS — R53.81 PHYSICAL DECONDITIONING: ICD-10-CM

## 2025-06-05 DIAGNOSIS — J40 BRONCHITIS: ICD-10-CM

## 2025-06-05 PROCEDURE — 99213 OFFICE O/P EST LOW 20 MIN: CPT

## 2025-06-05 PROCEDURE — G0463 HOSPITAL OUTPT CLINIC VISIT: HCPCS

## 2025-06-05 RX ORDER — PREDNISONE 20 MG/1
TABLET ORAL
Qty: 10 TABLET | Refills: 0 | Status: SHIPPED | OUTPATIENT
Start: 2025-06-05

## 2025-06-05 RX ORDER — CETIRIZINE HYDROCHLORIDE 10 MG/1
10 TABLET ORAL DAILY
Qty: 30 TABLET | Refills: 0 | Status: SHIPPED | OUTPATIENT
Start: 2025-06-05 | End: 2025-07-05

## 2025-06-05 NOTE — DISCHARGE INSTRUCTIONS
Start Zyrtec once daily at bedtime during allergy season.  Start prednisone once daily for the next 5 days for cough.  I recommend starting physical therapy soon as possible.

## 2025-06-05 NOTE — ED PROVIDER NOTES
History     Chief Complaint   Patient presents with    Cough     HPI  Felipe Verma is a 84 year old male who presents urgent care with chief complaint of cough.  Patient reports he has had a cough, rhinorrhea for over 1 month now.  Patient believes it is seasonal allergies.  He has been taking some cough medications without relief.  He reports cough is worse at nighttime and he is more congested when he wakes up in the mornings.  He is also here with his son who reports physical deconditioning over the last year.  He is concerned she is not walking as well as he used to.  Patient denies fever, chills, chest pain, shortness of breath, heart palpitations.    Allergies:  No Known Allergies    Problem List:    Patient Active Problem List    Diagnosis Date Noted    Physical deconditioning 06/06/2025     Priority: Medium    Presence of Watchman left atrial appendage closure device 10/30/2024     Priority: Medium     9/5/24 -27 mm Watchman FLX device      Chronic systolic heart failure (H) 10/30/2024     Priority: Medium    Permanent atrial fibrillation (H) 08/29/2024     Priority: Medium    Gross hematuria 06/19/2024     Priority: Medium    Acute urinary retention 06/19/2024     Priority: Medium    Nonsustained ventricular tachycardia (H) 06/19/2024     Priority: Medium    Paroxysmal atrial fibrillation (H) 02/22/2024     Priority: Medium    Elevated troponin 02/22/2024     Priority: Medium    Near syncope 02/22/2024     Priority: Medium    Edward hematuria 05/02/2023     Priority: Medium    Retention of urine 05/02/2023     Priority: Medium    NSTEMI -- S/P CANDIE x 3 to LAD on 4/10/23 04/10/2023     Priority: Medium    Rheumatoid arthritis involving multiple sites with positive rheumatoid factor (H) 03/16/2023     Priority: Medium    Localized swelling of left lower extremity 06/17/2021     Priority: Medium    Stage 3a chronic kidney disease (H) 06/17/2021     Priority: Medium    Benign essential hypertension  06/06/2018     Priority: Medium    Hyperlipidemia 06/06/2018     Priority: Medium    Impotence of organic origin 06/06/2018     Priority: Medium    Pure hypercholesterolemia 06/06/2018     Priority: Medium    Antiplatelet or antithrombotic long-term use 12/14/2016     Priority: Medium    Diverticulosis 05/05/2016     Priority: Medium    Brain aneurysm 05/03/2016     Priority: Medium    Dysarthria 05/03/2016     Priority: Medium    Facial droop 05/03/2016     Priority: Medium    History of subarachnoid hemorrhage 05/03/2016     Priority: Medium    BPH with urinary obstruction -- [place] on 4/10/23 04/20/2016     Priority: Medium    Hypertension 04/20/2016     Priority: Medium    Normocytic anemia 04/20/2016     Priority: Medium    Acute diverticulitis 04/20/2016     Priority: Medium        Past Medical History:    Past Medical History:   Diagnosis Date    Acute diverticulitis 04/20/2016    BPH with urinary obstruction     Brain aneurysm 05/03/2016    Chronic atrial fibrillation (H)     Coronary artery disease     Diverticulosis     Dysarthria 2016    Facial droop 2016    Heart attack (H) 04/10/2023    History of subarachnoid hemorrhage 05/03/2016    Hyperlipidemia     Hypertension     Lung nodule < 6cm on CT 06/06/2018    Normocytic anemia     Paroxysmal atrial fibrillation (H)     Pure hypercholesterolemia     Rheumatoid arthritis involving multiple sites with positive rheumatoid factor (H)     Stage 3a chronic kidney disease (CKD) (H)     Stented coronary artery        Past Surgical History:    Past Surgical History:   Procedure Laterality Date    CV CORONARY ANGIOGRAM N/A 4/11/2023    Procedure: Coronary Angiogram;  Surgeon: Alejandro Hitchcock MD;  Location: [facility] CATH LAB CV    CV LEFT ATRIAL APPENDAGE CLOSURE Right 9/5/2024    Procedure: Left Atrial Appendage Closure - ;  Surgeon: Anthony Goodrich MD;  Location: [facility] CATH LAB CV    CV PCI STENT DRUG ELUTING N/A 4/11/2023    Procedure: Percutaneous  Coronary Intervention Stent;  Surgeon: Alejandro Hitchcock MD;  Location: [facility] CATH LAB CV    CYSTOSCOPY, FULGURATE BLEEDERS, EVACUATE CLOT(S), COMBINED N/A 6/19/2024    Procedure: CYSTOSCOPY, WITH FULGURATION OF HEMORRHAGING BLOOD VESSEL AND THROMBUS REMOVAL;  Surgeon: Bert Schmitz MD;  Location: [facility] Main OR    OTHER SURGICAL HISTORY      OTHER SURGICAL HISTORYstent placement in head     TONSILLECTOMY         Family History:    Family History   Problem Relation Age of Onset    Colon Cancer Mother     Hypertension Father     Coronary Artery Disease Father     Aneurysm Sister     Cerebrovascular Disease Sister     No Known Problems Brother     Diabetes Son     No Known Problems Son     No Known Problems Son     Breast Cancer No family hx of     Prostate Cancer No family hx of        Social History:  Marital Status:   [2]  Social History     Tobacco Use    Smoking status: Former    Smokeless tobacco: Never   Vaping Use    Vaping status: Never Used   Substance Use Topics    Drug use: Not Currently        Medications:    cetirizine (ZYRTEC) 10 MG tablet  predniSONE (DELTASONE) 20 MG tablet  acetaminophen (TYLENOL) 500 MG tablet  alendronate (FOSAMAX) 70 MG tablet  aspirin 81 MG EC tablet  atorvastatin (LIPITOR) 20 MG tablet  calcium carbonate 500 mg, elemental, (OSCAL 500) 1250 (500 Ca) MG TABS tablet  clotrimazole (LOTRIMIN) 1 % external cream  furosemide (LASIX) 20 MG tablet  hydroxychloroquine (PLAQUENIL) 200 MG tablet  leucovorin (WELLCOVORIN) 5 mg tablet  losartan (COZAAR) 25 MG tablet  methotrexate 2.5 MG tablet  metoprolol succinate ER (TOPROL XL) 25 MG 24 hr tablet  nitroGLYcerin (NITROSTAT) 0.4 MG sublingual tablet  tamsulosin (FLOMAX) 0.4 MG capsule  vitamin B-12 (CYANOCOBALAMIN) 1000 MCG tablet  vitamin D3 (CHOLECALCIFEROL) 50 mcg (2000 units) tablet          Review of Systems   All other systems reviewed and are negative.      Physical Exam   BP: 123/81  Pulse: 81  Temp: 97.7  F  (36.5  C)  Resp: 18  SpO2: 97 %      Physical Exam  Vitals and nursing note reviewed.   Constitutional:       General: He is not in acute distress.     Appearance: Normal appearance. He is not ill-appearing or toxic-appearing.   HENT:      Head: Normocephalic.      Nose: Rhinorrhea present.      Mouth/Throat:      Mouth: Mucous membranes are moist.      Pharynx: Posterior oropharyngeal erythema present.   Cardiovascular:      Rate and Rhythm: Normal rate.      Pulses: Normal pulses.   Pulmonary:      Effort: No respiratory distress.      Breath sounds: No stridor. Wheezing (Mild expiratory wheezing on auscultation bilaterally) present. No rhonchi or rales.   Musculoskeletal:      Cervical back: Neck supple.   Neurological:      Mental Status: He is alert.      Motor: No weakness (5 out of 5 strength of upper and lower extremities bilaterally).   Psychiatric:         Mood and Affect: Mood normal.         Behavior: Behavior normal.         ED Course        Procedures        No results found for this or any previous visit (from the past 24 hours).    Medications - No data to display    Assessments & Plan (with Medical Decision Making)     I have reviewed the nursing notes.    I have reviewed the findings, diagnosis, plan and need for follow up with the patient.        Medical Decision Making     84 year old male who presents urgent care with chief complaint of cough.  Patient reports he has had a cough, rhinorrhea for over 1 month now.  Patient believes it is seasonal allergies.  He has been taking some cough medications without relief.  He reports cough is worse at nighttime and he is more congested when he wakes up in the mornings.  He is also here with his son who reports physical deconditioning over the last year.  He is concerned she is not walking as well as he used to.  Patient denies fever, chills, chest pain, shortness of breath, heart palpitations.    On exam patient is pleasant in no acute distress.  He does  have mild expiratory wheezing on auscultation bilaterally with rhinorrhea.  5 out of 5 strength of upper and lower extremities bilaterally.    I educated patient on seasonal allergies and recommended treatment with prednisone and Zyrtec today.  We also discussed starting therapy for physical deconditioning.  If new concerns develop patient should follow-up with PCP.      Prior to making a final disposition on this patient the results of patient's tests and other diagnostic studies were discussed with the patient. All questions were answered. Patient expressed understanding of the plan and was amenable to it.     Disclaimer: This note consists of symbols derived from keyboarding, dictation and/or voice recognition software. As a result, there may be errors in the script that have gone undetected. Please consider this when interpreting information found in this chart.      Discharge Medication List as of 6/5/2025  2:58 PM        START taking these medications    Details   cetirizine (ZYRTEC) 10 MG tablet Take 1 tablet (10 mg) by mouth daily., Disp-30 tablet, R-0, E-Prescribe      predniSONE (DELTASONE) 20 MG tablet Take two tablets (= 40mg) each day for 5 (five) days, Disp-10 tablet, R-0, E-Prescribe             Final diagnoses:   Seasonal allergies   Bronchitis   Physical deconditioning       6/5/2025   [facility] EMERGENCY DEPT       Dalila Alvarado PA-C  06/09/25 1532

## 2025-06-06 PROBLEM — R53.81 PHYSICAL DECONDITIONING: Status: ACTIVE | Noted: 2025-06-06

## 2025-06-12 DIAGNOSIS — E78.5 HYPERLIPIDEMIA, UNSPECIFIED HYPERLIPIDEMIA TYPE: ICD-10-CM

## 2025-06-12 DIAGNOSIS — I10 BENIGN ESSENTIAL HYPERTENSION: ICD-10-CM

## 2025-06-12 RX ORDER — LOSARTAN POTASSIUM 25 MG/1
25 TABLET ORAL DAILY
Qty: 90 TABLET | Refills: 0 | Status: SHIPPED | OUTPATIENT
Start: 2025-06-12

## 2025-06-12 RX ORDER — ATORVASTATIN CALCIUM 20 MG/1
20 TABLET, FILM COATED ORAL EVERY EVENING
Qty: 90 TABLET | Refills: 0 | Status: SHIPPED | OUTPATIENT
Start: 2025-06-12

## 2025-07-16 ENCOUNTER — OFFICE VISIT (OUTPATIENT)
Dept: FAMILY MEDICINE | Facility: CLINIC | Age: 85
End: 2025-07-16
Payer: MEDICARE

## 2025-07-16 VITALS
BODY MASS INDEX: 28.19 KG/M2 | SYSTOLIC BLOOD PRESSURE: 138 MMHG | TEMPERATURE: 98 F | DIASTOLIC BLOOD PRESSURE: 84 MMHG | HEIGHT: 68 IN | HEART RATE: 70 BPM | RESPIRATION RATE: 12 BRPM | WEIGHT: 186 LBS | OXYGEN SATURATION: 96 %

## 2025-07-16 DIAGNOSIS — N18.32 CHRONIC KIDNEY DISEASE, STAGE 3B (H): ICD-10-CM

## 2025-07-16 DIAGNOSIS — Z13.6 SCREENING FOR CARDIOVASCULAR CONDITION: ICD-10-CM

## 2025-07-16 DIAGNOSIS — Z00.00 ENCOUNTER FOR MEDICARE ANNUAL WELLNESS EXAM: Primary | ICD-10-CM

## 2025-07-16 DIAGNOSIS — I48.0 PAROXYSMAL ATRIAL FIBRILLATION (H): ICD-10-CM

## 2025-07-16 DIAGNOSIS — J30.1 SEASONAL ALLERGIC RHINITIS DUE TO POLLEN: ICD-10-CM

## 2025-07-16 DIAGNOSIS — I10 BENIGN ESSENTIAL HYPERTENSION: ICD-10-CM

## 2025-07-16 DIAGNOSIS — I48.20 CHRONIC ATRIAL FIBRILLATION (H): ICD-10-CM

## 2025-07-16 DIAGNOSIS — E78.00 PURE HYPERCHOLESTEROLEMIA: ICD-10-CM

## 2025-07-16 DIAGNOSIS — M05.79 RHEUMATOID ARTHRITIS INVOLVING MULTIPLE SITES WITH POSITIVE RHEUMATOID FACTOR (H): ICD-10-CM

## 2025-07-16 DIAGNOSIS — N18.31 STAGE 3A CHRONIC KIDNEY DISEASE (H): ICD-10-CM

## 2025-07-16 DIAGNOSIS — E78.5 HYPERLIPIDEMIA, UNSPECIFIED HYPERLIPIDEMIA TYPE: ICD-10-CM

## 2025-07-16 LAB
ANION GAP SERPL CALCULATED.3IONS-SCNC: 10 MMOL/L (ref 7–15)
BUN SERPL-MCNC: 24.8 MG/DL (ref 8–23)
CALCIUM SERPL-MCNC: 8.9 MG/DL (ref 8.8–10.4)
CHLORIDE SERPL-SCNC: 105 MMOL/L (ref 98–107)
CHOLEST SERPL-MCNC: 117 MG/DL
CREAT SERPL-MCNC: 1.64 MG/DL (ref 0.67–1.17)
CREAT UR-MCNC: 110.4 MG/DL
EGFRCR SERPLBLD CKD-EPI 2021: 41 ML/MIN/1.73M2
FASTING STATUS PATIENT QL REPORTED: YES
FASTING STATUS PATIENT QL REPORTED: YES
GLUCOSE SERPL-MCNC: 99 MG/DL (ref 70–99)
HCO3 SERPL-SCNC: 23 MMOL/L (ref 22–29)
HDLC SERPL-MCNC: 36 MG/DL
HGB BLD-MCNC: 11.6 G/DL (ref 13.3–17.7)
LDLC SERPL CALC-MCNC: 59 MG/DL
MCV RBC AUTO: 101 FL (ref 78–100)
MICROALBUMIN UR-MCNC: 21.2 MG/L
MICROALBUMIN/CREAT UR: 19.2 MG/G CR (ref 0–17)
NONHDLC SERPL-MCNC: 81 MG/DL
POTASSIUM SERPL-SCNC: 4.4 MMOL/L (ref 3.4–5.3)
SODIUM SERPL-SCNC: 138 MMOL/L (ref 135–145)
TRIGL SERPL-MCNC: 111 MG/DL

## 2025-07-16 PROCEDURE — 80048 BASIC METABOLIC PNL TOTAL CA: CPT | Performed by: FAMILY MEDICINE

## 2025-07-16 PROCEDURE — 3075F SYST BP GE 130 - 139MM HG: CPT | Performed by: FAMILY MEDICINE

## 2025-07-16 PROCEDURE — 3079F DIAST BP 80-89 MM HG: CPT | Performed by: FAMILY MEDICINE

## 2025-07-16 PROCEDURE — 36415 COLL VENOUS BLD VENIPUNCTURE: CPT | Performed by: FAMILY MEDICINE

## 2025-07-16 PROCEDURE — 82570 ASSAY OF URINE CREATININE: CPT | Performed by: FAMILY MEDICINE

## 2025-07-16 PROCEDURE — 99214 OFFICE O/P EST MOD 30 MIN: CPT | Mod: 25 | Performed by: FAMILY MEDICINE

## 2025-07-16 PROCEDURE — 85018 HEMOGLOBIN: CPT | Performed by: FAMILY MEDICINE

## 2025-07-16 PROCEDURE — 80061 LIPID PANEL: CPT | Performed by: FAMILY MEDICINE

## 2025-07-16 PROCEDURE — 82043 UR ALBUMIN QUANTITATIVE: CPT | Performed by: FAMILY MEDICINE

## 2025-07-16 PROCEDURE — G0439 PPPS, SUBSEQ VISIT: HCPCS | Performed by: FAMILY MEDICINE

## 2025-07-16 RX ORDER — METOPROLOL SUCCINATE 25 MG/1
75 TABLET, EXTENDED RELEASE ORAL DAILY
Qty: 270 TABLET | Refills: 3 | Status: SHIPPED | OUTPATIENT
Start: 2025-07-16

## 2025-07-16 RX ORDER — FUROSEMIDE 20 MG/1
TABLET ORAL
Qty: 90 TABLET | Refills: 1 | Status: SHIPPED | OUTPATIENT
Start: 2025-07-16

## 2025-07-16 RX ORDER — CETIRIZINE HYDROCHLORIDE 10 MG/1
10 TABLET ORAL 2 TIMES DAILY
Qty: 60 TABLET | Refills: 3 | Status: SHIPPED | OUTPATIENT
Start: 2025-07-16

## 2025-07-16 RX ORDER — ATORVASTATIN CALCIUM 20 MG/1
20 TABLET, FILM COATED ORAL EVERY EVENING
Qty: 90 TABLET | Refills: 3 | Status: SHIPPED | OUTPATIENT
Start: 2025-07-16

## 2025-07-16 RX ORDER — LOSARTAN POTASSIUM 25 MG/1
25 TABLET ORAL DAILY
Qty: 90 TABLET | Refills: 3 | Status: SHIPPED | OUTPATIENT
Start: 2025-07-16

## 2025-07-16 RX ORDER — OLOPATADINE HYDROCHLORIDE 1 MG/ML
1 SOLUTION OPHTHALMIC 2 TIMES DAILY
Qty: 5 ML | Refills: 3 | Status: SHIPPED | OUTPATIENT
Start: 2025-07-16

## 2025-07-16 SDOH — HEALTH STABILITY: PHYSICAL HEALTH: ON AVERAGE, HOW MANY DAYS PER WEEK DO YOU ENGAGE IN MODERATE TO STRENUOUS EXERCISE (LIKE A BRISK WALK)?: 0 DAYS

## 2025-07-16 SDOH — HEALTH STABILITY: PHYSICAL HEALTH: ON AVERAGE, HOW MANY MINUTES DO YOU ENGAGE IN EXERCISE AT THIS LEVEL?: 0 MIN

## 2025-07-16 ASSESSMENT — SOCIAL DETERMINANTS OF HEALTH (SDOH): HOW OFTEN DO YOU GET TOGETHER WITH FRIENDS OR RELATIVES?: ONCE A WEEK

## 2025-07-16 NOTE — PROGRESS NOTES
Preventive Care Visit  [facility] TRA Martinez MD, Family Medicine  Jul 16, 2025  {Provider  Link to SmartSet :461207}    {PROVIDER CHARTING PREFERENCE:160397}    Claudine Knott is a 84 year old, presenting for the following:  Medicare Visit        7/16/2025     9:46 AM   Additional Questions   Roomed by Karen DEVI, CMA        Wife passes away.   His oldest son lives with him.   He is living with his son   We did renew medications   He also was evaluated by physical therapy during this and he was given exercises       HPI        He is doing ok no chest pain  no shortness of breath     Advance Care Planning  Advance care planning document is on file but is outdated.  Patient encouraged to update or provider to update POLST.        7/16/2025   General Health   How would you rate your overall physical health? (!) FAIR   Feel stress (tense, anxious, or unable to sleep) Only a little   (!) STRESS CONCERN      7/16/2025   Nutrition   Diet: Low salt    Vegetarian/vegan    Breakfast skipped       Multiple values from one day are sorted in reverse-chronological order         7/16/2025   Exercise   Days per week of moderate/strenous exercise 0 days   Average minutes spent exercising at this level 0 min   (!) EXERCISE CONCERN      7/16/2025   Social Factors   Frequency of gathering with friends or relatives Once a week   Worry food won't last until get money to buy more No   Food not last or not have enough money for food? No   Do you have housing? (Housing is defined as stable permanent housing and does not include staying outside in a car, in a tent, in an abandoned building, in an overnight shelter, or couch-surfing.) Yes   Are you worried about losing your housing? No   Lack of transportation? No   Unable to get utilities (heat,electricity)? No         7/16/2025   Fall Risk   Fallen 2 or more times in the past year? No   Trouble with walking or balance? No          7/16/2025   Activities of  Daily Living- Home Safety   Needs help with the following daily activites None of the above   Safety concerns in the home None of the above         7/16/2025   Dental   Dentist two times every year? (!) NO         7/16/2025   Hearing Screening   Hearing concerns? (!) I FEEL THAT PEOPLE ARE MUMBLING OR NOT SPEAKING CLEARLY.   Would you like a referral for hearing testing? I already have hearing aids         7/16/2025   Driving Risk Screening   Patient/family members have concerns about driving No         7/16/2025   General Alertness/Fatigue Screening   Have you been more tired than usual lately? No         7/16/2025   Urinary Incontinence Screening   Bothered by leaking urine in past 6 months No         Today's PHQ-2 Score:       7/16/2025     9:50 AM   PHQ-2 ( 1999 Pf[employer])   Q1: Little interest or pleasure in doing things 1   Q2: Feeling down, depressed or hopeless 1   PHQ-2 Score 2    Q1: Little interest or pleasure in doing things Several days   Q2: Feeling down, depressed or hopeless Several days   PHQ-2 Score 2       Patient-reported           7/16/2025   Substance Use   Alcohol more than 3/day or more than 7/wk No   Do you have a current opioid prescription? No   How severe/bad is pain from 1 to 10? 1/10   Do you use any other substances recreationally? (!) OTHER     Social History     Tobacco Use    Smoking status: Former    Smokeless tobacco: Never   Vaping Use    Vaping status: Never Used   Substance Use Topics    Drug use: Not Currently     {Provider  If there are gaps in the social history shown above, please follow the link to update and then refresh the note Link to Social and Substance History :425754}    Known osteoporosisi    {Provider  REQUIRED FOR AWV Use the storyboard to review patient history, after sections have been marked as reviewed, refresh note to capture documentation:543276}  {Provider   REQUIRED AWV use this link to review and update sexual activity history  after section has been  "marked as reviewed, refresh note to capture documentation:792841}  Reviewed and updated as needed this visit by Provider                    {HISTORY OPTIONS (Optional):972254}  Current providers sharing in care for this patient include:  Patient Care Team:  Tessa Martinez MD as PCP - General (Family Medicine)  Darrell Lopez MD as Physician (Clinical Cardiac Electrophysiology)  Tessa Martinez MD as Assigned PCP  Rika Rawls PA-C as Assigned Heart and Vascular Provider    The following health maintenance items are reviewed in Epic and correct as of today:  Health Maintenance   Topic Date Due    HF ACTION PLAN  Never done    LIPID  Never done    MICROALBUMIN  Never done    ZOSTER VACCINE (1 of 2) 07/13/2016    ANNUAL REVIEW OF HM ORDERS  06/02/2024    BMP  02/28/2025    ALT  04/23/2025    COVID-19 VACCINE (6 - Pfizer risk 2024-25 season) 05/21/2025    HEMOGLOBIN  09/05/2025    CBC  08/29/2025    INFLUENZA VACCINE (1) 09/01/2025    MEDICARE ANNUAL WELLNESS VISIT  07/16/2026    FALL RISK ASSESSMENT  07/16/2026    DTAP/TDAP/TD VACCINE (3 - Td or Tdap) 05/24/2030    ADVANCE CARE PLANNING  06/06/2030    TSH W/FREE T4 REFLEX  Completed    PHQ-2 (once per calendar year)  Completed    PNEUMOCOCCAL VACCINE 50+ YEARS  Completed    URINALYSIS  Completed    RSV VACCINE  Completed    HPV VACCINE  Aged Out    MENINGITIS VACCINE  Aged Out         Review of Systems  Constitutional, HEENT, cardiovascular, pulmonary, gi and gu systems are negative, except as otherwise noted.     Objective    Exam  /84 (BP Location: Right arm, Patient Position: Sitting, Cuff Size: Adult Large)   Pulse 70   Temp 98  F (36.7  C) (Tympanic)   Resp 12   Ht 1.727 m (5' 8\")   Wt 84.4 kg (186 lb)   SpO2 96%   BMI 28.28 kg/m     Estimated body mass index is 28.28 kg/m  as calculated from the following:    Height as of this encounter: 1.727 m (5' 8\").    Weight as of this encounter: 84.4 kg (186 lb).    Physical Exam  GENERAL: alert " and no distress  EYES: injected conjunctiva and some mattering   RESP: lungs clear to auscultation - no rales, rhonchi or wheezes  CV: regular rate and rhythm, normal S1 S2, no S3 or S4, no murmur, click or rub, no peripheral edema   PSYCH: mentation appears normal, affect normal/bright         7/16/2025   Mini Cog   Clock Draw Score 2 Normal   3 Item Recall 3 objects recalled   Mini Cog Total Score 5     {A Mini-Cog total score of 0-2 suggests the possibility of dementia, score of 3-5 suggests no dementia:215167}         Signed Electronically by: Tessa Martinez MD  {Email feedback regarding this note to [email or web address]   :252456}   "Team:  Tessa Martinez MD as PCP - General (Family Medicine)  Darrell Lopez MD as Physician (Clinical Cardiac Electrophysiology)  Tessa Martinez MD as Assigned PCP  Rika Rawls PA-C as Assigned Heart and Vascular Provider    The following health maintenance items are reviewed in Epic and correct as of today:  Health Maintenance   Topic Date Due    HF ACTION PLAN  Never done    LIPID  Never done    MICROALBUMIN  Never done    ZOSTER VACCINE (1 of 2) 07/13/2016    ANNUAL REVIEW OF HM ORDERS  06/02/2024    BMP  02/28/2025    ALT  04/23/2025    COVID-19 VACCINE (6 - Pfizer risk 2024-25 season) 05/21/2025    HEMOGLOBIN  09/05/2025    CBC  08/29/2025    INFLUENZA VACCINE (1) 09/01/2025    MEDICARE ANNUAL WELLNESS VISIT  07/16/2026    FALL RISK ASSESSMENT  07/16/2026    DTAP/TDAP/TD VACCINE (3 - Td or Tdap) 05/24/2030    ADVANCE CARE PLANNING  06/06/2030    TSH W/FREE T4 REFLEX  Completed    PHQ-2 (once per calendar year)  Completed    PNEUMOCOCCAL VACCINE 50+ YEARS  Completed    URINALYSIS  Completed    RSV VACCINE  Completed    HPV VACCINE  Aged Out    MENINGITIS VACCINE  Aged Out         Review of Systems  Constitutional, HEENT, cardiovascular, pulmonary, gi and gu systems are negative, except as otherwise noted.     Objective    Exam  /84 (BP Location: Right arm, Patient Position: Sitting, Cuff Size: Adult Large)   Pulse 70   Temp 98  F (36.7  C) (Tympanic)   Resp 12   Ht 1.727 m (5' 8\")   Wt 84.4 kg (186 lb)   SpO2 96%   BMI 28.28 kg/m     Estimated body mass index is 28.28 kg/m  as calculated from the following:    Height as of this encounter: 1.727 m (5' 8\").    Weight as of this encounter: 84.4 kg (186 lb).    Physical Exam  GENERAL: alert and no distress  EYES: injected conjunctiva and some mattering   RESP: lungs clear to auscultation - no rales, rhonchi or wheezes  CV: regular rate and rhythm, normal S1 S2, no S3 or S4, no murmur, click or rub, no peripheral edema   PSYCH: mentation " appears normal, affect normal/bright         7/16/2025   Mini Cog   Clock Draw Score 2 Normal   3 Item Recall 3 objects recalled   Mini Cog Total Score 5              Signed Electronically by: Tessa Martinez MD

## 2025-07-16 NOTE — PATIENT INSTRUCTIONS
Patient Education  take the zyrtec/ cetirizine 2 times daily until it freezes  Preventive Care Advice   This is general advice given by our system to help you stay healthy. However, your care team may have specific advice just for you. Please talk to your care team about your preventive care needs.  Nutrition  Eat 5 or more servings of fruits and vegetables each day.  Try wheat bread, brown rice and whole grain pasta (instead of white bread, rice, and pasta).  Get enough calcium and vitamin D. Check the label on foods and aim for 100% of the RDA (recommended daily allowance).  Lifestyle  Exercise at least 150 minutes each week  (30 minutes a day, 5 days a week).  Do muscle strengthening activities 2 days a week. These help control your weight and prevent disease.  No smoking.  Wear sunscreen to prevent skin cancer.  Have a dental exam and cleaning every 6 months.  Yearly exams  See your health care team every year to talk about:  Any changes in your health.  Any medicines your care team has prescribed.  Preventive care, family planning, and ways to prevent chronic diseases.  Shots (vaccines)   HPV shots (up to age 26), if you've never had them before.  Hepatitis B shots (up to age 59), if you've never had them before.  COVID-19 shot: Get this shot when it's due.  Flu shot: Get a flu shot every year.  Tetanus shot: Get a tetanus shot every 10 years.  Pneumococcal, hepatitis A, and RSV shots: Ask your care team if you need these based on your risk.  Shingles shot (for age 50 and up)  General health tests  Diabetes screening:  Starting at age 35, Get screened for diabetes at least every 3 years.  If you are younger than age 35, ask your care team if you should be screened for diabetes.  Cholesterol test: At age 39, start having a cholesterol test every 5 years, or more often if advised.  Bone density scan (DEXA): At age 50, ask your care team if you should have this scan for osteoporosis (brittle bones).  Hepatitis C:  Get tested at least once in your life.  STIs (sexually transmitted infections)  Before age 24: Ask your care team if you should be screened for STIs.  After age 24: Get screened for STIs if you're at risk. You are at risk for STIs (including HIV) if:  You are sexually active with more than one person.  You don't use condoms every time.  You or a partner was diagnosed with a sexually transmitted infection.  If you are at risk for HIV, ask about PrEP medicine to prevent HIV.  Get tested for HIV at least once in your life, whether you are at risk for HIV or not.  Cancer screening tests  Cervical cancer screening: If you have a cervix, begin getting regular cervical cancer screening tests starting at age 21.  Breast cancer scan (mammogram): If you've ever had breasts, begin having regular mammograms starting at age 40. This is a scan to check for breast cancer.  Colon cancer screening: It is important to start screening for colon cancer at age 45.  Have a colonoscopy test every 10 years (or more often if you're at risk) Or, ask your provider about stool tests like a FIT test every year or Cologuard test every 3 years.  To learn more about your testing options, visit:   .  For help making a decision, visit:   https://bit.ly/et46145.  Prostate cancer screening test: If you have a prostate, ask your care team if a prostate cancer screening test (PSA) at age 55 is right for you.  Lung cancer screening: If you are a current or former smoker ages 50 to 80, ask your care team if ongoing lung cancer screenings are right for you.  For informational purposes only. Not to replace the advice of your health care provider. Copyright   2023 [employer]. All rights reserved. Clinically reviewed by the [facility] Transitions Program. [employer] 500760 - REV 01/24.

## 2025-07-24 ENCOUNTER — RESULTS FOLLOW-UP (OUTPATIENT)
Dept: FAMILY MEDICINE | Facility: CLINIC | Age: 85
End: 2025-07-24
Payer: MEDICARE

## 2025-07-24 NOTE — LETTER
July 24, 2025      Nikos Verma  [address] [place] [address]        Dear ,    We are writing to inform you of your test results.    Your lab results were normal/stable. Please feel free to my chart or call the office with questions.     Tessa Martinez M.D.    Resulted Orders   BASIC METABOLIC PANEL   Result Value Ref Range    Sodium 138 135 - 145 mmol/L    Potassium 4.4 3.4 - 5.3 mmol/L    Chloride 105 98 - 107 mmol/L    Carbon Dioxide (CO2) 23 22 - 29 mmol/L    Anion Gap 10 7 - 15 mmol/L    Urea Nitrogen 24.8 (H) 8.0 - 23.0 mg/dL    Creatinine 1.64 (H) 0.67 - 1.17 mg/dL    GFR Estimate 41 (L) >60 mL/min/1.73m2      Comment:      eGFR calculated using 2021 CKD-EPI equation.    Calcium 8.9 8.8 - 10.4 mg/dL    Glucose 99 70 - 99 mg/dL    Patient Fasting > 8hrs? Yes    Albumin Random Urine Quantitative with Creat Ratio   Result Value Ref Range    Creatinine Urine mg/dL 110.4 mg/dL      Comment:      The reference ranges have not been established in urine creatinine. The results should be integrated into the clinical context for interpretation.    Albumin Urine mg/L 21.2 mg/L      Comment:      The reference ranges have not been established in urine albumin. The results should be integrated into the clinical context for interpretation.    Albumin Urine mg/g Cr 19.20 (H) 0.00 - 17.00 mg/g Cr      Comment:      Microalbuminuria is defined as an albumin:creatinine ratio of 17 to 299 for males and 25 to 299 for females. A ratio of albumin:creatinine of 300 or higher is indicative of overt proteinuria.  Due to biologic variability, positive results should be confirmed by a second, first-morning random or 24-hour timed urine specimen. If there is discrepancy, a third specimen is recommended. When 2 out of 3 results are in the microalbuminuria range, this is evidence for incipient nephropathy and warrants increased efforts at glucose control, blood pressure control, and institution of therapy with  an angiotensin-converting-enzyme (ACE) inhibitor (if the patient can tolerate it).     Hemoglobin   Result Value Ref Range    Hemoglobin 11.6 (L) 13.3 - 17.7 g/dL     (H) 78 - 100 fL   Lipid panel reflex to direct LDL Fasting   Result Value Ref Range    Cholesterol 117 <200 mg/dL    Triglycerides 111 <150 mg/dL    Direct Measure HDL 36 (L) >=40 mg/dL    LDL Cholesterol Calculated 59 <100 mg/dL      Comment:      LDL calculated using the Friedewald equation.    Non HDL Cholesterol 81 <130 mg/dL    Patient Fasting > 8hrs? Yes     Narrative    Cholesterol  Desirable: < 200 mg/dL  Borderline High: 200 - 239 mg/dL  High: >= 240 mg/dL    Triglycerides  Normal: < 150 mg/dL  Borderline High: 150 - 199 mg/dL  High: 200-499 mg/dL  Very High: >= 500 mg/dL    Direct Measure HDL  Female: >= 50 mg/dL   Male: >= 40 mg/dL    LDL Cholesterol  Desirable: < 100 mg/dL  Above Desirable: 100 - 129 mg/dL   Borderline High: 130 - 159 mg/dL   High:  160 - 189 mg/dL   Very High: >= 190 mg/dL    Non HDL Cholesterol  Desirable: < 130 mg/dL  Above Desirable: 130 - 159 mg/dL  Borderline High: 160 - 189 mg/dL  High: 190 - 219 mg/dL  Very High: >= 220 mg/dL

## 2025-07-24 NOTE — RESULT ENCOUNTER NOTE
Felipe,  Your lab results were normal/stable. Please feel free to my chart or call the office with questions. Tessa Martinez M.D.

## (undated) DEVICE — GOWN IMPERVIOUS BREATHABLE SMART XLG 89045

## (undated) DEVICE — SYR ANGIOGRAPHY MULTIUSE KIT ACIST 014612

## (undated) DEVICE — SYR PISTON URETHRAL 60ML 68000

## (undated) DEVICE — GUIDEWIRE VASC 0.014INX180CM RUNTHROUGH 25-1011

## (undated) DEVICE — INTRODUCER CATH VASC 5FRX10CM  MPIS-501-NT-U-SST

## (undated) DEVICE — CUSTOM PACK CORONARY SAN5BCRHEA

## (undated) DEVICE — CATH BALLOON EMERGE 3.0X8MM H7493918908300

## (undated) DEVICE — CATH ANGIO INFINITI 3DRC 6FRX100CM 534676T

## (undated) DEVICE — ELECTRODE ADULT PACING MULTI P-211-M1

## (undated) DEVICE — TUBING SUCTION MEDI-VAC 1/4"X20' N620A

## (undated) DEVICE — SHEATH GLIDE RADIAL 6FR 25CM .035

## (undated) DEVICE — ELECTRODE DEFIB CADENCE 22550R

## (undated) DEVICE — MANIFOLD KIT ANGIO AUTOMATED 014613

## (undated) DEVICE — SHEATH FLEXOR CHECK-FLO 6FR .018 45CM

## (undated) DEVICE — CATH MICROCATH 1.9FRX135CML ASAHI CARAVEL CRV135-19P

## (undated) DEVICE — SOL WATER IRRIG 1000ML BOTTLE 2F7114

## (undated) DEVICE — SUCTION MANIFOLD NEPTUNE 2 SYS 1 PORT 702-025-000

## (undated) DEVICE — KIT HAND CONTROL ACIST 014644 AR-P54

## (undated) DEVICE — CATH BALLOON EMERGE 2.0X15MM H7493918915200

## (undated) DEVICE — TUBING IRRIG TUR Y TYPE 96" LF 6543-01

## (undated) DEVICE — SYR 30ML LL W/O NDL 302832

## (undated) DEVICE — DEVICE INFLATION SYR W/ HEMOSTASIS VALVE 12IN EXT IN4904

## (undated) DEVICE — CATH GUIDELINER 6FR 5571

## (undated) DEVICE — CATH FOLEY 3WAY 22FR 30ML LUBRICATH LATEX 0167L22

## (undated) DEVICE — STRAP CATH LEG ADJUSTABLE 0814-8200

## (undated) DEVICE — INTRO MICRO MINI STICK 4FR STIFF NITINOL 45-753

## (undated) DEVICE — ESU ELEC LOOP HF-RESECTION 24FR MED 30 W/CABLE WA22606S

## (undated) DEVICE — TRANSDUCER TRAY ARTERIAL 42646-06

## (undated) DEVICE — MAT FLOOR SURGICAL 40X38 0702140238

## (undated) DEVICE — CATH ANGIO JUDKINS JL4 6FRX100CM INFINITI 534620T

## (undated) DEVICE — CUSTOM PACK CYSTO PREFERRED SOT5BCYHEA

## (undated) DEVICE — GLOVE SURG PI ULTRA TOUCH M SZ 7-1/2 LF

## (undated) DEVICE — INTRO SHEATH 6FRX10CM PINNACLE RSS602

## (undated) DEVICE — TUBING SET THERMEDX UROLOGY SGL USE LL0006

## (undated) DEVICE — CATH BALLOON NC EMERGE 2.50X15MM H7493926715250

## (undated) DEVICE — WIRE GUIDE AMPLATZ SUPER STIFF 0.035"X180CM 46-501

## (undated) DEVICE — CATH ANGIO INFINITI PIGTAIL 6FRX110CM 6 SH 534650S

## (undated) DEVICE — ESU GROUND PAD ADULT REM W/15' CORD E7507DB

## (undated) DEVICE — SHEATH GUIDING VERSACROSS D1 CURVE L85 CM L180 CBL VXAK0003

## (undated) DEVICE — CATH BALLOON NC EMERGE 2.50X20MM H7493926720250

## (undated) DEVICE — SHEATH WITH DILATOR ACCESS SYSTEM FXD CRV DBL M635TU80020

## (undated) DEVICE — CATH BALLOON NC EMERGE 2.75X20MM H7493926720270

## (undated) DEVICE — SOLUTION IRRIG 2B7127 .9NS 3000ML BAG

## (undated) DEVICE — INTRODUCER CHECK FLO 16FRX30CM .038

## (undated) DEVICE — CABLE ENDOSPOPIC MNPLR 400CM URO HF RESCT WA00393S

## (undated) DEVICE — CATH BALLOON NC EMERGE 2.25X15MM H7493926715220

## (undated) DEVICE — WATCHDOG HEMOSTASIS VALVE

## (undated) DEVICE — CATH BALLOON EMERGE 2.5X12MM H7493918912250

## (undated) DEVICE — CATH LAUNCHER 6FR EBU 3.5 LA6EBU35

## (undated) DEVICE — CATH BALLOON NC EMERGE 3.00X15MM H7493926715300

## (undated) DEVICE — PREP DYNA-HEX 4% CHG SCRUB 4OZ BOTTLE MDS098710

## (undated) RX ORDER — ESMOLOL HYDROCHLORIDE 10 MG/ML
INJECTION INTRAVENOUS
Status: DISPENSED
Start: 2024-06-19

## (undated) RX ORDER — EPHEDRINE SULFATE 50 MG/ML
INJECTION, SOLUTION INTRAMUSCULAR; INTRAVENOUS; SUBCUTANEOUS
Status: DISPENSED
Start: 2024-09-05

## (undated) RX ORDER — CEFAZOLIN SODIUM/WATER 2 G/20 ML
SYRINGE (ML) INTRAVENOUS
Status: DISPENSED
Start: 2024-09-05

## (undated) RX ORDER — ASPIRIN 81 MG/1
TABLET ORAL
Status: DISPENSED
Start: 2024-09-05

## (undated) RX ORDER — PHENYLEPHRINE HYDROCHLORIDE 10 MG/ML
INJECTION INTRAVENOUS
Status: DISPENSED
Start: 2024-09-05

## (undated) RX ORDER — DEXAMETHASONE SODIUM PHOSPHATE 10 MG/ML
INJECTION, SOLUTION INTRAMUSCULAR; INTRAVENOUS
Status: DISPENSED
Start: 2024-06-19

## (undated) RX ORDER — LIDOCAINE HYDROCHLORIDE 10 MG/ML
INJECTION, SOLUTION EPIDURAL; INFILTRATION; INTRACAUDAL; PERINEURAL
Status: DISPENSED
Start: 2024-09-05

## (undated) RX ORDER — ONDANSETRON 2 MG/ML
INJECTION INTRAMUSCULAR; INTRAVENOUS
Status: DISPENSED
Start: 2024-06-19

## (undated) RX ORDER — ASPIRIN 81 MG/1
TABLET, CHEWABLE ORAL
Status: DISPENSED
Start: 2023-04-11

## (undated) RX ORDER — EPHEDRINE SULFATE 50 MG/ML
INJECTION, SOLUTION INTRAMUSCULAR; INTRAVENOUS; SUBCUTANEOUS
Status: DISPENSED
Start: 2024-06-19

## (undated) RX ORDER — FENTANYL CITRATE-0.9 % NACL/PF 10 MCG/ML
PLASTIC BAG, INJECTION (ML) INTRAVENOUS
Status: DISPENSED
Start: 2024-06-19

## (undated) RX ORDER — FENTANYL CITRATE 50 UG/ML
INJECTION, SOLUTION INTRAMUSCULAR; INTRAVENOUS
Status: DISPENSED
Start: 2024-06-19

## (undated) RX ORDER — FENTANYL CITRATE 50 UG/ML
INJECTION, SOLUTION INTRAMUSCULAR; INTRAVENOUS
Status: DISPENSED
Start: 2024-09-05

## (undated) RX ORDER — DEXAMETHASONE SODIUM PHOSPHATE 10 MG/ML
INJECTION, SOLUTION INTRAMUSCULAR; INTRAVENOUS
Status: DISPENSED
Start: 2024-09-05

## (undated) RX ORDER — PROPOFOL 10 MG/ML
INJECTION, EMULSION INTRAVENOUS
Status: DISPENSED
Start: 2024-06-19

## (undated) RX ORDER — ACETAMINOPHEN 325 MG/1
TABLET ORAL
Status: DISPENSED
Start: 2024-09-05

## (undated) RX ORDER — LIDOCAINE HYDROCHLORIDE 10 MG/ML
INJECTION, SOLUTION EPIDURAL; INFILTRATION; INTRACAUDAL; PERINEURAL
Status: DISPENSED
Start: 2024-06-19

## (undated) RX ORDER — PROTAMINE SULFATE 10 MG/ML
INJECTION, SOLUTION INTRAVENOUS
Status: DISPENSED
Start: 2024-09-05

## (undated) RX ORDER — PROPOFOL 10 MG/ML
INJECTION, EMULSION INTRAVENOUS
Status: DISPENSED
Start: 2024-09-05

## (undated) RX ORDER — ONDANSETRON 2 MG/ML
INJECTION INTRAMUSCULAR; INTRAVENOUS
Status: DISPENSED
Start: 2024-09-05

## (undated) RX ORDER — FENTANYL CITRATE 50 UG/ML
INJECTION, SOLUTION INTRAMUSCULAR; INTRAVENOUS
Status: DISPENSED
Start: 2023-04-11